# Patient Record
Sex: FEMALE | Race: BLACK OR AFRICAN AMERICAN | NOT HISPANIC OR LATINO | Employment: PART TIME | URBAN - METROPOLITAN AREA
[De-identification: names, ages, dates, MRNs, and addresses within clinical notes are randomized per-mention and may not be internally consistent; named-entity substitution may affect disease eponyms.]

---

## 2017-01-10 ENCOUNTER — TELEPHONE (OUTPATIENT)
Dept: OBGYN | Facility: CLINIC | Age: 32
End: 2017-01-10

## 2017-01-10 NOTE — TELEPHONE ENCOUNTER
Pt called to establish prenatal Care. Patient LMP was 16 . Pt complains of nausea/morning sickness.Patient is not taking prenatal vitamins. .

## 2017-01-11 ENCOUNTER — TELEPHONE (OUTPATIENT)
Dept: OBGYN | Facility: CLINIC | Age: 32
End: 2017-01-11

## 2017-01-11 DIAGNOSIS — Z34.91 NORMAL PREGNANCY IN FIRST TRIMESTER: Primary | ICD-10-CM

## 2017-01-30 ENCOUNTER — OFFICE VISIT (OUTPATIENT)
Dept: OBGYN | Facility: CLINIC | Age: 32
End: 2017-01-30
Attending: OBSTETRICS & GYNECOLOGY
Payer: COMMERCIAL

## 2017-01-30 VITALS
SYSTOLIC BLOOD PRESSURE: 129 MMHG | HEIGHT: 63 IN | HEART RATE: 87 BPM | DIASTOLIC BLOOD PRESSURE: 78 MMHG | WEIGHT: 151.6 LBS | BODY MASS INDEX: 26.86 KG/M2

## 2017-01-30 DIAGNOSIS — Z36.89 ENCOUNTER FOR FETAL ANATOMIC SURVEY: ICD-10-CM

## 2017-01-30 DIAGNOSIS — O09.291 H/O FETAL ANOMALY IN PRIOR PREGNANCY, CURRENTLY PREGNANT, FIRST TRIMESTER: ICD-10-CM

## 2017-01-30 DIAGNOSIS — Z36.82 ENCOUNTER FOR NUCHAL TRANSLUCENCY TESTING: ICD-10-CM

## 2017-01-30 DIAGNOSIS — Z86.32 HISTORY OF GESTATIONAL DIABETES MELLITUS (GDM) IN PRIOR PREGNANCY, CURRENTLY PREGNANT: ICD-10-CM

## 2017-01-30 DIAGNOSIS — O09.299 HISTORY OF GESTATIONAL DIABETES MELLITUS (GDM) IN PRIOR PREGNANCY, CURRENTLY PREGNANT: ICD-10-CM

## 2017-01-30 DIAGNOSIS — Z34.81 SUPERVISION OF NORMAL INTRAUTERINE PREGNANCY IN MULTIGRAVIDA, FIRST TRIMESTER: Primary | ICD-10-CM

## 2017-01-30 DIAGNOSIS — Z86.32 HISTORY OF GESTATIONAL DIABETES MELLITUS (GDM): ICD-10-CM

## 2017-01-30 DIAGNOSIS — O21.9 NAUSEA AND VOMITING DURING PREGNANCY PRIOR TO 22 WEEKS GESTATION: ICD-10-CM

## 2017-01-30 DIAGNOSIS — O09.91 PREGNANCY, SUPERVISION OF, HIGH-RISK, FIRST TRIMESTER: Primary | ICD-10-CM

## 2017-01-30 PROBLEM — Z34.80 SUPERVISION OF NORMAL INTRAUTERINE PREGNANCY IN MULTIGRAVIDA: Status: ACTIVE | Noted: 2017-01-30

## 2017-01-30 LAB
ABO + RH BLD: NORMAL
ABO + RH BLD: NORMAL
BASOPHILS # BLD AUTO: 0 10E9/L (ref 0–0.2)
BASOPHILS NFR BLD AUTO: 0.2 %
BLD GP AB SCN SERPL QL: NORMAL
BLOOD BANK CMNT PATIENT-IMP: NORMAL
DIFFERENTIAL METHOD BLD: NORMAL
EOSINOPHIL # BLD AUTO: 0.1 10E9/L (ref 0–0.7)
EOSINOPHIL NFR BLD AUTO: 0.9 %
ERYTHROCYTE [DISTWIDTH] IN BLOOD BY AUTOMATED COUNT: 13 % (ref 10–15)
HBA1C MFR BLD: 5.4 % (ref 4.3–6)
HCT VFR BLD AUTO: 38.8 % (ref 35–47)
HGB BLD-MCNC: 13 G/DL (ref 11.7–15.7)
IMM GRANULOCYTES # BLD: 0 10E9/L (ref 0–0.4)
IMM GRANULOCYTES NFR BLD: 0.3 %
LYMPHOCYTES # BLD AUTO: 2.2 10E9/L (ref 0.8–5.3)
LYMPHOCYTES NFR BLD AUTO: 33 %
MCH RBC QN AUTO: 28.9 PG (ref 26.5–33)
MCHC RBC AUTO-ENTMCNC: 33.5 G/DL (ref 31.5–36.5)
MCV RBC AUTO: 86 FL (ref 78–100)
MONOCYTES # BLD AUTO: 0.3 10E9/L (ref 0–1.3)
MONOCYTES NFR BLD AUTO: 3.8 %
NEUTROPHILS # BLD AUTO: 4.1 10E9/L (ref 1.6–8.3)
NEUTROPHILS NFR BLD AUTO: 61.8 %
NRBC # BLD AUTO: 0 10*3/UL
NRBC BLD AUTO-RTO: 0 /100
PLATELET # BLD AUTO: 211 10E9/L (ref 150–450)
RBC # BLD AUTO: 4.5 10E12/L (ref 3.8–5.2)
SPECIMEN EXP DATE BLD: NORMAL
WBC # BLD AUTO: 6.6 10E9/L (ref 4–11)

## 2017-01-30 PROCEDURE — 86850 RBC ANTIBODY SCREEN: CPT | Performed by: ADVANCED PRACTICE MIDWIFE

## 2017-01-30 PROCEDURE — 86780 TREPONEMA PALLIDUM: CPT | Performed by: ADVANCED PRACTICE MIDWIFE

## 2017-01-30 PROCEDURE — 86762 RUBELLA ANTIBODY: CPT | Performed by: ADVANCED PRACTICE MIDWIFE

## 2017-01-30 PROCEDURE — 85025 COMPLETE CBC W/AUTO DIFF WBC: CPT | Performed by: ADVANCED PRACTICE MIDWIFE

## 2017-01-30 PROCEDURE — 99212 OFFICE O/P EST SF 10 MIN: CPT | Mod: ZF

## 2017-01-30 PROCEDURE — 76801 OB US < 14 WKS SINGLE FETUS: CPT | Mod: ZF

## 2017-01-30 PROCEDURE — 86900 BLOOD TYPING SEROLOGIC ABO: CPT | Performed by: ADVANCED PRACTICE MIDWIFE

## 2017-01-30 PROCEDURE — 87340 HEPATITIS B SURFACE AG IA: CPT | Performed by: ADVANCED PRACTICE MIDWIFE

## 2017-01-30 PROCEDURE — 87389 HIV-1 AG W/HIV-1&-2 AB AG IA: CPT | Performed by: ADVANCED PRACTICE MIDWIFE

## 2017-01-30 PROCEDURE — 86901 BLOOD TYPING SEROLOGIC RH(D): CPT | Performed by: ADVANCED PRACTICE MIDWIFE

## 2017-01-30 PROCEDURE — 82306 VITAMIN D 25 HYDROXY: CPT | Performed by: ADVANCED PRACTICE MIDWIFE

## 2017-01-30 PROCEDURE — 87086 URINE CULTURE/COLONY COUNT: CPT | Performed by: ADVANCED PRACTICE MIDWIFE

## 2017-01-30 PROCEDURE — 83036 HEMOGLOBIN GLYCOSYLATED A1C: CPT | Performed by: ADVANCED PRACTICE MIDWIFE

## 2017-01-30 PROCEDURE — 36415 COLL VENOUS BLD VENIPUNCTURE: CPT | Performed by: ADVANCED PRACTICE MIDWIFE

## 2017-01-30 RX ORDER — PYRIDOXINE HCL (VITAMIN B6) 25 MG
25 TABLET ORAL 3 TIMES DAILY
Qty: 90 TABLET | Refills: 1 | Status: SHIPPED | OUTPATIENT
Start: 2017-01-30 | End: 2017-08-14

## 2017-01-30 RX ORDER — ONDANSETRON 4 MG/1
4 TABLET, FILM COATED ORAL EVERY 8 HOURS PRN
Qty: 30 TABLET | Refills: 1 | Status: SHIPPED | OUTPATIENT
Start: 2017-01-30 | End: 2017-08-14

## 2017-01-30 NOTE — PATIENT INSTRUCTIONS
"  Thank you for choosing [facility] for your obstetrical care.  We are an integrated health clinic with obstetricians, midwives, a psychologist, an acupuncturist, a nutritionist, a pharmacist, internal medicine and family practice all in one.  If you have questions about services offered please ask.      o Please keep all appointments with your provider.  You will help catch, prevent and treat problems early.  o Review your Expectant Family booklet and folder given to you at this intake visit.  It can answer many basic questions including:    Treatment for nausea and vomiting    Medications that are safe in pregnancy    Healthy diet and weight gain    Exercise and activity  o ASK questions!!  Please use \"Questions for my New OB visit\" form to write down any questions or concerns for your next visit.  o Eat a healthy diet.  Visit www.choosemyplate.gov and click on  Pregnancy and Breastfeeding  for information and tips  o Do not smoke.  Avoid other people's smoke, too.  We are happy to help with referrals to stop smoking programs.  o Do not drink alcohol.  o Try to avoid people who have colds or other infections.  Practice good hand washing.  o Consider registering for our Healthy Pregnancy Class here at [facility].  This class is offered every 3rd Wednesday from 2:30-4:30 p.m.  [place] at [phone] or online at [email or web address] or [email or web address]  o Consider registering for prenatal education classes through [place] at [facility].  You can view class schedules and register online at [email or web address] or call [phone] for questions     For urgent concerns, call [facility] at [phone] to speak with a triage nurse during regular clinic hours (8:00 am - 4:30 pm).  This line is answered by our service 24 hours a day, after hours a provider will return your call.  "

## 2017-01-30 NOTE — Clinical Note
2017       RE: Zaina Tan  [address]    [facility] [address]     Dear Colleague,    Thank you for referring your patient, Zaina Tan, to the [facility] at [facility]. Please see a copy of my visit note below.    31 year old female, , with LMP 2016, 8 3/7 weeks. Estimated Date of Delivery: 2017, presents for confirmation of dates and assessment of viability. This study was done transabdominally.    Measurements     CRL = 18.9 mm = 8 3/7 weeks  EGA.      Fetal anatomy appears normal for gestational age.     Fetal/Fetal Cardiac Activity: Present.  FHR = 182bpm.     Implantation: anterior.     Cervix = 37.1 cm      Maternal structures appear normal.    Impression: IUP w/ FAITH; 17    Recommend comprehensive scan at 18 to 20 weeks.    ROYCE Jolly MD, [facility] Specialists Staff  OB/GYN    2017  3:23 PM          Again, thank you for allowing me to participate in the care of your patient.      Sincerely,    [facility] Ultrasound

## 2017-01-30 NOTE — PROGRESS NOTES
31 year old female, , with LMP 2016, 8 3/7 weeks. Estimated Date of Delivery: 2017, presents for confirmation of dates and assessment of viability. This study was done transabdominally.    Measurements     CRL = 18.9 mm = 8 3/7 weeks  EGA.      Fetal anatomy appears normal for gestational age.     Fetal/Fetal Cardiac Activity: Present.  FHR = 182bpm.     Implantation: anterior.     Cervix = 37.1 cm      Maternal structures appear normal.    Impression: IUP w/ FAITH; 17    Recommend comprehensive scan at 18 to 20 weeks.    ROYCE Jolly MD, [facility] Specialists Staff  OB/GYN    2017  3:23 PM

## 2017-01-30 NOTE — MR AVS SNAPSHOT
"              After Visit Summary   1/30/2017    Zaina Tan    MRN: [id number]           Patient Information     Date Of Birth          1985        Visit Information        Provider Department      1/30/2017 3:00 PM Shahla West APRN CNM [facility]        Today's Diagnoses     Supervision of normal intrauterine pregnancy in multigravida, first trimester    -  1     History of gestational diabetes mellitus (GDM)         Nausea and vomiting during pregnancy prior to 22 weeks gestation         Encounter for nuchal translucency testing         Encounter for fetal anatomic survey         H/O fetal anomaly in prior pregnancy, currently pregnant, first trimester           Care Instructions      Thank you for choosing [facility] ([facility] for your obstetrical care.  We are an integrated health clinic with obstetricians, midwives, a psychologist, an acupuncturist, a nutritionist, a pharmacist, internal medicine and family practice all in one.  If you have questions about services offered please ask.      o Please keep all appointments with your provider.  You will help catch, prevent and treat problems early.  o Review your Expectant Family booklet and folder given to you at this intake visit.  It can answer many basic questions including:    Treatment for nausea and vomiting    Medications that are safe in pregnancy    Healthy diet and weight gain    Exercise and activity  o ASK questions!!  Please use \"Questions for my New OB visit\" form to write down any questions or concerns for your next visit.  o Eat a healthy diet.  Visit www.choosemyplate.gov and click on  Pregnancy and Breastfeeding  for information and tips  o Do not smoke.  Avoid other people's smoke, too.  We are happy to help with referrals to stop smoking programs.  o Do not drink alcohol.  o Try to avoid people who have colds or other infections.  Practice good hand washing.  o Consider registering for our " Healthy Pregnancy Class here at [facility].  This class is offered every 3rd Wednesday from 2:30-4:30 p.m.  [place] at [phone] or online at [email or web address] or [email or web address]  o Consider registering for prenatal education classes through [facility] at [facility].  You can view class schedules and register online at [email or web address] or call [phone] for questions     For urgent concerns, call [facility] at [phone] to speak with a triage nurse during regular clinic hours (8:00 am - 4:30 pm).  This line is answered by our service 24 hours a day, after hours a provider will return your call.        Follow-ups after your visit        Additional Services     MAT FETAL MED CTR REFERRAL-PREGNANCY       >> Patient may proceed with recommendations for further testing as directed by the Maternal Fetal Medicine Specialist >>    >> If requesting Fetal Echo: MFM will determine appropriate location for exam due to indication.    >> If requesting Lung Maturity Amnio:  If results indicate fetal lung maturity, induction or C/S is recommended within 36 hours.  Please schedule accordingly.     Dear Patient:   Please be aware that coverage of these services is subject to the terms and limitations of your health insurance plan.  Call member services at your health plan with any benefit or coverage questions.      Please bring the following to your appointment:    >>  Any x-rays, CTs or MRIs which have been performed.  Contact the facility where they were done to arrange for  prior to your scheduled appointment.  Any new CT, MRI or other procedures ordered by your specialist must be performed at a [facility] facility or coordinated by your clinic's referral office.  >>  List of current medications   >>  This referral request   >>  Any documents/labs given to you for this referral                  Follow-up notes from your care team     Discussed this visit Return in 3  "weeks (on 2/20/2017) for NOB.      Your next 10 appointments already scheduled     Feb 20, 2017  2:15 PM   NEW OB with RERE Dunn CNM   [facility] ([facility])    [facility] [address]  [address]  [facility] [id number] [phone]              Who to contact     Please call your clinic at [phone] to:    Ask questions about your health    Make or cancel appointments    Discuss your medicines    Learn about your test results    Speak to your doctor   If you have compliments or concerns about an experience at your clinic, or if you wish to file a complaint, please contact [facility] Patient Relations at [phone] or email us at [email or web address]         Additional Information About Your Visit        [employer] Information     Crowd[employer]t gives you secure access to your electronic health record. If you see a primary care provider, you can also send messages to your care team and make appointments. If you have questions, please call your primary care clinic.  If you do not have a primary care provider, please call [phone] and they will assist you.      Grow is an electronic gateway that provides easy, online access to your medical records. With Grow, you can request a clinic appointment, read your test results, renew a prescription or communicate with your care team.     To access your existing account, please contact your [facility] or call [phone] for assistance.        Care EveryWhere ID     This is your Care EveryWhere ID. This could be used by other organizations to access your [place] medical records  [id number]        Your Vitals Were     Pulse Height BMI (Body Mass Index)             87 1.6 m (5' 3\") 26.86 kg/m2          Blood Pressure from Last 3 Encounters:   01/30/17 129/78   01/25/16 128/68   01/11/16 129/77    Weight from Last 3 " Encounters:   01/30/17 68.765 kg (151 lb 9.6 oz)   01/25/16 65.318 kg (144 lb)   01/11/16 61.236 kg (135 lb)              We Performed the Following     25- OH-Vitamin D     ABO/Rh Type and Screen [BBH137]     Anti Treponema [THQ5837]     CBC with Platelets Differential [UMX041]     Hemoglobin A1c     Hepatitis B Surface Antigen [UKV234]     HIV Antigen Antibody Combo [ATJ8131]     MAT FETAL MED CTR REFERRAL-PREGNANCY     Rubella Antibody IgG Quantitative [BVS5293]     Urine Culture Aerobic Bacterial [POQ221]          Today's Medication Changes          These changes are accurate as of: 1/30/17  3:29 PM.  If you have any questions, ask your nurse or doctor.               Start taking these medicines.        Dose/Directions    ondansetron 4 MG tablet   Commonly known as:  ZOFRAN   Used for:  Nausea and vomiting during pregnancy prior to 22 weeks gestation   Started by:  Shahla West APRN CNM        Dose:  4 mg   Take 1 tablet (4 mg) by mouth every 8 hours as needed for nausea   Quantity:  30 tablet   Refills:  1       pyridOXINE 25 MG tablet   Commonly known as:  vitamin B-6   Used for:  Nausea and vomiting during pregnancy prior to 22 weeks gestation   Started by:  Shahla West APRN CNM        Dose:  25 mg   Take 1 tablet (25 mg) by mouth 3 times daily   Quantity:  90 tablet   Refills:  1         Stop taking these medicines if you haven't already. Please contact your care team if you have questions.     cholecalciferol 5000 UNITS Caps capsule   Commonly known as:  vitamin D3   Stopped by:  Shahla West APRN CNM           conjugated estrogens cream   Commonly known as:  PREMARIN   Stopped by:  Shahla West APRN CNM           docusate sodium 100 MG tablet   Commonly known as:  COLACE   Stopped by:  Shahla West APRN CNM           ferrous sulfate 325 (65 FE) MG tablet   Commonly known as:  IRON   Stopped by:  Shahla West APRN CNM                Where to get your  medicines      These medications were sent to [facility] - [place], [place] - [address] AT [facility] [address], [facility] [address]    Hours:  24-hours Phone:  [phone]    - ondansetron 4 MG tablet  - pyridOXINE 25 MG tablet             Primary Care Provider    Physician No Ref-Primary       No address on file        Thank you!     Thank you for choosing [facility]  for your care. Our goal is always to provide you with excellent care. Hearing back from our patients is one way we can continue to improve our services. Please take a few minutes to complete the written survey that you may receive in the mail after your visit with us. Thank you!             Your Updated Medication List - Protect others around you: Learn how to safely use, store and throw away your medicines at www.disposemymeds.org.          This list is accurate as of: 1/30/17  3:29 PM.  Always use your most recent med list.                   Brand Name Dispense Instructions for use    ondansetron 4 MG tablet    ZOFRAN    30 tablet    Take 1 tablet (4 mg) by mouth every 8 hours as needed for nausea       prenatal multivitamin  plus iron 27-0.8 MG Tabs per tablet      Take 1 tablet by mouth daily       pyridOXINE 25 MG tablet    vitamin B-6    90 tablet    Take 1 tablet (25 mg) by mouth 3 times daily

## 2017-01-30 NOTE — PROGRESS NOTES
Subjective   31 year old female who presents to clinic for initiation of OB care with her toddler daughter and her young niece.   at 8w3d with estimated date of delivery of Sep 8, 2017 based on US confirms.  Pregnancy is planned.  Symptoms since LMP include nausea and vomiting.  Patient has tried these relief measures: diet modification and small frequent meals.  Reviewed dating ultrasound.         PERSONAL/SOCIAL HISTORY  Pt is , Lives lives with their family.  Employment: Full time.  Her job involves moderate activity as nursing assistant.   Additional items: Denies past or present domestic violence, sexual and psychological abuse.    Objective  -VS: reviewed and within normal limits   -General appearance: no acute distress, patient is comfortable   NEUROLOGICAL/PSYCHIATRIC   - Orientated x3,   -Mood and affect: : normal   Genetic/Infection questionnaire completed, risks include personal history of GDM in second pregnancy.  History of pregnancy termination for hypoplastic left heart.     Assessment/Plan   at 8w3d  By US No LMP recorded. Patient is pregnant.   Encounter Diagnoses   Name Primary?     Supervision of normal intrauterine pregnancy in multigravida, first trimester Yes     History of gestational diabetes mellitus (GDM)      Nausea and vomiting during pregnancy prior to 22 weeks gestation      Encounter for nuchal translucency testing      Encounter for fetal anatomic survey      H/O fetal anomaly in prior pregnancy, currently pregnant, first trimester      History of gestational diabetes mellitus (GDM) in prior pregnancy, currently pregnant      Orders Placed This Encounter   Procedures     25- OH-Vitamin D     CBC with Platelets Differential [HMK087]     Anti Treponema [QSH0160]     Rubella Antibody IgG Quantitative [XWE4446]     Hepatitis B Surface Antigen [JNU790]     HIV Antigen Antibody Combo [ZEP7895]     Hemoglobin A1c     MAT FETAL MED CTR REFERRAL-PREGNANCY     ABO/Rh Type  and Screen [DCE032]     Orders Placed This Encounter   Medications     ondansetron (ZOFRAN) 4 MG tablet     Sig: Take 1 tablet (4 mg) by mouth every 8 hours as needed for nausea     Dispense:  30 tablet     Refill:  1     pyridOXINE (VITAMIN B-6) 25 MG tablet     Sig: Take 1 tablet (25 mg) by mouth 3 times daily     Dispense:  90 tablet     Refill:  1     In AM, afternoon, and at night.     - Reassured pt that team is aware of her past issues in pregnancy and prenatal plan will include early screening for diabetes in pregnancy and a fetal echocardiogram.    - Oriented to Practice, types of care, and how to reach a provider.   - Patient received 1st trimester new OB education packet complete with aide of The Expectant Family booklet including information on genetic screening test options.  - Patient desires 1st trimester screening which was ordered.  - Patient was encouraged to start prenatal vitamins as tolerated.    - Patient was sent to lab for routine OB labs including HgA1c.    - Patient instructed to schedule new OB exam with provider at 11 weeks.  - Pregnancy concerns to be addressed by provider at new OB exam include: personal history of GDM - will need early 1 hour, personal history of pregnancy termination for hypoplastic left heart - will need fetal echo.    Pt to RTO for NOB visit in 2 weeks and prn if questions or concerns    RERE Rios CNM

## 2017-01-30 NOTE — Clinical Note
Date:February 6, 2017      Patient was self referred, no letter generated. Do not send.        [facility] Health Information

## 2017-01-30 NOTE — Clinical Note
2017       RE: Zaina Tan  [address]    [facility] [address]     Dear Colleague,    Thank you for referring your patient, Zaina Tan, to the [facility] at [facility]. Please see a copy of my visit note below.      Subjective   31 year old female who presents to clinic for initiation of OB care with her toddler daughter and her young niece.   at 8w3d with estimated date of delivery of Sep 8, 2017 based on US confirms.  Pregnancy is planned.  Symptoms since LMP include nausea and vomiting.  Patient has tried these relief measures: diet modification and small frequent meals.  Reviewed dating ultrasound.         PERSONAL/SOCIAL HISTORY  Pt is , Lives lives with their family.  Employment: Full time.  Her job involves moderate activity as nursing assistant.   Additional items: Denies past or present domestic violence, sexual and psychological abuse.    Objective  -VS: reviewed and within normal limits   -General appearance: no acute distress, patient is comfortable   NEUROLOGICAL/PSYCHIATRIC   - Orientated x3,   -Mood and affect: : normal   Genetic/Infection questionnaire completed, risks include personal history of GDM in second pregnancy.  History of pregnancy termination for hypoplastic left heart.     Assessment/Plan   at 8w3d  By US No LMP recorded. Patient is pregnant.   Encounter Diagnoses   Name Primary?     Supervision of normal intrauterine pregnancy in multigravida, first trimester Yes     History of gestational diabetes mellitus (GDM)      Nausea and vomiting during pregnancy prior to 22 weeks gestation      Encounter for nuchal translucency testing      Encounter for fetal anatomic survey      H/O fetal anomaly in prior pregnancy, currently pregnant, first trimester      History of gestational diabetes mellitus (GDM) in prior pregnancy, currently pregnant      Orders Placed This Encounter   Procedures      25- OH-Vitamin D     CBC with Platelets Differential [QHP345]     Anti Treponema [OVO4129]     Rubella Antibody IgG Quantitative [FEU9717]     Hepatitis B Surface Antigen [PZT838]     HIV Antigen Antibody Combo [WAE3678]     Hemoglobin A1c     MAT FETAL MED CTR REFERRAL-PREGNANCY     ABO/Rh Type and Screen [BPV902]     Orders Placed This Encounter   Medications     ondansetron (ZOFRAN) 4 MG tablet     Sig: Take 1 tablet (4 mg) by mouth every 8 hours as needed for nausea     Dispense:  30 tablet     Refill:  1     pyridOXINE (VITAMIN B-6) 25 MG tablet     Sig: Take 1 tablet (25 mg) by mouth 3 times daily     Dispense:  90 tablet     Refill:  1     In AM, afternoon, and at night.     - Reassured pt that team is aware of her past issues in pregnancy and prenatal plan will include early screening for diabetes in pregnancy and a fetal echocardiogram.    - Oriented to Practice, types of care, and how to reach a provider.   - Patient received 1st trimester new OB education packet complete with aide of The Expectant Family booklet including information on genetic screening test options.  - Patient desires 1st trimester screening which was ordered.  - Patient was encouraged to start prenatal vitamins as tolerated.    - Patient was sent to lab for routine OB labs including HgA1c.    - Patient instructed to schedule new OB exam with provider at 11 weeks.  - Pregnancy concerns to be addressed by provider at new OB exam include: personal history of GDM - will need early 1 hour, personal history of pregnancy termination for hypoplastic left heart - will need fetal echo.    Pt to RTO for NOB visit in 2 weeks and prn if questions or concerns    RERE Rios CNM                      Again, thank you for allowing me to participate in the care of your patient.      Sincerely,    RERE Rios CNM

## 2017-01-30 NOTE — Clinical Note
Date:February 1, 2017      Patient was self referred, no letter generated. Do not send.        [facility] Health Information

## 2017-01-31 DIAGNOSIS — O26.90 PREGNANCY RELATED CONDITION, UNSPECIFIED TRIMESTER: Primary | ICD-10-CM

## 2017-01-31 PROBLEM — E55.9 VITAMIN D DEFICIENCY: Status: ACTIVE | Noted: 2017-01-31

## 2017-01-31 LAB
BACTERIA SPEC CULT: NO GROWTH
DEPRECATED CALCIDIOL+CALCIFEROL SERPL-MC: 25 UG/L (ref 20–75)
HBV SURFACE AG SERPL QL IA: NONREACTIVE
HIV 1+2 AB+HIV1 P24 AG SERPL QL IA: NORMAL
Lab: NORMAL
MICRO REPORT STATUS: NORMAL
RUBV IGG SERPL IA-ACNC: 110 IU/ML
SPECIMEN SOURCE: NORMAL
T PALLIDUM IGG+IGM SER QL: NEGATIVE

## 2017-02-20 ENCOUNTER — OFFICE VISIT (OUTPATIENT)
Dept: OBGYN | Facility: CLINIC | Age: 32
End: 2017-02-20
Attending: ADVANCED PRACTICE MIDWIFE
Payer: COMMERCIAL

## 2017-02-20 VITALS
DIASTOLIC BLOOD PRESSURE: 75 MMHG | HEART RATE: 69 BPM | HEIGHT: 62 IN | SYSTOLIC BLOOD PRESSURE: 117 MMHG | BODY MASS INDEX: 26.13 KG/M2 | WEIGHT: 142 LBS

## 2017-02-20 DIAGNOSIS — O09.299 HISTORY OF GESTATIONAL DIABETES MELLITUS (GDM) IN PRIOR PREGNANCY, CURRENTLY PREGNANT: ICD-10-CM

## 2017-02-20 DIAGNOSIS — R68.89 COLD FEELING: ICD-10-CM

## 2017-02-20 DIAGNOSIS — Z86.32 HISTORY OF GESTATIONAL DIABETES MELLITUS (GDM) IN PRIOR PREGNANCY, CURRENTLY PREGNANT: ICD-10-CM

## 2017-02-20 DIAGNOSIS — O09.91 SUPERVISION OF HIGH RISK PREGNANCY, ANTEPARTUM, FIRST TRIMESTER: Primary | ICD-10-CM

## 2017-02-20 DIAGNOSIS — E55.9 VITAMIN D DEFICIENCY: ICD-10-CM

## 2017-02-20 PROCEDURE — 87591 N.GONORRHOEAE DNA AMP PROB: CPT | Performed by: ADVANCED PRACTICE MIDWIFE

## 2017-02-20 PROCEDURE — 82950 GLUCOSE TEST: CPT | Performed by: ADVANCED PRACTICE MIDWIFE

## 2017-02-20 PROCEDURE — 99212 OFFICE O/P EST SF 10 MIN: CPT | Mod: ZF

## 2017-02-20 PROCEDURE — 87491 CHLMYD TRACH DNA AMP PROBE: CPT | Performed by: ADVANCED PRACTICE MIDWIFE

## 2017-02-20 ASSESSMENT — PAIN SCALES - GENERAL: PAINLEVEL: NO PAIN (0)

## 2017-02-20 NOTE — LETTER
2017       RE: Zaina Tan  [address]    [facility] [address]     Dear Colleague,    Thank you for referring your patient, Zaina Tan, to the [facility] at [facility]. Please see a copy of my visit note below.      SUBJECTIVE:    31 year old, female, , 11w3d,  who presents to the clinic today for a new ob visit.    Feels well. Has started PNV.  Estimated Date of Delivery: Sep 8, 2017   Sep 8, 2017 is calculated from No LMP recorded. Patient is pregnant..      She has not had bleeding since her LMP.   She has had mild nausea. Weight loss has not occurred.   This was a planned pregnancy.   FOB is involved,  Yaw   single  OTHER CONCERNS: Can't do early 1 hour today, will come back to have done next week.   Feeling cold at night, no fever or flu like symptoms.     ===========================================  ROS  PSYCHIATRIC:  Denies mood changes, difficulty concentrating, depression, anxiety, panic attacks or post partum depression  PHQ9: Last PHQ-9 score on record= 2  Social History   Substance Use Topics     Smoking status: Never Smoker     Smokeless tobacco: Never Used     Alcohol use No     History   Drug Use No     History   Smoking Status     Never Smoker   Smokeless Tobacco     Never Used       Alcohol use No     Family History   Problem Relation Age of Onset     Hypertension Mother      had before pregnancy     HEART DISEASE Other      hypoplastic left heart. TAB 23 wks      DIABETES No family hx of      ============================================  MEDICAL HISTORY   No Known Allergies        Current Outpatient Prescriptions:      cholecalciferol (VITAMIN D3) 5000 UNITS CAPS capsule, Take 1 capsule (5,000 Units) by mouth daily Take one capsule daily., Disp: 90 capsule, Rfl: 3     Prenatal Vit-Fe Fumarate-FA (PRENATAL MULTIVITAMIN  PLUS IRON) 27-0.8 MG TABS, Take 1 tablet by mouth daily, Disp: , Rfl:      ondansetron  "(ZOFRAN) 4 MG tablet, Take 1 tablet (4 mg) by mouth every 8 hours as needed for nausea (Patient not taking: Reported on 2017), Disp: 30 tablet, Rfl: 1     pyridOXINE (VITAMIN B-6) 25 MG tablet, Take 1 tablet (25 mg) by mouth 3 times daily (Patient not taking: Reported on 2017), Disp: 90 tablet, Rfl: 1  No current facility-administered medications for this visit.     Facility-Administered Medications Ordered in Other Visits:      lidocaine-EPINEPHrine 1.5 %-1:029578 injection, , EPIDURAL, Shanice MEJIA Cole Kiely, MD, 2 mL at 11/30/15 1202     bupivacaine (MARCAINE) 0.125 % injection (diluted from stock concentration by MD or CRNA), , EPIDURAL, Shanice MEJIA Cole Kiely, MD, 8 mL at 11/30/15 1202     fentaNYL (SUBLIMAZE) injection, , EPIDURAL, Shanice MEJIA Cole Kiely, MD, 16 mcg at 11/30/15 1202    Past Medical History   Diagnosis Date     NO ACTIVE PROBLEMS        Past Surgical History   Procedure Laterality Date     D & c       fetal hypoplastic leftheart syndrome            Obstetric History       T1      TAB0   SAB0   E0   M0   L1       # Outcome Date GA Lbr Alireza/2nd Weight Sex Delivery Anes PTL Lv   3 Current            2 Term 12/01/15 39w5d 11:55 / 01:02 3.43 kg (7 lb 9 oz) F Vag-Spont Local,EPI  Y      Apgar1:  4                Apgar5: 8   1 AB 14 23w0d                 GYN History- No Abnormal Pap Smears                        Cervical procedures: none                        History of STI: none    I personally reviewed the past social/family/medical and surgical history on the date of service.   I reviewed lab work done at Intake visit with patient.    OBJECTIVE:   PHYSICAL EXAM:  /75 (BP Location: Left arm, Patient Position: Chair, Cuff Size: Adult Regular)  Pulse 69  Ht 1.575 m (5' 2\")  Wt 64.4 kg (142 lb)  BMI 25.97 kg/m2  BMI- Body mass index is 25.97 kg/(m^2)., GENERAL:  Pleasant pregnant female, alert, cooperative and well groomed.  SKIN:  Warm and dry, without lesions " or rashes  HEAD: Symmetrical features.  MOUTH:  Buccal mucosa pink, moist without lesions.  Teeth in good repair.    NECK:  Thyroid without enlargement and nodules.  Lymph nodes not palpable.   LUNGS:  Clear to auscultation.  BREAST:    No dominant, fixed or suspicious masses are noted.  No skin or nipple changes or axillary nodes.   Nipples everted.      HEART:  RRR without murmur.  ABDOMEN: Soft without masses , tenderness or organomegaly.  No CVA tenderness.  Uterus not palpable at size equal to dates.  No scars noted.. Fetal heart tones present.  MUSCULOSKELETAL:  Full range of motion  EXTREMITIES:  No edema. No significant varicosities.   PELVIC EXAM:  GENITALIA: EGBUS  External genitalia, Bartholin's glands, urethra & [place]'s glands:normal. Vulva reveals no erythema or lesions.         VAGINA:  pink, normal rugae and discharge, no lesions, good tone.   CERVIX:  smooth, without discharge or CMT.                UTERUS: Anteverted and Midposition,  nontender 11 week size.   ADNEXA:  Without masses or tenderness.   RECTAL:  Normal appearance.  Digital exam deferred.  WET PREP:Not done  GC/CHLAMYDIA CULTURE OBTAINED:YES    ASSESSMENT:  Intrauterine pregnancy 11w3d size is consistent with dates  Genetic Screening: First Trimester Screen  Encounter Diagnoses   Name Primary?     Supervision of high risk pregnancy, antepartum, first trimester Yes     History of gestational diabetes mellitus (GDM) in prior pregnancy, currently pregnant      Cold feeling      Vitamin D deficiency         PLAN:  Orders Placed This Encounter   Procedures     TSH with free T4 reflex     Glucose 1 Hour     Orders Placed This Encounter   Medications     cholecalciferol (VITAMIN D3) 5000 UNITS CAPS capsule     Sig: Take 1 capsule (5,000 Units) by mouth daily Take one capsule daily.     Dispense:  90 capsule     Refill:  3     - Reviewed importance of early 1 hour glucose, will have done before 14 weeks.  - Reviewed use of triage nurse line and  contacting the on-call provider after hours for an urgent need such as fever, vagina bleeding, bladder or vaginal infection, rupture of membranes,  or term labor.    - Reviewed best evidence for: weight gain for her weight and height for pregnancy:  RECOMMENDED WEIGHT GAIN: 15-25 lbs.   healthy diet and foods to avoid; exercise and activity during pregnancy;avoiding exposure to toxoplasmosis; and maintenance of a generally healthy lifestyle.   - Discussed the harms, benefits, side effects and alternative therapies for current prescribed and OTC medications.    - All pt's questions discussed and answered.  Pt verbalized understanding of and agreement to plan of care.     - Continue scheduled prenatal care and prn if questions or concerns    RERE Rios CNM                 Again, thank you for allowing me to participate in the care of your patient.      Sincerely,    RERE Rios CNM

## 2017-02-20 NOTE — LETTER
Date:February 27, 2017      Patient was self referred, no letter generated. Do not send.        [facility] Health Information

## 2017-02-20 NOTE — MR AVS SNAPSHOT
"              After Visit Summary   2/20/2017    Zaina Tan    MRN: [id number]           Patient Information     Date Of Birth          1985        Visit Information        Provider Department      2/20/2017 2:15 PM Shahla West APRN CNM [facility]        Today's Diagnoses     Supervision of high risk pregnancy, antepartum, first trimester    -  1    History of gestational diabetes mellitus (GDM) in prior pregnancy, currently pregnant        Cold feeling        Vitamin D deficiency          Care Instructions      Thank you for choosing [facility] for your [facility]etrical care.  We are an integrated health clinic with obstetricians, midwives, a psychologist, an acupuncturist, a nutritionist, a pharmacist, internal medicine and family practice all in one.  If you have questions about services offered please ask.      o Please keep all appointments with your provider.  You will help catch, prevent and treat problems early.  o Review your Expectant Family booklet and folder given to you at this intake visit.  It can answer many basic questions including:    Treatment for nausea and vomiting    Medications that are safe in pregnancy    Healthy diet and weight gain    Exercise and activity  o ASK questions!!  Please use \"Questions for my New OB visit\" form to write down any questions or concerns for your next visit.  o Eat a healthy diet.  Visit www.choosemyplate.gov and click on  Pregnancy and Breastfeeding  for information and tips  o Do not smoke.  Avoid other people's smoke, too.  We are happy to help with referrals to stop smoking programs.  o Do not drink alcohol.  o Try to avoid people who have colds or other infections.  Practice good hand washing.  o Consider registering for our Healthy Pregnancy Class here at [facility].  This class is offered every 3rd Wednesday from 2:30-4:30 p.m.  [place] at [phone] or online at [email or web address] or " [email or web address]  o Consider registering for prenatal education classes through [employer] at [facility].  You can view class schedules and register online at [email or web address] or call [phone] for questions     For urgent concerns, call [facility] at [phone] to speak with a triage nurse during regular clinic hours (8:00 am - 4:30 pm).  This line is answered by our service 24 hours a day, after hours a provider will return your call.        Follow-ups after your visit        Follow-up notes from your care team     Will only call if abnormal Return in 4 weeks (on 3/20/2017) for ZEKE.      Your next 10 appointments already scheduled     Feb 27, 2017 12:45 PM CST   Genetic Counseling with REBEKAH GEN COUNSELOR 1   [facility] - [place] ([facility])    [address]  [facility] [address]   [phone]            Feb 27, 2017  1:30 PM CST   LEONEL NUCHAL TRANS W US SINGLE with [email or web address]   [facility] Ultrasound - [place] ([facility])    [address]  [facility] [id number] [phone]            Feb 27, 2017  2:00 PM CST   Radiology MD with REBEKAH CASTANEDA MD   [facility] [place] ([facility])    [address]  [facility] [address]   [phone]           Please arrive at the time given for your first appointment.  This visit is used internally to schedule the physician's time during your ultrasound.            Mar 24, 2017  2:15 PM CDT   RETURN OB with Dee Troncoso CNM   [facility]    [facility] [address]  [address]  [facility] [id number] [phone]              Who to contact     Please call your clinic at [phone] to:    Ask questions about your health    Make or cancel  "appointments    Discuss your medicines    Learn about your test results    Speak to your doctor   If you have compliments or concerns about an experience at your clinic, or if you wish to file a complaint, please contact [facility] Patient Relations at [phone] or email us at [email or web address]         Additional Information About Your Visit        [employer] Information     [employer] gives you secure access to your electronic health record. If you see a primary care provider, you can also send messages to your care team and make appointments. If you have questions, please call your primary care clinic.  If you do not have a primary care provider, please call [phone] and they will assist you.      [employer] is an electronic gateway that provides easy, online access to your medical records. With [employer], you can request a clinic appointment, read your test results, renew a prescription or communicate with your care team.     To access your existing account, please contact your [facility] Clinic or call [phone] for assistance.        Care EveryWhere ID     This is your Care EveryWhere ID. This could be used by other organizations to access your [place] medical records  [id number]        Your Vitals Were     Pulse Height BMI (Body Mass Index)             69 1.575 m (5' 2\") 25.97 kg/m2          Blood Pressure from Last 3 Encounters:   02/20/17 117/75   01/30/17 129/78   01/25/16 128/68    Weight from Last 3 Encounters:   02/20/17 64.4 kg (142 lb)   01/30/17 68.8 kg (151 lb 9.6 oz)   01/25/16 65.3 kg (144 lb)              We Performed the Following     Chlamydia Trachomatis PCR [[id number]]     Gonorrhea PCR [[id number]]          Today's Medication Changes          These changes are accurate as of: 2/20/17 11:59 PM.  If you have any questions, ask your nurse or doctor.               Start taking these medicines.        Dose/Directions    cholecalciferol " 5000 UNITS Caps capsule   Commonly known as:  vitamin D3   Used for:  Vitamin D deficiency   Started by:  Shahla West APRN CNM        Dose:  5000 Units   Take 1 capsule (5,000 Units) by mouth daily Take one capsule daily.   Quantity:  90 capsule   Refills:  3            Where to get your medicines      These medications were sent to [id number] - [place], [place] - [address] AT [facility] [address], [facility] [address]    Hours:  24-hours Phone:  [phone]     cholecalciferol 5000 UNITS Caps capsule                Primary Care Provider    Physician No Ref-Primary       No address on file        Thank you!     Thank you for choosing [facility]  for your care. Our goal is always to provide you with excellent care. Hearing back from our patients is one way we can continue to improve our services. Please take a few minutes to complete the written survey that you may receive in the mail after your visit with us. Thank you!             Your Updated Medication List - Protect others around you: Learn how to safely use, store and throw away your medicines at www.disposemymeds.org.          This list is accurate as of: 2/20/17 11:59 PM.  Always use your most recent med list.                   Brand Name Dispense Instructions for use    cholecalciferol 5000 UNITS Caps capsule    vitamin D3    90 capsule    Take 1 capsule (5,000 Units) by mouth daily Take one capsule daily.       ondansetron 4 MG tablet    ZOFRAN    30 tablet    Take 1 tablet (4 mg) by mouth every 8 hours as needed for nausea       prenatal multivitamin  plus iron 27-0.8 MG Tabs per tablet      Take 1 tablet by mouth daily       pyridOXINE 25 MG tablet    vitamin B-6    90 tablet    Take 1 tablet (25 mg) by mouth 3 times daily

## 2017-02-20 NOTE — PROGRESS NOTES
SUBJECTIVE:    31 year old, female, , 11w3d,  who presents to the clinic today for a new ob visit.    Feels well. Has started PNV.  Estimated Date of Delivery: Sep 8, 2017   Sep 8, 2017 is calculated from No LMP recorded. Patient is pregnant..      She has not had bleeding since her LMP.   She has had mild nausea. Weight loss has not occurred.   This was a planned pregnancy.   FOB is involved,  Yaw   single  OTHER CONCERNS: Can't do early 1 hour today, will come back to have done next week.   Feeling cold at night, no fever or flu like symptoms.     ===========================================  ROS  PSYCHIATRIC:  Denies mood changes, difficulty concentrating, depression, anxiety, panic attacks or post partum depression  PHQ9: Last PHQ-9 score on record= 2  Social History   Substance Use Topics     Smoking status: Never Smoker     Smokeless tobacco: Never Used     Alcohol use No     History   Drug Use No     History   Smoking Status     Never Smoker   Smokeless Tobacco     Never Used       Alcohol use No     Family History   Problem Relation Age of Onset     Hypertension Mother      had before pregnancy     HEART DISEASE Other      hypoplastic left heart. TAB 23 wks      DIABETES No family hx of      ============================================  MEDICAL HISTORY   No Known Allergies        Current Outpatient Prescriptions:      cholecalciferol (VITAMIN D3) 5000 UNITS CAPS capsule, Take 1 capsule (5,000 Units) by mouth daily Take one capsule daily., Disp: 90 capsule, Rfl: 3     Prenatal Vit-Fe Fumarate-FA (PRENATAL MULTIVITAMIN  PLUS IRON) 27-0.8 MG TABS, Take 1 tablet by mouth daily, Disp: , Rfl:      ondansetron (ZOFRAN) 4 MG tablet, Take 1 tablet (4 mg) by mouth every 8 hours as needed for nausea (Patient not taking: Reported on 2017), Disp: 30 tablet, Rfl: 1     pyridOXINE (VITAMIN B-6) 25 MG tablet, Take 1 tablet (25 mg) by mouth 3 times daily (Patient not taking: Reported on 2017), Disp: 90  "tablet, Rfl: 1  No current facility-administered medications for this visit.     Facility-Administered Medications Ordered in Other Visits:      lidocaine-EPINEPHrine 1.5 %-1:635990 injection, , EPIDURAL, Shanice MEJIA Cole Kiely, MD, 2 mL at 11/30/15 1202     bupivacaine (MARCAINE) 0.125 % injection (diluted from stock concentration by MD or CRNA), , EPIDURALJACKIE Deutz, Cole Kiely, MD, 8 mL at 11/30/15 1202     fentaNYL (SUBLIMAZE) injection, , EPIDURAL, Shanice MEJIA Cole Kiely, MD, 16 mcg at 11/30/15 1202    Past Medical History   Diagnosis Date     NO ACTIVE PROBLEMS        Past Surgical History   Procedure Laterality Date     D & c       fetal hypoplastic leftheart syndrome            Obstetric History       T1      TAB0   SAB0   E0   M0   L1       # Outcome Date GA Lbr Laireza/2nd Weight Sex Delivery Anes PTL Lv   3 Current            2 Term 12/01/15 39w5d 11:55 / 01:02 3.43 kg (7 lb 9 oz) F Vag-Spont Local,EPI  Y      Apgar1:  4                Apgar5: 8   1 AB 14 23w0d                 GYN History- No Abnormal Pap Smears                        Cervical procedures: none                        History of STI: none    I personally reviewed the past social/family/medical and surgical history on the date of service.   I reviewed lab work done at Intake visit with patient.    OBJECTIVE:   PHYSICAL EXAM:  /75 (BP Location: Left arm, Patient Position: Chair, Cuff Size: Adult Regular)  Pulse 69  Ht 1.575 m (5' 2\")  Wt 64.4 kg (142 lb)  BMI 25.97 kg/m2  BMI- Body mass index is 25.97 kg/(m^2)., GENERAL:  Pleasant pregnant female, alert, cooperative and well groomed.  SKIN:  Warm and dry, without lesions or rashes  HEAD: Symmetrical features.  MOUTH:  Buccal mucosa pink, moist without lesions.  Teeth in good repair.    NECK:  Thyroid without enlargement and nodules.  Lymph nodes not palpable.   LUNGS:  Clear to auscultation.  BREAST:    No dominant, fixed or suspicious masses are noted.  No skin " or nipple changes or axillary nodes.   Nipples everted.      HEART:  RRR without murmur.  ABDOMEN: Soft without masses , tenderness or organomegaly.  No CVA tenderness.  Uterus not palpable at size equal to dates.  No scars noted.. Fetal heart tones present.  MUSCULOSKELETAL:  Full range of motion  EXTREMITIES:  No edema. No significant varicosities.   PELVIC EXAM:  GENITALIA: EGBUS  External genitalia, Bartholin's glands, urethra & [place]'s glands:normal. Vulva reveals no erythema or lesions.         VAGINA:  pink, normal rugae and discharge, no lesions, good tone.   CERVIX:  smooth, without discharge or CMT.                UTERUS: Anteverted and Midposition,  nontender 11 week size.   ADNEXA:  Without masses or tenderness.   RECTAL:  Normal appearance.  Digital exam deferred.  WET PREP:Not done  GC/CHLAMYDIA CULTURE OBTAINED:YES    ASSESSMENT:  Intrauterine pregnancy 11w3d size is consistent with dates  Genetic Screening: First Trimester Screen  Encounter Diagnoses   Name Primary?     Supervision of high risk pregnancy, antepartum, first trimester Yes     History of gestational diabetes mellitus (GDM) in prior pregnancy, currently pregnant      Cold feeling      Vitamin D deficiency         PLAN:  Orders Placed This Encounter   Procedures     TSH with free T4 reflex     Glucose 1 Hour     Orders Placed This Encounter   Medications     cholecalciferol (VITAMIN D3) 5000 UNITS CAPS capsule     Sig: Take 1 capsule (5,000 Units) by mouth daily Take one capsule daily.     Dispense:  90 capsule     Refill:  3     - Reviewed importance of early 1 hour glucose, will have done before 14 weeks.  - Reviewed use of triage nurse line and contacting the on-call provider after hours for an urgent need such as fever, vagina bleeding, bladder or vaginal infection, rupture of membranes,  or term labor.    - Reviewed best evidence for: weight gain for her weight and height for pregnancy:  RECOMMENDED WEIGHT GAIN: 15-25 lbs.    healthy diet and foods to avoid; exercise and activity during pregnancy;avoiding exposure to toxoplasmosis; and maintenance of a generally healthy lifestyle.   - Discussed the harms, benefits, side effects and alternative therapies for current prescribed and OTC medications.    - All pt's questions discussed and answered.  Pt verbalized understanding of and agreement to plan of care.     - Continue scheduled prenatal care and prn if questions or concerns    RERE Rios CNM

## 2017-02-20 NOTE — PATIENT INSTRUCTIONS
"  Thank you for choosing [facility] for your [facility] care.  We are an integrated health clinic with obstetricians, midwives, a psychologist, an acupuncturist, a nutritionist, a pharmacist, internal medicine and family practice all in one.  If you have questions about services offered please ask.      o Please keep all appointments with your provider.  You will help catch, prevent and treat problems early.  o Review your Expectant Family booklet and folder given to you at this intake visit.  It can answer many basic questions including:    Treatment for nausea and vomiting    Medications that are safe in pregnancy    Healthy diet and weight gain    Exercise and activity  o ASK questions!!  Please use \"Questions for my New OB visit\" form to write down any questions or concerns for your next visit.  o Eat a healthy diet.  Visit www.choosemyplate.gov and click on  Pregnancy and Breastfeeding  for information and tips  o Do not smoke.  Avoid other people's smoke, too.  We are happy to help with referrals to stop smoking programs.  o Do not drink alcohol.  o Try to avoid people who have colds or other infections.  Practice good hand washing.  o Consider registering for our Healthy Pregnancy Class here at [facility].  This class is offered every 3rd Wednesday from 2:30-4:30 p.m.  [place] at [phone] or online at [email or web address] or [email or web address]  o Consider registering for prenatal education classes through [place] at [facility].  You can view class schedules and register online at [email or web address] or call [phone] for questions     For urgent concerns, call [facility] at [phone] to speak with a triage nurse during regular clinic hours (8:00 am - 4:30 pm).  This line is answered by our service 24 hours a day, after hours a provider will return your call.  "

## 2017-02-21 LAB
C TRACH DNA SPEC QL NAA+PROBE: NORMAL
N GONORRHOEA DNA SPEC QL NAA+PROBE: NORMAL
SPECIMEN SOURCE: NORMAL
SPECIMEN SOURCE: NORMAL

## 2017-02-22 ENCOUNTER — PRE VISIT (OUTPATIENT)
Dept: MATERNAL FETAL MEDICINE | Facility: CLINIC | Age: 32
End: 2017-02-22

## 2017-02-27 ENCOUNTER — OFFICE VISIT (OUTPATIENT)
Dept: MATERNAL FETAL MEDICINE | Facility: CLINIC | Age: 32
End: 2017-02-27
Attending: ADVANCED PRACTICE MIDWIFE
Payer: COMMERCIAL

## 2017-02-27 ENCOUNTER — HOSPITAL ENCOUNTER (OUTPATIENT)
Dept: ULTRASOUND IMAGING | Facility: CLINIC | Age: 32
Discharge: HOME | End: 2017-02-27
Attending: ADVANCED PRACTICE MIDWIFE | Admitting: OBSTETRICS & GYNECOLOGY
Payer: COMMERCIAL

## 2017-02-27 DIAGNOSIS — Z82.79 FAMILY HISTORY OF CONGENITAL HEART DEFECT: ICD-10-CM

## 2017-02-27 DIAGNOSIS — R68.89 COLD FEELING: ICD-10-CM

## 2017-02-27 DIAGNOSIS — O09.299 PREVIOUS PREGNANCY WITH CONGENITAL HEART DEFECT, CURRENTLY PREGNANT, UNSPECIFIED TRIMESTER: ICD-10-CM

## 2017-02-27 DIAGNOSIS — Z86.32 HISTORY OF GESTATIONAL DIABETES MELLITUS (GDM) IN PRIOR PREGNANCY, CURRENTLY PREGNANT: ICD-10-CM

## 2017-02-27 DIAGNOSIS — Z36.9 FIRST TRIMESTER SCREENING: Primary | ICD-10-CM

## 2017-02-27 DIAGNOSIS — O26.90 PREGNANCY RELATED CONDITION, UNSPECIFIED TRIMESTER: ICD-10-CM

## 2017-02-27 DIAGNOSIS — O09.299 HISTORY OF GESTATIONAL DIABETES MELLITUS (GDM) IN PRIOR PREGNANCY, CURRENTLY PREGNANT: ICD-10-CM

## 2017-02-27 DIAGNOSIS — O09.91 SUPERVISION OF HIGH RISK PREGNANCY, ANTEPARTUM, FIRST TRIMESTER: ICD-10-CM

## 2017-02-27 LAB
GLUCOSE 1H P 50 G GLC PO SERPL-MCNC: 87 MG/DL (ref 60–129)
TSH SERPL DL<=0.005 MIU/L-ACNC: 0.94 MU/L (ref 0.4–4)

## 2017-02-27 PROCEDURE — 76813 OB US NUCHAL MEAS 1 GEST: CPT

## 2017-02-27 PROCEDURE — 84704 HCG FREE BETACHAIN TEST: CPT | Performed by: OBSTETRICS & GYNECOLOGY

## 2017-02-27 PROCEDURE — 36415 COLL VENOUS BLD VENIPUNCTURE: CPT | Performed by: ADVANCED PRACTICE MIDWIFE

## 2017-02-27 PROCEDURE — 36415 COLL VENOUS BLD VENIPUNCTURE: CPT | Performed by: OBSTETRICS & GYNECOLOGY

## 2017-02-27 PROCEDURE — 84163 PAPPA SERUM: CPT | Performed by: OBSTETRICS & GYNECOLOGY

## 2017-02-27 PROCEDURE — 84443 ASSAY THYROID STIM HORMONE: CPT | Performed by: ADVANCED PRACTICE MIDWIFE

## 2017-02-27 PROCEDURE — 82950 GLUCOSE TEST: CPT | Performed by: ADVANCED PRACTICE MIDWIFE

## 2017-02-27 PROCEDURE — 96040 ZZH GENETIC COUNSELING, EACH 30 MINUTES: CPT | Mod: ZF | Performed by: GENETIC COUNSELOR, MS

## 2017-02-27 NOTE — MR AVS SNAPSHOT
After Visit Summary   2/27/2017    Zaina Tan    MRN: [id number]           Patient Information     Date Of Birth          1985        Visit Information        Provider Department      2/27/2017 12:45 PM Yvette Maharaj GC [facility] Maternal Fetal Medicine - [place]        Today's Diagnoses     First trimester screening    -  1    Pregnancy related condition, unspecified trimester        Family history of congenital heart defect           Follow-ups after your visit        Your next 10 appointments already scheduled     Mar 24, 2017  2:15 PM CDT   RETURN OB with Dee Troncoso CNM   [facility] ([facility])    [facility] [address]  [address]  [address]  [facility] [address] [phone]            Apr 17, 2017  1:30 PM CDT   LEONEL US COMP with URMFMUSR1   [facility] Maternal Fetal Medicine Ultrasound - [facility])    [address]  [facility] [id number] [phone]           Wear comfortable clothes and leave your valuables at home.            Apr 17, 2017  2:00 PM CDT   Radiology MD with UR LEONEL C[place]AND   [facility] Maternal Fetal Medicine - [place] ([facility])    [address]  [facility] [address] [phone]           Please arrive at the time given for your first appointment.  This visit is used internally to schedule the physician's time during your ultrasound.            May 05, 2017  2:00 PM CDT   Ech Fetal Complete* with URFETR1   [facility] Echo/EKG ([facility])    [address] [place] [address]  [facility] [address]                 Future tests that were ordered for you today     Open Future Orders        Priority Expected Expires Ordered    Echo Fetal Complete-Peds Cardiology Routine 5/8/2017 2/27/2018 2/27/2017            Who to contact     If you have questions or need follow up information about  today's clinic visit or your schedule please contact [facility] MATERNAL FETAL MEDICINE [facility] directly at [phone].  Normal or non-critical lab and imaging results will be communicated to you by [employer], letter or phone within 4 business days after the clinic has received the results. If you do not hear from us within 7 days, please contact the clinic through [employer] or phone. If you have a critical or abnormal lab result, we will notify you by phone as soon as possible.  Submit refill requests through [employer] or call your pharmacy and they will forward the refill request to us. Please allow 3 business days for your refill to be completed.          Additional Information About Your Visit        [employer] Information     [employer] gives you secure access to your electronic health record. If you see a primary care provider, you can also send messages to your care team and make appointments. If you have questions, please call your primary care clinic.  If you do not have a primary care provider, please call [phone] and they will assist you.        Care EveryWhere ID     This is your Care EveryWhere ID. This could be used by other organizations to access your [place] medical records  [id number]         Blood Pressure from Last 3 Encounters:   02/20/17 117/75   01/30/17 129/78   01/25/16 128/68    Weight from Last 3 Encounters:   02/20/17 64.4 kg (142 lb)   01/30/17 68.8 kg (151 lb 9.6 oz)   01/25/16 65.3 kg (144 lb)              We Performed the Following     [facility] Genetic Counseling        Primary Care Provider    Physician No Ref-Primary       No address on file        Thank you!     Thank you for choosing [facility]  for your care. Our goal is always to provide you with excellent care. Hearing back from our patients is one way we can continue to improve our services. Please take a few minutes to complete the written survey that you may receive in the mail after your visit  with us. Thank you!             Your Updated Medication List - Protect others around you: Learn how to safely use, store and throw away your medicines at www.disposemymeds.org.          This list is accurate as of: 2/27/17  3:11 PM.  Always use your most recent med list.                   Brand Name Dispense Instructions for use    cholecalciferol 5000 UNITS Caps capsule    vitamin D3    90 capsule    Take 1 capsule (5,000 Units) by mouth daily Take one capsule daily.       ondansetron 4 MG tablet    ZOFRAN    30 tablet    Take 1 tablet (4 mg) by mouth every 8 hours as needed for nausea       prenatal multivitamin  plus iron 27-0.8 MG Tabs per tablet      Take 1 tablet by mouth daily       pyridOXINE 25 MG tablet    vitamin B-6    90 tablet    Take 1 tablet (25 mg) by mouth 3 times daily

## 2017-02-27 NOTE — PROGRESS NOTES
"[facility] Maternal Fetal Medicine [place]  Genetic Counseling Consult    Patient: Zaina Tan YOB: 1985     Date of Service: 17     Referring Provider: Shahla West CNM        Zaina Tan was seen at [facility]etal [facility] Center for genetic consultation to discuss the options for routine screening for fetal chromosome abnormalities.  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      Impression/Plan:   1.  Zaina had an ultrasound and blood draw for first trimester screening.  Results are expected within one week, and will be available in EPIC.  We will contact her to discuss the results, and a copy will be forwarded to the office of the referring OB provider.    2.  Maternal serum AFP (single marker screen) is recommended after 15 weeks to screen for open neural tube defects.  A quad screen should not be performed.    3.  Due to Zaina's history of a baby with HLH, level II ultrasound has been scheduled for 17, and fetal echocardiogram has been scheduled for 17.    Pregnancy History:   /Parity:    Age at Delivery: 31 year old  FAITH: 2017, by Ultrasound  Gestational Age: 12w3d    No significant complications or exposures were reported in the current pregnancy.      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  Amniocentesis was completed, and chromosome results were normal (normal karyotype and normal microarray).  Zaina and her partner opted to terminate the pregnancy at 23 weeks via a D&E procedure (in [place]).  Zaina and her partner had a subsequent pregnancy that was uncomplicated and resulted in a full term delivery of a healthy daughter who is now 14 months of age.  Zaina reports that her daughter had a small \"hole in the heart\" that has spontaneously closed.  She has been followed by Dr. Marte (cardiology) due to the history of HLH in her " sibling.    Medical History:   Zaina s reported medical history is not expected to impact pregnancy management or risks to fetal development.  Zaina is 31 years of age and she reports good health.  Her partner and father of the baby, Bravo, is 34 years of age and he is healthy.       Family History:   A three-generation pedigree was obtained during Zaina's prior pregnancy.  The family history was updated today and this has been scanned into EPIC.  Zaina's partner has a brother with learning disabilities.  Otherwise, the reported family history is negative for multiple miscarriages, stillbirths, birth defects, mental retardation, known genetic conditions, and consanguinity.       Carrier Screening:   The patient reports that she and the father of the pregnancy have  ancestry:       The hemoglobinopathies are a group of genetic blood diseases that occur with increased frequency in individuals of  ancestry, and carrier screening for these conditions is available.  Carrier screening for the hemoglobinopathies includes a CBC with red blood cell indices, a ferritin level, and a quantitative hemoglobin electrophoresis or HPLC.  In addition,  screening in the [facility] includes many of the hemoglobinopathies.  Zaina has a normal MCV value, making it less likely for her to be a carrier of thalassemia.  She was informed about the option of hemoglobin electrophoresis testing to evaluate for sickle cell trait, and she did not express interest as of today.      Expanded carrier screening is available for a large panel of autosomal recessive conditions including cystic fibrosis, spinal muscular atrophy, and others.  Expanded carrier screening was not specifically discussed today, but remains an option for Zaina if she is interested.       Risk Assessment:   Zaina has had genetic counseling with Sindy Zambrano (in prior pregnancies) regarding the multifactorial inheritance associated with most cases  of hypoplastic left heart, and that the risk of recurrence in another pregnancy is in the range of 3-4%.  Some cases of HLH are due to an underlying chromosome abnormality or other genetic syndrome, though the normal amniocentesis results from the affected pregnancy suggest a relatively low likelihood that the HLH was syndromic.  In some families, left sided heart defects are associated with a relatively strong genetic susceptibility, and the risk of recurrence can be fairly significant in some families.  Given the history of HLH in Zaina's first pregnancy, as well as the history of a small hole in the heart for Zaina's daughter, the risk to the current pregnancy for any type of heart defect may be slightly higher than 3-4%.  Level II ultrasound and fetal echocardiogram are recommended and have been scheduled.    We reviewed the association between increasing maternal age and an increased risk for fetal chromosome abnormalities. We discussed specific features of common chromosome abnormalities including Down syndrome, trisomy 18, and trisomy 13.      At age 31 at midtrimester, the risk to have a baby with Down syndrome is about 1 in 508.     At age 31 at midtrimester, the risk to have a baby with any chromosome abnormality is about 1 in 254.       Testing Options:   We discussed the following options:     First trimester screening    First trimester ultrasound with nuchal translucency and nasal bone assessments, maternal plasma hCG, IVETTE-A, and AFP measurement    Screens for fetal trisomy 21, trisomy 13, and trisomy 18    Cannot screen for open neural tube defects; maternal serum AFP after 15 weeks is recommended     Non-invasive Prenatal Testing (NIPT)    Maternal plasma cell-free DNA testing; first trimester ultrasound with nuchal translucency and nasal bone assessment is recommended, when appropriate    Screens for fetal trisomy 21, trisomy 13, trisomy 18, and sex chromosome aneuploidy    Cannot screen for open  neural tube defects; maternal serum AFP after 15 weeks is recommended     Genetic Amniocentesis    Invasive procedure typically performed in the second trimester by which amniotic fluid is obtained for the purpose of chromosome analysis and/or other prenatal genetic analysis    Diagnostic results; >99% sensitivity for fetal chromosome abnormalities    AFAFP measurement tests for open neural tube defects     Comprehensive (Level II) ultrasound:     Detailed ultrasound performed between 18-22 weeks gestation to screen for major birth defects and markers for aneuploidy.      We reviewed the benefits and limitations of this testing.  Screening tests provide a risk assessment specific to the pregnancy for certain fetal chromosome abnormalities, but cannot definitively diagnose or exclude a fetal chromosome abnormality.  Follow-up genetic counseling and consideration of diagnostic testing is recommended with any abnormal screening result.      It was a [facility] be [facility].  Face-to-face time of the meeting was 35 minutes.      Yvette Maharaj, [facility]  Certified Genetic Counselor  Pager: [phone]

## 2017-02-27 NOTE — PROGRESS NOTES
"Please see \"Imaging\" tab under \"Chart Review\" for details of today's US.    Kalie Mendoza, DO    "

## 2017-03-02 ENCOUNTER — TELEPHONE (OUTPATIENT)
Dept: MATERNAL FETAL MEDICINE | Facility: CLINIC | Age: 32
End: 2017-03-02

## 2017-03-02 NOTE — TELEPHONE ENCOUNTER
I left Zaina a detailed message regarding her reassuring first trimester screen results.  Specifically, the screen risk of Down syndrome is less than 1 in 10,000 (vs. 1 in 458 age risk), and the screen risk of trisomy 18/13 is less than 1 in 10,000.  I explained that these results are very reassuring, though not definitive, and further aneuploidy screening/testing is not indicated at this time.  I encouraged Zaina to call me back if she has questions, or if she wants to review this result in more detail.    Serum AFP screening between 15-22 weeks is recommended to screen for open neural tube defects.      Yvette Maharaj MS, [facility]  Certified Genetic Counselor  March 2, 2017  11:53 AM

## 2017-03-06 LAB — LAB SCANNED RESULT: NORMAL

## 2017-03-24 ENCOUNTER — OFFICE VISIT (OUTPATIENT)
Dept: OBGYN | Facility: CLINIC | Age: 32
End: 2017-03-24
Attending: ADVANCED PRACTICE MIDWIFE
Payer: COMMERCIAL

## 2017-03-24 VITALS
HEIGHT: 62 IN | DIASTOLIC BLOOD PRESSURE: 66 MMHG | HEART RATE: 82 BPM | BODY MASS INDEX: 26.94 KG/M2 | SYSTOLIC BLOOD PRESSURE: 104 MMHG | WEIGHT: 146.4 LBS

## 2017-03-24 DIAGNOSIS — Z34.82 SUPERVISION OF NORMAL INTRAUTERINE PREGNANCY IN MULTIGRAVIDA, SECOND TRIMESTER: Primary | ICD-10-CM

## 2017-03-24 DIAGNOSIS — O09.291 H/O FETAL ANOMALY IN PRIOR PREGNANCY, CURRENTLY PREGNANT, FIRST TRIMESTER: ICD-10-CM

## 2017-03-24 ASSESSMENT — PAIN SCALES - GENERAL: PAINLEVEL: NO PAIN (0)

## 2017-03-24 NOTE — LETTER
"3/24/2017       RE: Zaina Tan  [address]    [facility] [address]     Dear Colleague,    Thank you for referring your patient, Zaina Tan, to the [facility] at [facility]. Please see a copy of my visit note below.    Subjective:      31 year old  at 16w4d presents for a routine prenatal appointment.        Denies cramping/contractions,  vaginal bleeding or leakage of fluid.   Not feeling fetal movement yet.   No HA, visual changes, RUQ or epigastric pain.     Pt concerns: Feeling well overall. Nausea is improving, has lost 3lbs total but has gained weight since last visit.   Level II US  Scheduled.  Fetal echo scheduled.  Offered AFP, pt declined.     Objective:  Vitals:    17 1421   BP: 104/66   BP Location: Left arm   Patient Position: Chair   Cuff Size: Adult Regular   Pulse: 82   Weight: 66.4 kg (146 lb 6.4 oz)   Height: 1.575 m (5' 2.01\")      See OB flowsheet    Assessment/Plan     Encounter Diagnoses   Name Primary?     Supervision of normal intrauterine pregnancy in multigravida, second trimester Yes     H/O fetal anomaly in prior pregnancy, currently pregnant, first trimester      Patient education/orders or handouts today:  PTL signs/symptoms, Fetal movement and Level 2 u/s scheduled  - Reviewed total weight gain, encouraged continued healthy diet and exercise.      - Reviewed why/how to contact provider.      Reviewed OBI/NOB labs.   Continue vitamin D supplementation.  Declined AFP.  Level 2 u/s scheduled for .  Fetal echo scheduled for .  Continue scheduled prenatal care, RTC in approx 4 weeks and prn if questions or concerns.     RERE Martinez CNM                  Again, thank you for allowing me to participate in the care of your patient.      Sincerely,    Dee Troncoso CNM      "

## 2017-03-24 NOTE — MR AVS SNAPSHOT
After Visit Summary   3/24/2017    Zaina Tan    MRN: [id number]           Patient Information     Date Of Birth          1985        Visit Information        Provider Department      3/24/2017 2:15 PM Dee Troncoso CNM [facility]        Today's Diagnoses     Supervision of normal intrauterine pregnancy in multigravida, second trimester    -  1    H/O fetal anomaly in prior pregnancy, currently pregnant, first trimester           Follow-ups after your visit        Follow-up notes from your care team     Return in about 4 weeks (around 4/21/2017) for AMANDA MOJICA.      Your next 10 appointments already scheduled     Apr 17, 2017  1:30 PM CDT   MFM US COMP with URMFMUSR1   [facility]etal [facility] Ultrasound - [facility])    [address]  [facility] [address] [phone]           Wear comfortable clothes and leave your valuables at home.            Apr 17, 2017  2:00 PM CDT   Radiology MD with UR LEONEL C[place]AND   [facility] Maternal [facility]etal Medicine - [facility])    [address]  [facility] [address] [phone]           Please arrive at the time given for your first appointment.  This visit is used internally to schedule the physician's time during your ultrasound.            Apr 25, 2017  2:15 PM CDT   RETURN OB with Dee Troncoso CNM   [facility] ([facility])    [facility] [address]  [address]  [address]  [facility] [address] [phone]            May 05, 2017  2:00 PM CDT   Ech Fetal Complete* with URFETR1   [facility] Echo/EKG ([facility])    [address]  [facility] [address]                 Who to contact     Please call your clinic at [phone] to:    Ask questions about your health    Make or cancel  "appointments    Discuss your medicines    Learn about your test results    Speak to your doctor   If you have compliments or concerns about an experience at your clinic, or if you wish to file a complaint, please contact [facility] Patient Relations at [phone] or email us at [email or web address]         Additional Information About Your Visit        MyChart Information     [employer] gives you secure access to your electronic health record. If you see a primary care provider, you can also send messages to your care team and make appointments. If you have questions, please call your primary care clinic.  If you do not have a primary care provider, please call [phone] and they will assist you.      [employer] is an electronic gateway that provides easy, online access to your medical records. With [employer], you can request a clinic appointment, read your test results, renew a prescription or communicate with your care team.     To access your existing account, please contact your [facility] or call [phone] for assistance.        Care EveryWhere ID     This is your Care EveryWhere ID. This could be used by other organizations to access your [place] medical records  [id number]        Your Vitals Were     Pulse Height BMI (Body Mass Index)             82 1.575 m (5' 2.01\") 26.77 kg/m2          Blood Pressure from Last 3 Encounters:   03/24/17 104/66   02/20/17 117/75   01/30/17 129/78    Weight from Last 3 Encounters:   03/24/17 66.4 kg (146 lb 6.4 oz)   02/20/17 64.4 kg (142 lb)   01/30/17 68.8 kg (151 lb 9.6 oz)              Today, you had the following     No orders found for display       Primary Care Provider    Physician No Ref-Primary       No address on file        Thank you!     Thank you for choosing [facility]  for your care. Our goal is always to provide you with excellent care. Hearing back from our patients " is one way we can continue to improve our services. Please take a few minutes to complete the written survey that you may receive in the mail after your visit with us. Thank you!             Your Updated Medication List - Protect others around you: Learn how to safely use, store and throw away your medicines at www.disposemymeds.org.          This list is accurate as of: 3/24/17 11:59 PM.  Always use your most recent med list.                   Brand Name Dispense Instructions for use    cholecalciferol 5000 UNITS Caps capsule    vitamin D3    90 capsule    Take 1 capsule (5,000 Units) by mouth daily Take one capsule daily.       ondansetron 4 MG tablet    ZOFRAN    30 tablet    Take 1 tablet (4 mg) by mouth every 8 hours as needed for nausea       prenatal multivitamin  plus iron 27-0.8 MG Tabs per tablet      Take 1 tablet by mouth daily       pyridOXINE 25 MG tablet    vitamin B-6    90 tablet    Take 1 tablet (25 mg) by mouth 3 times daily

## 2017-03-28 NOTE — PROGRESS NOTES
"Subjective:      31 year old  at 16w4d presents for a routine prenatal appointment.        Denies cramping/contractions,  vaginal bleeding or leakage of fluid.   Not feeling fetal movement yet.   No HA, visual changes, RUQ or epigastric pain.     Pt concerns: Feeling well overall. Nausea is improving, has lost 3lbs total but has gained weight since last visit.   Level II US  Scheduled.  Fetal echo scheduled.  Offered AFP, pt declined.     Objective:  Vitals:    17 1421   BP: 104/66   BP Location: Left arm   Patient Position: Chair   Cuff Size: Adult Regular   Pulse: 82   Weight: 66.4 kg (146 lb 6.4 oz)   Height: 1.575 m (5' 2.01\")      See OB flowsheet    Assessment/Plan     Encounter Diagnoses   Name Primary?     Supervision of normal intrauterine pregnancy in multigravida, second trimester Yes     H/O fetal anomaly in prior pregnancy, currently pregnant, first trimester      Patient education/orders or handouts today:  PTL signs/symptoms, Fetal movement and Level 2 u/s scheduled  - Reviewed total weight gain, encouraged continued healthy diet and exercise.      - Reviewed why/how to contact provider.      Reviewed OBI/NOB labs.   Continue vitamin D supplementation.  Declined AFP.  Level 2 u/s scheduled for .  Fetal echo scheduled for .  Continue scheduled prenatal care, RTC in approx 4 weeks and prn if questions or concerns.     Dee Troncoso, APRN, CNM                "

## 2017-04-17 ENCOUNTER — HOSPITAL ENCOUNTER (OUTPATIENT)
Dept: ULTRASOUND IMAGING | Facility: CLINIC | Age: 32
Discharge: HOME | End: 2017-04-17
Attending: ADVANCED PRACTICE MIDWIFE | Admitting: OBSTETRICS & GYNECOLOGY
Payer: COMMERCIAL

## 2017-04-17 ENCOUNTER — OFFICE VISIT (OUTPATIENT)
Dept: MATERNAL FETAL MEDICINE | Facility: CLINIC | Age: 32
End: 2017-04-17
Attending: ADVANCED PRACTICE MIDWIFE
Payer: COMMERCIAL

## 2017-04-17 DIAGNOSIS — O26.90 PREGNANCY RELATED CONDITION, UNSPECIFIED TRIMESTER: ICD-10-CM

## 2017-04-17 DIAGNOSIS — O09.299 PREVIOUS PREGNANCY WITH CONGENITAL HEART DEFECT, CURRENTLY PREGNANT, UNSPECIFIED TRIMESTER: Primary | ICD-10-CM

## 2017-04-17 PROCEDURE — 76811 OB US DETAILED SNGL FETUS: CPT

## 2017-04-17 NOTE — PROGRESS NOTES
"Please see \"Imaging\" tab under \"Chart Review\" for details of today's US at the [facility].    Santos Rodriguez MD  Maternal-Fetal Medicine      "

## 2017-04-17 NOTE — MR AVS SNAPSHOT
After Visit Summary   4/17/2017    Zaina Tan    MRN: [id number]           Patient Information     Date Of Birth          1985        Visit Information        Provider Department      4/17/2017 2:00 PM Santos Rodriguez MD [facility] Maternal Fetal Medicine [facility]        Today's Diagnoses     Previous pregnancy with congenital heart defect, currently pregnant, unspecified trimester    -  1       Follow-ups after your visit        Your next 10 appointments already scheduled     Apr 25, 2017  2:15 PM CDT   RETURN OB with Dee Troncoso CNM   [facility] ([facility])    [facility] [address]  [address]  [address]  [facility] [address] [phone]            May 05, 2017  2:00 PM CDT   Ech Fetal Complete* with URFETR1   [facility] Echo/EKG ([facility])    [address]  [facility] [address]                 Who to contact     If you have questions or need follow up information about today's clinic visit or your schedule please contact [facility] MATERNAL FETAL MEDICINE [facility] directly at [phone].  Normal or non-critical lab and imaging results will be communicated to you by [email or web address], letter or phone within 4 business days after the clinic has received the results. If you do not hear from us within 7 days, please contact the clinic through [email or web address] or phone. If you have a critical or abnormal lab result, we will notify you by phone as soon as possible.  Submit refill requests through [employer] or call your pharmacy and they will forward the refill request to us. Please allow 3 business days for your refill to be completed.          Additional Information About Your Visit        MyChart Information     [employer] gives you secure access to your electronic health record. If you see a primary care provider, you can also send messages to your care team and make appointments. If you have  questions, please call your primary care clinic.  If you do not have a primary care provider, please call [phone] and they will assist you.        Care EveryWhere ID     This is your Care EveryWhere ID. This could be used by other organizations to access your [place] medical records  [id number]         Blood Pressure from Last 3 Encounters:   03/24/17 104/66   02/20/17 117/75   01/30/17 129/78    Weight from Last 3 Encounters:   03/24/17 66.4 kg (146 lb 6.4 oz)   02/20/17 64.4 kg (142 lb)   01/30/17 68.8 kg (151 lb 9.6 oz)              Today, you had the following     No orders found for display       Primary Care Provider    Physician No Ref-Primary       No address on file        Thank you!     Thank you for choosing [facility]  for your care. Our goal is always to provide you with excellent care. Hearing back from our patients is one way we can continue to improve our services. Please take a few minutes to complete the written survey that you may receive in the mail after your visit with us. Thank you!             Your Updated Medication List - Protect others around you: Learn how to safely use, store and throw away your medicines at www.disposemymeds.org.          This list is accurate as of: 4/17/17  2:03 PM.  Always use your most recent med list.                   Brand Name Dispense Instructions for use    cholecalciferol 5000 UNITS Caps capsule    vitamin D3    90 capsule    Take 1 capsule (5,000 Units) by mouth daily Take one capsule daily.       ondansetron 4 MG tablet    ZOFRAN    30 tablet    Take 1 tablet (4 mg) by mouth every 8 hours as needed for nausea       prenatal multivitamin  plus iron 27-0.8 MG Tabs per tablet      Take 1 tablet by mouth daily       pyridOXINE 25 MG tablet    vitamin B-6    90 tablet    Take 1 tablet (25 mg) by mouth 3 times daily

## 2017-05-01 ENCOUNTER — OFFICE VISIT (OUTPATIENT)
Dept: OBGYN | Facility: CLINIC | Age: 32
End: 2017-05-01
Attending: ADVANCED PRACTICE MIDWIFE
Payer: COMMERCIAL

## 2017-05-01 VITALS
HEART RATE: 73 BPM | HEIGHT: 63 IN | DIASTOLIC BLOOD PRESSURE: 65 MMHG | BODY MASS INDEX: 27.11 KG/M2 | WEIGHT: 153 LBS | SYSTOLIC BLOOD PRESSURE: 107 MMHG

## 2017-05-01 DIAGNOSIS — Z86.32 HISTORY OF GESTATIONAL DIABETES MELLITUS (GDM) IN PRIOR PREGNANCY, CURRENTLY PREGNANT: ICD-10-CM

## 2017-05-01 DIAGNOSIS — D25.9 UTERINE LEIOMYOMA, UNSPECIFIED LOCATION: ICD-10-CM

## 2017-05-01 DIAGNOSIS — O09.291 H/O FETAL ANOMALY IN PRIOR PREGNANCY, CURRENTLY PREGNANT, FIRST TRIMESTER: ICD-10-CM

## 2017-05-01 DIAGNOSIS — O99.891 BACK PAIN AFFECTING PREGNANCY IN SECOND TRIMESTER: ICD-10-CM

## 2017-05-01 DIAGNOSIS — O09.299 HISTORY OF GESTATIONAL DIABETES MELLITUS (GDM) IN PRIOR PREGNANCY, CURRENTLY PREGNANT: ICD-10-CM

## 2017-05-01 DIAGNOSIS — M54.9 BACK PAIN AFFECTING PREGNANCY IN SECOND TRIMESTER: ICD-10-CM

## 2017-05-01 DIAGNOSIS — Z34.82 SUPERVISION OF NORMAL INTRAUTERINE PREGNANCY IN MULTIGRAVIDA, SECOND TRIMESTER: Primary | ICD-10-CM

## 2017-05-01 PROCEDURE — 99212 OFFICE O/P EST SF 10 MIN: CPT | Mod: ZF

## 2017-05-01 PROCEDURE — 99213 OFFICE O/P EST LOW 20 MIN: CPT

## 2017-05-01 ASSESSMENT — PAIN SCALES - GENERAL: PAINLEVEL: SEVERE PAIN (6)

## 2017-05-01 NOTE — LETTER
"2017       RE: Zaina Tan  [address]    [facility] [address]     Dear Colleague,    Thank you for referring your patient, Zaina Tan, to the [facility] at [facility]. Please see a copy of my visit note below.    Subjective:      31 year old  at 21w3d presents for a routine prenatal appointment.     no vaginal bleeding or leakage of fluid.  no contractions or cramping.  + fetal movement - a little every day.       No HA, visual changes, RUQ or epigastric pain.     The patient presents with the following concerns: Low back pain, worse on L side.  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  Her work schedule has several days blocked together.  Hasn't started wearing her maternity belt yet but will.  Took tylenol last 2 days ago - 1 extra strength and repeated 4 hours later.  Hasn't tried stretches or heat yet.   No s/s of UTI, no CVA tenderness.  Would like work modification letter.      Level II US  Results reviewed normal scan.   Has Fetal Echo scheduled 17.        Objective:  Vitals:    17 1351   BP: 107/65   Pulse: 73   Weight: 69.4 kg (153 lb)   Height: 1.6 m (5' 3\")     See OB flowsheet    Assessment/Plan     Encounter Diagnoses   Name Primary?     Supervision of normal intrauterine pregnancy in multigravida, second trimester Yes     Back pain affecting pregnancy in second trimester      Uterine leiomyoma, unspecified location      H/O fetal anomaly in prior pregnancy, currently pregnant, first trimester      History of gestational diabetes mellitus (GDM) in prior pregnancy, currently pregnant      - Reviewed total weight gain, encouraged continued healthy diet and exercise.    Reinforced heat to back, tylenol prn, stretches, maternity belt, limiting twisting/lifting.   PT encouraged, declined.  Work modification letter given.   - Reviewed why/how to contact provider.    Patient " education/orders or handouts today:PTL signs/symptoms, Fetal movement, PTL handout and Plan for EOB visit w labs   Return to clinic in 6 weeks and prn if questions or concerns.   RERE Rios CNM                  Again, thank you for allowing me to participate in the care of your patient.      Sincerely,    RERE Rios CNM

## 2017-05-01 NOTE — MR AVS SNAPSHOT
After Visit Summary   5/1/2017    Zaina Tan    MRN: [id number]           Patient Information     Date Of Birth          1985        Visit Information        Provider Department      5/1/2017 1:45 PM Shahla West APRN CNM [facility]        Today's Diagnoses     Supervision of normal intrauterine pregnancy in multigravida, second trimester    -  1       Follow-ups after your visit        Follow-up notes from your care team     Discussed this visit Return in about 6 weeks (around 6/12/2017) for EOB.      Your next 10 appointments already scheduled     May 16, 2017  1:00 PM CDT   Ech Fetal Complete* with URFETR1   [facility] Echo[facility]EKG ([facility])    [address]  [facility] [address]               Jun 12, 2017  1:30 PM CDT   RETURN OB with RERE Dunn CNM   [facility] ([facility])    [facility] [address]  [address]  [address]  [facility] [address] [phone]              Who to contact     Please call your clinic at [phone] to:    Ask questions about your health    Make or cancel appointments    Discuss your medicines    Learn about your test results    Speak to your doctor   If you have compliments or concerns about an experience at your clinic, or if you wish to file a complaint, please contact [facility] Patient Relations at [phone] or email us at [email or web address]         Additional Information About Your Visit        MyChart Information     [employer] gives you secure access to your electronic health record. If you see a primary care provider, you can also send messages to your care team and make appointments. If you have questions, please call your primary care clinic.  If you do not have a primary care provider, please call [phone] and they will assist you.      [employer] is an electronic gateway that  "provides easy, online access to your medical records. With [employer], you can request a clinic appointment, read your test results, renew a prescription or communicate with your care team.     To access your existing account, please contact your [facility] or call [phone] for assistance.        Care EveryWhere ID     This is your Care EveryWhere ID. This could be used by other organizations to access your [place] medical records  [id number]        Your Vitals Were     Pulse Height BMI (Body Mass Index)             73 1.6 m (5' 3\") 27.1 kg/m2          Blood Pressure from Last 3 Encounters:   05/01/17 107/65   03/24/17 104/66   02/20/17 117/75    Weight from Last 3 Encounters:   05/01/17 69.4 kg (153 lb)   03/24/17 66.4 kg (146 lb 6.4 oz)   02/20/17 64.4 kg (142 lb)              Today, you had the following     No orders found for display       Primary Care Provider    Physician No Ref-Primary       No address on file        Thank you!     Thank you for choosing [facility]  for your care. Our goal is always to provide you with excellent care. Hearing back from our patients is one way we can continue to improve our services. Please take a few minutes to complete the written survey that you may receive in the mail after your visit with us. Thank you!             Your Updated Medication List - Protect others around you: Learn how to safely use, store and throw away your medicines at www.disposemymeds.org.          This list is accurate as of: 5/1/17  2:18 PM.  Always use your most recent med list.                   Brand Name Dispense Instructions for use    cholecalciferol 5000 UNITS Caps capsule    vitamin D3    90 capsule    Take 1 capsule (5,000 Units) by mouth daily Take one capsule daily.       ondansetron 4 MG tablet    ZOFRAN    30 tablet    Take 1 tablet (4 mg) by mouth every 8 hours as needed for nausea       prenatal multivitamin  plus iron 27-0.8 MG " Tabs per tablet      Take 1 tablet by mouth daily       pyridOXINE 25 MG tablet    vitamin B-6    90 tablet    Take 1 tablet (25 mg) by mouth 3 times daily

## 2017-05-01 NOTE — LETTER
Date:May 3, 2017      Patient was self referred, no letter generated. Do not send.        [facility] Health Information

## 2017-05-01 NOTE — LETTER
May 1, 2017        Zaina Tan  [address]  [facility] [address]    To Whom it May Concern:    I am one of the medical provider treating Zaina Tan for prenatal care.  She seen in our office on 5/1/2017 and was given the following instructions. Ms. Tan has a health condition that requires accomodation.  Specifically can not stand for an hour without fifteen minute break to sit, may not lift 50lbs more than three times a shift and must be assisted with all lifting over 25lbs.  Additionally she may not be put at risk of being kicked in the stomach.    Ms. Tan is able to continue working with reasonable accomodation.  We recommend the following accomodation - change to scheduling to have no more than two shifts in a row, prefer one shift followed by break day if possible.      This limitation began on 5/1/2017. At this time, I anticipate that Ms. Tan will need an accomodation for her limitation until her  Estimated Date of Delivery: Sep 8, 2017.    Please feel free to contact us should you require additional information or clarification.         Sincerely,        RERE Alfaro CNM

## 2017-05-01 NOTE — PROGRESS NOTES
"Subjective:      31 year old  at 21w3d presents for a routine prenatal appointment.     no vaginal bleeding or leakage of fluid.  no contractions or cramping.  + fetal movement - a little every day.       No HA, visual changes, RUQ or epigastric pain.     The patient presents with the following concerns: Low back pain, worse on L side.  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  Her work schedule has several days blocked together.  Hasn't started wearing her maternity belt yet but will.  Took tylenol last 2 days ago - 1 extra strength and repeated 4 hours later.  Hasn't tried stretches or heat yet.   No s/s of UTI, no CVA tenderness.  Would like work modification letter.      Level II US  Results reviewed normal scan.   Has Fetal Echo scheduled 17.        Objective:  Vitals:    17 1351   BP: 107/65   Pulse: 73   Weight: 69.4 kg (153 lb)   Height: 1.6 m (5' 3\")     See OB flowsheet    Assessment/Plan     Encounter Diagnoses   Name Primary?     Supervision of normal intrauterine pregnancy in multigravida, second trimester Yes     Back pain affecting pregnancy in second trimester      Uterine leiomyoma, unspecified location      H/O fetal anomaly in prior pregnancy, currently pregnant, first trimester      History of gestational diabetes mellitus (GDM) in prior pregnancy, currently pregnant      - Reviewed total weight gain, encouraged continued healthy diet and exercise.    Reinforced heat to back, tylenol prn, stretches, maternity belt, limiting twisting/lifting.   PT encouraged, declined.  Work modification letter given.   - Reviewed why/how to contact provider.    Patient education/orders or handouts today:PTL signs/symptoms, Fetal movement, PTL handout and Plan for EOB visit w labs   Return to clinic in 6 weeks and prn if questions or concerns.   Shahla West, RERE MOJICA                "

## 2017-05-16 ENCOUNTER — HOSPITAL ENCOUNTER (OUTPATIENT)
Dept: CARDIOLOGY | Facility: CLINIC | Age: 32
Discharge: HOME | End: 2017-05-16
Attending: OBSTETRICS & GYNECOLOGY | Admitting: OBSTETRICS & GYNECOLOGY
Payer: COMMERCIAL

## 2017-05-16 DIAGNOSIS — O09.299 PREVIOUS PREGNANCY WITH CONGENITAL HEART DEFECT, CURRENTLY PREGNANT, UNSPECIFIED TRIMESTER: ICD-10-CM

## 2017-05-16 PROCEDURE — 76825 ECHO EXAM OF FETAL HEART: CPT

## 2017-06-12 ENCOUNTER — OFFICE VISIT (OUTPATIENT)
Dept: OBGYN | Facility: CLINIC | Age: 32
End: 2017-06-12
Attending: ADVANCED PRACTICE MIDWIFE
Payer: COMMERCIAL

## 2017-06-12 DIAGNOSIS — O09.299 HISTORY OF GESTATIONAL DIABETES MELLITUS (GDM) IN PRIOR PREGNANCY, CURRENTLY PREGNANT: ICD-10-CM

## 2017-06-12 DIAGNOSIS — Z23 NEED FOR DIPHTHERIA-TETANUS-PERTUSSIS (TDAP) VACCINE: ICD-10-CM

## 2017-06-12 DIAGNOSIS — Z86.32 HISTORY OF GESTATIONAL DIABETES MELLITUS (GDM) IN PRIOR PREGNANCY, CURRENTLY PREGNANT: ICD-10-CM

## 2017-06-12 DIAGNOSIS — Z34.82 SUPERVISION OF NORMAL INTRAUTERINE PREGNANCY IN MULTIGRAVIDA, SECOND TRIMESTER: Primary | ICD-10-CM

## 2017-06-12 DIAGNOSIS — Z23 NEED FOR TDAP VACCINATION: ICD-10-CM

## 2017-06-12 LAB
GLUCOSE 1H P 50 G GLC PO SERPL-MCNC: 98 MG/DL (ref 60–129)
HGB BLD-MCNC: 11 G/DL (ref 11.7–15.7)

## 2017-06-12 PROCEDURE — 90715 TDAP VACCINE 7 YRS/> IM: CPT | Mod: ZF

## 2017-06-12 PROCEDURE — 99212 OFFICE O/P EST SF 10 MIN: CPT | Mod: 25

## 2017-06-12 PROCEDURE — 86780 TREPONEMA PALLIDUM: CPT | Performed by: ADVANCED PRACTICE MIDWIFE

## 2017-06-12 PROCEDURE — 25000125 ZZHC RX 250: Mod: ZF

## 2017-06-12 PROCEDURE — 90471 IMMUNIZATION ADMIN: CPT

## 2017-06-12 PROCEDURE — 82306 VITAMIN D 25 HYDROXY: CPT | Performed by: ADVANCED PRACTICE MIDWIFE

## 2017-06-12 PROCEDURE — 85027 COMPLETE CBC AUTOMATED: CPT | Performed by: ADVANCED PRACTICE MIDWIFE

## 2017-06-12 PROCEDURE — 82950 GLUCOSE TEST: CPT | Performed by: ADVANCED PRACTICE MIDWIFE

## 2017-06-12 PROCEDURE — 85018 HEMOGLOBIN: CPT | Performed by: ADVANCED PRACTICE MIDWIFE

## 2017-06-12 NOTE — MR AVS SNAPSHOT
After Visit Summary   6/12/2017    Zaina Tan    MRN: [id number]           Patient Information     Date Of Birth          1985        Visit Information        Provider Department      6/12/2017 1:30 PM Shahla West APRN CNM [facility]        Today's Diagnoses     Supervision of normal intrauterine pregnancy in multigravida, second trimester    -  1    Need for diphtheria-tetanus-pertussis (Tdap) vaccine        Need for Tdap vaccination        History of gestational diabetes mellitus (GDM) in prior pregnancy, currently pregnant           Follow-ups after your visit        Your next 10 appointments already scheduled     Jul 10, 2017  2:00 PM CDT   RETURN OB with RERE Dunn CNM   [facility] ([facility])    [facility] [address]  [address]  [address]  [facility] [address] [phone]              Who to contact     Please call your clinic at [phone] to:    Ask questions about your health    Make or cancel appointments    Discuss your medicines    Learn about your test results    Speak to your doctor   If you have compliments or concerns about an experience at your clinic, or if you wish to file a complaint, please contact [facility] Patient Relations at [phone] or email us at [email or web address]         Additional Information About Your Visit        MyChart Information     [employer] gives you secure access to your electronic health record. If you see a primary care provider, you can also send messages to your care team and make appointments. If you have questions, please call your primary care clinic.  If you do not have a primary care provider, please call [phone] and they will assist you.      [employer] is an electronic gateway that provides easy, online access to your medical records. With [employer], you can request a clinic appointment, read  your test results, renew a prescription or communicate with your care team.     To access your existing account, please contact your [facility] or call [phone] for assistance.        Care EveryWhere ID     This is your Care EveryWhere ID. This could be used by other organizations to access your [place] medical records  [id number]        Your Vitals Were     BMI (Body Mass Index)                   27.97 kg/m2            Blood Pressure from Last 3 Encounters:   06/13/17 118/62   05/01/17 107/65   03/24/17 104/66    Weight from Last 3 Encounters:   06/13/17 71.6 kg (157 lb 14.4 oz)   05/01/17 69.4 kg (153 lb)   03/24/17 66.4 kg (146 lb 6.4 oz)              We Performed the Following     25 Hydroxyvitamin D2 and D3     Anti Treponema     CBC with Platelets     Glucose tolerance gest screen 1 hour     Hemoglobin     TDAP VACCINE (BOOSTRIX)        Primary Care Provider    Physician No Ref-Primary       No address on file        Thank you!     Thank you for choosing [facility]  for your care. Our goal is always to provide you with excellent care. Hearing back from our patients is one way we can continue to improve our services. Please take a few minutes to complete the written survey that you may receive in the mail after your visit with us. Thank you!             Your Updated Medication List - Protect others around you: Learn how to safely use, store and throw away your medicines at www.disposemymeds.org.          This list is accurate as of: 6/12/17 11:59 PM.  Always use your most recent med list.                   Brand Name Dispense Instructions for use    cholecalciferol 5000 UNITS Caps capsule    vitamin D3    90 capsule    Take 1 capsule (5,000 Units) by mouth daily Take one capsule daily.       ondansetron 4 MG tablet    ZOFRAN    30 tablet    Take 1 tablet (4 mg) by mouth every 8 hours as needed for nausea       prenatal multivitamin  plus iron 27-0.8 MG  Tabs per tablet      Take 1 tablet by mouth daily       pyridOXINE 25 MG tablet    vitamin B-6    90 tablet    Take 1 tablet (25 mg) by mouth 3 times daily

## 2017-06-12 NOTE — LETTER
Date:June 14, 2017      Patient was self referred, no letter generated. Do not send.        [facility] Health Information

## 2017-06-12 NOTE — LETTER
2017       RE: Zaina Tan  [address]    [facility] [address]     Dear Colleague,    Thank you for referring your patient, Zaina Tan, to the [facility]. Please see a copy of my visit note below.       31 year old, , 27w3d,   The patient presents with the following concerns: Feeling well overall.  Has been able to have shifts split up so she has more recovery time after work.   PHQ-9 SCORE 2015   Total Score 16 - 2   Total Score - 16 -     Education completed today includes breast feeding, [facility] hand out , contraception, counting movements, signs of pre-term labor, when to present to birthplace, post partum depression, GBS, getting enough iron and labor induction.  Birth preferences reviewed: Medicated  Labor support:      Feeding plans :    Contraception planned:  Unsure. Plans a few years for child spacing.  Will review options and follow up.   The following labs were ordered today:       GCT, CBC w platelets, Vitamin D and Anti-treponema  Water birth consent form was not given.  Blood type:   ABO   Date Value Ref Range Status   2017 B  Final     RH(D)   Date Value Ref Range Status   2017  Pos  Final     Antibody Screen   Date Value Ref Range Status   2017 Neg  Final   Rhogam  was not given.  TDAP  Was given.  A/P:  Encounter Diagnoses   Name Primary?     Supervision of normal intrauterine pregnancy in multigravida, second trimester Yes     Need for diphtheria-tetanus-pertussis (Tdap) vaccine      Need for Tdap vaccination      History of gestational diabetes mellitus (GDM) in prior pregnancy, currently pregnant      Orders Placed This Encounter   Procedures     TDAP VACCINE (BOOSTRIX)     Glucose tolerance gest screen 1 hour     Hemoglobin     Anti Treponema     25 Hydroxyvitamin D2 and D3     CBC with Platelets     - Confirmed pt would like S  [facility] care in labor.     Continue scheduled prenatal care  RERE Rios CNM                  Again, thank you for allowing me to participate in the care of your patient.      Sincerely,    RERE Rios CNM

## 2017-06-13 VITALS — SYSTOLIC BLOOD PRESSURE: 118 MMHG | DIASTOLIC BLOOD PRESSURE: 62 MMHG | BODY MASS INDEX: 27.97 KG/M2 | WEIGHT: 157.9 LBS

## 2017-06-13 LAB
DEPRECATED CALCIDIOL+CALCIFEROL SERPL-MC: NORMAL UG/L (ref 20–75)
ERYTHROCYTE [DISTWIDTH] IN BLOOD BY AUTOMATED COUNT: 13.5 % (ref 10–15)
HCT VFR BLD AUTO: 34.1 % (ref 35–47)
HGB BLD-MCNC: 11 G/DL (ref 11.7–15.7)
MCH RBC QN AUTO: 29.6 PG (ref 26.5–33)
MCHC RBC AUTO-ENTMCNC: 32.3 G/DL (ref 31.5–36.5)
MCV RBC AUTO: 92 FL (ref 78–100)
PLATELET # BLD AUTO: 205 10E9/L (ref 150–450)
RBC # BLD AUTO: 3.71 10E12/L (ref 3.8–5.2)
T PALLIDUM IGG+IGM SER QL: NEGATIVE
VITAMIN D2 SERPL-MCNC: <5 UG/L
VITAMIN D3 SERPL-MCNC: 48 UG/L
WBC # BLD AUTO: 8.3 10E9/L (ref 4–11)

## 2017-06-13 NOTE — PROGRESS NOTES
31 year old, , 27w3d,   The patient presents with the following concerns: Feeling well overall.  Has been able to have shifts split up so she has more recovery time after work.   PHQ-9 SCORE 2015   Total Score 16 - 2   Total Score - 16 -     Education completed today includes breast feeding, [facility] hand out , contraception, counting movements, signs of pre-term labor, when to present to birthplace, post partum depression, GBS, getting enough iron and labor induction.  Birth preferences reviewed: Medicated  Labor support:      Feeding plans :    Contraception planned:  Unsure. Plans a few years for child spacing.  Will review options and follow up.   The following labs were ordered today:       GCT, CBC w platelets, Vitamin D and Anti-treponema  Water birth consent form was not given.  Blood type:   ABO   Date Value Ref Range Status   2017 B  Final     RH(D)   Date Value Ref Range Status   2017  Pos  Final     Antibody Screen   Date Value Ref Range Status   2017 Neg  Final   Rhogam  was not given.  TDAP  Was given.  A/P:  Encounter Diagnoses   Name Primary?     Supervision of normal intrauterine pregnancy in multigravida, second trimester Yes     Need for diphtheria-tetanus-pertussis (Tdap) vaccine      Need for Tdap vaccination      History of gestational diabetes mellitus (GDM) in prior pregnancy, currently pregnant      Orders Placed This Encounter   Procedures     TDAP VACCINE (BOOSTRIX)     Glucose tolerance gest screen 1 hour     Hemoglobin     Anti Treponema     25 Hydroxyvitamin D2 and D3     CBC with Platelets     - Confirmed pt would like [facility] care in labor.     Continue scheduled prenatal care  RERE Rios [facility]

## 2017-07-10 ENCOUNTER — OFFICE VISIT (OUTPATIENT)
Dept: OBGYN | Facility: CLINIC | Age: 32
End: 2017-07-10
Attending: ADVANCED PRACTICE MIDWIFE
Payer: COMMERCIAL

## 2017-07-10 VITALS
DIASTOLIC BLOOD PRESSURE: 63 MMHG | WEIGHT: 161.4 LBS | BODY MASS INDEX: 28.59 KG/M2 | HEART RATE: 94 BPM | SYSTOLIC BLOOD PRESSURE: 116 MMHG

## 2017-07-10 DIAGNOSIS — Z34.83 SUPERVISION OF NORMAL INTRAUTERINE PREGNANCY IN MULTIGRAVIDA, THIRD TRIMESTER: Primary | ICD-10-CM

## 2017-07-10 DIAGNOSIS — Z86.32 HISTORY OF GESTATIONAL DIABETES MELLITUS (GDM) IN PRIOR PREGNANCY, CURRENTLY PREGNANT: ICD-10-CM

## 2017-07-10 DIAGNOSIS — D25.9 UTERINE LEIOMYOMA, UNSPECIFIED LOCATION: ICD-10-CM

## 2017-07-10 DIAGNOSIS — O09.299 HISTORY OF GESTATIONAL DIABETES MELLITUS (GDM) IN PRIOR PREGNANCY, CURRENTLY PREGNANT: ICD-10-CM

## 2017-07-10 DIAGNOSIS — E55.9 VITAMIN D DEFICIENCY: ICD-10-CM

## 2017-07-10 DIAGNOSIS — O09.293 H/O FETAL ANOMALY IN PRIOR PREGNANCY, CURRENTLY PREGNANT, THIRD TRIMESTER: ICD-10-CM

## 2017-07-10 PROCEDURE — 99212 OFFICE O/P EST SF 10 MIN: CPT | Mod: ZF

## 2017-07-10 NOTE — PROGRESS NOTES
Subjective:      31 year old  at 31w3d presentst for a routine prenatal appointment with young daughter and her nephew.    No vaginal bleeding or leakage of fluid.  no contractions. + fetal movement.       No HA, visual changes, RUQ or epigastric pain.   Patient concerns:   Feeling well overall.  Reviewed EOB labs with patient - pt happy she's not GDM this pregnancy.  Reviewed TDAP Previously given     - Having some R sided sciatica and pubic symphysis discomfort after walking/standing.  Resting helps.  Has maternity belt from previous pregnancy but hasn't started wearing. Not interested in PT at this time.   - Has [facility] paperwork to be completed.     Objective:  Vitals:    07/10/17 1407   BP: 116/63   BP Location: Left arm   Patient Position: Chair   Cuff Size: Adult Regular   Pulse: 94   Weight: 73.2 kg (161 lb 6.4 oz)   See ob flowsheet    Assessment/Plan     Encounter Diagnoses   Name Primary?     Supervision of normal intrauterine pregnancy in multigravida, third trimester Yes     H/O fetal anomaly in prior pregnancy, currently pregnant, third trimester      Uterine leiomyoma, unspecified location      History of gestational diabetes mellitus (GDM) in prior pregnancy, currently pregnant      Vitamin D deficiency        ABO   Date Value Ref Range Status   2017 B  Final     RH(D)   Date Value Ref Range Status   2017  Pos  Final     Antibody Screen   Date Value Ref Range Status   2017 Neg  Final   , Rhogam  was notgiven.    - Reviewed total weight gain, encouraged continued healthy diet and exercise.  .  Reviewed importance of daily fetal kick count and why/how to contact provider.  - Encouraged maternity belt while working/walking and then additional pillow support while resting.  Offered PT, pt delcined.   - Reviewed why/how to contact provider if headache/visual changes/RUQ or epigastric pain, decreased fetal movement, vaginal bleeding, leakage of fluid or more than 4 contractions in an  hour.     Patient education/orders or handouts today:PTL signs/symptoms and Hospital tour  Reviewed GBS screening at 35-36 wks.    Return to clinic in 3 weeks and prn if questions or concerns.     Shahla West, APRN ADWOAM

## 2017-07-10 NOTE — LETTER
7/10/2017       RE: Zaina Tan  [address]    [facility] [address]     Dear Colleague,    Thank you for referring your patient, Zaina Tan, to the [facility] at [facility]. Please see a copy of my visit note below.    Subjective:      31 year old  at 31w3d presentst for a routine prenatal appointment with young daughter and her nephew.    No vaginal bleeding or leakage of fluid.  no contractions. + fetal movement.       No HA, visual changes, RUQ or epigastric pain.   Patient concerns:   Feeling well overall.  Reviewed EOB labs with patient - pt happy she's not GDM this pregnancy.  Reviewed TDAP Previously given     - Having some R sided sciatica and pubic symphysis discomfort after walking/standing.  Resting helps.  Has maternity belt from previous pregnancy but hasn't started wearing. Not interested in PT at this time.   - Has FMLA paperwork to be completed.     Objective:  Vitals:    07/10/17 1407   BP: 116/63   BP Location: Left arm   Patient Position: Chair   Cuff Size: Adult Regular   Pulse: 94   Weight: 73.2 kg (161 lb 6.4 oz)   See ob flowsheet  Assessment/Plan     Encounter Diagnoses   Name Primary?     Supervision of normal intrauterine pregnancy in multigravida, third trimester Yes     H/O fetal anomaly in prior pregnancy, currently pregnant, third trimester      Uterine leiomyoma, unspecified location      History of gestational diabetes mellitus (GDM) in prior pregnancy, currently pregnant      Vitamin D deficiency        ABO   Date Value Ref Range Status   2017 B  Final     RH(D)   Date Value Ref Range Status   2017  Pos  Final     Antibody Screen   Date Value Ref Range Status   2017 Neg  Final   , Rhogam  was notgiven.    - Reviewed total weight gain, encouraged continued healthy diet and exercise.  .  Reviewed importance of daily fetal kick count and why/how to contact provider.  - Encouraged  maternity belt while working/walking and then additional pillow support while resting.  Offered PT, pt delcined.   - Reviewed why/how to contact provider if headache/visual changes/RUQ or epigastric pain, decreased fetal movement, vaginal bleeding, leakage of fluid or more than 4 contractions in an hour.     Patient education/orders or handouts today:PTL signs/symptoms and Hospital tour  Reviewed GBS screening at 35-36 wks.    Return to clinic in 3 weeks and prn if questions or concerns.     RERE RiosM

## 2017-07-10 NOTE — NURSING NOTE
Chief Complaint   Patient presents with     Prenatal Care     31 weeks and 3 days       Erin Alvarez, [facility] 7/10/2017

## 2017-07-10 NOTE — MR AVS SNAPSHOT
After Visit Summary   7/10/2017    Zaina Tan    MRN: [id number]           Patient Information     Date Of Birth          1985        Visit Information        Provider Department      7/10/2017 2:00 PM Shahla West APRN CNM [facility]        Today's Diagnoses     Supervision of normal intrauterine pregnancy in multigravida, third trimester    -  1    H/O fetal anomaly in prior pregnancy, currently pregnant, third trimester        Uterine leiomyoma, unspecified location        History of gestational diabetes mellitus (GDM) in prior pregnancy, currently pregnant        Vitamin D deficiency        Urinary frequency           Follow-ups after your visit        Follow-up notes from your care team     Discussed this visit Return in about 3 weeks (around 7/31/2017) for ZEKE.      Your next 10 appointments already scheduled     Jul 31, 2017  2:15 PM CDT   RETURN OB with RERE Sweet CNM   [facility] ([facility])    [facility] [address]  [address]  [facility] [address] [phone]              Who to contact     Please call your clinic at [phone] to:    Ask questions about your health    Make or cancel appointments    Discuss your medicines    Learn about your test results    Speak to your doctor   If you have compliments or concerns about an experience at your clinic, or if you wish to file a complaint, please contact [facility] Patient Relations at [phone] or email us at [email or web address]         Additional Information About Your Visit        MyChart Information     [employer] gives you secure access to your electronic health record. If you see a primary care provider, you can also send messages to your care team and make appointments. If you have questions, please call your primary care clinic.  If you do not have a primary care provider,  please call [phone] and they will assist you.      [employer] is an electronic gateway that provides easy, online access to your medical records. With [employer], you can request a clinic appointment, read your test results, renew a prescription or communicate with your care team.     To access your existing account, please contact your [facility] or call [phone] for assistance.        Care EveryWhere ID     This is your Care EveryWhere ID. This could be used by other organizations to access your [place] medical records  [id number]        Your Vitals Were     Pulse BMI (Body Mass Index)                94 28.59 kg/m2           Blood Pressure from Last 3 Encounters:   07/10/17 116/63   06/13/17 118/62   05/01/17 107/65    Weight from Last 3 Encounters:   07/10/17 73.2 kg (161 lb 6.4 oz)   06/13/17 71.6 kg (157 lb 14.4 oz)   05/01/17 69.4 kg (153 lb)              Today, you had the following     No orders found for display       Primary Care Provider    Physician No Ref-Primary       No address on file        Equal Access to Services     : Hadii kavita Clayton, wagiancarloda joseph, qaybcharo hookalesteban bolton, carla rawls . So [facility] [phone].    ATENCIÓN: Si habla español, tiene a lee disposición servicios gratuitos de asistencia lingüística. Llame al [phone].    We comply with applicable federal civil rights laws and [place] laws. We do not discriminate on the basis of race, color, national origin, age, disability sex, sexual orientation or gender identity.            Thank you!     Thank you for choosing [facility]  for your care. Our goal is always to provide you with excellent care. Hearing back from our patients is one way we can continue to improve our services. Please take a few minutes to complete the written survey that you may receive in the mail after your visit with us. Thank you!              Your Updated Medication List - Protect others around you: Learn how to safely use, store and throw away your medicines at www.disposemymeds.org.          This list is accurate as of: 7/10/17  2:25 PM.  Always use your most recent med list.                   Brand Name Dispense Instructions for use Diagnosis    cholecalciferol 5000 UNITS Caps capsule    vitamin D3    90 capsule    Take 1 capsule (5,000 Units) by mouth daily Take one capsule daily.    Vitamin D deficiency       ondansetron 4 MG tablet    ZOFRAN    30 tablet    Take 1 tablet (4 mg) by mouth every 8 hours as needed for nausea    Nausea and vomiting during pregnancy prior to 22 weeks gestation       prenatal multivitamin  plus iron 27-0.8 MG Tabs per tablet      Take 1 tablet by mouth daily        pyridOXINE 25 MG tablet    vitamin B-6    90 tablet    Take 1 tablet (25 mg) by mouth 3 times daily    Nausea and vomiting during pregnancy prior to 22 weeks gestation

## 2017-07-31 ENCOUNTER — HOSPITAL ENCOUNTER (OUTPATIENT)
Facility: CLINIC | Age: 32
Discharge: HOME | End: 2017-07-31
Attending: ADVANCED PRACTICE MIDWIFE | Admitting: ADVANCED PRACTICE MIDWIFE
Payer: COMMERCIAL

## 2017-07-31 ENCOUNTER — OFFICE VISIT (OUTPATIENT)
Dept: OBGYN | Facility: CLINIC | Age: 32
End: 2017-07-31
Attending: ADVANCED PRACTICE MIDWIFE
Payer: COMMERCIAL

## 2017-07-31 VITALS
WEIGHT: 163.6 LBS | HEIGHT: 63 IN | DIASTOLIC BLOOD PRESSURE: 74 MMHG | BODY MASS INDEX: 28.99 KG/M2 | HEART RATE: 81 BPM | SYSTOLIC BLOOD PRESSURE: 116 MMHG

## 2017-07-31 VITALS — DIASTOLIC BLOOD PRESSURE: 68 MMHG | TEMPERATURE: 98.1 F | SYSTOLIC BLOOD PRESSURE: 136 MMHG

## 2017-07-31 DIAGNOSIS — Z34.83 SUPERVISION OF NORMAL INTRAUTERINE PREGNANCY IN MULTIGRAVIDA, THIRD TRIMESTER: Primary | ICD-10-CM

## 2017-07-31 LAB — A1 MICROGLOB PLACENTAL VAG QL: NEGATIVE

## 2017-07-31 PROCEDURE — 99212 OFFICE O/P EST SF 10 MIN: CPT | Mod: ZF

## 2017-07-31 ASSESSMENT — PAIN SCALES - GENERAL: PAINLEVEL: NO PAIN (0)

## 2017-07-31 NOTE — LETTER
"2017       RE: Zaina Tan  [address]    [facility] [address]     Dear Colleague,    Thank you for referring your patient, Zaina Tan, to the [facility] at [facility]. Please see a copy of my visit note below.    Subjective:      31 year old  at 34w3d presentst for a routine prenatal appointment.      No vaginal bleeding.  Frequent BH contractions. Normal daily fetal movement. Reports leaking of fluid x 3 episodes, first was 1 week ago and last was 2 days ago. Each time it leaked through her pants and onto her sheets. She has not had intercourse since the leaking began. Each time she felt the fluid and it felt more watery than mucous.     No HA, visual changes, RUQ or epigastric pain.     Patient concerns: Left sided pain with palpation. Hurts all the time primarily with palpation or if she lays on that side. Has not tried heat, ice or tylenol. Normal BMs, passing gas, no other associated symptoms.     Reviewed TDAP Previously given     Objective:  Vitals:    17 1425   BP: 116/74   Pulse: 81   Weight: 74.2 kg (163 lb 9.6 oz)   Height: 1.6 m (5' 3\")   See ob flowsheet    Assessment/Plan     Encounter Diagnosis   Name Primary?     Supervision of normal intrauterine pregnancy in multigravida, third trimester Yes     ABO   Date Value Ref Range Status   2017 B  Final     RH(D)   Date Value Ref Range Status   2017  Pos  Final     Antibody Screen   Date Value Ref Range Status   2017 Neg  Final   Rhogam was not given.    - Reviewed importance of daily fetal kick count and why/how to contact provider.    - Reviewed why/how to contact provider if headache/visual changes/RUQ or epigastric pain, decreased fetal movement, vaginal bleeding, leakage of fluid or more than 4 contractions in an hour.     -Reviewed GBS screening at 35-36 wks.    -Pt sent directly to LD for r/o SROM.     Return to clinic in 2 weeks and prn " if questions or concerns.     Yuli Russell, RERE CNM

## 2017-07-31 NOTE — PLAN OF CARE
Data: [facility] at 1506.   Reason for maternal/fetal assessment per patient is Rule out rupture of membranes  . Patient is a . Prenatal record reviewed.      Obstetric History       T1      L1     SAB0   TAB0   Ectopic0   Multiple0   Live Births1       # Outcome Date GA Lbr Alireza/2nd Weight Sex Delivery Anes PTL Lv   3 Current            2 Term 12/01/15 39w5d 11:55 / 01:02 3.43 kg (7 lb 9 oz) F Vag-Spont Local,EPI  PREETI      Apgar1:  4                Apgar5: 8   1 AB 14 23w0d                Medical History:   Past Medical History:   Diagnosis Date     NO ACTIVE PROBLEMS    . Gestational Age 34w3d. VSS. Cervix: not examined.  Fetal movement present. Patient denies cramping, backache, pelvic pressure, UTI symptoms, GI problems, bloody show, vaginal bleeding, edema, headache, visual disturbances, epigastric or URQ pain, abdominal pain, rupture of membranes. Support persons not present.  Action: Verbal consent for EFM. Triage assessment completed. EFM applied for fetal well being. Uterine assessment shows occasional contraction. Fetal assessment: Presumed adequate fetal oxygenation documented (see flow record). Patient education pamphlets given on discharge instructions. Patient instructed to report change in fetal movement, vaginal leaking of fluid or bleeding, abdominal pain, or any concerns related to the pregnancy to her nurse/physician.   Response: Homer Reagan CNM informed of patient arrival and assessment. Plan per provider is to discharge patient to home. Patient verbalized understanding of education and verbalized agreement with plan. Discharged ambulatory at 1650.

## 2017-07-31 NOTE — IP AVS SNAPSHOT
MRN:[id number]                      After Visit Summary   7/31/2017    Zaina Tan    MRN: [id number]           Thank you!     Thank you for choosing [place] for your care. Our goal is always to provide you with excellent care. Hearing back from our patients is one way we can continue to improve our services. Please take a few minutes to complete the written survey that you may receive in the mail after you visit with us. Thank you!        Patient Information     Date Of Birth          1985        Designated Caregiver       Most Recent Value    Caregiver    Will someone help with your care after discharge? no      About your hospital stay     You were admitted on:  July 31, 2017 You last received care in the:  [facility]    You were discharged on:  July 31, 2017       Who to Call     For medical emergencies, please call 911.  For non-urgent questions about your medical care, please call your primary care provider or clinic, None          Attending Provider     Provider Specialty    Sydney Reagan APRN CNM MIDWIFE       Primary Care Provider    Physician No Ref-Primary      Your next 10 appointments already scheduled     Aug 14, 2017  2:00 PM CDT   RETURN OB with Dee Troncoso CNM   [facility] ([facility])    [place] [facility] [address]  [address]  [address]  [facility] [address] [phone]              Further instructions from your care team       Discharge Instruction for Undelivered Patients      You were seen for: Membrane Assessment  We Consulted: Homer Reagan CNM  You had (Test or Medicine):Amnisure/NST     Diet:   Drink 8 to 12 glasses of liquids (milk, juice, water) every day.  You may eat meals and snacks.     Activity:  Call your doctor or nurse midwife if your baby is moving less than usual.     Call your provider if you notice:  Swelling in your face or increased swelling in your hands or  legs.  Headaches that are not relieved by Tylenol (acetaminophen).  Changes in your vision (blurring: seeing spots or stars.)  Nausea (sick to your stomach) and vomiting (throwing up).   Weight gain of 5 pounds or more per week.  Heartburn that doesn't go away.  Signs of bladder infection: pain when you urinate (use the toilet), need to go more often and more urgently.  The bag of bingham (rupture of membranes) breaks, or you notice leaking in your underwear.  Bright red blood in your underwear.  Abdominal (lower belly) or stomach pain.  For first baby: Contractions (tightening) less than 5 minutes apart for one hour or more.  Second (plus) baby: Contractions (tightening) less than 10 minutes apart and getting stronger.  *If less than 34 weeks: Contractions (tightenings) more than 6 times in one hour.  Increase or change in vaginal discharge (note the color and amount)  Other:     Follow-up:  As scheduled in the clinic          Pending Results     No orders found from 7/29/2017 to 8/1/2017.            Admission Information     Date & Time Provider Department Dept. Phone    7/31/2017 Sydney Reaagn, RERE CNM [facility] [phone]      Your Vitals Were     Blood Pressure Temperature                136/68 98.1  F (36.7  C) (Oral)          MyChart Information     [employer] gives you secure access to your electronic health record. If you see a primary care provider, you can also send messages to your care team and make appointments. If you have questions, please call your primary care clinic.  If you do not have a primary care provider, please call [phone] and they will assist you.        Care EveryWhere ID     This is your Care EveryWhere ID. This could be used by other organizations to access your [place] medical records  [id number]        Equal Access to Services     LINDA CALLOWAY AH: Guillermo Clayton, wilberto ruiz, carla majano. So  [facility] [phone].    ATENCIÓN: Si bobla rao, tiene a lee disposición servicios gratuitos de asistencia lingüística. Torri parra [phone].    We comply with applicable federal civil rights laws and [place] laws. We do not discriminate on the basis of race, color, national origin, age, disability sex, sexual orientation or gender identity.               Review of your medicines      UNREVIEWED medicines. Ask your doctor about these medicines        Dose / Directions    cholecalciferol 5000 UNITS Caps capsule   Commonly known as:  vitamin D3   Used for:  Vitamin D deficiency        Dose:  5000 Units   Take 1 capsule (5,000 Units) by mouth daily Take one capsule daily.   Quantity:  90 capsule   Refills:  3       ondansetron 4 MG tablet   Commonly known as:  ZOFRAN   Used for:  Nausea and vomiting during pregnancy prior to 22 weeks gestation        Dose:  4 mg   Take 1 tablet (4 mg) by mouth every 8 hours as needed for nausea   Quantity:  30 tablet   Refills:  1       prenatal multivitamin  plus iron 27-0.8 MG Tabs per tablet        Dose:  1 tablet   Take 1 tablet by mouth daily   Refills:  0       pyridOXINE 25 MG tablet   Commonly known as:  vitamin B-6   Used for:  Nausea and vomiting during pregnancy prior to 22 weeks gestation        Dose:  25 mg   Take 1 tablet (25 mg) by mouth 3 times daily   Quantity:  90 tablet   Refills:  1                Protect others around you: Learn how to safely use, store and throw away your medicines at www.disposemymeds.org.             Medication List: This is a list of all your medications and when to take them. Check marks below indicate your daily home schedule. Keep this list as a reference.      Medications           Morning Afternoon Evening Bedtime As Needed    cholecalciferol 5000 UNITS Caps capsule   Commonly known as:  vitamin D3   Take 1 capsule (5,000 Units) by mouth daily Take one capsule daily.                                ondansetron 4 MG tablet   Commonly known  as:  ZOFRAN   Take 1 tablet (4 mg) by mouth every 8 hours as needed for nausea                                prenatal multivitamin  plus iron 27-0.8 MG Tabs per tablet   Take 1 tablet by mouth daily                                pyridOXINE 25 MG tablet   Commonly known as:  vitamin B-6   Take 1 tablet (25 mg) by mouth 3 times daily

## 2017-07-31 NOTE — IP AVS SNAPSHOT
[facility]    [address]    [place] [address]    Phone:  [phone]                                       After Visit Summary   7/31/2017    Zaina Tan    MRN: [id number]           After Visit Summary Signature Page     I have received my discharge instructions, and my questions have been answered. I have discussed any challenges I see with this plan with the nurse or doctor.    ..........................................................................................................................................  Patient/Patient Representative Signature      ..........................................................................................................................................  Patient Representative Print Name and Relationship to Patient    ..................................................               ................................................  Date                                            Time    ..........................................................................................................................................  Reviewed by Signature/Title    ...................................................              ..............................................  Date                                                            Time

## 2017-07-31 NOTE — MR AVS SNAPSHOT
After Visit Summary   7/31/2017    Zaina Tan    MRN: [id number]           Patient Information     Date Of Birth          1985        Visit Information        Provider Department      7/31/2017 2:15 PM Yuli Russell APRN CNM [facility]        Today's Diagnoses     Supervision of normal intrauterine pregnancy in multigravida, third trimester    -  1       Follow-ups after your visit        Follow-up notes from your care team     Return for Return OB.      Who to contact     Please call your clinic at [phone] to:    Ask questions about your health    Make or cancel appointments    Discuss your medicines    Learn about your test results    Speak to your doctor   If you have compliments or concerns about an experience at your clinic, or if you wish to file a complaint, please contact [facility] Patient Relations at [phone] or email us at [email or web address]         Additional Information About Your Visit        MyChart Information     [employer] gives you secure access to your electronic health record. If you see a primary care provider, you can also send messages to your care team and make appointments. If you have questions, please call your primary care clinic.  If you do not have a primary care provider, please call [phone] and they will assist you.      [employer] is an electronic gateway that provides easy, online access to your medical records. With [employer], you can request a clinic appointment, read your test results, renew a prescription or communicate with your care team.     To access your existing account, please contact your [facility] or call [phone] for assistance.        Care EveryWhere ID     This is your Care EveryWhere ID. This could be used by other organizations to access your [place] medical records  [id number]        Your Vitals Were     Pulse Height BMI (Body  "Mass Index)             81 1.6 m (5' 3\") 28.98 kg/m2          Blood Pressure from Last 3 Encounters:   07/31/17 116/74   07/10/17 116/63   06/13/17 118/62    Weight from Last 3 Encounters:   07/31/17 74.2 kg (163 lb 9.6 oz)   07/10/17 73.2 kg (161 lb 6.4 oz)   06/13/17 71.6 kg (157 lb 14.4 oz)              Today, you had the following     No orders found for display       Primary Care Provider    Physician No Ref-Primary       No address on file        Equal Access to Services     [facility]: Hadii kavita Clayton, wilberto ruiz, tish bolton, carla rawls . So [facility] [phone].    ATENCIÓN: Si habla español, tiene a lee disposición servicios gratuitos de asistencia lingüística. Llame al [phone].    We comply with applicable federal civil rights laws and [place] laws. We do not discriminate on the basis of race, color, national origin, age, disability sex, sexual orientation or gender identity.            Thank you!     Thank you for choosing [facility]  for your care. Our goal is always to provide you with excellent care. Hearing back from our patients is one way we can continue to improve our services. Please take a few minutes to complete the written survey that you may receive in the mail after your visit with us. Thank you!             Your Updated Medication List - Protect others around you: Learn how to safely use, store and throw away your medicines at www.disposemymeds.org.          This list is accurate as of: 7/31/17  2:47 PM.  Always use your most recent med list.                   Brand Name Dispense Instructions for use Diagnosis    cholecalciferol 5000 UNITS Caps capsule    vitamin D3    90 capsule    Take 1 capsule (5,000 Units) by mouth daily Take one capsule daily.    Vitamin D deficiency       ondansetron 4 MG tablet    ZOFRAN    30 tablet    Take 1 tablet (4 mg) by mouth every 8 hours as needed for nausea    Nausea " and vomiting during pregnancy prior to 22 weeks gestation       prenatal multivitamin  plus iron 27-0.8 MG Tabs per tablet      Take 1 tablet by mouth daily        pyridOXINE 25 MG tablet    vitamin B-6    90 tablet    Take 1 tablet (25 mg) by mouth 3 times daily    Nausea and vomiting during pregnancy prior to 22 weeks gestation

## 2017-07-31 NOTE — PROGRESS NOTES
"Subjective:      31 year old  at 34w3d presentst for a routine prenatal appointment.      No vaginal bleeding.  Frequent BH contractions. Normal daily fetal movement. Reports leaking of fluid x 3 episodes, first was 1 week ago and last was 2 days ago. Each time it leaked through her pants and onto her sheets. She has not had intercourse since the leaking began. Each time she felt the fluid and it felt more watery than mucous.     No HA, visual changes, RUQ or epigastric pain.     Patient concerns: Left sided pain with palpation. Hurts all the time primarily with palpation or if she lays on that side. Has not tried heat, ice or tylenol. Normal BMs, passing gas, no other associated symptoms.     Reviewed TDAP Previously given     Objective:  Vitals:    17 1425   BP: 116/74   Pulse: 81   Weight: 74.2 kg (163 lb 9.6 oz)   Height: 1.6 m (5' 3\")   See ob flowsheet    Assessment/Plan     Encounter Diagnosis   Name Primary?     Supervision of normal intrauterine pregnancy in multigravida, third trimester Yes     ABO   Date Value Ref Range Status   2017 B  Final     RH(D)   Date Value Ref Range Status   2017  Pos  Final     Antibody Screen   Date Value Ref Range Status   2017 Neg  Final   Rhogam was not given.    - Reviewed importance of daily fetal kick count and why/how to contact provider.    - Reviewed why/how to contact provider if headache/visual changes/RUQ or epigastric pain, decreased fetal movement, vaginal bleeding, leakage of fluid or more than 4 contractions in an hour.     -Reviewed GBS screening at 35-36 wks.    -Pt sent directly to LD for r/o SROM.     Return to clinic in 2 weeks and prn if questions or concerns.     RERE Keene CNM      "

## 2017-07-31 NOTE — H&P
HOSPITAL TRIAGE NOTE  ===================    CHIEF COMPLAINT  ========================  Zaina Tan is a 31 year old patient presenting today at 34w3d for evaluation of leaking vaginal fluid.    No LMP recorded. Patient is pregnant.  Estimated Date of Delivery: Sep 8, 2017     HPI  ==================   Pt sent from clinic for amnisure evaluation. Felt leaking of clear mucousy DC last week and again two days ago. No leaking today. No recent IC, no hx of cervical or vaginal infection. Has had UTI in past, no dysuria, urgency or CVAT. No abd pain or fevers  Prenatal record and labs reviewed from [facility], through [employer] EMR.    CONTRACTIONS: irreg, mild and not felt by patient  ABDOMINAL PAIN: none  FETAL MOVEMENT: active    VAGINAL BLEEDING: none  RUPTURE OF MEMBRANES: uncertain- pending amnisure  PELVIC PAIN: none    PREGNANCY COMPLICATIONS: none  OTHER:     REVIEW OF SYSTEMS  =====================  C: NEGATIVE for fever, chills  I: NEGATIVE for worrisome rashes, moles or lesions  E: NEGATIVE for vision changes or irritation  R: NEGATIVE for significant cough or SOB  CV: NEGATIVE for chest pain, palpitations or varicosities  GI: NEGATIVE for nausea, abdominal pain, heartburn, or change in bowel habits  : NEGATIVE for frequency, dysuria, or hematuria  M: NEGATIVE for significant arthralgias or myalgia  N: NEGATIVE for headache, weakness, dizziness or paresthesias  P: NEGATIVE for changes in mood or affect    PROBLEM LIST  ===============  Patient Active Problem List    Diagnosis Date Noted     H/O fetal anomaly in prior pregnancy, currently pregnant, third trimester 2017     Priority: High     2017 - 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    17- Fetal echo wnl       Supervision of normal intrauterine pregnancy in multigravida - [facility] 2017     Priority: High     2017 - Normal early 1 hour gluosce.  TSH normal.   Normal NT. 3/24/17: does not  desire AFP, has level 2 scheduled for 4/17 4/17/17- [facility] US- wnl  5/16/17.  Fetal ECHO wnl  6/12/2017 - Tdap done.  Plans CNM care, epidural, breastfeeding.  Unsure about birth control.  Passed 1 hour glucose       Vitamin D deficiency 01/31/2017     Priority: Medium     1/30/17: Vitamin D= 25, [employer] message sent - supplementation 2932-8028 IU/day  6/12/17- Vit D 48       History of gestational diabetes mellitus (GDM) in prior pregnancy, currently pregnant 09/03/2015     Priority: Medium     2/4/2017 - HgA1C WNL.  Early  1 hour at NOB -  Normal early 1 hour gluosce.  TSH normal.     6/12/17- GCT 98       Uterine leiomyoma 04/21/2015     Priority: Medium     Overview:   1st trimester US noted several fibroids at uterine fundus, largest 2.1 cm. Monitor.        Maternal care for other (suspected) fetal abnormality and damage, not applicable or unspecified 04/01/2015     Priority: Medium     Overview:   -Per pt chromosomal analysis was normal. S/p 2nd trimester D&E. Will send to [facility] following dating US for evaluation and genetic counseling.          HISTORIES  ==============  ALLERGIES:    No Known Allergies  PAST MEDICAL HISTORY  Past Medical History:   Diagnosis Date     NO ACTIVE PROBLEMS      SOCIAL HISTORY  Social History     Social History     Marital status: Single     Spouse name: N/A     Number of children: N/A     Years of education: N/A     Occupational History     [employer]      Social History Main Topics     Smoking status: Never Smoker     Smokeless tobacco: Never Used     Alcohol use No     Drug use: No     Sexual activity: Yes     Partners: Male     Other Topics Concern     Not on file     Social History Narrative    How much exercise per week? Daily walking    How much calcium per day? In vits and foods       How much caffeine per day? 0    How much vitamin D per day? In vits and foods    Do you/your family wear seatbelts?  Yes    Do you/your family use safety helmets? Yes    Do you/your family use  sunscreen? No    Do you/your family keep firearms in the home? no    Do you/your family have a smoke detector(s)?     Do you feel safe in your home? Yes    Has anyone ever touched you in an unwanted manner?no feels safe in home.           2015 Peyton Bernardo LPN    Reviewed Esperanza Owen CNM     PARTNER: not involved  EMPLOYMENT:   FAMILY HISTORY  Family History   Problem Relation Age of Onset     Hypertension Mother      had before pregnancy     HEART DISEASE Other      hypoplastic left heart. TAB 23 wks      DIABETES No family hx of      OB HISTORY  Obstetric History       T1      L1     SAB0   TAB0   Ectopic0   Multiple0   Live Births1       # Outcome Date GA Lbr Alireza/2nd Weight Sex Delivery Anes PTL Lv   3 Current            2 Term 12/01/15 39w5d 11:55 / 01:02 3.43 kg (7 lb 9 oz) F Vag-Spont Local,EPI  PREETI      Apgar1:  4                Apgar5: 8   1 AB 14 23w0d               Prenatal Labs:   Lab Results   Component Value Date    ABO B 2017    RH  Pos 2017    AS Neg 2017    HEPBANG Nonreactive 2017    TREPAB Negative 2017    RUQIGG 110 2017    HGB 11.0 (L) 2017     Rubella- immune    ULTRASOUND(s) reviewed: no    EXAM  ============  /68  Temp 98.1  F (36.7  C) (Oral)  GENERAL APPEARANCE: healthy, alert and no distress  Has not eaten since this AM, baby moving per pt  RESP: lungs clear to auscultation - no rales, rhonchi or wheezes  BREAST: normal without masses, tenderness or nipple discharge and no palpable axillary masses or adenopathy  CV: regular rates and rhythm, normal S1 S2, no S3 or S4 and no murmur,and no varicosities  ABDOMEN:  soft, nontender, no epigastric pain  SKIN: no suspicious lesions or rashes  NEURO: Denies headache, blurred vision, other vision changes  PSYCH: mentation appears normal. and affect normal/bright  MS/ LEGS: No edema    CONTRACTIONS: irreg, mild and felt as tightenings   FETAL HEART TONES: continuous EFM-  baseline 130 with moderate variability and one accelerations. No decelerations.  NST: NON-REACTIVE  EFW:na    PELVIC EXAM: deferred  BLOOD: no  DISCHARGE: none seen    ROM: no  AMNISURE: negative    LABS: none  Lab results reviewed- na  DIAGNOSIS  ============  34w3d seen on the [facility]place Triage for vaginal discharge- no SROM  NST: NON-REACTIVE  Fetal Heart rate tracing:category one    PLAN  ============  Will have pt eat and stay until reactive NST  Discharge to home with PTL instructions per discharge instruction form  Call or return to the [facility]place with contractions, cramping, abdominal or pelvic pain, vaginal bleeding, leaking fluid or decreased fetal movement.  Follow up- in clinic at next visit.  RERE Mcmanus CNM

## 2017-07-31 NOTE — DISCHARGE INSTRUCTIONS
Discharge Instruction for Undelivered Patients      You were seen for: Membrane Assessment  We Consulted: Homer Reagan CNM  You had (Test or Medicine):Amnisure/NST     Diet:   Drink 8 to 12 glasses of liquids (milk, juice, water) every day.  You may eat meals and snacks.     Activity:  Call your doctor or nurse midwife if your baby is moving less than usual.     Call your provider if you notice:  Swelling in your face or increased swelling in your hands or legs.  Headaches that are not relieved by Tylenol (acetaminophen).  Changes in your vision (blurring: seeing spots or stars.)  Nausea (sick to your stomach) and vomiting (throwing up).   Weight gain of 5 pounds or more per week.  Heartburn that doesn't go away.  Signs of bladder infection: pain when you urinate (use the toilet), need to go more often and more urgently.  The bag of bnigham (rupture of membranes) breaks, or you notice leaking in your underwear.  Bright red blood in your underwear.  Abdominal (lower belly) or stomach pain.  For first baby: Contractions (tightening) less than 5 minutes apart for one hour or more.  Second (plus) baby: Contractions (tightening) less than 10 minutes apart and getting stronger.  *If less than 34 weeks: Contractions (tightenings) more than 6 times in one hour.  Increase or change in vaginal discharge (note the color and amount)  Other:     Follow-up:  As scheduled in the clinic

## 2017-08-14 ENCOUNTER — OFFICE VISIT (OUTPATIENT)
Dept: OBGYN | Facility: CLINIC | Age: 32
End: 2017-08-14
Attending: OBSTETRICS & GYNECOLOGY
Payer: COMMERCIAL

## 2017-08-14 VITALS
SYSTOLIC BLOOD PRESSURE: 111 MMHG | HEART RATE: 79 BPM | HEIGHT: 63 IN | DIASTOLIC BLOOD PRESSURE: 68 MMHG | BODY MASS INDEX: 28.97 KG/M2 | WEIGHT: 163.5 LBS

## 2017-08-14 DIAGNOSIS — Z34.83 SUPERVISION OF NORMAL INTRAUTERINE PREGNANCY IN MULTIGRAVIDA, THIRD TRIMESTER: ICD-10-CM

## 2017-08-14 PROCEDURE — 87653 STREP B DNA AMP PROBE: CPT | Performed by: ADVANCED PRACTICE MIDWIFE

## 2017-08-14 ASSESSMENT — ANXIETY QUESTIONNAIRES
1. FEELING NERVOUS, ANXIOUS, OR ON EDGE: SEVERAL DAYS
6. BECOMING EASILY ANNOYED OR IRRITABLE: MORE THAN HALF THE DAYS
7. FEELING AFRAID AS IF SOMETHING AWFUL MIGHT HAPPEN: SEVERAL DAYS
5. BEING SO RESTLESS THAT IT IS HARD TO SIT STILL: NOT AT ALL
3. WORRYING TOO MUCH ABOUT DIFFERENT THINGS: NOT AT ALL
GAD7 TOTAL SCORE: 5
2. NOT BEING ABLE TO STOP OR CONTROL WORRYING: NOT AT ALL

## 2017-08-14 ASSESSMENT — PATIENT HEALTH QUESTIONNAIRE - PHQ9
SUM OF ALL RESPONSES TO PHQ QUESTIONS 1-9: 0
5. POOR APPETITE OR OVEREATING: SEVERAL DAYS

## 2017-08-14 ASSESSMENT — PAIN SCALES - GENERAL: PAINLEVEL: NO PAIN (0)

## 2017-08-14 NOTE — LETTER
"2017       RE: Zaina Tan  [address]    [place] [address]     Dear Colleague,    Thank you for referring your patient, Zania Tan, to the [facility] at [facility]. Please see a copy of my visit note below.    Subjective:      31 year old  at 36w3d presents for a routine prenatal appointment.         No vaginal bleeding,  leakage of fluid, or change in vaginal discharge.  No contractions.  Normal daily fetal movement.  No HA, visual changes, RUQ or epigastric pain.   Patient concerns: Feeling well overall. Feeling more heaviness in abdomen. Having fears about vaginal lacerations as she had a difficult healing process after her first baby.     Objective:  Vitals:    17 1415   BP: 111/68   Pulse: 79   Weight: 74.2 kg (163 lb 8 oz)   Height: 1.6 m (5' 3\")    See OB flowsheet    Assessment/Plan     Encounter Diagnosis   Name Primary?     Supervision of normal intrauterine pregnancy in multigravida, third trimester        PHQ-9 SCORE 2015   Total Score 16 - 2   Total Score - 16 -     GBS screening: Obtained.  Labor signs discussed. Reinforced daily fetal movement counts.  Reviewed methods for reducing perineal lacerations.   Reviewed why/how to contact provider if headache/visual changes/RUQ or epigastric pain, decreased fetal movement, vaginal bleeding, leakage of fluid.   Return to clinic in 1 week and prn if questions or concerns.     RERE Keene CNM    Again, thank you for allowing me to participate in the care of your patient.      Sincerely,    RERE Keene CNM      "

## 2017-08-14 NOTE — LETTER
Date:August 15, 2017      Patient was self referred, no letter generated. Do not send.        [facility] Health Information

## 2017-08-14 NOTE — MR AVS SNAPSHOT
After Visit Summary   8/14/2017    Zaina Tan    MRN: [id number]           Patient Information     Date Of Birth          1985        Visit Information        Provider Department      8/14/2017 2:00 PM Yuli Russell APRN CNM [facility]        Today's Diagnoses     Supervision of normal intrauterine pregnancy in multigravida, third trimester           Follow-ups after your visit        Follow-up notes from your care team     Return in about 2 weeks (around 8/28/2017) for Return OB.      Who to contact     Please call your clinic at [phone] to:    Ask questions about your health    Make or cancel appointments    Discuss your medicines    Learn about your test results    Speak to your doctor   If you have compliments or concerns about an experience at your clinic, or if you wish to file a complaint, please contact [facility] Patient Relations at [phone] or email us at [email or web address]         Additional Information About Your Visit        MyChart Information     [employer] gives you secure access to your electronic health record. If you see a primary care provider, you can also send messages to your care team and make appointments. If you have questions, please call your primary care clinic.  If you do not have a primary care provider, please call [phone] and they will assist you.      [employer] is an electronic gateway that provides easy, online access to your medical records. With [employer] you can request a clinic appointment, read your test results, renew a prescription or communicate with your care team.     To access your existing account, please contact your [facility] or call [phone] for assistance.        Care EveryWhere ID     This is your Care EveryWhere ID. This could be used by other organizations to access your [place] medical records  [id number]        Your Vitals  "Were     Pulse Height Breastfeeding? BMI (Body Mass Index)          79 1.6 m (5' 3\") No 28.96 kg/m2         Blood Pressure from Last 3 Encounters:   08/14/17 111/68   07/31/17 136/68   07/31/17 116/74    Weight from Last 3 Encounters:   08/14/17 74.2 kg (163 lb 8 oz)   07/31/17 74.2 kg (163 lb 9.6 oz)   07/10/17 73.2 kg (161 lb 6.4 oz)              We Performed the Following     Group B strep PCR          Today's Medication Changes          These changes are accurate as of: 8/14/17  2:40 PM.  If you have any questions, ask your nurse or doctor.               Stop taking these medicines if you haven't already. Please contact your care team if you have questions.     cholecalciferol 5000 UNITS Caps capsule   Commonly known as:  vitamin D3   Stopped by:  Yuli Russell APRN CNM           ondansetron 4 MG tablet   Commonly known as:  ZOFRAN   Stopped by:  Yuli Russell APRN CNM           pyridOXINE 25 MG tablet   Commonly known as:  vitamin B-6   Stopped by:  Yuli Russell APRN CNM                    Primary Care Provider    Physician No Ref-Primary       No address on file        Equal Access to Services     [facility] : Guillermo ballesteros Sostacey, wagiancarloda luamberadaha, qaybta kaalmada adevelasquezyada, carla wadsworth. So [facility] [phone].    ATENCIÓN: Si habla español, tiene a lee disposición servicios gratuitos de asistencia lingüística. Llame al [phone].    We comply with applicable federal civil rights laws and [place] laws. We do not discriminate on the basis of race, color, national origin, age, disability sex, sexual orientation or gender identity.            Thank you!     Thank you for choosing [facility]  for your care. Our goal is always to provide you with excellent care. Hearing back from our patients is one way we can continue to improve our services. Please take a few minutes to complete the written survey that you may receive in the " mail after your visit with us. Thank you!             Your Updated Medication List - Protect others around you: Learn how to safely use, store and throw away your medicines at www.disposemymeds.org.          This list is accurate as of: 8/14/17  2:40 PM.  Always use your most recent med list.                   Brand Name Dispense Instructions for use Diagnosis    prenatal multivitamin plus iron 27-0.8 MG Tabs per tablet      Take 1 tablet by mouth daily

## 2017-08-14 NOTE — PROGRESS NOTES
"Subjective:      31 year old  at 36w3d presents for a routine prenatal appointment.         No vaginal bleeding,  leakage of fluid, or change in vaginal discharge.  No contractions.  Normal daily fetal movement.  No HA, visual changes, RUQ or epigastric pain.   Patient concerns: Feeling well overall. Feeling more heaviness in abdomen. Having fears about vaginal lacerations as she had a difficult healing process after her first baby.     Objective:  Vitals:    17 1415   BP: 111/68   Pulse: 79   Weight: 74.2 kg (163 lb 8 oz)   Height: 1.6 m (5' 3\")    See OB flowsheet    Assessment/Plan     Encounter Diagnosis   Name Primary?     Supervision of normal intrauterine pregnancy in multigravida, third trimester        PHQ-9 SCORE 2015   Total Score 16 - 2   Total Score - 16 -     GBS screening: Obtained.  Labor signs discussed. Reinforced daily fetal movement counts.  Reviewed methods for reducing perineal lacerations.   Reviewed why/how to contact provider if headache/visual changes/RUQ or epigastric pain, decreased fetal movement, vaginal bleeding, leakage of fluid.   Return to clinic in 1 week and prn if questions or concerns.     RERE Keene CNM  "

## 2017-08-15 LAB
GP B STREP DNA SPEC QL NAA+PROBE: NEGATIVE
SPECIMEN SOURCE: NORMAL

## 2017-08-15 ASSESSMENT — ANXIETY QUESTIONNAIRES: GAD7 TOTAL SCORE: 5

## 2017-08-28 ENCOUNTER — OFFICE VISIT (OUTPATIENT)
Dept: OBGYN | Facility: CLINIC | Age: 32
End: 2017-08-28
Attending: ADVANCED PRACTICE MIDWIFE
Payer: COMMERCIAL

## 2017-08-28 VITALS
BODY MASS INDEX: 29.23 KG/M2 | DIASTOLIC BLOOD PRESSURE: 65 MMHG | HEIGHT: 63 IN | SYSTOLIC BLOOD PRESSURE: 101 MMHG | WEIGHT: 165 LBS | HEART RATE: 90 BPM

## 2017-08-28 DIAGNOSIS — Z34.83 SUPERVISION OF NORMAL INTRAUTERINE PREGNANCY IN MULTIGRAVIDA, THIRD TRIMESTER: Primary | ICD-10-CM

## 2017-08-28 PROCEDURE — 99212 OFFICE O/P EST SF 10 MIN: CPT | Mod: ZF

## 2017-08-28 ASSESSMENT — PAIN SCALES - GENERAL: PAINLEVEL: NO PAIN (0)

## 2017-08-28 NOTE — NURSING NOTE
Chief Complaint   Patient presents with     Prenatal Care     ZEKE 38 weeks and 3 days   Ania Justin LPN

## 2017-08-28 NOTE — PROGRESS NOTES
"Subjective:     31 year old  at 38w3d presents for routine prenatal visit. No vaginal bleeding or leakage of fluid.  Occasional BH contractions.  Normal daily fetal movement. No HA, visual changes, RUQ or epigastric pain. Patient concerns: Feeling well overall. Experiencing hemorrhoids with constipation. Notices this especially after eating a lot of bread. Has not tried any interventions. Requests VE today.    Objective:  Vitals:    17 1428   BP: 101/65   Pulse: 90   Weight: 74.8 kg (165 lb)   Height: 1.6 m (5' 2.99\")    See OB flowsheet    VE: 1 cm internal os/long/-3/posterior/medium      Assessment/Plan     Encounter Diagnosis   Name Primary?     Supervision of normal intrauterine pregnancy in multigravida, third trimester Yes       -Reviewed GBS Negative  -Advised increased fiber intake and hydration for hemorrhoids. Will consider stool softener if constipation persists.   - Reviewed why/how to contact provider if headache/visual changes/RUQ or epigastric pain, decreased fetal movement, vaginal bleeding, leakage of fluid or strong/regular contractions.  -Return to clinic in 1 week and prn if questions or concerns.     Yuli Russell, APRMARI CNM      "

## 2017-08-28 NOTE — LETTER
"2017       RE: Zaina Tan  [address]    [facility] [address]     Dear Colleague,    Thank you for referring your patient, Zaina Tan, to the [facility] at [facility]. Please see a copy of my visit note below.    Subjective:     31 year old  at 38w3d presents for routine prenatal visit. No vaginal bleeding or leakage of fluid.  Occasional BH contractions.  Normal daily fetal movement. No HA, visual changes, RUQ or epigastric pain. Patient concerns: Feeling well overall. Experiencing hemorrhoids with constipation. Notices this especially after eating a lot of bread. Has not tried any interventions. Requests VE today.    Objective:  Vitals:    17 1428   BP: 101/65   Pulse: 90   Weight: 74.8 kg (165 lb)   Height: 1.6 m (5' 2.99\")    See OB flowsheet    VE: 1 cm internal os/long/-3/posterior/medium      Assessment/Plan     Encounter Diagnosis   Name Primary?     Supervision of normal intrauterine pregnancy in multigravida, third trimester Yes       -Reviewed GBS Negative  -Advised increased fiber intake and hydration for hemorrhoids. Will consider stool softener if constipation persists.   - Reviewed why/how to contact provider if headache/visual changes/RUQ or epigastric pain, decreased fetal movement, vaginal bleeding, leakage of fluid or strong/regular contractions.  -Return to clinic in 1 week and prn if questions or concerns.     RREE Keene CNM      "

## 2017-08-28 NOTE — MR AVS SNAPSHOT
After Visit Summary   8/28/2017    Zaina Tan    MRN: [id number]           Patient Information     Date Of Birth          1985        Visit Information        Provider Department      8/28/2017 2:15 PM Yuli Russell APRN CN [facility]        Today's Diagnoses     Supervision of normal intrauterine pregnancy in multigravida, third trimester    -  1       Follow-ups after your visit        Follow-up notes from your care team     Return in about 1 week (around 9/4/2017) for Return OB.      Your next 10 appointments already scheduled     Sep 07, 2017  1:15 PM CDT   RETURN OB with Dee Troncoso CNM   [facility] ([facility])    [facility]  [address]  [address]  [facility] [address] [phone]              Who to contact     Please call your clinic at [phone] to:    Ask questions about your health    Make or cancel appointments    Discuss your medicines    Learn about your test results    Speak to your doctor   If you have compliments or concerns about an experience at your clinic, or if you wish to file a complaint, please contact [facility] Patient Relations at [phone] or email us at [email or web address]         Additional Information About Your Visit        MyChart Information     [employer] gives you secure access to your electronic health record. If you see a primary care provider, you can also send messages to your care team and make appointments. If you have questions, please call your primary care clinic.  If you do not have a primary care provider, please call [phone] and they will assist you.      lettrs is an electronic gateway that provides easy, online access to your medical records. With lettrs, you can request a clinic appointment, read your test results, renew a prescription or communicate with your care team.    "  To access your existing account, please contact your [facility] or call [phone] for assistance.        Care EveryWhere ID     This is your Care EveryWhere ID. This could be used by other organizations to access your [place] medical records  [id number]        Your Vitals Were     Pulse Height Breastfeeding? BMI (Body Mass Index)          90 1.6 m (5' 2.99\") No 29.24 kg/m2         Blood Pressure from Last 3 Encounters:   08/28/17 101/65   08/14/17 111/68   07/31/17 136/68    Weight from Last 3 Encounters:   08/28/17 74.8 kg (165 lb)   08/14/17 74.2 kg (163 lb 8 oz)   07/31/17 74.2 kg (163 lb 9.6 oz)              Today, you had the following     No orders found for display       Primary Care Provider    Physician No Ref-Primary       No address on file        Equal Access to Services     [facility]: Hadii kavita Clayton, waaxda joseph, qaybta kaalmada hoang, carla rawls . So [facility] [phone].    ATENCIÓN: Si habla español, tiene a lee disposición servicios gratuitos de asistencia lingüística. Llame al [phone].    We comply with applicable federal civil rights laws and [place] laws. We do not discriminate on the basis of race, color, national origin, age, disability sex, sexual orientation or gender identity.            Thank you!     Thank you for choosing [facility]  for your care. Our goal is always to provide you with excellent care. Hearing back from our patients is one way we can continue to improve our services. Please take a few minutes to complete the written survey that you may receive in the mail after your visit with us. Thank you!             Your Updated Medication List - Protect others around you: Learn how to safely use, store and throw away your medicines at www.disposemymeds.org.          This list is accurate as of: 8/28/17  3:18 PM.  Always use your most recent med list.                "    Brand Name Dispense Instructions for use Diagnosis    prenatal multivitamin plus iron 27-0.8 MG Tabs per tablet      Take 1 tablet by mouth daily

## 2017-09-04 ENCOUNTER — ANESTHESIA EVENT (OUTPATIENT)
Dept: OBGYN | Facility: CLINIC | Age: 32
End: 2017-09-04
Payer: COMMERCIAL

## 2017-09-04 ENCOUNTER — TELEPHONE (OUTPATIENT)
Dept: OBGYN | Facility: CLINIC | Age: 32
End: 2017-09-04

## 2017-09-04 ENCOUNTER — HOSPITAL ENCOUNTER (INPATIENT)
Facility: CLINIC | Age: 32
LOS: 3 days | Discharge: HOME | End: 2017-09-07
Attending: ADVANCED PRACTICE MIDWIFE | Admitting: ADVANCED PRACTICE MIDWIFE
Payer: COMMERCIAL

## 2017-09-04 ENCOUNTER — SURGERY (OUTPATIENT)
Age: 32
End: 2017-09-04

## 2017-09-04 ENCOUNTER — ANESTHESIA (OUTPATIENT)
Dept: OBGYN | Facility: CLINIC | Age: 32
End: 2017-09-04
Payer: COMMERCIAL

## 2017-09-04 LAB
ABO + RH BLD: NORMAL
ABO + RH BLD: NORMAL
ALBUMIN SERPL-MCNC: 2.9 G/DL (ref 3.4–5)
ALP SERPL-CCNC: 244 U/L (ref 40–150)
ALT SERPL W P-5'-P-CCNC: 19 U/L (ref 0–50)
ANION GAP SERPL CALCULATED.3IONS-SCNC: 10 MMOL/L (ref 3–14)
AST SERPL W P-5'-P-CCNC: 19 U/L (ref 0–45)
BASOPHILS # BLD AUTO: 0 10E9/L (ref 0–0.2)
BASOPHILS NFR BLD AUTO: 0.2 %
BILIRUB SERPL-MCNC: 0.5 MG/DL (ref 0.2–1.3)
BLD GP AB SCN SERPL QL: NORMAL
BLOOD BANK CMNT PATIENT-IMP: NORMAL
BUN SERPL-MCNC: 5 MG/DL (ref 7–30)
CALCIUM SERPL-MCNC: 9 MG/DL (ref 8.5–10.1)
CHLORIDE SERPL-SCNC: 107 MMOL/L (ref 94–109)
CO2 SERPL-SCNC: 23 MMOL/L (ref 20–32)
CREAT SERPL-MCNC: 0.68 MG/DL (ref 0.52–1.04)
CREAT UR-MCNC: 79 MG/DL
DIFFERENTIAL METHOD BLD: NORMAL
EOSINOPHIL # BLD AUTO: 0 10E9/L (ref 0–0.7)
EOSINOPHIL NFR BLD AUTO: 0.1 %
ERYTHROCYTE [DISTWIDTH] IN BLOOD BY AUTOMATED COUNT: 13.5 % (ref 10–15)
GFR SERPL CREATININE-BSD FRML MDRD: >90 ML/MIN/1.7M2
GLUCOSE SERPL-MCNC: 97 MG/DL (ref 70–99)
HCT VFR BLD AUTO: 38.4 % (ref 35–47)
HGB BLD-MCNC: 12.7 G/DL (ref 11.7–15.7)
HGB BLD-MCNC: 12.8 G/DL (ref 11.7–15.7)
IMM GRANULOCYTES # BLD: 0 10E9/L (ref 0–0.4)
IMM GRANULOCYTES NFR BLD: 0.4 %
LYMPHOCYTES # BLD AUTO: 1.2 10E9/L (ref 0.8–5.3)
LYMPHOCYTES NFR BLD AUTO: 11.8 %
MCH RBC QN AUTO: 28.4 PG (ref 26.5–33)
MCHC RBC AUTO-ENTMCNC: 33.3 G/DL (ref 31.5–36.5)
MCV RBC AUTO: 85 FL (ref 78–100)
MONOCYTES # BLD AUTO: 0.6 10E9/L (ref 0–1.3)
MONOCYTES NFR BLD AUTO: 5.5 %
NEUTROPHILS # BLD AUTO: 8.3 10E9/L (ref 1.6–8.3)
NEUTROPHILS NFR BLD AUTO: 82 %
NRBC # BLD AUTO: 0 10*3/UL
NRBC BLD AUTO-RTO: 0 /100
PLATELET # BLD AUTO: 178 10E9/L (ref 150–450)
POTASSIUM SERPL-SCNC: 3.9 MMOL/L (ref 3.4–5.3)
PROT SERPL-MCNC: 7.9 G/DL (ref 6.8–8.8)
PROT UR-MCNC: 0.34 G/L
PROT/CREAT 24H UR: 0.43 G/G CR (ref 0–0.2)
RBC # BLD AUTO: 4.51 10E12/L (ref 3.8–5.2)
SODIUM SERPL-SCNC: 140 MMOL/L (ref 133–144)
SPECIMEN EXP DATE BLD: NORMAL
URATE SERPL-MCNC: 6.4 MG/DL (ref 2.6–6)
WBC # BLD AUTO: 10.1 10E9/L (ref 4–11)

## 2017-09-04 PROCEDURE — 25000128 H RX IP 250 OP 636: Performed by: ADVANCED PRACTICE MIDWIFE

## 2017-09-04 PROCEDURE — C9290 INJ, BUPIVACAINE LIPOSOME: HCPCS | Performed by: STUDENT IN AN ORGANIZED HEALTH CARE EDUCATION/TRAINING PROGRAM

## 2017-09-04 PROCEDURE — 27110028 ZZH OR GENERAL SUPPLY NON-STERILE: Performed by: OBSTETRICS & GYNECOLOGY

## 2017-09-04 PROCEDURE — 40000170 ZZH STATISTIC PRE-PROCEDURE ASSESSMENT II: Performed by: OBSTETRICS & GYNECOLOGY

## 2017-09-04 PROCEDURE — 86900 BLOOD TYPING SEROLOGIC ABO: CPT | Performed by: ADVANCED PRACTICE MIDWIFE

## 2017-09-04 PROCEDURE — 12000032 ZZH R&B OB CRITICAL UMMC

## 2017-09-04 PROCEDURE — 25000128 H RX IP 250 OP 636: Performed by: NURSE ANESTHETIST, CERTIFIED REGISTERED

## 2017-09-04 PROCEDURE — 40000977 ZZH STATISTIC ATTENDANCE AT DELIVERY

## 2017-09-04 PROCEDURE — 25000128 H RX IP 250 OP 636: Performed by: ANESTHESIOLOGY

## 2017-09-04 PROCEDURE — 86901 BLOOD TYPING SEROLOGIC RH(D): CPT | Performed by: ADVANCED PRACTICE MIDWIFE

## 2017-09-04 PROCEDURE — 84156 ASSAY OF PROTEIN URINE: CPT | Performed by: ADVANCED PRACTICE MIDWIFE

## 2017-09-04 PROCEDURE — 71000015 ZZH RECOVERY PHASE 1 LEVEL 2 EA ADDTL HR: Performed by: OBSTETRICS & GYNECOLOGY

## 2017-09-04 PROCEDURE — 36000059 ZZH SURGERY LEVEL 3 EA 15 ADDTL MIN UMMC: Performed by: OBSTETRICS & GYNECOLOGY

## 2017-09-04 PROCEDURE — 25000132 ZZH RX MED GY IP 250 OP 250 PS 637

## 2017-09-04 PROCEDURE — 27210794 ZZH OR GENERAL SUPPLY STERILE: Performed by: OBSTETRICS & GYNECOLOGY

## 2017-09-04 PROCEDURE — 25000128 H RX IP 250 OP 636: Performed by: STUDENT IN AN ORGANIZED HEALTH CARE EDUCATION/TRAINING PROGRAM

## 2017-09-04 PROCEDURE — 36415 COLL VENOUS BLD VENIPUNCTURE: CPT | Performed by: ADVANCED PRACTICE MIDWIFE

## 2017-09-04 PROCEDURE — 86780 TREPONEMA PALLIDUM: CPT | Performed by: ADVANCED PRACTICE MIDWIFE

## 2017-09-04 PROCEDURE — 84550 ASSAY OF BLOOD/URIC ACID: CPT | Performed by: ADVANCED PRACTICE MIDWIFE

## 2017-09-04 PROCEDURE — 25000128 H RX IP 250 OP 636: Performed by: OBSTETRICS & GYNECOLOGY

## 2017-09-04 PROCEDURE — 86850 RBC ANTIBODY SCREEN: CPT | Performed by: ADVANCED PRACTICE MIDWIFE

## 2017-09-04 PROCEDURE — 25000125 ZZHC RX 250: Performed by: NURSE ANESTHETIST, CERTIFIED REGISTERED

## 2017-09-04 PROCEDURE — 37000009 ZZH ANESTHESIA TECHNICAL FEE, EACH ADDTL 15 MIN: Performed by: OBSTETRICS & GYNECOLOGY

## 2017-09-04 PROCEDURE — 99215 OFFICE O/P EST HI 40 MIN: CPT

## 2017-09-04 PROCEDURE — 80053 COMPREHEN METABOLIC PANEL: CPT | Performed by: ADVANCED PRACTICE MIDWIFE

## 2017-09-04 PROCEDURE — 37000008 ZZH ANESTHESIA TECHNICAL FEE, 1ST 30 MIN: Performed by: OBSTETRICS & GYNECOLOGY

## 2017-09-04 PROCEDURE — 85025 COMPLETE CBC W/AUTO DIFF WBC: CPT | Performed by: ADVANCED PRACTICE MIDWIFE

## 2017-09-04 PROCEDURE — 25000125 ZZHC RX 250: Performed by: STUDENT IN AN ORGANIZED HEALTH CARE EDUCATION/TRAINING PROGRAM

## 2017-09-04 PROCEDURE — 85018 HEMOGLOBIN: CPT | Performed by: ADVANCED PRACTICE MIDWIFE

## 2017-09-04 PROCEDURE — 71000014 ZZH RECOVERY PHASE 1 LEVEL 2 FIRST HR: Performed by: OBSTETRICS & GYNECOLOGY

## 2017-09-04 PROCEDURE — 88307 TISSUE EXAM BY PATHOLOGIST: CPT | Performed by: STUDENT IN AN ORGANIZED HEALTH CARE EDUCATION/TRAINING PROGRAM

## 2017-09-04 PROCEDURE — 25000125 ZZHC RX 250: Performed by: ANESTHESIOLOGY

## 2017-09-04 PROCEDURE — 88307 TISSUE EXAM BY PATHOLOGIST: CPT | Mod: 26 | Performed by: STUDENT IN AN ORGANIZED HEALTH CARE EDUCATION/TRAINING PROGRAM

## 2017-09-04 PROCEDURE — 25000125 ZZHC RX 250: Performed by: ADVANCED PRACTICE MIDWIFE

## 2017-09-04 PROCEDURE — 36000057 ZZH SURGERY LEVEL 3 1ST 30 MIN - UMMC: Performed by: OBSTETRICS & GYNECOLOGY

## 2017-09-04 RX ORDER — MISOPROSTOL 200 UG/1
TABLET ORAL
Status: DISCONTINUED
Start: 2017-09-04 | End: 2017-09-04 | Stop reason: HOSPADM

## 2017-09-04 RX ORDER — TERBUTALINE SULFATE 1 MG/ML
0.25 INJECTION, SOLUTION SUBCUTANEOUS
Status: DISCONTINUED | OUTPATIENT
Start: 2017-09-04 | End: 2017-09-05

## 2017-09-04 RX ORDER — LIDOCAINE HYDROCHLORIDE 20 MG/ML
INJECTION, SOLUTION INFILTRATION; PERINEURAL PRN
Status: DISCONTINUED | OUTPATIENT
Start: 2017-09-04 | End: 2017-09-04

## 2017-09-04 RX ORDER — CEFAZOLIN SODIUM 1 G/3ML
1 INJECTION, POWDER, FOR SOLUTION INTRAMUSCULAR; INTRAVENOUS SEE ADMIN INSTRUCTIONS
Status: DISCONTINUED | OUTPATIENT
Start: 2017-09-04 | End: 2017-09-04 | Stop reason: HOSPADM

## 2017-09-04 RX ORDER — OXYTOCIN/0.9 % SODIUM CHLORIDE 30/500 ML
PLASTIC BAG, INJECTION (ML) INTRAVENOUS CONTINUOUS PRN
Status: DISCONTINUED | OUTPATIENT
Start: 2017-09-04 | End: 2017-09-04

## 2017-09-04 RX ORDER — KETOROLAC TROMETHAMINE 30 MG/ML
INJECTION, SOLUTION INTRAMUSCULAR; INTRAVENOUS PRN
Status: DISCONTINUED | OUTPATIENT
Start: 2017-09-04 | End: 2017-09-04

## 2017-09-04 RX ORDER — OXYTOCIN/0.9 % SODIUM CHLORIDE 30/500 ML
PLASTIC BAG, INJECTION (ML) INTRAVENOUS
Status: DISCONTINUED
Start: 2017-09-04 | End: 2017-09-04 | Stop reason: HOSPADM

## 2017-09-04 RX ORDER — OXYTOCIN 10 [USP'U]/ML
10 INJECTION, SOLUTION INTRAMUSCULAR; INTRAVENOUS
Status: DISCONTINUED | OUTPATIENT
Start: 2017-09-04 | End: 2017-09-05

## 2017-09-04 RX ORDER — ONDANSETRON 2 MG/ML
4 INJECTION INTRAMUSCULAR; INTRAVENOUS EVERY 30 MIN PRN
Status: CANCELLED | OUTPATIENT
Start: 2017-09-04

## 2017-09-04 RX ORDER — OXYTOCIN/0.9 % SODIUM CHLORIDE 30/500 ML
100-340 PLASTIC BAG, INJECTION (ML) INTRAVENOUS CONTINUOUS PRN
Status: COMPLETED | OUTPATIENT
Start: 2017-09-04 | End: 2017-09-04

## 2017-09-04 RX ORDER — METHYLERGONOVINE MALEATE 0.2 MG/ML
200 INJECTION INTRAVENOUS
Status: DISCONTINUED | OUTPATIENT
Start: 2017-09-04 | End: 2017-09-05

## 2017-09-04 RX ORDER — FENTANYL CITRATE 50 UG/ML
25-50 INJECTION, SOLUTION INTRAMUSCULAR; INTRAVENOUS
Status: CANCELLED | OUTPATIENT
Start: 2017-09-04

## 2017-09-04 RX ORDER — FENTANYL CITRATE 50 UG/ML
50-100 INJECTION, SOLUTION INTRAMUSCULAR; INTRAVENOUS
Status: DISCONTINUED | OUTPATIENT
Start: 2017-09-04 | End: 2017-09-05

## 2017-09-04 RX ORDER — CITRIC ACID/SODIUM CITRATE 334-500MG
30 SOLUTION, ORAL ORAL
Status: COMPLETED | OUTPATIENT
Start: 2017-09-04 | End: 2017-09-04

## 2017-09-04 RX ORDER — NALOXONE HYDROCHLORIDE 0.4 MG/ML
.1-.4 INJECTION, SOLUTION INTRAMUSCULAR; INTRAVENOUS; SUBCUTANEOUS
Status: DISCONTINUED | OUTPATIENT
Start: 2017-09-04 | End: 2017-09-04

## 2017-09-04 RX ORDER — SODIUM CHLORIDE, SODIUM LACTATE, POTASSIUM CHLORIDE, CALCIUM CHLORIDE 600; 310; 30; 20 MG/100ML; MG/100ML; MG/100ML; MG/100ML
INJECTION, SOLUTION INTRAVENOUS CONTINUOUS
Status: DISCONTINUED | OUTPATIENT
Start: 2017-09-04 | End: 2017-09-04 | Stop reason: HOSPADM

## 2017-09-04 RX ORDER — NALBUPHINE HYDROCHLORIDE 10 MG/ML
2.5-5 INJECTION, SOLUTION INTRAMUSCULAR; INTRAVENOUS; SUBCUTANEOUS EVERY 6 HOURS PRN
Status: DISCONTINUED | OUTPATIENT
Start: 2017-09-04 | End: 2017-09-05

## 2017-09-04 RX ORDER — LIDOCAINE 40 MG/G
CREAM TOPICAL
Status: DISCONTINUED | OUTPATIENT
Start: 2017-09-04 | End: 2017-09-05

## 2017-09-04 RX ORDER — CEFAZOLIN SODIUM 2 G/100ML
2 INJECTION, SOLUTION INTRAVENOUS
Status: COMPLETED | OUTPATIENT
Start: 2017-09-04 | End: 2017-09-04

## 2017-09-04 RX ORDER — LIDOCAINE HYDROCHLORIDE 10 MG/ML
INJECTION, SOLUTION EPIDURAL; INFILTRATION; INTRACAUDAL; PERINEURAL
Status: DISCONTINUED
Start: 2017-09-04 | End: 2017-09-04 | Stop reason: HOSPADM

## 2017-09-04 RX ORDER — LIDOCAINE HYDROCHLORIDE AND EPINEPHRINE 15; 5 MG/ML; UG/ML
INJECTION, SOLUTION EPIDURAL PRN
Status: DISCONTINUED | OUTPATIENT
Start: 2017-09-04 | End: 2017-09-04

## 2017-09-04 RX ORDER — LABETALOL HYDROCHLORIDE 5 MG/ML
10 INJECTION, SOLUTION INTRAVENOUS
Status: CANCELLED | OUTPATIENT
Start: 2017-09-04

## 2017-09-04 RX ORDER — ACETAMINOPHEN 325 MG/1
650 TABLET ORAL EVERY 4 HOURS PRN
Status: DISCONTINUED | OUTPATIENT
Start: 2017-09-04 | End: 2017-09-05

## 2017-09-04 RX ORDER — ONDANSETRON 4 MG/1
4 TABLET, ORALLY DISINTEGRATING ORAL EVERY 30 MIN PRN
Status: CANCELLED | OUTPATIENT
Start: 2017-09-04

## 2017-09-04 RX ORDER — NALOXONE HYDROCHLORIDE 0.4 MG/ML
.1-.4 INJECTION, SOLUTION INTRAMUSCULAR; INTRAVENOUS; SUBCUTANEOUS
Status: CANCELLED | OUTPATIENT
Start: 2017-09-04

## 2017-09-04 RX ORDER — OXYTOCIN/0.9 % SODIUM CHLORIDE 30/500 ML
1-24 PLASTIC BAG, INJECTION (ML) INTRAVENOUS CONTINUOUS
Status: DISCONTINUED | OUTPATIENT
Start: 2017-09-04 | End: 2017-09-05

## 2017-09-04 RX ORDER — SODIUM CHLORIDE, SODIUM LACTATE, POTASSIUM CHLORIDE, CALCIUM CHLORIDE 600; 310; 30; 20 MG/100ML; MG/100ML; MG/100ML; MG/100ML
INJECTION, SOLUTION INTRAVENOUS CONTINUOUS
Status: DISCONTINUED | OUTPATIENT
Start: 2017-09-04 | End: 2017-09-05

## 2017-09-04 RX ORDER — FLUMAZENIL 0.1 MG/ML
0.2 INJECTION, SOLUTION INTRAVENOUS
Status: CANCELLED | OUTPATIENT
Start: 2017-09-04

## 2017-09-04 RX ORDER — IBUPROFEN 800 MG/1
800 TABLET, FILM COATED ORAL
Status: DISCONTINUED | OUTPATIENT
Start: 2017-09-04 | End: 2017-09-05

## 2017-09-04 RX ORDER — SODIUM CHLORIDE, SODIUM LACTATE, POTASSIUM CHLORIDE, CALCIUM CHLORIDE 600; 310; 30; 20 MG/100ML; MG/100ML; MG/100ML; MG/100ML
INJECTION, SOLUTION INTRAVENOUS CONTINUOUS
Status: CANCELLED | OUTPATIENT
Start: 2017-09-04

## 2017-09-04 RX ORDER — CITRIC ACID/SODIUM CITRATE 334-500MG
SOLUTION, ORAL ORAL
Status: COMPLETED
Start: 2017-09-04 | End: 2017-09-04

## 2017-09-04 RX ORDER — OXYTOCIN 10 [USP'U]/ML
INJECTION, SOLUTION INTRAMUSCULAR; INTRAVENOUS
Status: DISCONTINUED
Start: 2017-09-04 | End: 2017-09-04 | Stop reason: HOSPADM

## 2017-09-04 RX ORDER — ONDANSETRON 2 MG/ML
4 INJECTION INTRAMUSCULAR; INTRAVENOUS EVERY 6 HOURS PRN
Status: DISCONTINUED | OUTPATIENT
Start: 2017-09-04 | End: 2017-09-05

## 2017-09-04 RX ORDER — BUPIVACAINE HYDROCHLORIDE AND EPINEPHRINE 2.5; 5 MG/ML; UG/ML
INJECTION, SOLUTION INFILTRATION; PERINEURAL PRN
Status: DISCONTINUED | OUTPATIENT
Start: 2017-09-04 | End: 2017-09-04

## 2017-09-04 RX ORDER — OXYCODONE AND ACETAMINOPHEN 5; 325 MG/1; MG/1
1 TABLET ORAL
Status: DISCONTINUED | OUTPATIENT
Start: 2017-09-04 | End: 2017-09-05

## 2017-09-04 RX ORDER — NALOXONE HYDROCHLORIDE 0.4 MG/ML
.1-.4 INJECTION, SOLUTION INTRAMUSCULAR; INTRAVENOUS; SUBCUTANEOUS
Status: DISCONTINUED | OUTPATIENT
Start: 2017-09-04 | End: 2017-09-05

## 2017-09-04 RX ORDER — EPHEDRINE SULFATE 50 MG/ML
INJECTION, SOLUTION INTRAMUSCULAR; INTRAVENOUS; SUBCUTANEOUS
Status: DISCONTINUED
Start: 2017-09-04 | End: 2017-09-04 | Stop reason: WASHOUT

## 2017-09-04 RX ORDER — CARBOPROST TROMETHAMINE 250 UG/ML
250 INJECTION, SOLUTION INTRAMUSCULAR
Status: DISCONTINUED | OUTPATIENT
Start: 2017-09-04 | End: 2017-09-05

## 2017-09-04 RX ORDER — EPHEDRINE SULFATE 50 MG/ML
5 INJECTION, SOLUTION INTRAMUSCULAR; INTRAVENOUS; SUBCUTANEOUS
Status: DISCONTINUED | OUTPATIENT
Start: 2017-09-04 | End: 2017-09-05

## 2017-09-04 RX ORDER — FENTANYL CITRATE 50 UG/ML
25 INJECTION, SOLUTION INTRAMUSCULAR; INTRAVENOUS ONCE
Status: COMPLETED | OUTPATIENT
Start: 2017-09-04 | End: 2017-09-04

## 2017-09-04 RX ADMIN — CEFAZOLIN SODIUM 2 G: 2 INJECTION, SOLUTION INTRAVENOUS at 19:31

## 2017-09-04 RX ADMIN — PHENYLEPHRINE HYDROCHLORIDE 200 MCG: 10 INJECTION, SOLUTION INTRAMUSCULAR; INTRAVENOUS; SUBCUTANEOUS at 20:24

## 2017-09-04 RX ADMIN — KETOROLAC TROMETHAMINE 30 MG: 30 INJECTION, SOLUTION INTRAMUSCULAR at 20:31

## 2017-09-04 RX ADMIN — SODIUM CHLORIDE, POTASSIUM CHLORIDE, SODIUM LACTATE AND CALCIUM CHLORIDE: 600; 310; 30; 20 INJECTION, SOLUTION INTRAVENOUS at 22:21

## 2017-09-04 RX ADMIN — NALBUPHINE HYDROCHLORIDE 5 MG: 10 INJECTION, SOLUTION INTRAMUSCULAR; INTRAVENOUS; SUBCUTANEOUS at 11:13

## 2017-09-04 RX ADMIN — Medication 30 ML: at 19:14

## 2017-09-04 RX ADMIN — PHENYLEPHRINE HYDROCHLORIDE 50 MCG: 10 INJECTION, SOLUTION INTRAMUSCULAR; INTRAVENOUS; SUBCUTANEOUS at 20:04

## 2017-09-04 RX ADMIN — LIDOCAINE HYDROCHLORIDE,EPINEPHRINE BITARTRATE 3 ML: 15; .005 INJECTION, SOLUTION EPIDURAL; INFILTRATION; INTRACAUDAL; PERINEURAL at 11:07

## 2017-09-04 RX ADMIN — BUPIVACAINE 20 ML: 13.3 INJECTION, SUSPENSION, LIPOSOMAL INFILTRATION at 21:10

## 2017-09-04 RX ADMIN — OXYTOCIN-SODIUM CHLORIDE 0.9% IV SOLN 30 UNIT/500ML 100 ML/HR: 30-0.9/5 SOLUTION at 23:48

## 2017-09-04 RX ADMIN — Medication 12 ML/HR: at 11:09

## 2017-09-04 RX ADMIN — BUPIVACAINE HYDROCHLORIDE AND EPINEPHRINE BITARTRATE 20 ML: 2.5; .005 INJECTION, SOLUTION INFILTRATION; PERINEURAL at 21:10

## 2017-09-04 RX ADMIN — PHENYLEPHRINE HYDROCHLORIDE 200 MCG: 10 INJECTION, SOLUTION INTRAMUSCULAR; INTRAVENOUS; SUBCUTANEOUS at 20:22

## 2017-09-04 RX ADMIN — AZITHROMYCIN MONOHYDRATE 500 MG: 500 INJECTION, POWDER, LYOPHILIZED, FOR SOLUTION INTRAVENOUS at 19:45

## 2017-09-04 RX ADMIN — SODIUM CHLORIDE, POTASSIUM CHLORIDE, SODIUM LACTATE AND CALCIUM CHLORIDE 1000 ML: 600; 310; 30; 20 INJECTION, SOLUTION INTRAVENOUS at 10:21

## 2017-09-04 RX ADMIN — SODIUM CITRATE AND CITRIC ACID MONOHYDRATE 30 ML: 500; 334 SOLUTION ORAL at 19:14

## 2017-09-04 RX ADMIN — PHENYLEPHRINE HYDROCHLORIDE 100 MCG: 10 INJECTION, SOLUTION INTRAMUSCULAR; INTRAVENOUS; SUBCUTANEOUS at 19:38

## 2017-09-04 RX ADMIN — PHENYLEPHRINE HYDROCHLORIDE 100 MCG: 10 INJECTION, SOLUTION INTRAMUSCULAR; INTRAVENOUS; SUBCUTANEOUS at 19:55

## 2017-09-04 RX ADMIN — PHENYLEPHRINE HYDROCHLORIDE 100 MCG: 10 INJECTION, SOLUTION INTRAMUSCULAR; INTRAVENOUS; SUBCUTANEOUS at 19:44

## 2017-09-04 RX ADMIN — PHENYLEPHRINE HYDROCHLORIDE 100 MCG: 10 INJECTION, SOLUTION INTRAMUSCULAR; INTRAVENOUS; SUBCUTANEOUS at 19:29

## 2017-09-04 RX ADMIN — FENTANYL CITRATE 25 MCG: 50 INJECTION, SOLUTION INTRAMUSCULAR; INTRAVENOUS at 11:06

## 2017-09-04 RX ADMIN — PHENYLEPHRINE HYDROCHLORIDE 100 MCG: 10 INJECTION, SOLUTION INTRAMUSCULAR; INTRAVENOUS; SUBCUTANEOUS at 19:50

## 2017-09-04 RX ADMIN — SODIUM CHLORIDE, POTASSIUM CHLORIDE, SODIUM LACTATE AND CALCIUM CHLORIDE: 600; 310; 30; 20 INJECTION, SOLUTION INTRAVENOUS at 11:30

## 2017-09-04 RX ADMIN — Medication 13 ML: at 19:19

## 2017-09-04 RX ADMIN — OXYTOCIN-SODIUM CHLORIDE 0.9% IV SOLN 30 UNIT/500ML 300 ML/HR: 30-0.9/5 SOLUTION at 19:58

## 2017-09-04 RX ADMIN — SODIUM CHLORIDE, POTASSIUM CHLORIDE, SODIUM LACTATE AND CALCIUM CHLORIDE: 600; 310; 30; 20 INJECTION, SOLUTION INTRAVENOUS at 17:03

## 2017-09-04 RX ADMIN — ONDANSETRON 4 MG: 2 INJECTION INTRAMUSCULAR; INTRAVENOUS at 19:55

## 2017-09-04 RX ADMIN — SODIUM CHLORIDE, POTASSIUM CHLORIDE, SODIUM LACTATE AND CALCIUM CHLORIDE: 600; 310; 30; 20 INJECTION, SOLUTION INTRAVENOUS at 19:20

## 2017-09-04 NOTE — PLAN OF CARE
Problem: Labor (Cervical Ripen, Induct, Augment) (Adult,Obstetrics,Pediatric)  Goal: Signs and Symptoms of Listed Potential Problems Will be Absent or Manageable (Labor)  Signs and symptoms of listed potential problems will be absent or manageable by discharge/transition of care (reference Labor (Cervical Ripen, Induct, Augment) (Adult,Obstetrics,Pediatric) CPG).   Outcome: Improving  [facility]: [facility]   Reason for maternal/fetal assessment per patient is Rule Out Labor  .  Patient is a . Prenatal record reviewed.      Obstetric History       T1      L1     SAB0   TAB0   Ectopic0   Multiple0   Live Births1        # Outcome Date GA Lbr Alireza/2nd Weight Sex Delivery Anes PTL Lv   3 Current                     2 Term 12/01/15 39w5d 11:55 / 01:02 3.43 kg (7 lb 9 oz) F Vag-Spont Local,EPI   PREETI      Apgar1:  4                Apgar5: 8   1 AB 14 23w0d                     . Medical history:   Past Medical History:   Diagnosis Date     NO ACTIVE PROBLEMS     . Gestational Age 39w3d. VSS. Fetal movement present. Patient denies pelvic pressure, UTI symptoms, GI problems, bloody show, vaginal bleeding, edema, headache, visual disturbances, epigastric or URQ pain, abdominal pain. Complains of back and abdominal pain with contractions. States water broke at 0915 while on way to hospital. Fluid assessed, meconium present.  Support persons, friend [place], and patient's young daughter present.  Action: Verbal consent for EFM. Triage assessment completed in room 442. EFM applied. Uterine assessment: Moderate contractions per palpation. Patient states frequency of every 5 minutes.Fetal assessment: Presumed adequate fetal oxygenation documented (see flow record).   Response: Yuli Russell CNM informed of patient arrival and desire for epidural as soon as possible. Cervix checked by provider: /-3. Plan per provider is admission and prepare for epidural. Patient verbalized  agreement with plan. Patient oriented to room and call light.

## 2017-09-04 NOTE — PLAN OF CARE
Problem: Labor (Cervical Ripen, Induct, Augment) (Adult,Obstetrics,Pediatric)  Goal: Signs and Symptoms of Listed Potential Problems Will be Absent or Manageable (Labor)  Signs and symptoms of listed potential problems will be absent or manageable by discharge/transition of care (reference Labor (Cervical Ripen, Induct, Augment) (Adult,Obstetrics,Pediatric) CPG).   Outcome: Improving  , with decels (LD, ED and VD). O2 started, CNM aware.  Ctxns q 2-4. Used rebozo technique between 3 contractions, then attempted to reposition patient to hands and knees using cubb device and assistance x3. Along w/CNM Drew, positioned patient successfully on hands and knees with pillows below her abdomen; patient then reported she did not feel well in that position and threw up. Cubb removed and patient repositioned to right side. Plan for monitoring per protocol.

## 2017-09-04 NOTE — IP AVS SNAPSHOT
[facility]    [address] [facility] [address]    Phone:  [phone]                                       After Visit Summary   9/4/2017    Zaina Tan    MRN: [id number]           After Visit Summary Signature Page     I have received my discharge instructions, and my questions have been answered. I have discussed any challenges I see with this plan with the nurse or doctor.    ..........................................................................................................................................  Patient/Patient Representative Signature      ..........................................................................................................................................  Patient Representative Print Name and Relationship to Patient    ..................................................               ................................................  Date                                            Time    ..........................................................................................................................................  Reviewed by Signature/Title    ...................................................              ..............................................  Date                                                            Time

## 2017-09-04 NOTE — PROGRESS NOTES
Labor Progress Note    S: Zaina Tan is a 31 year old  at 39w3d with no LMP recorded in active labor with SROM and meconium stained fluid. Pain is well controlled at this time after epidural. She reports moderate itchiness with improvement after Nubain. No changes in vision, RUQ pain, lower extremity swelling or headache.  Overall doing well.     O:  Blood pressure (!) 133/91, pulse 82, temperature 98.1  F (36.7  C), temperature source Oral, SpO2 100 %, not currently breastfeeding.  General appearance: comfortable.  Neuro: DTRs difficult to elicit. No clonus   Extremities: No edema  Contractions: Every 2-5 minutes. seconds duration.  Palpate: moderate.  Soft resting tone.   FHT: Baseline 140 bpm with moderate variability. Accelerations not present. Late decelerations resolved with position changes and interventions. Now a Category 1 tracing.   ROM: meconium fluid . Membranes have been ruptured for <12 hours.  Pelvic exam: 6.5/ 80%/ Anterior/ soft/ -2    Pitocin- none,  Antibiotics- none    Labs   Liver Function Studies -   Recent Labs   Lab Test  17   1307   PROTTOTAL  7.9   ALBUMIN  2.9*   BILITOTAL  0.5   ALKPHOS  244*   AST  19   ALT  19     Lab Results   Component Value Date    WBC 10.1 2017     Lab Results   Component Value Date    RBC 4.51 2017     Lab Results   Component Value Date    HGB 12.8 2017     Lab Results   Component Value Date    HCT 38.4 2017     No components found for: MCT  Lab Results   Component Value Date    MCV 85 2017     Lab Results   Component Value Date    MCH 28.4 2017     Lab Results   Component Value Date    MCHC 33.3 2017     Lab Results   Component Value Date    RDW 13.5 2017     Lab Results   Component Value Date     2017     Tp/cr to be collected by nursing     A:  31 year old  with IUP @ 39w3d active labor   Comment: normal cervical change since last exam   Fetal Heart rate tracing Category II  with resolution to Category I with interventions  GBS- negative  Non-sustained, mildly elevated BP with normal HELLP labs   Patient Active Problem List   Diagnosis     History of gestational diabetes mellitus (GDM) in prior pregnancy, currently pregnant     H/O fetal anomaly in prior pregnancy, currently pregnant, third trimester     Supervision of normal intrauterine pregnancy in multigravida - [facility] CNM     Vitamin D deficiency     Uterine leiomyoma     Maternal care for other (suspected) fetal abnormality and damage, not applicable or unspecified     Labor and delivery indication for care or intervention         P:  -Encourage movement and position changes  -Encourage PO fluid as tolerated  -Continuous EFM.  -Continue to monitor closely for maternal and fetal wellbeing.  -Reassess patient in 1-2 hours, sooner prn.  -Anticipate        Scribe Disclosure:   I, Dorothy Feliz, am serving as a scribe; to document services personally performed by Yuli Russell- -based on data collection and the provider's statements to me.     Provider Disclosure:  I agree with above History, Review of Systems, Physical exam and Plan.  I have reviewed the content of the documentation and have edited it as needed. I have personally performed the services documented here and the documentation accurately represents those services and the decisions I have made.      Electronically signed by:  RERE Keene CNM, MS3  Pager: [phone]

## 2017-09-04 NOTE — PROGRESS NOTES
Labor Progress Note    S:  Zaina is a 31 year old  at 38w3d in active labor with SROM and meconium stained fluid. Pt reports pain well controlled with epidural. Upon attempting to move Zaina to her hands and knees using the CUB, she had an episode of nausea and a small amount of vomiting. Upon returning to the right lateral decubitus position, her symptoms resolved and she is resting comfortable and doing well without visitors.     O:  Blood pressure 124/80, pulse 82, temperature 98.1  F (36.7  C), temperature source Axillary, resp. rate 16, SpO2 96 %, not currently breastfeeding.  General appearance: comfortable until after manipulation onto hand and knees.  Contractions: Every 2-4 minutes.  seconds duration.  Palpate: moderate.  Soft resting tone.   FHT: Baseline 140 with moderate variability. Accelerations not present. 1 prolonged deceleration present upon moving her to the assisted hands and knees position.   ROM: light meconium fluid. Membranes have been ruptured for <12 hours hours.  Pelvic exam: 7/ 80%/ Anterior/ soft/ -2. Cervix remains edematous.     Pitocin- none,  Antibiotics- none    A:  31 year old  with IUP @ 39w3d active labor without change in cervical dilation from previous exam. Minimal cervical change since 1300.   -Fetal Heart rate tracing Category II in association with assistance into hands and knees position. 1 prolonged deceleration resolved upon returning Zaina to the right lateral decubitus position. Resolution to Category II with intrauterine resuscitation efforts.   -gHTN v Pre-Eclampsia; Pt meets criteria for Pre-E with elevated Tp:Cr ratio (0.43) may be due to blood in the urinary catheter.   -GBS- negative    Patient Active Problem List   Diagnosis     History of gestational diabetes mellitus (GDM) in prior pregnancy, currently pregnant     H/O fetal anomaly in prior pregnancy, currently pregnant, third trimester     Supervision of normal intrauterine pregnancy  in multigravida - [facility] YUNIOR     Vitamin D deficiency     Uterine leiomyoma     Maternal care for other (suspected) fetal abnormality and damage, not applicable or unspecified     Labor and delivery indication for care or intervention       P:  -Repeat total protein:Cr ratio if uncontaminated sample can be obtained  -Encourage movement and frequent position changes.  -Encourage PO fluid as tolerated.   -Continuous EFM.  -Continue to monitor closely for maternal and fetal wellbeing.  -Reassess patient in 30 minutes, sooner prn.  -Initiate Pitocin augmentation if FHR remains category I for 30 minutes.   -MD consultant Nae in house and available as needed.       Scribe Disclosure:   I, Dorothy Feliz, am serving as a scribe; to document services personally performed by Yuli Russell- -based on data collection and the provider's statements to me.      Provider Disclosure:  I agree with above History, Review of Systems, Physical exam and Plan.  I have reviewed the content of the documentation and have edited it as needed. I have personally performed the services documented here and the documentation accurately represents those services and the decisions I have made.       Electronically signed by: RERE Keene CNM, MS3  Pager: [phone]    ADDENDUM 1836  I was called to the patient room at 1800 for a deceleration following straight catheterization. Cervical exam remained unchanged and intrauterine resuscitation efforts were undertaken until the fetus recovered to baseline of 140 after 4 minutes. At that time, I discussed with the patient that I was concerned about the wellbeing of her fetus given the slow recovery to baseline and would like to have our OB/GYN on call come to the bedside for consultation for delivery by . Zaina agreed to this. She called her partner Bravo who is en route to the hospital.     Handoff given to Dr. Soni at bedside and [facility] midwife Tez.      Yuli Russell, RERE ORONAM

## 2017-09-04 NOTE — PROVIDER NOTIFICATION
Cervix 6.5/80/-2, Ctxns q 2-4, FHR 130s w/LD's, denies pain, patient repositioned to right side with peanut ball and FHR return to baseline. Preeclampsia labs sent. Pt educated regarding decels, plan for fluid bolus and O2 if needed. Continue to monitor per protocol.

## 2017-09-04 NOTE — IP AVS SNAPSHOT
MRN:[id number]                      After Visit Summary   9/4/2017    Zaina Tan    MRN: [id number]           Thank you!     Thank you for choosing [facility] for your care. Our goal is always to provide you with excellent care. Hearing back from our patients is one way we can continue to improve our services. Please take a few minutes to complete the written survey that you may receive in the mail after you visit with us. Thank you!        Patient Information     Date Of Birth          1985        Designated Caregiver       Most Recent Value    Caregiver    Will someone help with your care after discharge? no      About your hospital stay     You were admitted on:  September 4, 2017 You last received care in the:  [facility]    You were discharged on:  September 7, 2017       Who to Call     For medical emergencies, please call 911.  For non-urgent questions about your medical care, please call your primary care provider or clinic, None  For questions related to your surgery, please call your surgery clinic        Attending Provider     Provider Yuli Quinn APRN [facility]       Primary Care Provider    Physician No Ref-Primary      After Care Instructions     Activity       Review discharge instructions            Diet       Resume previous diet            Discharge Instructions       Call the [facility] at [phone] or return to the ED if you have any of the following:    - Temperature greater than 100.4F  - Pain not controlled by pain medications  - any symptoms or preeclampsia, including: Severe headache, visual changes, chest pain, shortness of breath, pain in the upper right abdomen, or sudden increase in swelling  - Uncontrolled nausea/vomiting  - Foul-smelling vaginal discharge  - Vaginal bleeding soaking 1 pad per hour for 2 hours in a row            Discharge Instructions - Gestational diabetic patients        Gestational diabetic patients to follow up for fasting blood sugar and 2 hour 75gm glucose load at 6 weeks postpartum.            Discharge Instructions - Postpartum visit       Schedule postpartum visit with your provider and return to clinic in 6 weeks and in 1 week for a blood pressure check.                  Your next 10 appointments already scheduled     Sep 07, 2017  1:15 PM CDT   RETURN OB with Dee Troncoso CNM   [facility] ([facility])    [facility]  [address]  [address]  [facility] [id number] [phone]              Further instructions from your care team       Postop  Birth Instructions    Activity       Do not lift more than 10 pounds for 6 weeks after surgery.  Ask family and friends for help when you need it.    No driving until you have stopped taking your pain medications (usually two weeks after surgery).    No heavy exercise or activity for 6 weeks.  Don't do anything that will put a strain on your surgery site.    Don't strain when using the toilet.  Your care team may prescribe a stool softener if you have problems with your bowel movements.     To care for your incision:       Keep the incision clean and dry.    Do not soak your incision in water. No swimming or hot tubs until it has fully healed. You may soak in the bathtub if the water level is below your incision.    Do not use peroxide, gel, cream, lotion, or ointment on your incision.    Adjust your clothes to avoid pressure on your surgery site (check the elastic in your underwear for example).     You may see a small amount of clear or pink drainage and this is normal.  Check with your health care provider:       If the drainage increases or has an odor.    If the incision reddens, you have swelling, or develop a rash.    If you have increased pain and the medicine we prescribed doesn't help.    If you have a fever above 100.4 F (38 C)  with or without chills when placing thermometer under your tongue.   The area around your incision (surgery wound), will feel numb.  This is normal. The numbness should go away in less than a year.     Keep your hands clean:  Always wash your hands before touching your incision (surgery wound). This helps reduce your risk of infection. If your hands aren't dirty, you may use an alcohol hand-rub to clean your hands. Keep your nails clean and short.    Call your healthcare provider if you have any of these symptoms:       You soak a sanitary pad with blood within 1 hour, or you see blood clots larger than a golf ball.    Bleeding that lasts more than 6 weeks.    Vaginal discharge that smells bad.    Severe pain, cramping or tenderness in your lower belly area.    A need to urinate more frequently (use the toilet more often), more urgently (use the toilet very quickly), or it burns when you urinate.    Nausea and vomiting.    Redness, swelling or pain around a vein in your leg.    Problems breastfeeding or a red or painful area on your breast.    Chest pain and cough or are gasping for air.    Problems with coping with sadness, anxiety or depression. If you have concerns about hurting yourself or the baby, call your provider immediately.      You have questions or concerns after you return home.                  Pending Results     No orders found from 9/2/2017 to 9/5/2017.            Statement of Approval     Ordered          09/07/17 0941  I have reviewed and agree with all the recommendations and orders detailed in this document.  EFFECTIVE NOW     Approved and electronically signed by:  Sivan Doshi MD             Admission Information     Date & Time Provider Department Dept. Phone    9/4/2017 Shellie Soni MD [facility] [phone]      Your Vitals Were     Blood Pressure Pulse Temperature Respirations Pulse Oximetry       116/70 70 98.2  F (36.8  C) (Oral) 16 100%       MyChart Information      [employer] gives you secure access to your electronic health record. If you see a primary care provider, you can also send messages to your care team and make appointments. If you have questions, please call your primary care clinic.  If you do not have a primary care provider, please call [phone] and they will assist you.        Care EveryWhere ID     This is your Care EveryWhere ID. This could be used by other organizations to access your [place] medical records  [id number]        Equal Access to Services     LINDA CALLOWAY : Hadii kavita ku hadasho Soomaali, waaxda luqadaha, qaybta kaalmada adeegyada, waxay jorge rawls . So [facility] [phone].    ATENCIÓN: Si habla español, tiene a lee disposición servicios gratuitos de asistencia lingüística. [facility] [phone].    We comply with applicable federal civil rights laws and [place] laws. We do not discriminate on the basis of race, color, national origin, age, disability sex, sexual orientation or gender identity.               Review of your medicines      START taking        Dose / Directions    ibuprofen 600 MG tablet   Commonly known as:  ADVIL/MOTRIN        Dose:  600 mg   Take 1 tablet (600 mg) by mouth every 6 hours as needed for moderate pain   Quantity:  30 tablet   Refills:  1       oxyCODONE-acetaminophen 5-325 MG per tablet   Commonly known as:  PERCOCET        Dose:  1-2 tablet   Take 1-2 tablets by mouth every 4 hours as needed for pain   Quantity:  25 tablet   Refills:  0       senna-docusate 8.6-50 MG per tablet   Commonly known as:  SENOKOT-S;PERICOLACE        Dose:  2 tablet   Take 2 tablets by mouth 2 times daily   Quantity:  120 tablet   Refills:  1         CONTINUE these medicines which have NOT CHANGED        Dose / Directions    prenatal multivitamin plus iron 27-0.8 MG Tabs per tablet        Dose:  1 tablet   Take 1 tablet by mouth daily   Refills:  0            Where to get your medicines      These medications were  sent to [facility] [place], [place] - [address]  [address], [facility] [address]     Phone:  [phone]     ibuprofen 600 MG tablet    senna-docusate 8.6-50 MG per tablet         Some of these will need a paper prescription and others can be bought over the counter. Ask your nurse if you have questions.     Bring a paper prescription for each of these medications     oxyCODONE-acetaminophen 5-325 MG per tablet                Protect others around you: Learn how to safely use, store and throw away your medicines at www.disposemymeds.org.             Medication List: This is a list of all your medications and when to take them. Check marks below indicate your daily home schedule. Keep this list as a reference.      Medications           Morning Afternoon Evening Bedtime As Needed    ibuprofen 600 MG tablet   Commonly known as:  ADVIL/MOTRIN   Take 1 tablet (600 mg) by mouth every 6 hours as needed for moderate pain   Last time this was given:  800 mg on 9/7/2017  6:06 AM                                oxyCODONE-acetaminophen 5-325 MG per tablet   Commonly known as:  PERCOCET   Take 1-2 tablets by mouth every 4 hours as needed for pain                                prenatal multivitamin plus iron 27-0.8 MG Tabs per tablet   Take 1 tablet by mouth daily                                senna-docusate 8.6-50 MG per tablet   Commonly known as:  SENOKOT-S;PERICOLACE   Take 2 tablets by mouth 2 times daily   Last time this was given:  2 tablets on 9/6/2017  9:01 PM

## 2017-09-04 NOTE — PROGRESS NOTES
Labor Progress Note    S:   Zaina is a 31 year old  at 38w3d in active labor with SROM and meconium stained fluid. Pt reports pain well controlled with epidural. Pt is in good spirits. Partner left hospital to take pt's daughter home. He will not be able to be present for birth. Pt doing well laboring without family support. Overall doing well.      O:  Blood pressure 143/82, pulse 82, temperature 98.1  F (36.7  C), temperature source Axillary, resp. rate 16, SpO2 100 %, not currently breastfeeding.  General appearance: comfortable.  Contractions: Every 2-4 minutes.  seconds duration.  Palpate: moderate.  Soft resting tone.   FHT: Baseline 140 with moderate variability. Accelerations not present. 1 late and 1 prolonged deceleration present. Decelerations resolved with position changes. Now a category I tracing.   ROM: light meconium fluid. Membranes have been ruptured for <12 hours.  Pelvic exam: 6.5/80%/ Anterior/ soft/ -2. Cervix is edematous.    Pitocin- none,  Antibiotics- none    Labs  Creatinine   Date Value Ref Range Status   2017 0.68 0.52 - 1.04 mg/dL Final     Lab Results   Component Value Date    PROTTOTAL 7.9 2017     0.43 Total Protein:Creatinine ratio. Blood with clots present in the urine sample     A:  31 year old  with IUP @ 39w3d active labor   No cervical change from previous exam at 1300  Fetal Heart rate tracing Category II with resolution to Category I with interventions  GBS- negative  gHTN v Pre-Eclampsia; Pt meets criteria for Pre-E with elevated Tp:Cr ratio (0.43) may be due to blood in the urinary catheter.     Patient Active Problem List   Diagnosis     History of gestational diabetes mellitus (GDM) in prior pregnancy, currently pregnant     H/O fetal anomaly in prior pregnancy, currently pregnant, third trimester     Supervision of normal intrauterine pregnancy in multigravida - S CNM     Vitamin D deficiency     Uterine leiomyoma     Maternal care for  other (suspected) fetal abnormality and damage, not applicable or unspecified     Labor and delivery indication for care or intervention       P:  -Elevated BPs: Will continue to monitor closely however we would not  at this point if we consider her to have preeclampsia   -Repeat total protein:Cr ratio labs if sample not contaminated with blood can be obtained  -Encourage movement and position changes.  -Encourage PO fluid as tolerated.   -Continuous EFM.  -Continue to monitor closely for maternal and fetal wellbeing.  -MD Soni made aware that pt meets criteria for pre-eclampsia  -Reassess patient in 1 hour, sooner prn.  -Consider pit if no change in cervix at reassessment     Scribe Disclosure:   I, Dorothy Feliz, am serving as a scribe; to document services personally performed by RERE Escalera CNM- -based on data collection and the provider's statements to me.     Provider Disclosure:  I agree with above History, Review of Systems, Physical exam and Plan.  I have reviewed the content of the documentation and have edited it as needed. I have personally performed the services documented here and the documentation accurately represents those services and the decisions I have made.      Electronically signed by:  RERE Keene CNM, MS3  Pager: [phone]      ADDENDUM [id number]    CNM at bedside as pt noted to be having recurrent late decelerations.   SVE: 7/80%/-2, small caput, cervix continues to feel edematous. Probable OP positioning.     Plan:   -Discussed increased risk for  with pt given slow cervical change and frequent episodes of late decelerations. Pt verbalizes understanding.  -Continue intrauterine resuscitation efforts to achieve Cat I tracing.   -Rebozo and repositioning to support optimal fetal positioning and descent.   -Dr. Soni updated on pt's Cat II tracing and minimal cervical change. Dr. Soni in house and available.   -Pitocin  augmentation when tracing becomes Category I.     Yuli Russell, APRN CNM

## 2017-09-04 NOTE — H&P
"ADMIT NOTE  =================  Zaina is a 31 year old  at 39w3d with a Last Menstrual Period of No LMP recorded. Patient is pregnant. and Estimated Date of Delivery of Estimated Date of Delivery: Sep 8, 2017 based on first trimester ultrasound. She is [facility] on 2017 at 9:53 AM  in in early labor and spontaneous rupture of membranes for light meconium stained fluid.     Fetal movement- active  ROM- yes, large, light meconium   GBS- negative    HPI  ================  Zaina reports spontaneous contractions that began this morning around 0800. Contractions increased in intensity close to 0900 at which time she decided to come to the hospital. At 0900 her membranes ruptured in the car while she was en route to the hospital. She requested an epidural upon her arrival to the [facility].       FOB- is not involved, Pt's Partner is [place] (goes by Neil), he has been present and supportive since the beginning of pregnancy. Plans to be present for birth.   Other labor support- Daughter    PRENATAL COURSE  =================  Prenatal course was essentially uncomplicated Regular care received at [facility] clinic.  Started care at 8 weeks gestation, 9 total visits.  TWG  165 - 150 =  15 #. Starting BMI 27, Height 63\"  Denies smoking, drug, etoh use during pregnancy.    HISTORIES  ============  No Known Allergies  Past Medical History:   Diagnosis Date     NO ACTIVE PROBLEMS      Past Surgical History:   Procedure Laterality Date     D & C      fetal hypoplastic leftheart syndrome   .  Family History   Problem Relation Age of Onset     Hypertension Mother      had before pregnancy     HEART DISEASE Other      hypoplastic left heart. TAB 23 wks      DIABETES No family hx of      Social History   Substance Use Topics     Smoking status: Never Smoker     Smokeless tobacco: Never Used     Alcohol use No     Obstetric History       T1      L1     SAB0   TAB0   Ectopic0   Multiple0   Live " Births1       # Outcome Date GA Lbr Alireza/2nd Weight Sex Delivery Anes PTL Lv   3 Current            2 Term 12/01/15 39w5d 11:55 / 01:02 3.43 kg (7 lb 9 oz) F Vag-Spont Local,EPI  PREETI      Apgar1:  4                Apgar5: 8   1 AB 06/07/14 23w0d                  LABS:   ===========  Rhogam not indicated  Lab Results   Component Value Date    ABO B 01/30/2017       Lab Results   Component Value Date    RH  Pos 01/30/2017     No results found for: RUBELLAABIGG   No results found for: RPR  No results found for: HIV  Lab Results   Component Value Date    HGB 11.0 06/12/2017      Lab Results   Component Value Date    HEPBANG Nonreactive 01/30/2017     Lab Results   Component Value Date    GBS Negative 08/14/2017     GCT - 98      ULTRA SOUND:  ==============  First trimester US- confirmed LMP dating and Second trimester screening US- WNL. Normal Fetal Echo (completed d/t hx termination for hypoplastic left heart). Anterior placenta.     ROS  =========  Pt denies significant respiratory, cardiovacular, GI, or muscular/skeletalcomplaints.    See RN data base ROS.     PHYSICAL EXAM:  ===============  Blood pressure 144/69, temperature 97.8  F (36.6  C), temperature source Oral, not currently breastfeeding.  General appearance: uncomfortable with contractions  Heart: RRR without murmur  Lungs: clear to auscultation   Neuro: denies headache and visual disturbances  Psych: Mentation normal and bright   Legs: 2+/2+, no clonus, no edema     Abdomen: gravid, single vertex fetus, non-tender between contractions. Vertex position confirmed with BSUS.  EFW- 7#2 lbs.   CONTACTIONS: Contractions every 2.5-6 minutes.  Palpate: moderate  FETAL HEART TONES: baseline  with moderate FHR variability and pos accelerations. No decelerations present.      PELVIC EXAM: 4/90%/ Mid/ soft/ -3   BLOODY SHOW: no   ROM: Yes  FLUID: clear  AMNISURE: not done; gross rupture for large amount of clear fluid.    ASSESSMENT:  ==============  IUP @ 39w3d in  in early labor   Fetal Heart rate tracing Category category one  GBS- negative  Desires epidural     PLAN:  ===========  Admit - see IP orders  Expectant management of labor  Epidural per pt request  Fetal Monitoring: Continuous EFM for epidural  MD consultant Soni on call/ available prn   Reevaluate in 2-3 hours, sooner prn.     Yuli Russell, CNM, APRN

## 2017-09-04 NOTE — ANESTHESIA PROCEDURE NOTES
Combined Spinal/Epidural procedure note    Staff  Anesthesiologist:  DANIEL LEE  Location:  OB  Procedure start time:  9/4/2017 10:56 AM  Procedure end time:  9/4/2017 11:09 AM    Timeout  patient identified, IV checked, site marked, risks and benefits discussed, informed consent, monitors and equipment checked, pre-op evaluation, at physician/surgeon's request and post-op pain management    Correct Patient: Yes    Correct Position: Yes    Correct Site: Yes    Correct Procedure: Yes    Correct Laterality:  N/A  Site Marked:  N/A    Procedure details  Procedure:  Combined Spinal/Epidural (CSE)  Position:  Sitting  ASA:  2  Sterile Prep: chloraprep, patient draped, mask and sterile gloves    Insertion site:  L4-5  Local skin infiltration:  2% lidocaine  amount (mL):  2  Approach:  Midline  Epidural Needle Type:  Mai  Needle gauge (G):  17  Injection Technique:  LORT saline  LINO at (cm):  4.5  Attempts:  1  Redirects:  0  Spinal Needle (G):  25  Spinal Needle Type:  Iman  Catheter gauge (G):  19  Catheter threaded easily: Yes    Threaded to skin (cm) :  9  Threaded in epidural space (cm):  4.5  Paresthesias:  No  CSF with Epidural needle: No    CSF with Spinal needle: Yes    Aspiration negative for Heme or CSF: Yes    Test dose (mL):  3  Local anesthetic for test dose:  Lidocaine 1.5% w/ 1:200,000 epinephrine  Test dose negative for signs of intravascular, subdural or intrathecal injection: Yes

## 2017-09-04 NOTE — SIGNIFICANT EVENT
[facility]  DYSTOCIA ARREST HUDDLE       Choose one diagnosis below  Diagnosis of Dystocia/ Arrest:  Cervix is >=6cm  Membranes are ruptured (if possible), then No cervical change after at least 4 hours of adequate uterine activity (strong by palpation or MVUs greater than 200), OR at least 6 hours of oxytocin administration with inadequate uterine activity.  Patient with limited cervical change over last 6 hours with swelling of cervix on exam.  Category II tracing with periods of minimal variability and recurrent late decelerations.  Discussed with patient concern for protracted labor and inability to augment given category II tracing.  Recommend proceeding with  section.  Discussed risks of bleeding, infection and damage to surrounding structures of the uterus.  Patient understands, agreeable to blood transfusion if needed.  Surgical consent signed.  Plan to move forward with  section for above reasons.  Shellie Soni MD

## 2017-09-04 NOTE — TELEPHONE ENCOUNTER
Zaina paged to report low back cramping about every 5 minutes that started this morning 30 minutes ago at 0800. These cramps are reminiscent of early labor cramps with her first baby. Denies leaking of fluid and vaginal bleeding. Has had some mucous discharge today but no bloody show. She is uncertain if baby is active this morning - will do movement count now and page back if <10 in 2 hours. Denies dysuria and frequency.     Plan for pt to shower, eat, hydrate, fetal movement count and rest as needed.  Will page back if membranes rupture, if cramps are feeling stronger or more frequent. Encouraged to come to labor floor for evaluation at any point if she desires. Pt agrees with this plan.     RERE Keene CNM

## 2017-09-04 NOTE — PROVIDER NOTIFICATION
Prolonged decel following patient repositioning to left side. Patient turned to right side, O2 and fluid bolus started. FHR return to baseline. CNM Kleven at bedside, cervix checked, unchanged.. Straight cath'd to empty bladder. Monitoring per protocol.

## 2017-09-04 NOTE — ANESTHESIA PREPROCEDURE EVALUATION
Anesthesia Evaluation       history and physical reviewed .      No history of anesthetic complications          ROS/MED HX    ENT/Pulmonary:  - neg pulmonary ROS     Neurologic:  - neg neurologic ROS     Cardiovascular:  - neg cardiovascular ROS       METS/Exercise Tolerance:     Hematologic:         Musculoskeletal:         GI/Hepatic:  - neg GI/hepatic ROS       Renal/Genitourinary:         Endo:         Psychiatric:         Infectious Disease:         Malignancy:         Other:                     Physical Exam  Normal systems: cardiovascular, pulmonary and dental    Airway   Mallampati: II  TM distance: > 3 FB  Neck ROM: full  Mouth opening: > 3 cm    Dental     Cardiovascular       Pulmonary           neg OB ROS  (+) gestational diabetes               Anesthesia Plan      History & Physical Review  History and physical reviewed and following examination; no interval change.    ASA Status:  2 .  OB Epidural Asa: 2   NPO Status:  > 8 hours    Plan for Epidural   PONV prophylaxis:  Ondansetron (or other 5HT-3) and Dexamethasone or Solumedrol  Combined CSE      Postoperative Care  Postoperative pain management:  IV analgesics, Oral pain medications and Peripheral nerve block (Single Shot).      Consents  Anesthetic plan, risks, benefits and alternatives discussed with:  Patient and Patient..

## 2017-09-05 LAB — HGB BLD-MCNC: 10.6 G/DL (ref 11.7–15.7)

## 2017-09-05 PROCEDURE — 36415 COLL VENOUS BLD VENIPUNCTURE: CPT | Performed by: OBSTETRICS & GYNECOLOGY

## 2017-09-05 PROCEDURE — 25000132 ZZH RX MED GY IP 250 OP 250 PS 637: Performed by: STUDENT IN AN ORGANIZED HEALTH CARE EDUCATION/TRAINING PROGRAM

## 2017-09-05 PROCEDURE — 12000030 ZZH R&B OB INTERMEDIATE UMMC

## 2017-09-05 PROCEDURE — 25000128 H RX IP 250 OP 636: Performed by: STUDENT IN AN ORGANIZED HEALTH CARE EDUCATION/TRAINING PROGRAM

## 2017-09-05 PROCEDURE — 85018 HEMOGLOBIN: CPT | Performed by: OBSTETRICS & GYNECOLOGY

## 2017-09-05 RX ORDER — LIDOCAINE 40 MG/G
CREAM TOPICAL
Status: DISCONTINUED | OUTPATIENT
Start: 2017-09-05 | End: 2017-09-07 | Stop reason: HOSPADM

## 2017-09-05 RX ORDER — PRENATAL VIT/IRON FUM/FOLIC AC 27MG-0.8MG
1 TABLET ORAL DAILY
Status: DISCONTINUED | OUTPATIENT
Start: 2017-09-05 | End: 2017-09-07 | Stop reason: HOSPADM

## 2017-09-05 RX ORDER — OXYTOCIN/0.9 % SODIUM CHLORIDE 30/500 ML
340 PLASTIC BAG, INJECTION (ML) INTRAVENOUS CONTINUOUS PRN
Status: DISCONTINUED | OUTPATIENT
Start: 2017-09-05 | End: 2017-09-07 | Stop reason: HOSPADM

## 2017-09-05 RX ORDER — SIMETHICONE 80 MG
80 TABLET,CHEWABLE ORAL 4 TIMES DAILY PRN
Status: DISCONTINUED | OUTPATIENT
Start: 2017-09-05 | End: 2017-09-07 | Stop reason: HOSPADM

## 2017-09-05 RX ORDER — ACETAMINOPHEN 325 MG/1
650 TABLET ORAL EVERY 4 HOURS PRN
Status: DISCONTINUED | OUTPATIENT
Start: 2017-09-07 | End: 2017-09-07 | Stop reason: HOSPADM

## 2017-09-05 RX ORDER — OXYTOCIN 10 [USP'U]/ML
10 INJECTION, SOLUTION INTRAMUSCULAR; INTRAVENOUS
Status: DISCONTINUED | OUTPATIENT
Start: 2017-09-05 | End: 2017-09-07 | Stop reason: HOSPADM

## 2017-09-05 RX ORDER — NALOXONE HYDROCHLORIDE 0.4 MG/ML
.1-.4 INJECTION, SOLUTION INTRAMUSCULAR; INTRAVENOUS; SUBCUTANEOUS
Status: DISCONTINUED | OUTPATIENT
Start: 2017-09-05 | End: 2017-09-07 | Stop reason: HOSPADM

## 2017-09-05 RX ORDER — BISACODYL 10 MG
10 SUPPOSITORY, RECTAL RECTAL DAILY PRN
Status: DISCONTINUED | OUTPATIENT
Start: 2017-09-06 | End: 2017-09-07 | Stop reason: HOSPADM

## 2017-09-05 RX ORDER — LANOLIN 100 %
OINTMENT (GRAM) TOPICAL
Status: DISCONTINUED | OUTPATIENT
Start: 2017-09-05 | End: 2017-09-07 | Stop reason: HOSPADM

## 2017-09-05 RX ORDER — ONDANSETRON 2 MG/ML
4 INJECTION INTRAMUSCULAR; INTRAVENOUS EVERY 6 HOURS PRN
Status: DISCONTINUED | OUTPATIENT
Start: 2017-09-05 | End: 2017-09-07 | Stop reason: HOSPADM

## 2017-09-05 RX ORDER — AMOXICILLIN 250 MG
1-2 CAPSULE ORAL 2 TIMES DAILY
Status: DISCONTINUED | OUTPATIENT
Start: 2017-09-05 | End: 2017-09-07 | Stop reason: HOSPADM

## 2017-09-05 RX ORDER — HYDROCORTISONE 2.5 %
CREAM (GRAM) TOPICAL 3 TIMES DAILY PRN
Status: DISCONTINUED | OUTPATIENT
Start: 2017-09-05 | End: 2017-09-07 | Stop reason: HOSPADM

## 2017-09-05 RX ORDER — MISOPROSTOL 200 UG/1
400 TABLET ORAL
Status: DISCONTINUED | OUTPATIENT
Start: 2017-09-05 | End: 2017-09-07 | Stop reason: HOSPADM

## 2017-09-05 RX ORDER — ACETAMINOPHEN 325 MG/1
975 TABLET ORAL EVERY 8 HOURS
Status: DISCONTINUED | OUTPATIENT
Start: 2017-09-05 | End: 2017-09-07 | Stop reason: HOSPADM

## 2017-09-05 RX ORDER — OXYTOCIN/0.9 % SODIUM CHLORIDE 30/500 ML
100 PLASTIC BAG, INJECTION (ML) INTRAVENOUS CONTINUOUS
Status: DISCONTINUED | OUTPATIENT
Start: 2017-09-05 | End: 2017-09-07 | Stop reason: HOSPADM

## 2017-09-05 RX ORDER — IBUPROFEN 400 MG/1
400-800 TABLET, FILM COATED ORAL EVERY 6 HOURS PRN
Status: DISCONTINUED | OUTPATIENT
Start: 2017-09-05 | End: 2017-09-07 | Stop reason: HOSPADM

## 2017-09-05 RX ORDER — DEXTROSE, SODIUM CHLORIDE, SODIUM LACTATE, POTASSIUM CHLORIDE, AND CALCIUM CHLORIDE 5; .6; .31; .03; .02 G/100ML; G/100ML; G/100ML; G/100ML; G/100ML
INJECTION, SOLUTION INTRAVENOUS CONTINUOUS
Status: DISCONTINUED | OUTPATIENT
Start: 2017-09-05 | End: 2017-09-07 | Stop reason: HOSPADM

## 2017-09-05 RX ORDER — OXYCODONE HYDROCHLORIDE 5 MG/1
5-10 TABLET ORAL
Status: DISCONTINUED | OUTPATIENT
Start: 2017-09-05 | End: 2017-09-07 | Stop reason: HOSPADM

## 2017-09-05 RX ORDER — DIPHENHYDRAMINE HCL 25 MG
25 CAPSULE ORAL EVERY 6 HOURS PRN
Status: DISCONTINUED | OUTPATIENT
Start: 2017-09-05 | End: 2017-09-07 | Stop reason: HOSPADM

## 2017-09-05 RX ORDER — DIPHENHYDRAMINE HYDROCHLORIDE 50 MG/ML
25 INJECTION INTRAMUSCULAR; INTRAVENOUS EVERY 6 HOURS PRN
Status: DISCONTINUED | OUTPATIENT
Start: 2017-09-05 | End: 2017-09-07 | Stop reason: HOSPADM

## 2017-09-05 RX ORDER — KETOROLAC TROMETHAMINE 30 MG/ML
30 INJECTION, SOLUTION INTRAMUSCULAR; INTRAVENOUS EVERY 6 HOURS
Status: DISPENSED | OUTPATIENT
Start: 2017-09-05 | End: 2017-09-06

## 2017-09-05 RX ADMIN — OXYCODONE HYDROCHLORIDE 10 MG: 5 TABLET ORAL at 01:14

## 2017-09-05 RX ADMIN — SENNOSIDES AND DOCUSATE SODIUM 2 TABLET: 8.6; 5 TABLET ORAL at 21:52

## 2017-09-05 RX ADMIN — ACETAMINOPHEN 975 MG: 325 TABLET, FILM COATED ORAL at 01:14

## 2017-09-05 RX ADMIN — OXYCODONE HYDROCHLORIDE 5 MG: 5 TABLET ORAL at 22:02

## 2017-09-05 RX ADMIN — OXYCODONE HYDROCHLORIDE 5 MG: 5 TABLET ORAL at 15:25

## 2017-09-05 RX ADMIN — OXYCODONE HYDROCHLORIDE 10 MG: 5 TABLET ORAL at 07:10

## 2017-09-05 RX ADMIN — OXYCODONE HYDROCHLORIDE 5 MG: 5 TABLET ORAL at 19:08

## 2017-09-05 RX ADMIN — ACETAMINOPHEN 975 MG: 325 TABLET, FILM COATED ORAL at 17:31

## 2017-09-05 RX ADMIN — SODIUM CHLORIDE, SODIUM LACTATE, POTASSIUM CHLORIDE, CALCIUM CHLORIDE AND DEXTROSE MONOHYDRATE: 5; 600; 310; 30; 20 INJECTION, SOLUTION INTRAVENOUS at 05:01

## 2017-09-05 RX ADMIN — SENNOSIDES AND DOCUSATE SODIUM 1 TABLET: 8.6; 5 TABLET ORAL at 08:36

## 2017-09-05 RX ADMIN — OXYCODONE HYDROCHLORIDE 10 MG: 5 TABLET ORAL at 04:05

## 2017-09-05 RX ADMIN — IBUPROFEN 800 MG: 400 TABLET ORAL at 21:52

## 2017-09-05 RX ADMIN — OXYCODONE HYDROCHLORIDE 5 MG: 5 TABLET ORAL at 10:33

## 2017-09-05 RX ADMIN — KETOROLAC TROMETHAMINE 30 MG: 30 INJECTION, SOLUTION INTRAMUSCULAR at 02:27

## 2017-09-05 RX ADMIN — ACETAMINOPHEN 975 MG: 325 TABLET, FILM COATED ORAL at 09:10

## 2017-09-05 RX ADMIN — KETOROLAC TROMETHAMINE 30 MG: 30 INJECTION, SOLUTION INTRAMUSCULAR at 15:25

## 2017-09-05 RX ADMIN — KETOROLAC TROMETHAMINE 30 MG: 30 INJECTION, SOLUTION INTRAMUSCULAR at 08:35

## 2017-09-05 NOTE — PLAN OF CARE
Problem: Goal Outcome Summary  Goal: Goal Outcome Summary  Outcome: Improving  Vitals are stable, breastfeeding baby on demand, voiding without difficulty, taking Ibuprofen, Tylenol, and Oxycodone for pain. Continue with plan of care.

## 2017-09-05 NOTE — PROGRESS NOTES
D) Pt to PACU via cart.  Vss, temp slightly elevated (99.9),  30 units of Pitocin infusing by gravity to PIV without complications, sena to gravity with clear, yellow with a pink-tinged colored urine. Denies nausea. Pt began to report some pain at her incision site as of  but denied needing any interventions.   I) IV to pump, sequential compression devices re-started. TAP block administered by anesthesia team.  A) Stable. FF @ U/U, midline. Perineum intact. Small amount of rubra lochia.  incision covered, dressing C/D/I.  P) Pt to inform RN if she experiences pain or nausea.  Post op cares.   Anticipate transfer to 7 floor post partum at ~ 2 hours post op.

## 2017-09-05 NOTE — PLAN OF CARE
Problem: Goal Outcome Summary  Goal: Goal Outcome Summary  Outcome: No Change  Data: Vital signs within normal limits. Postpartum checks within normal limits - see flow record. Patient eating and drinking normally. Patient's sena catheter draining adequate amounts of urine and pt is up ambulating. No apparent signs of infection. Incision dressing clean, dry and intact. Patient performing self cares and is able to care for infant. Breastfeeding on que with minimal ot no assistance.  Action: Patient medicated during the shift for pain. See MAR. Patient reassessed within 1 hour after each medication and pain was improved - patient stated she was comfortable. Patient education done about unit and room orientation, plan of care, breastfeeding, etc. See flow record.  Response: Positive attachment behaviors observed with infant. Support person present.   Plan: Anticipate discharge on POD #3-4.

## 2017-09-05 NOTE — PROGRESS NOTES
Data: Zaina Tan transferred to 7121 via cart at 2320. Baby transferred via parent's arms.  Action: Receiving unit notified of transfer: Yes. Patient and family notified of room change. Report given to Sivan PARSONS RN at 2300. Belongings sent to receiving unit. Accompanied by Registered Nurse. Oriented patient to surroundings. Call light within reach. ID bands double-checked with receiving RN.  Response: Patient tolerated transfer and is stable.

## 2017-09-05 NOTE — PLAN OF CARE
Problem: Labor (Cervical Ripen, Induct, Augment) (Adult,Obstetrics,Pediatric)  Goal: Signs and Symptoms of Listed Potential Problems Will be Absent or Manageable (Labor)  Signs and symptoms of listed potential problems will be absent or manageable by discharge/transition of care (reference Labor (Cervical Ripen, Induct, Augment) (Adult,Obstetrics,Pediatric) CPG).   Outcome: Declining  Contractions q 2-5, FHR 140s w/VD, PD and LD.Cervix unchanged per YUNIOR Russell. Position changes, O2 and fluid boluses given, with FHR return to baseline, but decels continued with contractions. Bimal Zaman and Nae notified, and patient agreed to . Pre-op prep completed, consults completed and consents signed. Patient tearful, anxious for boyfriend to arrive. OR team briefed about desire to keep gender of baby a surprise until birth as her boyfriend believes the child to be a girl, but it is a boy, per the patient, that she plans to name after her boyfriend. Report given to Dinora DAMON RN, and care transferred prior to  patient transfer to OR2.

## 2017-09-05 NOTE — ANESTHESIA PROCEDURE NOTES
Peripheral Nerve Block Procedure Note    Staff:     Anesthesiologist:  DANIEL LEE    Referred By:  KATE GONZÁLES  Location: PACU  Procedure Start/Stop TImes:      9/4/2017 9:04 PM     9/4/2017 9:11 PM    patient identified, IV checked, site marked, risks and benefits discussed, informed consent, monitors and equipment checked, pre-op evaluation, at physician/surgeon's request and post-op pain management      Correct Patient: Yes      Correct Position: Yes      Correct Site: Yes      Correct Procedure: Yes      Correct Laterality:  N/A    Site Marked:  Yes  Procedure details:     Procedure:  TAP    ASA:  2 and Emergent    Diagnosis:  Post op pain    Laterality:  Bilateral    Position:  Supine    Sterile Prep: chloraprep, mask and sterile gloves      Local skin infiltration:  None    Needle:  Insulated and short bevel    Needle gauge:  21    Needle length (mm):  100    Ultrasound: Yes      Ultrasound used to identify targeted nerve, plexus, or vascular structure and placed a needle adjacent to it      Permanent Image entered into patiient's record      Abnormal pain on injection: No      Blood Aspirated: No      Paresthesias:  No    Bleeding at site: No      Bolus via:  Needle    Infusion Method:  Single Shot    Blood aspirated via catheter: No      Complications:  None  Assessment/Narrative:     Injection made incrementally with aspirations every (mL):  5

## 2017-09-05 NOTE — OP NOTE
[facility]  Operative Note    Zaina Tan    1985   MRN [id number]  Date of Service: 2017   Surgeon: Shellie Soni MD  Assistants: Emily Rubin MD PGY-2     Pre-operative diagnosis:   IUP at 39w3d   Arrest of dilation  Preeclampsia w/o severe features    Post-operative diagnosis:   Postpartum s/p primary  with two-layer uterine closure     Procedures: Primary low transverse  section with double layer uterine closure via Pfannenstiel incision     Anesthesia: epidural  IVF: 300cc  UOP: 100cc  EBL: 800cc  Intraoperative medications: 2g ancef, 500mg azitromycin, IV pitocin    Indications: Zaina Tan is an 31 year old  at 39w3d who presented in labor.  Her labor course was protracted with minimal change of cervix from 6 to 7 cm over 6 hours.  There was also concern for fetal status with Category II tracing with minimal to moderate variability and recurrent late decelerationsShe was also diagnosed with preeclampsia without severe features. The risks, benefits and alternatives were reviewed and the patient signed informed consent for the procedure. All questions were answered.     Findings: A single liveborn male weighing 4030g with apgars of 1 and 7.  Face presentation was initially noted and the fetus became breech in utero and was delivered atraumatically using typical breech maneuvers. Cord gases: arterial pH 6.99, venous pH 7.14. Four 1-2cm subserosal/pedunculated fibroids on left aspect of fundus. Normal appearing fallopian tubes, ovaries.  Meconium-stained amniotic fluid, terminal meconium.  Anterior placenta, intact. 3V cord. No fascial adhesions.  No intraabdominal adhesions.    Specimen: cord blood, cord gases, placenta    Complications: None apparent    Procedure Details: The patient was taken back to the operating room where epidural anesthesia was noted to be adequate. SCDs and sena catheter placed.  Hematuria was noted. She was  prepped and draped in the usual sterile fashion in the dorsal supine position with a leftward tilt. A safety time out was performed. 2 g Ancef and 500mg azithromycin was administered. After testing to ensure adequate anesthesia, a Pfannenstiel skin incision was made with the scalpel and carried through the underlying layers to the fascia. The fascia was incised in the midline and extended laterally with Ballard scissors. The superior aspect of the fascial incision was grasped with kocher clamps, elevated and the underlying rectus muscles dissected off with a combination of blunt and sharp dissection. Attention was then turned to the inferior aspect of the incision, which in a similar fashion was grasped, tented up and the rectus muscle dissected off the fascia. The rectus muscles were  in the midline. The peritoneum was identified and entered bluntly.  The bladder blade was inserted.  The lower uterine segment was incised in a transverse fashion with the scalpel. The incision was extended with digital pressure in cranial and caudal directions. The bladder blade was removed.  Face presentation was initially noted but as delivery of the fetal head was attempted the fetus became breech in utero and was then delivered atraumatically using typical breech maneuvers. There was initial poor tone after delivery and as such the cord was immediately clamped and cut and the infant was handed off to the waiting NICU staff.  A segment of cord was taken for cord blood and gases. The placenta was removed with gentle traction on the cord and fundal massage. The uterus was exteriorized and cleared of all clots and debris.  Oxytocin was given through running IV.  Four 1-2cm subserosal/pedunculated fibroids on left aspect of fundus. The uterine incision was repaired with 0- Vicryl in a running locked fashion. A second layer of 0-Vicryl was used to horizontally imbricate the incision.  The uterus was noted to be firm. The  posterior cul-de-sac was suctioned. The uterus was then returned to the abdomen and noted to be hemostatic. The pericolic gutters were suctioned and cleared of clots.  A kocher clamp was used to elevate the fascia superiorly and inferiorly and areas of oozing were controlled with cautery until good hemostasis was noted. The fascia was closed with 0-vicryl in a running fashion. The subcutaneous tissue was irrigated and areas of bleeding were controlled with cautery. The subcutaneous tissue was not closed as it was approximately 1.5cm deep The skin was closed with 4-0 Monocryl. Steristrips, ABD and sterile bandage placed.The patient tolerated the procedure well and was taken to the recovery room in stable condition. All lap, instrument, and sharps counts were correct times two. Dr. Soni was scrubbed and present for the procedure.     Emily Rubin MD   PGY-2 OB/GYN  9/4/2017 9:39 PM    Staff MD Note  I was present and scrubbed for the entire procedure noted above.  I agree with the description above and any necessary changes have been made by me.  Shellie Soni MD

## 2017-09-05 NOTE — PROGRESS NOTES
[facility] Obstetrics Postpartum Progress Note    S: Patient states she is doing well.  Small amount of PO without n/v.  Has not ambulated much.   Denies HA, vision changes, CP, SOB, n/v, abdominal pain. She is not yet passing flatus. Mood good.  Pain well controlled with current pain meds. Lochia similar to menses. breastfeeding. Baby doing well.   O:  Vitals:    17 2250 17 2300 17 2335 17 0008   BP: 122/72 135/86 139/82 128/81   Pulse:       Resp:     Temp:   98.6  F (37  C)    TempSrc:   Oral    SpO2:  100% 96% 99%     Gen:  NAD,  CV: regular rate and rhythm  Resp: CTAB, good air movement  Abd: soft, nondistended, minimally and appropriately tender, fundus firm at  umbilicus  Incision: dry and intact with light serosanguinous staining on Steristrips in place  : Infante in place  Ext: non-tender, trace edema, no erythema    Hemoglobin   Date Value Ref Range Status   2017 12.8 11.7 - 15.7 g/dL Final   2017 12.7 11.7 - 15.7 g/dL Final     A/P: 31 year old  POD#1 s/p primary LTCS for arrest of dilation.  Doing well at this early juncture in her postop/postpartum course.    1. Heme: POD1 Hgb pending, pt had a starting Hgb of 12.8, .  Pt asymptomatic, no tachycardia on exam.  Continue to monitor VS.  2. Pain control: s/p TAP block.  pain is well-controlled with Tylenol, Toradol and oxycodone, continue Tylenol and oxycodone, will transition to IBU after 24h of Toradol.  3. Preeclampsia w/o severe features: pt continues to be asymptomatic.  Normotensive since delivery, cont to monitor.  4. Hematuria: present on straight cath prior to surgery per OR RN, present upon Infante placement in OR prior to surgery.  5. Rh positive  6. Routine postpartum:    encourage breastfeeding    Encourage ambulation    Cont bowel regimen    Contraception: will discuss tomorrow  7. Dispo: likely d/c home on POD2-3 pending continued recovery.    Emily Rubin MD  PGY-2  OB/GYN    OB/GYN Staff -- Pt seen and examined by me. Agree with note as above.  Sammi

## 2017-09-05 NOTE — DISCHARGE SUMMARY
[facility] Discharge Summary    Zaina Tan MRN# [id number]   Age: 31 year old YOB: 1985     Date of Admission:  2017  Date of Discharge::  17  Admitting Physician:  R[facility]  Discharge Physician:  Dr. Doshi             Admission Diagnoses:   Intrauterine pregnancy at 39w3d  Labor w/ spontaneous rupture of membranes  Meconium stained fluid         Discharge Diagnosis:   Postpartum s/p CS  Arrest of Dilation  Preeclampsia without severe features          Procedures:   Procedure(s): Primary low transverse  section with two layer closure via Pfannenstiel skin incision                Medications Prior to Admission:     Prescriptions Prior to Admission   Medication Sig Dispense Refill Last Dose     Prenatal Vit-Fe Fumarate-FA (PRENATAL MULTIVITAMIN  PLUS IRON) 27-0.8 MG TABS Take 1 tablet by mouth daily   Past Week at Unknown time             Discharge Medications:        Review of your medicines      START taking       Dose / Directions    ibuprofen 600 MG tablet   Commonly known as:  ADVIL/MOTRIN        Dose:  600 mg   Take 1 tablet (600 mg) by mouth every 6 hours as needed for moderate pain   Quantity:  30 tablet   Refills:  1       oxyCODONE-acetaminophen 5-325 MG per tablet   Commonly known as:  PERCOCET        Dose:  1-2 tablet   Take 1-2 tablets by mouth every 4 hours as needed for pain   Quantity:  25 tablet   Refills:  0       senna-docusate 8.6-50 MG per tablet   Commonly known as:  SENOKOT-S;PERICOLACE        Dose:  2 tablet   Take 2 tablets by mouth 2 times daily   Quantity:  120 tablet   Refills:  1         CONTINUE these medicines which have NOT CHANGED       Dose / Directions    prenatal multivitamin plus iron 27-0.8 MG Tabs per tablet        Dose:  1 tablet   Take 1 tablet by mouth daily   Refills:  0            Where to get your medicines      These medications were sent to [facility] [place], [place] - [address]  [address] [place] [address], [facility] [address]     Phone:  [phone]      ibuprofen 600 MG tablet     senna-docusate 8.6-50 MG per tablet         Some of these will need a paper prescription and others can be bought over the counter. Ask your nurse if you have questions.     Bring a paper prescription for each of these medications      oxyCODONE-acetaminophen 5-325 MG per tablet                 Consultations:   none          Brief Admission History:   Zaina Tan is an 31 year old  at 39w3d who presented in labor.  Her labor course was protracted with minimal change of cervix from 6 to 7 cm over 6 hours. There was also concern for fetal status with Category II tracing with minimal to moderate variability and recurrent late decelerations. She was also diagnosed with preeclampsia without severe features with mild range blood pressures and a UPC 0.43. The decision was made to proceed with a PLTCS for arrest of dilation and category II FHR. The risks, benefits and alternatives were reviewed and the patient signed informed consent for the procedure. All questions were answered.         Intrapartum/Intraoperative course   The procedure was uncomplicated.   mL.      Operative Findings: A single liveborn male weighing 4030g with apgars of 1 and 7. Face presentation was initially noted and the fetus became breech in utero and was delivered atraumatically using typical breech maneuvers. Cord gases: arterial pH 6.99, venous pH 7.14. Four 1-2 cm subserosal/pedunculated fibroids on left aspect of fundus. Normal appearing fallopian tubes, ovaries. Meconium stained amniotic fluid, terminal meconium. Anterior placenta, intact.  3V cord. No fascial adhesions. No intraabdominal adhesions.    Please see operative report for full details.       Postpartum Course   The patient's hospital course was unremarkable.  She recovered as anticipated and experienced no post-operative complications. On discharge, her pain was well  controlled. Vaginal bleeding was appropriate for postpartum period.  Voiding without difficulty.  Ambulating well and tolerating a normal diet without nausea or emesis.  No fever or significant wound drainage.  Breastfeeding well.  Infant is stable.  Passing flatus and had a bowel movement. She did not exhibit symptoms of preeclampsia with severe features.      She was discharged on post-partum day #3.    She planned to use a LARC for contraception.    B positive blood type. Rhogam not indicated.     Post-partum hemoglobin: 10.6          Discharge Instructions and Follow-Up:   Discharge diet: Regular   Discharge activity: No lifting greater than 20 lbs, pushing, pulling, or other strenuous activity for 6 weeks. Pelvic rest for 6 weeks including no sexual intercourse, tampons, or douching. No driving until you can slam on the brakes without pain or while on narcotic pain medications.    Discharge follow-up: Follow up with primary OB for routine postpartum visit in 6 weeks   Wound care: Keep incision clean and dry           Discharge Disposition:   Discharged to home      Emily Rubin MD  PGY2 OBGYN     Appreciate note by Dr. Rubin. Patient has been seen and examined by me separate from the resident, agree with above note.     Sivan Doshi MD  10:53 PM

## 2017-09-05 NOTE — ANESTHESIA CARE TRANSFER NOTE
Patient: Zaina Tan    Procedure(s):   - Wound Class: II-Clean Contaminated    Diagnosis: pregnant  Diagnosis Additional Information: No value filed.    Anesthesia Type:   No value filed.     Note:  Airway :Nasal Cannula  Patient transferred to:PACU  Comments: Airway :Nasal Cannula  Patient transferred to:PACU  Comments: Patient was following commands, warm and demonstrated adequate strength.  Transported to PACU on 3L O2 via nasal cannula.   VSS upon arrival to PACU.  Patient denies nausea or pain at this time.   Care transfer plan communicated and patient care transferred to PACU RN     Jatinder Serrano Jr., MD  Anesthesia Resident - CA2  Pager: [phone]  9/4/2017  9:15 PM        Vitals: (Last set prior to Anesthesia Care Transfer)    CRNA VITALS  9/4/2017 2015 - 9/4/2017 2115      9/4/2017             NIBP: (!)  143/134    NIBP Mean: 140                Electronically Signed By: Jatinder Serrano MD  September 4, 2017  9:15 PM

## 2017-09-05 NOTE — BRIEF OP NOTE
[facility] OB/GYN Brief Operative Note    Pre-operative diagnosis: IUP at 39w3d   Arrest of dilation  Preeclampsia w/o severe features   Post-operative diagnosis: Same s/p below stated procedure     Procedure: primary  with two-layer uterine closure  TAP block     Surgeon: Dr. Shellie Soni   Assistant(s): Emily Rubin MD PGY2 OB/GYN      Anesthesia: Epidural anesthesia     Estimated blood loss: 800mL   Total IV fluids: 300 mL, crystalloid       Total urine output: 150mL   Drains: Infante draining clear yellow urine   Specimens: cord blood, cord gases, placenta   Implants: none   Complications: None apparent    Condition: Patient in stable condition at the end of surgery, patient stayed in OR to have TAP block done.     Findings: A single vigorous, liveborn male weighing 4030g with apgars of 1 and 7.  Face presentation was initially noted and the fetus became breech in utero and was delivered atraumatically using typical breech maneuvers. Cord gases: pending. Four 1-2cm subserosal/pedunculated fibrooids on left aspect of fundus. Normal appearing fallopian tubes, ovaries.  Meconium-stained amniotic fluid, terminal meconium.  Anterior placenta, intact. 3V cord. No fascial adhesions.  No intraabdominal adhesions.     Emily Rubin MD  PGY2 OB/GYN  2017 7:12 PM     Staff MD Note  I was present and scrubbed for the entire procedure noted above.  I agree with the description above and any necessary changes have been made by me.  Shellie Soni MD

## 2017-09-05 NOTE — PROGRESS NOTES
Zaina Tan      MRN#: [id number]  Age: 31 year old      YOB: 1985      P0D #1 S/P  Section - CNM social rounds    - Pt noted up ambulating in room - coping well overall with pain using abdominal binder as well as NSAIDs and prn narcotics.  Encouraged around the clock NSAIDS with prn narcotics, taking stool softener.   Toddler daughter being cared for her by her partner.  Allowed pt space to update CNM on birth story, pt feels that everything possible was done to avoid C/Section and she feels like she had wonderful care in labor from both CNM and MD teams.  All pt's questions about C/Section recovery recommendations discussed and answered.  Reviewed with pt social support from CNM while inpatient with MD care and follow up with CNM team in office for wound check/BP check and routine postpartum exam.  No unmet needs identified by pt.  Pt verbalized understanding of and agreement to social involvement of CNM.        Shahla JERNIGAN, ADWOAM

## 2017-09-06 LAB
COPATH REPORT: NORMAL
T PALLIDUM IGG+IGM SER QL: NEGATIVE

## 2017-09-06 PROCEDURE — 25000132 ZZH RX MED GY IP 250 OP 250 PS 637: Performed by: STUDENT IN AN ORGANIZED HEALTH CARE EDUCATION/TRAINING PROGRAM

## 2017-09-06 PROCEDURE — 12000028 ZZH R&B OB UMMC

## 2017-09-06 RX ORDER — OXYCODONE AND ACETAMINOPHEN 5; 325 MG/1; MG/1
1-2 TABLET ORAL EVERY 4 HOURS PRN
Qty: 25 TABLET | Refills: 0 | Status: SHIPPED | OUTPATIENT
Start: 2017-09-06 | End: 2017-10-18

## 2017-09-06 RX ORDER — AMOXICILLIN 250 MG
2 CAPSULE ORAL 2 TIMES DAILY
Qty: 120 TABLET | Refills: 1 | Status: SHIPPED | OUTPATIENT
Start: 2017-09-06 | End: 2018-02-28

## 2017-09-06 RX ORDER — IBUPROFEN 600 MG/1
600 TABLET, FILM COATED ORAL EVERY 6 HOURS PRN
Qty: 30 TABLET | Refills: 1 | Status: SHIPPED | OUTPATIENT
Start: 2017-09-06 | End: 2017-10-18

## 2017-09-06 RX ADMIN — OXYCODONE HYDROCHLORIDE 10 MG: 5 TABLET ORAL at 08:56

## 2017-09-06 RX ADMIN — ACETAMINOPHEN 975 MG: 325 TABLET, FILM COATED ORAL at 08:56

## 2017-09-06 RX ADMIN — IBUPROFEN 800 MG: 400 TABLET ORAL at 23:33

## 2017-09-06 RX ADMIN — IBUPROFEN 800 MG: 400 TABLET ORAL at 16:35

## 2017-09-06 RX ADMIN — ACETAMINOPHEN 975 MG: 325 TABLET, FILM COATED ORAL at 18:05

## 2017-09-06 RX ADMIN — OXYCODONE HYDROCHLORIDE 5 MG: 5 TABLET ORAL at 05:56

## 2017-09-06 RX ADMIN — IBUPROFEN 800 MG: 400 TABLET ORAL at 10:29

## 2017-09-06 RX ADMIN — OXYCODONE HYDROCHLORIDE 5 MG: 5 TABLET ORAL at 18:05

## 2017-09-06 RX ADMIN — OXYCODONE HYDROCHLORIDE 5 MG: 5 TABLET ORAL at 01:32

## 2017-09-06 RX ADMIN — OXYCODONE HYDROCHLORIDE 5 MG: 5 TABLET ORAL at 21:55

## 2017-09-06 RX ADMIN — SENNOSIDES AND DOCUSATE SODIUM 1 TABLET: 8.6; 5 TABLET ORAL at 08:56

## 2017-09-06 RX ADMIN — SENNOSIDES AND DOCUSATE SODIUM 2 TABLET: 8.6; 5 TABLET ORAL at 21:01

## 2017-09-06 RX ADMIN — IBUPROFEN 800 MG: 400 TABLET ORAL at 04:37

## 2017-09-06 RX ADMIN — ACETAMINOPHEN 975 MG: 325 TABLET, FILM COATED ORAL at 01:29

## 2017-09-06 NOTE — PLAN OF CARE
Problem: Goal Outcome Summary  Goal: Goal Outcome Summary  Outcome: Improving  Vitals are stable, breastfeeding baby on demand, voiding without difficulty. Taking Ibuprofen, Tylenol, and Oxycodone for pain. Continue with plan of care.

## 2017-09-06 NOTE — PLAN OF CARE
Problem: Goal Outcome Summary  Goal: Goal Outcome Summary  Outcome: Improving  Data: Vital signs within normal limits. Postpartum checks within normal limits - see flow record. Patient eating and drinking normally. Patient able to empty bladder independently and is up ambulating. Dressing still on, encouraged pt to shower tomorrow and remove. Patient performing self cares and is able to care for infant.  Action: Patient medicated during the shift for incisional soreness. Patient education done about the importance of hand massage/expression to support healthy milk supply.  Response: Positive attachment behaviors observed with infant.   Will continue to monitor and provide support.

## 2017-09-06 NOTE — PROGRESS NOTES
[facility] Obstetrics Postpartum Progress Note    S: Patient states she is doing well.  Pain well controlled with current pain meds.   Good PO intake w/o nausea/vomiting.  She is ambulating.  No voiding issues.  Denies HA, vision changes, CP, SOB, n/v, abdominal pain. She is passing flatus, small BM o/n. Mood good.  Lochia similar to menses. Breastfeeding, denies breast issues. Baby doing well.   O:  Vitals:    17 0836 17 1530 17 2215 17 0711   BP: 124/68 124/72 106/61 106/57   Pulse: 73 100 92 78   Resp: 18 18 18 16   Temp: 98.5  F (36.9  C) 97.6  F (36.4  C) 98  F (36.7  C) 98.5  F (36.9  C)   TempSrc: Oral Oral Oral Oral   SpO2: 99% 100%       Gen:  NAD,  CV: regular rate and rhythm  Resp: CTAB, good air movement  Abd: soft, nondistended, minimally tender, fundus firm at  umbilicus  Incision: dry and intact with light serosanguinous staining on Steristrips in place  : Infante in place  Ext: non-tender, trace edema, no erythema    Hemoglobin   Date Value Ref Range Status   2017 10.6 (L) 11.7 - 15.7 g/dL Final   2017 12.8 11.7 - 15.7 g/dL Final     A/P: 31 year old  POD#2 s/p primary LTCS for arrest of dilation.  Doing well in her postop/postpartum course.    1. Heme: POD1 Hgb 10.6 is appropriate as pt had a starting Hgb of 12.8 and .  Pt asymptomatic, no tachycardia on exam.  Continue to monitor VS.  2. Pain control: s/p TAP block.  pain is well-controlled with Tylenol, IBU, and oxycodone, continue.  3. Preeclampsia w/o severe features: pt continues to be asymptomatic.  Normotensive since delivery, cont to monitor.  4. Rh positive  5. Routine postpartum:    encourage breastfeeding    Encourage ambulation    Cont bowel regimen    Cont regular diet    Contraception: considering LARC  6. Dispo: likely d/c home on POD 3 pending continued recovery.    Emily Rubin MD  PGY-2 OB/GYN     [facility] staff:  Appreciate note by Dr. Rubin.  I have seen  and examined the patient without the resident. I have reviewed, edited, and agree with the note.        Philly Mills MD, FACOG  9/6/2017  9:35 AM

## 2017-09-07 VITALS
TEMPERATURE: 98.2 F | HEART RATE: 70 BPM | DIASTOLIC BLOOD PRESSURE: 70 MMHG | SYSTOLIC BLOOD PRESSURE: 116 MMHG | OXYGEN SATURATION: 100 % | RESPIRATION RATE: 16 BRPM

## 2017-09-07 PROCEDURE — 25000132 ZZH RX MED GY IP 250 OP 250 PS 637: Performed by: STUDENT IN AN ORGANIZED HEALTH CARE EDUCATION/TRAINING PROGRAM

## 2017-09-07 RX ADMIN — ACETAMINOPHEN 975 MG: 325 TABLET, FILM COATED ORAL at 10:14

## 2017-09-07 RX ADMIN — ACETAMINOPHEN 975 MG: 325 TABLET, FILM COATED ORAL at 02:29

## 2017-09-07 RX ADMIN — OXYCODONE HYDROCHLORIDE 5 MG: 5 TABLET ORAL at 02:29

## 2017-09-07 RX ADMIN — IBUPROFEN 800 MG: 400 TABLET ORAL at 06:06

## 2017-09-07 RX ADMIN — OXYCODONE HYDROCHLORIDE 5 MG: 5 TABLET ORAL at 10:14

## 2017-09-07 RX ADMIN — OXYCODONE HYDROCHLORIDE 5 MG: 5 TABLET ORAL at 06:06

## 2017-09-07 NOTE — PLAN OF CARE
Problem: Goal Outcome Summary  Goal: Goal Outcome Summary  Outcome: Adequate for Discharge Date Met:  09/07/17  Vitals are stable, breastfeeding baby on demand, voiding without difficulty. Going home today.

## 2017-09-07 NOTE — DISCHARGE SUMMARY
Patient stable independent with infant and self care. Discharged instructions reviewed. Pt verbalized understanding of discharge instructions. Take home meds given with instructions. D/C home with infant. Checked and matched bands with infant. Instructed follow up in 6 weeks in clinic.

## 2017-09-07 NOTE — PLAN OF CARE
Problem: Goal Outcome Summary  Goal: Goal Outcome Summary  Outcome: Improving  Pt stable this shift with postpartum checks. Pt is breastfeeding well with good latch verified. Pt is having good pain relief and taking ibuprofen,tylenol and oxycodone. Incision shows no sign of infection.  Will continue to monitor for changes and assist as needed.

## 2017-09-07 NOTE — PROGRESS NOTES
[facility]stetric[facility] Postpartum Progress Note    S: Patient states she is doing well, very tired as baby is cluster feeding.  Pain well controlled with current pain meds.   Passing flatus, yesterday.  Denies HA, vision changes, CP, SOB, n/v, abdominal pain.  Lochia light.   O:  Vitals:    17 0711 17 1527 17 2000 17 0000   BP: 106/57 120/76 108/71 128/79   BP Location:  Left arm     Pulse: 78 77 72 79   Resp:  18    Temp: 98.5  F (36.9  C) 98.1  F (36.7  C) 98  F (36.7  C) 98.2  F (36.8  C)   TempSrc: Oral Oral Oral Oral   SpO2:         Gen:  NAD,  CV: regular rate and rhythm  Resp: CTAB, good air movement  Abd: soft, nondistended, minimally tender, fundus firm at 1cm below umbilicus  Incision: dry and intact with light serosanguinous staining on Steristrips in place  : Infante in place  Ext: non-tender, trace edema, no erythema    Hemoglobin   Date Value Ref Range Status   2017 10.6 (L) 11.7 - 15.7 g/dL Final   2017 12.8 11.7 - 15.7 g/dL Final     A/P: 31 year old  POD#3 s/p primary LTCS for arrest of dilation.  Doing well in her postop/postpartum course.    1. Heme: POD1 Hgb 10.6 is appropriate as pt had a starting Hgb of 12.8 and .  Pt asymptomatic, no tachycardia on exam.  Continue to monitor VS.  2. Pain control: s/p TAP block.  pain is well-controlled with Tylenol, IBU, and oxycodone, continue.  3. Preeclampsia w/o severe features: pt continues to be asymptomatic.  Normotensive since delivery, cont to monitor.  4. Rh positive  5. Routine postpartum:    encourage breastfeeding    Encourage ambulation    Cont bowel regimen    Cont regular diet    Contraception: considering LARC  6. Dispo: likely d/c home today pending attending physician evaluation    Emily Rubin MD  PGY-2 OB/GYN     Appreciate note by Dr. Rubin. Patient has been seen and examined by me separate from the resident, agree with above note.     Sivan Doshi MD  10:52  PM

## 2017-09-07 NOTE — PLAN OF CARE
Problem: Goal Outcome Summary  Goal: Goal Outcome Summary  Outcome: Improving  Pt stable this shift with postpartum checks. Pt is breastfeeding well with good latch verified. Baby cluster fed and mother was very tired.  Pt is having good pain relief and taking ibuprofen,tylenol and oxycodone. Incision shows no sign of infection.   Pt ambulated in room with ease. Pt tolerating fluids and food. Plan is for discharge today. Will continue to monitor for changes and assist as needed.

## 2017-10-04 ENCOUNTER — TELEPHONE (OUTPATIENT)
Dept: OBGYN | Facility: CLINIC | Age: 32
End: 2017-10-04

## 2017-10-04 NOTE — TELEPHONE ENCOUNTER
----- Message from Vidhya Waite sent at 10/4/2017  9:21 AM CDT -----  Regarding: FMLA paperwork  Contact: [phone]  Patient says she handed FMLA paperwork to Shahla West to fill out & have it faxed to her employer. She states her work contacted her & they have not received anything. Could you please check & see if there is FMLA paperwork there in her chart & call patient back.     Thank you!  Carmella    Call Center       Please DO NOT send this message and/or reply back to sender. Call Center Representatives DO NOT respond to messages.

## 2017-10-04 NOTE — TELEPHONE ENCOUNTER
Pt is resender her [facility] paperwork to be filled out and sent to employer.

## 2017-10-18 ENCOUNTER — OFFICE VISIT (OUTPATIENT)
Dept: OBGYN | Facility: CLINIC | Age: 32
End: 2017-10-18
Attending: ADVANCED PRACTICE MIDWIFE
Payer: COMMERCIAL

## 2017-10-18 DIAGNOSIS — R29.898 RIGHT ARM WEAKNESS: ICD-10-CM

## 2017-10-18 DIAGNOSIS — Z91.89 AT RISK FOR INEFFECTIVE BREASTFEEDING: Primary | ICD-10-CM

## 2017-10-18 PROCEDURE — 99212 OFFICE O/P EST SF 10 MIN: CPT | Mod: ZF

## 2017-10-18 RX ORDER — BREAST PUMP
1 EACH MISCELLANEOUS PRN
Qty: 1 EACH | Refills: 0 | Status: SHIPPED | OUTPATIENT
Start: 2017-10-18 | End: 2017-11-13

## 2017-10-18 NOTE — MR AVS SNAPSHOT
After Visit Summary   10/18/2017    Zaina Tan    MRN: [id number]           Patient Information     Date Of Birth          1985        Visit Information        Provider Department      10/18/2017 11:30 AM Shahla West APRN CNM [facility]        Today's Diagnoses     At risk for ineffective breastfeeding    -  1    Routine postpartum follow-up        Postpartum care and examination of lactating mother        Right arm weakness           Follow-ups after your visit        Additional Services     [facility], HAND, AND CHIROPRACTIC REFERRAL       **This order will print in the [facility] Scheduling Office**    Physical Therapy, Hand Therapy and Chiropractic Care are available through:    *[facility]  *[facility]  *[facility]    Call one number to schedule at any of the above locations: [phone].    Your provider has referred you to: Physical Therapy at [facility] or [facility]    Indication/Reason for Referral: Women's Health (Please Complete Special Programs SmartList)  Onset of Illness: 2 weeks agp  Therapy Orders: Evaluate and Treat  Special Programs: Women's Health: OB/GYN Musculoskeletal Dysfunction: R arm and R leg discomfort s/p Labor and  Section   Special Request: None    Nicholas Peterson      Additional Comments for the Therapist or Chiropractor: None    Please be aware that coverage of these services is subject to the terms and limitations of your health insurance plan.  Call member services at your health plan with any benefit or coverage questions.      Please bring the following to your appointment:    *Your personal calendar for scheduling future appointments  *Comfortable clothing                  Follow-up notes from your care team     Discussed this visit Return in about 1 year (around 10/18/2018), or When Desirse IUD, for Annual Exam .      Who to contact     Please call your clinic at [phone]  to:    Ask questions about your health    Make or cancel appointments    Discuss your medicines    Learn about your test results    Speak to your doctor   If you have compliments or concerns about an experience at your clinic, or if you wish to file a complaint, please contact [facility] Patient Relations at [phone] or email us at [email or web address]         Additional Information About Your Visit        [employer] Information     [employer] gives you secure access to your electronic health record. If you see a primary care provider, you can also send messages to your care team and make appointments. If you have questions, please call your primary care clinic.  If you do not have a primary care provider, please call [phone] and they will assist you.      [employer] is an electronic gateway that provides easy, online access to your medical records. With [employer], you can request a clinic appointment, read your test results, renew a prescription or communicate with your care team.     To access your existing account, please contact your [facility] or call [phone] for assistance.        Care EveryWhere ID     This is your Care EveryWhere ID. This could be used by other organizations to access your [place] medical records  [id number]         Blood Pressure from Last 3 Encounters:   09/07/17 116/70   08/28/17 101/65   08/14/17 111/68    Weight from Last 3 Encounters:   08/28/17 74.8 kg (165 lb)   08/14/17 74.2 kg (163 lb 8 oz)   07/31/17 74.2 kg (163 lb 9.6 oz)              We Performed the Following     [facility], HAND, AND CHIROPRACTIC REFERRAL          Today's Medication Changes          These changes are accurate as of: 10/18/17 11:59 PM.  If you have any questions, ask your nurse or doctor.               Start taking these medicines.        Dose/Directions    breast pump Misc   Used for:  At risk for ineffective breastfeeding   Started by:   Shahla West, RERE MOJICA        Dose:  1 each   1 each as needed   Quantity:  1 each   Refills:  0            Where to get your medicines      Some of these will need a paper prescription and others can be bought over the counter.  Ask your nurse if you have questions.     Bring a paper prescription for each of these medications     breast pump Misc                Primary Care Provider    Physician No Ref-Primary       NO REF-PRIMARY PHYSICIAN        Equal Access to Services     LINDA [facility] SystemDANIEL : Hadii aad ku hadasho Soomaali, waaxda luqadaha, qaybta kaalmada adeegyada, waxay sonaliin haycarlosn cristian alejandredonitakieran rawls . So [facility] [phone].    ATENCIÓN: Si habla español, tiene a lee disposición servicios gratuitos de asistencia lingüística. [facility] [phone].    We comply with applicable federal civil rights laws and [place] laws. We do not discriminate on the basis of race, color, national origin, age, disability, sex, sexual orientation, or gender identity.            Thank you!     Thank you for choosing [facility]  for your care. Our goal is always to provide you with excellent care. Hearing back from our patients is one way we can continue to improve our services. Please take a few minutes to complete the written survey that you may receive in the mail after your visit with us. Thank you!             Your Updated Medication List - Protect others around you: Learn how to safely use, store and throw away your medicines at www.disposemymeds.org.          This list is accurate as of: 10/18/17 11:59 PM.  Always use your most recent med list.                   Brand Name Dispense Instructions for use Diagnosis    breast pump Misc     1 each    1 each as needed    At risk for ineffective breastfeeding       prenatal multivitamin plus iron 27-0.8 MG Tabs per tablet      Take 1 tablet by mouth daily        senna-docusate 8.6-50 MG per tablet    SENOKOT-S;PERICOLACE    120 tablet    Take 2 tablets  by mouth 2 times daily    Delivered by  section

## 2017-10-18 NOTE — LETTER
"10/18/2017       RE: Zaina Tan  [address]   [place] [address]     Dear Colleague,    Thank you for referring your patient, Zaina Tan, to the [facility] at [facility]. Please see a copy of my visit note below.      Nursing Notes:   Khoa Sarmiento CMA  10/18/2017 11:48 AM  Signed  SUBJECTIVE:   Zaina Tan is here for her 6-week postpartum checkup.     PHQ-9 score: 0  Hx of Abuse:  No    Delivery Date: 2017.    Delivering provider:  Shellie Soni MD.    Type of delivery:  .     Delivery complications: Arrest of dilation, pre eclampsia  Infant gender:  boy, weight 8 pounds 14.2 oz.  Feeding Method:  Bottlefeed with formula.  Complications reported with feeding:  inadequate milk supply. Feels bad because she really wants to breastfeed.  Bleeding:  None.  Duration:  1 month.  Menses resumed:  No  Bowel/Urinary problems:  No, using stool softener PRN    Contraception Planned:  Will discuss.  She  has not had intercourse since delivery..           ================================================================  ROS: 10 point ROS neg other than the symptoms noted above in the HPI.   - R arm and R leg - numbness/tingling and then perceived muscle \"slowness\" over the past 2 weeks.   - Incisional discomfort more when moving, not taking any pain medication. Feels like \"stretching\"  - Breastfeeding issues. Infant has self weaned, only taking bottle.  She stopped pumping because she wasn't getting a large volume.  Would like to pump to give in bottle if he won't go to breast.     EXAM:  Vitals reviewed, WNL    General: healthy, alert, no distress, cooperative and smiling  Psych: negative for anxiety, nervous breakdown, depression, thoughts of self-harm, thoughts of hurting someone else, agitation, hallucinations, sexual difficulties, marital problems, abusive relationship, excessive alcohol consumption and illegal drug usage  Last " PHQ-9 score on record= 2  Breasts:  Nipples intact with no lesions, Non-tender and No S/S of yeast or mastitis  Abdomen: Benign, Soft, flat, non-tender, No masses, organomegaly and Diastasis less than 1-2 FB  Incision:  well healed   Vulva:  Normal genitalia and Bartholin's, Urethra, [place]'s normal  Vagina:  normal with good muscle tone  Cervix:   closed, without lesion or CMT.    Uterus:  fully involuted and non-tender    Adnexa:  Within normal limits and No masses, nodularity, tenderness  Recto-vaginal:   anus normal        ASSESSMENT:   Encounter Diagnoses   Name Primary?     Routine postpartum follow-up      Postpartum care and examination of lactating mother      At risk for ineffective breastfeeding Yes     Right arm weakness       Normal postpartum exam after c/s for abnormal FHT, Pre Eclampsia,   Pregnancy was complicated by:  Failure to Progress, Fetal distress and PIH.      PLAN:   (Z91.89) At risk for ineffective breastfeeding  (primary encounter diagnosis)  Plan: Misc. Devices (BREAST PUMP) MISC    (R29.898) Right arm weakness  Plan: [facility] PT, HAND, AND CHIROPRACTIC REFERRAL    Last pap 1/2016 NIL, HPV negative. No noted h/o abnormal.  Repeat co testing 2021 per [facility].   Risks and benefits of prescribed medications discussed.  Medication instructions reviewed.  Contraception methods discussed at length - Nexplanon, Paragard, Mirena, Kyleena.  Pt undecided - will follow up prn if desires.    Signs and symptoms of postpartum depression/anxiety discussed and resources offered  Discussed calcium intake, vitamins and supplements including Vitamin D  - Reviewed how to establish/maintain milk supply, nipple care, pumping for storage, fore/hind milk, wet/dirty diapers to expect each day, resources prn if questions or concerns.  Encouraged restarting pumping q2-3 hours if desires to restart milk supply. Follow up with lactation prn.   - Reviewed PT for muscle discomfort/weakness. Consider sports medicine prn.    Exercise encouraged  Flu shot recommended  High fiber, low fat diet encouraged  Kegel exercises recommended  Routine breast self-exam encouraged  Seat belt use encouraged  STI/STD prevention discussed    Follow up in 1 year and prn     All pt's questions discussed and answered. Pt verbalized understanding of and agreement to plan of care.     Shahla JERNIGAN, YUNIOR          Again, thank you for allowing me to participate in the care of your patient.      Sincerely,    RERE Rios CNM

## 2017-10-18 NOTE — NURSING NOTE
SUBJECTIVE:   Zaina Tan is here for her 6-week postpartum checkup.     PHQ-9 score: 0  Hx of Abuse:  No    Delivery Date: 2017.    Delivering provider:  Shellie Soni MD.    Type of delivery:  .     Delivery complications: Arrest of dilation, pre eclampsia  Infant gender:  boy, weight 8 pounds 14.2 oz.  Feeding Method:  Bottlefeed with formula.  Complications reported with feeding:  inadequate milk supply. Feels bad because she really wants to breastfeed.  Bleeding:  None.  Duration:  1 month.  Menses resumed:  No  Bowel/Urinary problems:  No, using stool softener PRN    Contraception Planned:  Will discuss.  She  has not had intercourse since delivery..

## 2017-10-18 NOTE — PROGRESS NOTES
"  Nursing Notes:   Khoa Sarmiento, [facility]  10/18/2017 11:48 AM  Signed  SUBJECTIVE:   Zaina Tan is here for her 6-week postpartum checkup.     PHQ-9 score: 0  Hx of Abuse:  No    Delivery Date: 2017.    Delivering provider:  Shellie Soni MD.    Type of delivery:  .     Delivery complications: Arrest of dilation, pre eclampsia  Infant gender:  boy, weight 8 pounds 14.2 oz.  Feeding Method:  Bottlefeed with formula.  Complications reported with feeding:  inadequate milk supply. Feels bad because she really wants to breastfeed.  Bleeding:  None.  Duration:  1 month.  Menses resumed:  No  Bowel/Urinary problems:  No, using stool softener PRN    Contraception Planned:  Will discuss.  She  has not had intercourse since delivery..           ================================================================  ROS: 10 point ROS neg other than the symptoms noted above in the HPI.   - R arm and R leg - numbness/tingling and then perceived muscle \"slowness\" over the past 2 weeks.   - Incisional discomfort more when moving, not taking any pain medication. Feels like \"stretching\"  - Breastfeeding issues. Infant has self weaned, only taking bottle.  She stopped pumping because she wasn't getting a large volume.  Would like to pump to give in bottle if he won't go to breast.     EXAM:  Vitals reviewed, WNL    General: healthy, alert, no distress, cooperative and smiling  Psych: negative for anxiety, nervous breakdown, depression, thoughts of self-harm, thoughts of hurting someone else, agitation, hallucinations, sexual difficulties, marital problems, abusive relationship, excessive alcohol consumption and illegal drug usage  Last PHQ-9 score on record= 2  Breasts:  Nipples intact with no lesions, Non-tender and No S/S of yeast or mastitis  Abdomen: Benign, Soft, flat, non-tender, No masses, organomegaly and Diastasis less than 1-2 FB  Incision:  well healed   Vulva:  Normal genitalia and Bartholin's, Urethra, [place]'s " normal  Vagina:  normal with good muscle tone  Cervix:   closed, without lesion or CMT.    Uterus:  fully involuted and non-tender    Adnexa:  Within normal limits and No masses, nodularity, tenderness  Recto-vaginal:   anus normal        ASSESSMENT:   Encounter Diagnoses   Name Primary?     Routine postpartum follow-up      Postpartum care and examination of lactating mother      At risk for ineffective breastfeeding Yes     Right arm weakness       Normal postpartum exam after c/s for abnormal FHT, Pre Eclampsia,   Pregnancy was complicated by:  Failure to Progress, Fetal distress and PIH.      PLAN:   (Z91.89) At risk for ineffective breastfeeding  (primary encounter diagnosis)  Plan: Misc. Devices (BREAST PUMP) MISC    (R29.898) Right arm weakness  Plan: [facility] PT, HAND, AND CHIROPRACTIC REFERRAL    Last pap 1/2016 NIL, HPV negative. No noted h/o abnormal.  Repeat co testing 2021 per [facility].   Risks and benefits of prescribed medications discussed.  Medication instructions reviewed.  Contraception methods discussed at length - Nexplanon, Paragard, Mirena, Kyleena.  Pt undecided - will follow up prn if desires.    Signs and symptoms of postpartum depression/anxiety discussed and resources offered  Discussed calcium intake, vitamins and supplements including Vitamin D  - Reviewed how to establish/maintain milk supply, nipple care, pumping for storage, fore/hind milk, wet/dirty diapers to expect each day, resources prn if questions or concerns.  Encouraged restarting pumping q2-3 hours if desires to restart milk supply. Follow up with lactation prn.   - Reviewed PT for muscle discomfort/weakness. Consider sports medicine prn.   Exercise encouraged  Flu shot recommended  High fiber, low fat diet encouraged  Kegel exercises recommended  Routine breast self-exam encouraged  Seat belt use encouraged  STI/STD prevention discussed    Follow up in 1 year and prn     All pt's questions discussed and answered. Pt verbalized  understanding of and agreement to plan of care.     Shahla JERNIGAN, CNM

## 2017-10-18 NOTE — LETTER
Date:October 23, 2017      Patient was self referred, no letter generated. Do not send.        [facility] Information

## 2017-10-31 ENCOUNTER — THERAPY VISIT (OUTPATIENT)
Dept: PHYSICAL THERAPY | Facility: CLINIC | Age: 32
End: 2017-10-31
Payer: COMMERCIAL

## 2017-10-31 DIAGNOSIS — G54.0 TOS (THORACIC OUTLET SYNDROME): Primary | ICD-10-CM

## 2017-10-31 PROCEDURE — 97161 PT EVAL LOW COMPLEX 20 MIN: CPT | Mod: GP | Performed by: PHYSICAL THERAPIST

## 2017-10-31 PROCEDURE — 97110 THERAPEUTIC EXERCISES: CPT | Mod: GP | Performed by: PHYSICAL THERAPIST

## 2017-10-31 NOTE — PROGRESS NOTES
"Subjective:    Patient is a 32 year old female presenting with rehab cervical spine hpi.   Zaina Tan is a 32 year old female with a cervical spine condition.  Condition occurred with:  Insidious onset.  Condition occurred: for unknown reasons.  This is a new condition  Pt presents with complaints of R UE \"heaviness\" for the past 6+ weeks; pt reported she is 8 weeks post-partum. Pt noted onset of sx's in R UE to include the upper/lower arm and hand. Sx's include: pain in the distal tips of the 2-4th fingers, numbness/pain of the UE/hand and \"heaviness\" to lifting of the R UE. In spite of UE \"weakness\" pt reported she is able to lift her children, daughter-almost 2 years and son-8 weeks.        Pain is described as aching and is intermittent Pain Scale: 0-4/10.  Associated symptoms:  Numbness, loss of strength and fatigue. Pain is worse during the day.  Symptoms are exacerbated by certain positions, lifting and carrying and relieved by nothing.  Since onset symptoms are unchanged.        General health as reported by patient is good.                                              Objective:    System              Cervical/Thoracic Evaluation  Cervical AROM: normal           Cervical Myotomes:  Cervical myotomes:  strength L-40 ft lbs; R-5 ft lbs.        C5 (Deltoid):  Right: 5-  C6 (Biceps):  Right: 5-  C7 (Triceps):  Right: 5-      DTR's:  normal          Cervical Dermatomes:              C6 left:  Hypo-light touch       C7 left:  Hypo-light touch       C8 left:  Hypo-light touch                        Shoulder Evaluation:  ROM:  AROM:  normal                                  Strength:        Abduction:  Right: 5/5     Pain:    Internal Rotation:  Right: 5/5     Pain:  External Rotation:   Right:5/5     Pain:        Elbow Flexion:  Right:5/5     Pain:  Elbow Extension:  Right:5-/5     Pain:                                           General     ROS    Assessment/Plan:      Patient is a 32 year old female with " cervical complaints.    Patient has the following significant findings with corresponding treatment plan.                Diagnosis 1:  R UE sx's; possibly related to TOS  Pain -  manual therapy, self management, education and home program  Decreased strength - therapeutic exercise and therapeutic activities  Impaired muscle performance - neuro re-education  Decreased function - therapeutic activities    Therapy Evaluation Codes:   1) History comprised of:   Personal factors that impact the plan of care:      None.    Comorbidity factors that impact the plan of care are:      None.     Medications impacting care: None.  2) Examination of Body Systems comprised of:   Body structures and functions that impact the plan of care:      Cervical spine.   Activity limitations that impact the plan of care are:      Lifting.  3) Clinical presentation characteristics are:   Stable/Uncomplicated.  4) Decision-Making    Low complexity using standardized patient assessment instrument and/or measureable assessment of functional outcome.  Cumulative Therapy Evaluation is: Low complexity.    Previous and current functional limitations:  (See Goal Flow Sheet for this information)    Short term and Long term goals: (See Goal Flow Sheet for this information)     Communication ability:  Patient appears to be able to clearly communicate and understand verbal and written communication and follow directions correctly.  Treatment Explanation - The following has been discussed with the patient:   RX ordered/plan of care  Anticipated outcomes  Possible risks and side effects  This patient would benefit from PT intervention to resume normal activities.   Rehab potential is good.    Frequency:  1 X week, once daily  Duration:  for 6 weeks  Discharge Plan:  Achieve all LTG.  Independent in home treatment program.  Reach maximal therapeutic benefit.    Please refer to the daily flowsheet for treatment today, total treatment time and time spent  performing 1:1 timed codes.

## 2017-10-31 NOTE — MR AVS SNAPSHOT
After Visit Summary   10/31/2017    Zaina Tan    MRN: [id number]           Patient Information     Date Of Birth          1985        Visit Information        Provider Department      10/31/2017 3:20 PM Shahla Fried PT [facility] Physical Therapy        Today's Diagnoses     TOS (thoracic outlet syndrome)    -  1       Follow-ups after your visit        Your next 10 appointments already scheduled     Nov 07, 2017  3:20 PM CST   STEPHEN Extremity with Shahla Fried PT   [facility] Physical Therapy ([place]  )    [address] [id number] [phone]              Who to contact     If you have questions or need follow up information about today's clinic visit or your schedule please contact [facility] PHYSICAL THERAPY directly at [phone].  Normal or non-critical lab and imaging results will be communicated to you by MyChart, letter or phone within 4 business days after the clinic has received the results. If you do not hear from us within 7 days, please contact the clinic through Oxagenhart or phone. If you have a critical or abnormal lab result, we will notify you by phone as soon as possible.  Submit refill requests through SecondHome or call your pharmacy and they will forward the refill request to us. Please allow 3 business days for your refill to be completed.          Additional Information About Your Visit        MyChart Information     SecondHome gives you secure access to your electronic health record. If you see a primary care provider, you can also send messages to your care team and make appointments. If you have questions, please call your primary care clinic.  If you do not have a primary care provider, please call [phone] and they will assist you.        Care EveryWhere ID     This is your Care EveryWhere ID. This could be used by  other organizations to access your [place] medical records  [id number]         Blood Pressure from Last 3 Encounters:   09/07/17 116/70   08/28/17 101/65   08/14/17 111/68    Weight from Last 3 Encounters:   08/28/17 74.8 kg (165 lb)   08/14/17 74.2 kg (163 lb 8 oz)   07/31/17 74.2 kg (163 lb 9.6 oz)              We Performed the Following     STEPHEN Inital Eval Report     PT Eval, Low Complexity (11981)     Therapeutic Exercises        Primary Care Provider    Physician No Ref-Primary       NO REF-PRIMARY PHYSICIAN        Equal Access to Services     LINDA CALLOWAY : Hadii aad ku hadasho Soomaali, waaxda luqadaha, qaybta kaalmada hoang, carla rawls . So [facility] [phone].    ATENCIÓN: Si habla español, tiene a lee disposición servicios gratuitos de asistencia lingüística. Llame al [phone].    We comply with applicable federal civil rights laws and [place] laws. We do not discriminate on the basis of race, color, national origin, age, disability, sex, sexual orientation, or gender identity.            Thank you!     Thank you for choosing [facility] PHYSICAL THERAP[facility]  for your care. Our goal is always to provide you with excellent care. Hearing back from our patients is one way we can continue to improve our services. Please take a few minutes to complete the written survey that you may receive in the mail after your visit with us. Thank you!             Your Updated Medication List - Protect others around you: Learn how to safely use, store and throw away your medicines at www.disposemymeds.org.          This list is accurate as of: 10/31/17 11:59 PM.  Always use your most recent med list.                   Brand Name Dispense Instructions for use Diagnosis    breast pump Misc     1 each    1 each as needed    At risk for ineffective breastfeeding       prenatal multivitamin plus iron 27-0.8 MG Tabs per tablet      Take 1 tablet by mouth daily         senna-docusate 8.6-50 MG per tablet    SENOKOT-S;PERICOLACE    120 tablet    Take 2 tablets by mouth 2 times daily    Delivered by  section

## 2017-11-02 PROBLEM — G54.0 TOS (THORACIC OUTLET SYNDROME): Status: ACTIVE | Noted: 2017-11-02

## 2017-11-02 NOTE — PROGRESS NOTES
Subjective:    Patient is a 32 year old female presenting with rehab left ankle/foot hpi.                                      Pertinent medical history includes:  Other (Numbness/tingling at right hand).        Current occupation is CNA.    Primary job tasks include:  Lifting (carrying, pushing/pulling).                                Objective:    System    Physical Exam    General     ROS    Assessment/Plan:

## 2017-11-07 ENCOUNTER — THERAPY VISIT (OUTPATIENT)
Dept: PHYSICAL THERAPY | Facility: CLINIC | Age: 32
End: 2017-11-07
Payer: COMMERCIAL

## 2017-11-07 DIAGNOSIS — G54.0 TOS (THORACIC OUTLET SYNDROME): ICD-10-CM

## 2017-11-07 PROCEDURE — 97530 THERAPEUTIC ACTIVITIES: CPT | Mod: GP | Performed by: PHYSICAL THERAPIST

## 2017-11-07 NOTE — MR AVS SNAPSHOT
After Visit Summary   11/7/2017    Zaina Tan    MRN: [id number]           Patient Information     Date Of Birth          1985        Visit Information        Provider Department      11/7/2017 3:20 PM Shahla Fried PT [facility] Physical Therapy        Today's Diagnoses     TOS (thoracic outlet syndrome)           Follow-ups after your visit        Who to contact     If you have questions or need follow up information about today's clinic visit or your schedule please contact [facility] PHYSICAL THERAPY directly at [phone].  Normal or non-critical lab and imaging results will be communicated to you by Continuumhart, letter or phone within 4 business days after the clinic has received the results. If you do not hear from us within 7 days, please contact the clinic through Continuumhart or phone. If you have a critical or abnormal lab result, we will notify you by phone as soon as possible.  Submit refill requests through [employer] or call your pharmacy and they will forward the refill request to us. Please allow 3 business days for your refill to be completed.          Additional Information About Your Visit        MyChart Information     [employer] gives you secure access to your electronic health record. If you see a primary care provider, you can also send messages to your care team and make appointments. If you have questions, please call your primary care clinic.  If you do not have a primary care provider, please call [phone] and they will assist you.        Care EveryWhere ID     This is your Care EveryWhere ID. This could be used by other organizations to access your [place] medical records  [id number]         Blood Pressure from Last 3 Encounters:   09/07/17 116/70   08/28/17 101/65   08/14/17 111/68    Weight from Last 3 Encounters:   08/28/17 74.8 kg (165 lb)   08/14/17 74.2 kg (163 lb 8 oz)   07/31/17 74.2 kg (163  lb 9.6 oz)              We Performed the Following     Therapeutic Activities        Primary Care Provider    Physician No Ref-Primary       NO REF-PRIMARY PHYSICIAN        Equal Access to Services     LINDA CALLOWAY : Hadii aad ku hadchrissysolis Clayton, wagiancarloda marianoluisanaha, abbeta monsterbinda maxwellcollinsmarie, waxmelissa jorge hiteshmario arreolavelasquez hilldonitakieran wadsworth. So [facility] [phone].    ATENCIÓN: Si habla español, tiene a lee disposición servicios gratuitos de asistencia lingüística. Llame al [phone].    We comply with applicable federal civil rights laws and [place] laws. We do not discriminate on the basis of race, color, national origin, age, disability, sex, sexual orientation, or gender identity.            Thank you!     Thank you for choosing [facility] PHYSICAL THERAP[facility]  for your care. Our goal is always to provide you with excellent care. Hearing back from our patients is one way we can continue to improve our services. Please take a few minutes to complete the written survey that you may receive in the mail after your visit with us. Thank you!             Your Updated Medication List - Protect others around you: Learn how to safely use, store and throw away your medicines at www.disposemymeds.org.          This list is accurate as of: 17  5:09 PM.  Always use your most recent med list.                   Brand Name Dispense Instructions for use Diagnosis    breast pump Misc     1 each    1 each as needed    At risk for ineffective breastfeeding       prenatal multivitamin plus iron 27-0.8 MG Tabs per tablet      Take 1 tablet by mouth daily        senna-docusate 8.6-50 MG per tablet    SENOKOT-S;PERICOLACE    120 tablet    Take 2 tablets by mouth 2 times daily    Delivered by  section

## 2017-11-13 ENCOUNTER — OFFICE VISIT (OUTPATIENT)
Dept: OBGYN | Facility: CLINIC | Age: 32
End: 2017-11-13
Attending: ADVANCED PRACTICE MIDWIFE
Payer: COMMERCIAL

## 2017-11-13 VITALS
SYSTOLIC BLOOD PRESSURE: 100 MMHG | BODY MASS INDEX: 27 KG/M2 | HEART RATE: 63 BPM | HEIGHT: 63 IN | DIASTOLIC BLOOD PRESSURE: 57 MMHG | WEIGHT: 152.4 LBS

## 2017-11-13 DIAGNOSIS — R52 POSTPARTUM PAIN: ICD-10-CM

## 2017-11-13 DIAGNOSIS — M79.641 PAIN OF RIGHT HAND: Primary | ICD-10-CM

## 2017-11-13 DIAGNOSIS — M79.604 PAIN OF RIGHT LOWER EXTREMITY: ICD-10-CM

## 2017-11-13 PROCEDURE — 99212 OFFICE O/P EST SF 10 MIN: CPT | Mod: ZF

## 2017-11-13 NOTE — MR AVS SNAPSHOT
After Visit Summary   11/13/2017    Zaina Tan    MRN: [id number]           Patient Information     Date Of Birth          1985        Visit Information        Provider Department      11/13/2017 10:45 AM Shahla West APRN CNM [facility]        Today's Diagnoses     Pain of right hand    -  1    Pain of right lower extremity        Postpartum pain           Follow-ups after your visit        Additional Services     NEUROLOGY ADULT REFERRAL       Your provider has referred you for the following:   Consult at [place]: [facility] of Neurology [facility] [phone]   [email or web address]    Please be aware that coverage of these services is subject to the terms and limitations of your health insurance plan.  Call member services at your health plan with any benefit or coverage questions.      Please bring the following with you to your appointment:    (1) Any X-Rays, CTs or MRIs which have been performed.  Contact the facility where they were done to arrange for  prior to your scheduled appointment.    (2) List of current medications  (3) This referral request   (4) Any documents/labs given to you for this referral                  Follow-up notes from your care team     Discussed this visit Return if symptoms worsen or fail to improve.      Who to contact     Please call your clinic at [phone] to:    Ask questions about your health    Make or cancel appointments    Discuss your medicines    Learn about your test results    Speak to your doctor   If you have compliments or concerns about an experience at your clinic, or if you wish to file a complaint, please contact [facility] Physicians Patient Relations at [phone] or email us at [email or web address]         Additional Information About Your Visit        MyChart Information     SuiteLinqt gives you secure  "access to your electronic health record. If you see a primary care provider, you can also send messages to your care team and make appointments. If you have questions, please call your primary care clinic.  If you do not have a primary care provider, please call [phone] and they will assist you.      [employer] is an electronic gateway that provides easy, online access to your medical records. With [employer], you can request a clinic appointment, read your test results, renew a prescription or communicate with your care team.     To access your existing account, please contact your [facility] Clinic or call [phone] for assistance.        Care EveryWhere ID     This is your Care EveryWhere ID. This could be used by other organizations to access your [place] medical records  [id number]        Your Vitals Were     Pulse Height BMI (Body Mass Index)             63 1.6 m (5' 2.99\") 27.01 kg/m2          Blood Pressure from Last 3 Encounters:   11/13/17 100/57   09/07/17 116/70   08/28/17 101/65    Weight from Last 3 Encounters:   11/13/17 69.1 kg (152 lb 6.4 oz)   08/28/17 74.8 kg (165 lb)   08/14/17 74.2 kg (163 lb 8 oz)              We Performed the Following     NEUROLOGY ADULT REFERRAL     WRIST SPLINT          Today's Medication Changes          These changes are accurate as of: 11/13/17  4:27 PM.  If you have any questions, ask your nurse or doctor.               Stop taking these medicines if you haven't already. Please contact your care team if you have questions.     breast pump Misc   Stopped by:  Shahla West APRN CNM                    Primary Care Provider    Physician No Ref-Primary       NO REF-PRIMARY PHYSICIAN        Equal Access to Services     [facility] AH: Hadii kavita [place], waaxda joseph, qaybta carla simon. So [facility] [phone].    ATENCIÓN: Si habla español, tiene a lee disposición servicios " [place] de asistencia lingüística. Torri parra [phone].    We comply with applicable federal civil rights laws and [place] laws. We do not discriminate on the basis of race, color, national origin, age, disability, sex, sexual orientation, or gender identity.            Thank you!     Thank you for choosing [facility]  for your care. Our goal is always to provide you with excellent care. Hearing back from our patients is one way we can continue to improve our services. Please take a few minutes to complete the written survey that you may receive in the mail after your visit with us. Thank you!             Your Updated Medication List - Protect others around you: Learn how to safely use, store and throw away your medicines at www.disposemymeds.org.          This list is accurate as of: 17  4:27 PM.  Always use your most recent med list.                   Brand Name Dispense Instructions for use Diagnosis    prenatal multivitamin plus iron 27-0.8 MG Tabs per tablet      Take 1 tablet by mouth daily        senna-docusate 8.6-50 MG per tablet    SENOKOT-S;PERICOLACE    120 tablet    Take 2 tablets by mouth 2 times daily    Delivered by  section

## 2017-11-13 NOTE — PROGRESS NOTES
"S -  Pt is a 32 year old  who is now 10 weeks postpartum following a C/Section.   She is healing well from an OB perspective but continues to have persistent R sided pain despite having started physical therapy recommended.  R wrist discomfort/hand numbness worse in the AM.  Hasn't started wearing a wrist splint.  Now having some continued discomfort that also happens in her right leg.  Reports having some difference in sensation between left and right leg.  Physical therapy recommended follow up with OB as her primary care to get referral to neurology.  Pt is scheduled to return to work at end of November.       O - /57  Pulse 63  Ht 1.6 m (5' 2.99\")  Wt 69.1 kg (152 lb 6.4 oz)  BMI 27.01 kg/m2     Additional physical exam deferred.  Pt able to hold pen/purse, no  limit to ADL during visit.        A - 32 year old  10 weeks s/p C/Section who continued R sided weakness/pain      P - (M79.641) Pain of right hand  (primary encounter diagnosis)  Plan: NEUROLOGY ADULT REFERRAL, WRIST SPLINT,         (M79.604) Pain of right lower extremity  Plan: NEUROLOGY ADULT REFERRAL            (O90.89,  R52) Postpartum pain  Plan: NEUROLOGY ADULT REFERRAL          - Reviewed with pt potential influence of labor and then C/section on nerves and allowing more time as symptoms are improving overall per pt.  Continue PT and follow up with neurology.  Pt aware any work modification letters should come from Neurology or PT as pt is cleared Obstetrically to return to work.   Encouraged wrist splint while sleeping and throughout the day.  Encouraged pt to use Lingotekt to communicate questions/plan of care with OB providers - pt updated password in visit to reestablish access.    100% of this 20 minute visit was spent in face to face counseling and coordination of care as above.   All pt's questions discussed and answered. Pt verbalized understanding of and agreement to bowman of care.      Shahla JERNIGAN, YUNIOR          "

## 2017-11-13 NOTE — LETTER
"2017       RE: Zaina Tan  [address]   [place] [address]     Dear Colleague,    Thank you for referring your patient, Zaina Tan, to the [facility] at [facility]. Please see a copy of my visit note below.    S -  Pt is a 32 year old  who is now 10 weeks postpartum following a C/Section.   She is healing well from an OB perspective but continues to have persistent R sided pain despite having started physical therapy recommended.  R wrist discomfort/hand numbness worse in the AM.  Hasn't started wearing a wrist splint.  Now having some continued discomfort that also happens in her right leg.  Reports having some difference in sensation between left and right leg.  Physical therapy recommended follow up with OB as her primary care to get referral to neurology.  Pt is scheduled to return to work at end of November.       O - /57  Pulse 63  Ht 1.6 m (5' 2.99\")  Wt 69.1 kg (152 lb 6.4 oz)  BMI 27.01 kg/m2     Additional physical exam deferred.  Pt able to hold pen/purse, no  limit to ADL during visit.        A - 32 year old  10 weeks s/p C/Section who continued R sided weakness/pain      P - (M79.641) Pain of right hand  (primary encounter diagnosis)  Plan: NEUROLOGY ADULT REFERRAL, WRIST SPLINT,         (M79.604) Pain of right lower extremity  Plan: NEUROLOGY ADULT REFERRAL            (O90.89,  R52) Postpartum pain  Plan: NEUROLOGY ADULT REFERRAL          - Reviewed with pt potential influence of labor and then C/section on nerves and allowing more time as symptoms are improving overall per pt.  Continue PT and follow up with neurology.  Pt aware any work modification letters should come from Neurology or PT as pt is cleared Obstetrically to return to work.   Encouraged wrist splint while sleeping and throughout the day.  Encouraged pt to use [employer] to communicate questions/plan of care with OB providers - " pt updated password in visit to reestablish access.    100% of this 20 minute visit was spent in face to face counseling and coordination of care as above.   All pt's questions discussed and answered. Pt verbalized understanding of and agreement to bowman of care.      Shahla JERNIGAN CNM            Again, thank you for allowing me to participate in the care of your patient.      Sincerely,    RERE Rois CNM

## 2018-02-26 ENCOUNTER — TELEPHONE (OUTPATIENT)
Dept: OBGYN | Facility: CLINIC | Age: 33
End: 2018-02-26

## 2018-02-26 NOTE — TELEPHONE ENCOUNTER
Patient called to seek appointment for concern she is having with her vision. Pt states for last month has begun to see black spots in her field of vision and having difficulty recognizing people until they approach her within close distance. Pt is 5 months post partum. Denies symptoms dizziness/weakness. Vision has been getting worse gradually over last month.    Advised pt be seen by primary care. Facilitated appointment with Dr. Singh this week.

## 2018-02-28 ENCOUNTER — OFFICE VISIT (OUTPATIENT)
Dept: OPHTHALMOLOGY | Facility: CLINIC | Age: 33
End: 2018-02-28
Attending: OPHTHALMOLOGY
Payer: COMMERCIAL

## 2018-02-28 ENCOUNTER — OFFICE VISIT (OUTPATIENT)
Dept: INTERNAL MEDICINE | Facility: CLINIC | Age: 33
End: 2018-02-28
Attending: INTERNAL MEDICINE
Payer: COMMERCIAL

## 2018-02-28 ENCOUNTER — TELEPHONE (OUTPATIENT)
Dept: OBGYN | Facility: CLINIC | Age: 33
End: 2018-02-28

## 2018-02-28 VITALS
DIASTOLIC BLOOD PRESSURE: 68 MMHG | SYSTOLIC BLOOD PRESSURE: 104 MMHG | HEIGHT: 64 IN | BODY MASS INDEX: 27.98 KG/M2 | WEIGHT: 163.9 LBS | HEART RATE: 66 BPM

## 2018-02-28 DIAGNOSIS — H53.9 VISION CHANGES: Primary | ICD-10-CM

## 2018-02-28 DIAGNOSIS — H53.10 SUBJECTIVE VISUAL DISTURBANCE OF BOTH EYES: Primary | ICD-10-CM

## 2018-02-28 DIAGNOSIS — H43.22 ASTEROID HYALOSIS OF LEFT EYE: ICD-10-CM

## 2018-02-28 DIAGNOSIS — H52.13 MYOPIA OF BOTH EYES: ICD-10-CM

## 2018-02-28 DIAGNOSIS — G56.03 BILATERAL CARPAL TUNNEL SYNDROME: ICD-10-CM

## 2018-02-28 DIAGNOSIS — H43.393 VITREOUS SYNERESIS OF BOTH EYES: ICD-10-CM

## 2018-02-28 LAB
ALBUMIN SERPL-MCNC: 3.6 G/DL (ref 3.4–5)
ALP SERPL-CCNC: 98 U/L (ref 40–150)
ALT SERPL W P-5'-P-CCNC: 20 U/L (ref 0–50)
ANION GAP SERPL CALCULATED.3IONS-SCNC: 7 MMOL/L (ref 3–14)
AST SERPL W P-5'-P-CCNC: 14 U/L (ref 0–45)
BILIRUB SERPL-MCNC: 0.2 MG/DL (ref 0.2–1.3)
BUN SERPL-MCNC: 9 MG/DL (ref 7–30)
CALCIUM SERPL-MCNC: 8.4 MG/DL (ref 8.5–10.1)
CHLORIDE SERPL-SCNC: 108 MMOL/L (ref 94–109)
CO2 SERPL-SCNC: 25 MMOL/L (ref 20–32)
CREAT SERPL-MCNC: 0.59 MG/DL (ref 0.52–1.04)
ERYTHROCYTE [DISTWIDTH] IN BLOOD BY AUTOMATED COUNT: 12.6 % (ref 10–15)
GFR SERPL CREATININE-BSD FRML MDRD: >90 ML/MIN/1.7M2
GLUCOSE SERPL-MCNC: 96 MG/DL (ref 70–99)
HCT VFR BLD AUTO: 36.4 % (ref 35–47)
HGB BLD-MCNC: 12 G/DL (ref 11.7–15.7)
MCH RBC QN AUTO: 28.4 PG (ref 26.5–33)
MCHC RBC AUTO-ENTMCNC: 33 G/DL (ref 31.5–36.5)
MCV RBC AUTO: 86 FL (ref 78–100)
PLATELET # BLD AUTO: 184 10E9/L (ref 150–450)
POTASSIUM SERPL-SCNC: 4 MMOL/L (ref 3.4–5.3)
PROT SERPL-MCNC: 7.7 G/DL (ref 6.8–8.8)
RBC # BLD AUTO: 4.22 10E12/L (ref 3.8–5.2)
SODIUM SERPL-SCNC: 140 MMOL/L (ref 133–144)
TSH SERPL DL<=0.005 MIU/L-ACNC: 1.34 MU/L (ref 0.4–4)
VIT B12 SERPL-MCNC: 961 PG/ML (ref 193–986)
WBC # BLD AUTO: 4.2 10E9/L (ref 4–11)

## 2018-02-28 PROCEDURE — 92015 DETERMINE REFRACTIVE STATE: CPT | Mod: GY,ZF

## 2018-02-28 PROCEDURE — 80053 COMPREHEN METABOLIC PANEL: CPT | Performed by: INTERNAL MEDICINE

## 2018-02-28 PROCEDURE — 85027 COMPLETE CBC AUTOMATED: CPT | Performed by: INTERNAL MEDICINE

## 2018-02-28 PROCEDURE — 84443 ASSAY THYROID STIM HORMONE: CPT | Performed by: INTERNAL MEDICINE

## 2018-02-28 PROCEDURE — G0463 HOSPITAL OUTPT CLINIC VISIT: HCPCS | Mod: ZF

## 2018-02-28 PROCEDURE — 36415 COLL VENOUS BLD VENIPUNCTURE: CPT | Performed by: INTERNAL MEDICINE

## 2018-02-28 PROCEDURE — 92134 CPTRZ OPH DX IMG PST SGM RTA: CPT | Mod: ZF | Performed by: OPHTHALMOLOGY

## 2018-02-28 PROCEDURE — 82607 VITAMIN B-12: CPT | Performed by: INTERNAL MEDICINE

## 2018-02-28 ASSESSMENT — REFRACTION_MANIFEST
OD_CYLINDER: SPHERE
OD_SPHERE: -0.75
OS_CYLINDER: SPHERE
OS_SPHERE: -0.50

## 2018-02-28 ASSESSMENT — VISUAL ACUITY
OD_SC+: +1
OS_PH_SC: 20/25
METHOD: SNELLEN - LINEAR
OS_SC: 20/50
OD_SC: 20/25

## 2018-02-28 ASSESSMENT — CONF VISUAL FIELD
OS_NORMAL: 1
OD_NORMAL: 1
METHOD: COUNTING FINGERS

## 2018-02-28 ASSESSMENT — ENCOUNTER SYMPTOMS
DIARRHEA: 0
CHILLS: 0
EYE PAIN: 0
POLYPHAGIA: 0
POLYDIPSIA: 0
TINGLING: 1
BRUISES/BLEEDS EASILY: 0
ARTHRALGIAS: 0
NERVOUS/ANXIOUS: 0
FATIGUE: 0
INSOMNIA: 0
HEADACHES: 0
EYE WATERING: 0
ALTERED TEMPERATURE REGULATION: 0
SWOLLEN GLANDS: 0
EYE IRRITATION: 0
CONSTIPATION: 0
DYSPNEA ON EXERTION: 0
FEVER: 0
DEPRESSION: 0
DIZZINESS: 0
NUMBNESS: 1
SINUS CONGESTION: 0
COUGH: 0
BACK PAIN: 0
DECREASED CONCENTRATION: 0
PANIC: 0
ABDOMINAL PAIN: 0

## 2018-02-28 ASSESSMENT — CUP TO DISC RATIO
OS_RATIO: 0.4
OD_RATIO: 0.4

## 2018-02-28 ASSESSMENT — TONOMETRY
OS_IOP_MMHG: 19
OD_IOP_MMHG: 20
IOP_METHOD: TONOPEN

## 2018-02-28 ASSESSMENT — SLIT LAMP EXAM - LIDS
COMMENTS: NORMAL
COMMENTS: NORMAL

## 2018-02-28 NOTE — PROGRESS NOTES
CC: floaters both eyes x several months  HPI:  Zaina Tan is a 32 year old female with no previous ocular history presents with three days increased floaters in both eyes. She has had one floater in her right eye for many years. 3 days ago she noticed an increased number, as many as nine total in both eyes. She denies flashes, pain. Only notices when looking at light plain surface. These are always present. These spots follow when she moves her eyes. She also states that she has blurry vision at a distance.           POHx: None, denies trauma  PMHx: Denies  Current Medications:   No current outpatient prescriptions on file prior to visit.  No current facility-administered medications on file prior to visit.   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    Discussed with MD Yannick Rodriguez MD  PGY3, Dept of Ophthalmology  Pager [phone]      Teaching statement:  Complete documentation of historical and exam elements from today's encounter can be found in the full encounter summary report (not reduplicated in this progress note). I personally obtained the chief complaint(s) and history of present illness.  I confirmed and edited as necessary the review of systems, past medical/surgical history, family history, social history, and examination findings as documented by others; and I examined the patient myself. I personally reviewed the relevant tests,  images, and reports as documented above.     I formulated and edited as necessary the assessment and plan and discussed the findings and management plan with the patient and family.    Lorenza Rome MD  Comprehensive Ophthalmology & Ocular Pathology  Department of Ophthalmology and Visual Neurosciences  [email or web address]  Pager [phone]

## 2018-02-28 NOTE — NURSING NOTE
Chief Complaints and History of Present Illnesses   Patient presents with     Consult For     black spots     HPI    Symptoms:        Frequency:  Constant       Do you have eye pain now?:  No      Comments:  Seeing black spots in BE that increased about 3 days ago  No flashes of lights  Decreased details dist va starting about 1 month ago  Cheryl Dozier COT 2:00 PM February 28, 2018

## 2018-02-28 NOTE — PROGRESS NOTES
HPI  Patient is here for evaluation of vision changes as well as hand problem..   Patient states that she had a black spot in her vision field. She states that last week she has noticed several black spots. She called the clinic and was advised to see a doctor. Patient also has noticed that her visual acuity has decreased. She reports that she is seeing more spots in the left eye. She denies pain in both eyes.   Patient also reports that after delivering her son she has numbness and tingling in her hands. Most of the symptoms are in her right hand. She was advised to try a splint. However, her symptoms have not improved. She also describes some weakness in her right hand. She denies issues with working on a keyboard, however, she does not type a lot.     Review of Systems     Constitutional:  Negative for fever, chills and fatigue.   HENT:  Negative for ear pain and sinus congestion.    Eyes:  Positive for spots and floaters. Negative for pain, eye pain, eye watering, eye dryness, flashing lights, tunnel vision and eye irritation.   Respiratory:   Negative for cough and dyspnea on exertion.    Cardiovascular:  Negative for chest pain, dyspnea on exertion and edema.   Gastrointestinal:  Negative for abdominal pain, diarrhea and constipation.   Musculoskeletal:  Negative for back pain, arthralgias and bone pain.   Skin:  Negative for itching and rash.   Neurological:  Positive for tingling and numbness. Negative for dizziness and headaches.   Endo/Heme:  Negative for anemia, swollen glands and bruises/bleeds easily.   Psychiatric/Behavioral:  Negative for depression, decreased concentration, mood swings and panic attacks.    Endocrine:  Negative for altered temperature regulation, polyphagia, polydipsia, unwanted hair growth and change in facial hair.    Current Outpatient Prescriptions   Medication     senna-docusate (SENOKOT-S;PERICOLACE) 8.6-50 MG per tablet     Prenatal Vit-Fe Fumarate-FA (PRENATAL MULTIVITAMIN   "PLUS IRON) 27-0.8 MG TABS     No current facility-administered medications for this visit.      Past Medical History:   Diagnosis Date     NO ACTIVE PROBLEMS      Past Surgical History:   Procedure Laterality Date      SECTION N/A 2017    Procedure:  SECTION;;  Surgeon: Shellie Soni MD;  Location:  L+D     D & C      fetal hypoplastic leftheart syndrome     Family History   Problem Relation Age of Onset     Hypertension Mother      had before pregnancy     HEART DISEASE Other      hypoplastic left heart. TAB 23 wks      DIABETES No family hx of      Social History     Social History     Marital status: Single     Spouse name: N/A     Number of children: N/A     Years of education: N/A     Occupational History     [employer]      Social History Main Topics     Smoking status: Never Smoker     Smokeless tobacco: Never Used     Alcohol use No     Drug use: No     Sexual activity: Yes     Partners: Male     Other Topics Concern     Not on file     Social History Narrative    How much exercise per week? Daily walking    How much calcium per day? In vits and foods       How much caffeine per day? 0    How much vitamin D per day? In vits and foods    Do you/your family wear seatbelts?  Yes    Do you/your family use safety helmets? Yes    Do you/your family use sunscreen? No    Do you/your family keep firearms in the home? no    Do you/your family have a smoke detector(s)?     Do you feel safe in your home? Yes    Has anyone ever touched you in an unwanted manner?no feels safe in home.           2015 Peyton Bernardo LPN    Reviewed Esperanza Owen CNM       Vitals:    18 0826 18 0828 18 0829 18 0830   BP: 105/71 98/63 109/71 104/68   BP Location: Left arm Left arm Left arm Left arm   Patient Position: Chair Chair Chair Chair   Pulse:    66   Weight: 74.3 kg (163 lb 14.4 oz)      Height: 1.613 m (5' 3.5\")            Physical Exam   Constitutional: She is oriented to " person, place, and time and well-developed, well-nourished, and in no distress.   HENT:   Head: Normocephalic and atraumatic.   Mouth/Throat: Oropharynx is clear and moist. No oropharyngeal exudate.   Eyes: Conjunctivae and EOM are normal. Pupils are equal, round, and reactive to light. No scleral icterus.   Neck: Normal range of motion. Neck supple. No thyromegaly present.   Cardiovascular: Normal rate, regular rhythm and normal heart sounds.  Exam reveals no gallop and no friction rub.    No murmur heard.  Pulmonary/Chest: Effort normal and breath sounds normal. No respiratory distress. She has no wheezes. She has no rales. She exhibits no tenderness.   Abdominal: Soft. Bowel sounds are normal.   Musculoskeletal: She exhibits no edema.   Neurological: She is alert and oriented to person, place, and time. She has normal reflexes. She displays normal reflexes. She exhibits normal muscle tone.   Skin: Skin is warm and dry.   Psychiatric: Mood, affect and judgment normal.     Assessment and Plan:  Zaina was seen today for consult for.    Diagnoses and all orders for this visit:    Vision changes. Discussed the need for urgent Ophthalmology evaluation given change in vision as well as spots in visual filed. Patient is in agreement with the plan.   -     OPHTHALMOLOGY ADULT REFERRAL    Bilateral carpal tunnel syndrome. Advised on likely cause of symptoms. Recommend to continue with use of wrist brace at this time. Referral to Sports Medicine was also made for further evaluation and recommendations on management.   -     CBC with Platelets  -     Vitamin B12  -     TSH with free T4 reflex  -     Comprehensive metabolic panel  -     SPORTS MEDICINE REFERRAL        Total time spent 30  minutes.  More than 50% of the time spent with Ms. Tan on counseling / coordinating her care    Dilcia Singh MD

## 2018-02-28 NOTE — MR AVS SNAPSHOT
After Visit Summary   2/28/2018    Zaina Tan    MRN: [id number]           Patient Information     Date Of Birth          1985        Visit Information        Provider Department      2/28/2018 1:45 PM Yannick Munoz MD [facility]        Today's Diagnoses     Subjective visual disturbance of both eyes    -  1    Asteroid hyalosis of left eye        Myopia of both eyes        Vitreous syneresis of both eyes           Follow-ups after your visit        Follow-up notes from your care team     Return for dilated exam; f/u vit syneresis.      Your next 10 appointments already scheduled     Mar 01, 2018  8:30 AM CST   (Arrive by 8:15 AM)   New Patient Visit with Flako Dent MD   [facility] ([facility] [place])    [address]  [address] [facility] [address] [phone]            Mar 28, 2018  9:30 AM CDT   NEW GENERAL with Lorenza Rome MD   [facility] ([facility])    [address]  [address] [address] [phone]              Who to contact     Please call your clinic at [phone] to:    Ask questions about your health    Make or cancel appointments    Discuss your medicines    Learn about your test results    Speak to your doctor            Additional Information About Your Visit        [employer] Information     [employer] gives you secure access to your electronic health record. If you see a primary care provider, you can also send messages to your care team and make appointments. If you have questions, please call your primary care clinic.  If you do not have a primary care provider, please call [phone] and they will assist you.      [employer] is an electronic gateway that provides easy, online access to your medical records. With [employer], you can request a clinic appointment, read your test results, renew a prescription or communicate with your care team.      To access your existing account, please contact your [facility] or call [phone] for assistance.        Care EveryWhere ID     This is your Care EveryWhere ID. This could be used by other organizations to access your [place] medical records  [id number]        Your Vitals Were     Last Period                   02/15/2018            Blood Pressure from Last 3 Encounters:   02/28/18 104/68   11/13/17 100/57   09/07/17 116/70    Weight from Last 3 Encounters:   02/28/18 74.3 kg (163 lb 14.4 oz)   11/13/17 69.1 kg (152 lb 6.4 oz)   08/28/17 74.8 kg (165 lb)              We Performed the Following     OCT Retina Spectralis OU (both eyes)        Primary Care Provider Office Phone # Fax #    Dilcia Slade Singh [phone]       [facility] [address]  [facility] [address]        Equal Access to Services     LINDA CALLOWAY : Hadii aad ku hadasho Soomaali, waaxda luqadaha, qaybta kaalmada adeegyada, waxay sonaliin haycarlosn cristian rawls . So [facility] [phone].    ATENCIÓN: Si renny yeh, tiene a lee disposición servicios gratuitos de asistencia lingüística. Llame al [phone].    We comply with applicable federal civil rights laws and [place] laws. We do not discriminate on the basis of race, color, national origin, age, disability, sex, sexual orientation, or gender identity.            Thank you!     Thank you for choosing E[facility]  for your care. Our goal is always to provide you with excellent care. Hearing back from our patients is one way we can continue to improve our services. Please take a few minutes to complete the written survey that you may receive in the mail after your visit with us. Thank you!             Your Updated Medication List - Protect others around you: Learn how to safely use, store and throw away your medicines at www.disposemymeds.org.      Notice  As of 2/28/2018  3:14 PM    You have not been prescribed any  medications.

## 2018-02-28 NOTE — NURSING NOTE
Chief Complaint   Patient presents with     Consult For     Vision changes--see black spots all over, tingling in both hands, cold in fingertips.        See SHERYL Sarmiento 2/28/2018

## 2018-02-28 NOTE — LETTER
3/6/2018         Zaina Tan   [address]   [place] [address]        Dear Ms. Tan:    Your labs were reviewed by Dilcia Singh MD and are within acceptable ranges.  No changes are recommended.     Results for orders placed or performed in visit on 02/28/18   CBC with Platelets   Result Value Ref Range    WBC 4.2 4.0 - 11.0 10e9/L    RBC Count 4.22 3.8 - 5.2 10e12/L    Hemoglobin 12.0 11.7 - 15.7 g/dL    Hematocrit 36.4 35.0 - 47.0 %    MCV 86 78 - 100 fl    MCH 28.4 26.5 - 33.0 pg    MCHC 33.0 31.5 - 36.5 g/dL    RDW 12.6 10.0 - 15.0 %    Platelet Count 184 150 - 450 10e9/L   Vitamin B12   Result Value Ref Range    Vitamin B12 961 193 - 986 pg/mL   TSH with free T4 reflex   Result Value Ref Range    TSH 1.34 0.40 - 4.00 mU/L   Comprehensive metabolic panel   Result Value Ref Range    Sodium 140 133 - 144 mmol/L    Potassium 4.0 3.4 - 5.3 mmol/L    Chloride 108 94 - 109 mmol/L    Carbon Dioxide 25 20 - 32 mmol/L    Anion Gap 7 3 - 14 mmol/L    Glucose 96 70 - 99 mg/dL    Urea Nitrogen 9 7 - 30 mg/dL    Creatinine 0.59 0.52 - 1.04 mg/dL    GFR Estimate >90 >60 mL/min/1.7m2    GFR Estimate If Black >90 >60 mL/min/1.7m2    Calcium 8.4 (L) 8.5 - 10.1 mg/dL    Bilirubin Total 0.2 0.2 - 1.3 mg/dL    Albumin 3.6 3.4 - 5.0 g/dL    Protein Total 7.7 6.8 - 8.8 g/dL    Alkaline Phosphatase 98 40 - 150 U/L    ALT 20 0 - 50 U/L    AST 14 0 - 45 U/L         Please note that test explanations are brief and do not reflect all diagnostic uses.  If you have any questions or concerns, please call the clinic at [phone].      Sincerely,      Ania Justin sent on behalf of  Dilcia Singh MD

## 2018-02-28 NOTE — LETTER
2/28/2018       RE: Zaina Tan  [address]   [place] [address]     Dear Colleague,    Thank you for referring your patient, Zaina Tan, to the [facility]  at [facility]. Please see a copy of my visit note below.    HPI  Patient is here for evaluation of vision changes as well as hand problem..   Patient states that she had a black spot in her vision field. She states that last week she has noticed several black spots. She called the clinic and was advised to see a doctor. Patient also has noticed that her visual acuity has decreased. She reports that she is seeing more spots in the left eye. She denies pain in both eyes.   Patient also reports that after delivering her son she has numbness and tingling in her hands. Most of the symptoms are in her right hand. She was advised to try a splint. However, her symptoms have not improved. She also describes some weakness in her right hand. She denies issues with working on a keyboard, however, she does not type a lot.     Review of Systems     Constitutional:  Negative for fever, chills and fatigue.   HENT:  Negative for ear pain and sinus congestion.    Eyes:  Positive for spots and floaters. Negative for pain, eye pain, eye watering, eye dryness, flashing lights, tunnel vision and eye irritation.   Respiratory:   Negative for cough and dyspnea on exertion.    Cardiovascular:  Negative for chest pain, dyspnea on exertion and edema.   Gastrointestinal:  Negative for abdominal pain, diarrhea and constipation.   Musculoskeletal:  Negative for back pain, arthralgias and bone pain.   Skin:  Negative for itching and rash.   Neurological:  Positive for tingling and numbness. Negative for dizziness and headaches.   Endo/Heme:  Negative for anemia, swollen glands and bruises/bleeds easily.   Psychiatric/Behavioral:  Negative for depression, decreased concentration, mood swings and panic attacks.     Endocrine:  Negative for altered temperature regulation, polyphagia, polydipsia, unwanted hair growth and change in facial hair.    Current Outpatient Prescriptions   Medication     senna-docusate (SENOKOT-S;PERICOLACE) 8.6-50 MG per tablet     Prenatal Vit-Fe Fumarate-FA (PRENATAL MULTIVITAMIN  PLUS IRON) 27-0.8 MG TABS     No current facility-administered medications for this visit.      Past Medical History:   Diagnosis Date     NO ACTIVE PROBLEMS      Past Surgical History:   Procedure Laterality Date      SECTION N/A 2017    Procedure:  SECTION;;  Surgeon: Shellie Soni MD;  Location:  L+D     D & C      fetal hypoplastic leftheart syndrome     Family History   Problem Relation Age of Onset     Hypertension Mother      had before pregnancy     HEART DISEASE Other      hypoplastic left heart. TAB 23 wks      DIABETES No family hx of      Social History     Social History     Marital status: Single     Spouse name: N/A     Number of children: N/A     Years of education: N/A     Occupational History     [employer]      Social History Main Topics     Smoking status: Never Smoker     Smokeless tobacco: Never Used     Alcohol use No     Drug use: No     Sexual activity: Yes     Partners: Male     Other Topics Concern     Not on file     Social History Narrative    How much exercise per week? Daily walking    How much calcium per day? In vits and foods       How much caffeine per day? 0    How much vitamin D per day? In vits and foods    Do you/your family wear seatbelts?  Yes    Do you/your family use safety helmets? Yes    Do you/your family use sunscreen? No    Do you/your family keep firearms in the home? no    Do you/your family have a smoke detector(s)?     Do you feel safe in your home? Yes    Has anyone ever touched you in an unwanted manner?no feels safe in home.           2015 Peyton Bernardo LPN    Reviewed Esperanza Owen CNM       Vitals:    18 0826 18 0828  "02/28/18 0829 02/28/18 0830   BP: 105/71 98/63 109/71 104/68   BP Location: Left arm Left arm Left arm Left arm   Patient Position: Chair Chair Chair Chair   Pulse:    66   Weight: 74.3 kg (163 lb 14.4 oz)      Height: 1.613 m (5' 3.5\")            Physical Exam   Constitutional: She is oriented to person, place, and time and well-developed, well-nourished, and in no distress.   HENT:   Head: Normocephalic and atraumatic.   Mouth/Throat: Oropharynx is clear and moist. No oropharyngeal exudate.   Eyes: Conjunctivae and EOM are normal. Pupils are equal, round, and reactive to light. No scleral icterus.   Neck: Normal range of motion. Neck supple. No thyromegaly present.   Cardiovascular: Normal rate, regular rhythm and normal heart sounds.  Exam reveals no gallop and no friction rub.    No murmur heard.  Pulmonary/Chest: Effort normal and breath sounds normal. No respiratory distress. She has no wheezes. She has no rales. She exhibits no tenderness.   Abdominal: Soft. Bowel sounds are normal.   Musculoskeletal: She exhibits no edema.   Neurological: She is alert and oriented to person, place, and time. She has normal reflexes. She displays normal reflexes. She exhibits normal muscle tone.   Skin: Skin is warm and dry.   Psychiatric: Mood, affect and judgment normal.     Assessment and Plan:  Zaina was seen today for consult for.    Diagnoses and all orders for this visit:    Vision changes. Discussed the need for urgent Ophthalmology evaluation given change in vision as well as spots in visual filed. Patient is in agreement with the plan.   -     OPHTHALMOLOGY ADULT REFERRAL    Bilateral carpal tunnel syndrome. Advised on likely cause of symptoms. Recommend to continue with use of wrist brace at this time. Referral to Sports Medicine was also made for further evaluation and recommendations on management.   -     CBC with Platelets  -     Vitamin B12  -     TSH with free T4 reflex  -     Comprehensive metabolic " panel  -     SPORTS MEDICINE REFERRAL        Total time spent 30  minutes.  More than 50% of the time spent with Ms. Tan on counseling / coordinating her care    Dilcia Singh MD            Again, thank you for allowing me to participate in the care of your patient.      Sincerely,    Dilcia Singh MD

## 2018-02-28 NOTE — MR AVS SNAPSHOT
After Visit Summary   2/28/2018    Zaina Tan    MRN: [id number]           Patient Information     Date Of Birth          1985        Visit Information        Provider Department      2/28/2018 8:00 AM Dilcia Singh MD [facility]         Today's Diagnoses     Vision changes    -  1    Bilateral carpal tunnel syndrome           Follow-ups after your visit        Additional Services     OPHTHALMOLOGY ADULT REFERRAL       Your provider has referred you to: [facility] [phone]   [email or web address]    Please be aware that coverage of these services is subject to the terms and limitations of your health insurance plan.  Call member services at your health plan with any benefit or coverage questions.      Please bring the following with you to your appointment:    (1) Any X-Rays, CTs or MRIs which have been performed.  Contact the facility where they were done to arrange for  prior to your scheduled appointment.    (2) List of current medications  (3) This referral request   (4) Any documents/labs given to you for this referral            SPORTS MEDICINE REFERRAL       Your provider has referred you to:  [facility]: Sports Medicine [facility] [phone]   [email or web address]    Please be aware that coverage of these services is subject to the terms and limitations of your health insurance plan.  Call member services at your health plan with any benefit or coverage questions.      Please bring the following to your appointment:    >>   Any x-rays, CTs or MRIs which have been performed.  Contact the facility where they were done to arrange for  prior to your scheduled appointment.    >>   List of current medications   >>   This referral request   >>   Any documents/labs given to you for this referral                  Who to contact     Please call your clinic  "at [phone] to:    Ask questions about your health    Make or cancel appointments    Discuss your medicines    Learn about your test results    Speak to your doctor            Additional Information About Your Visit        [employer] Information     [employer] gives you secure access to your electronic health record. If you see a primary care provider, you can also send messages to your care team and make appointments. If you have questions, please call your primary care clinic.  If you do not have a primary care provider, please call [phone] and they will assist you.      [employer] is an electronic gateway that provides easy, online access to your medical records. With [employer], you can request a clinic appointment, read your test results, renew a prescription or communicate with your care team.     To access your existing account, please contact your [facility] or call [phone] for assistance.        Care EveryWhere ID     This is your Care EveryWhere ID. This could be used by other organizations to access your [place] medical records  [id number]        Your Vitals Were     Pulse Height Last Period Breastfeeding? BMI (Body Mass Index)       66 1.613 m (5' 3.5\") 02/15/2018 No 28.58 kg/m2        Blood Pressure from Last 3 Encounters:   02/28/18 104/68   11/13/17 100/57   09/07/17 116/70    Weight from Last 3 Encounters:   02/28/18 74.3 kg (163 lb 14.4 oz)   11/13/17 69.1 kg (152 lb 6.4 oz)   08/28/17 74.8 kg (165 lb)              We Performed the Following     CBC with Platelets     Comprehensive metabolic panel     OPHTHALMOLOGY ADULT REFERRAL     SPORTS MEDICINE REFERRAL     TSH with free T4 reflex     Vitamin B12        Primary Care Provider Fax #    Physician No Ref-Primary [phone]       No address on file        Equal Access to Services     LINDA CALLOWAY AH: Guillermo Clayton, wilberto ruiz, carla majano " ah. So [facility] [phone].    ATENCIÓN: Si habla rao, tiene a lee disposición servicios gratuitos de asistencia lingüística. Torri al [phone].    We comply with applicable federal civil rights laws and [place] laws. We do not discriminate on the basis of race, color, national origin, age, disability, sex, sexual orientation, or gender identity.            Thank you!     Thank you for choosing [facility]   for your care. Our goal is always to provide you with excellent care. Hearing back from our patients is one way we can continue to improve our services. Please take a few minutes to complete the written survey that you may receive in the mail after your visit with us. Thank you!             Your Updated Medication List - Protect others around you: Learn how to safely use, store and throw away your medicines at www.disposemymeds.org.          This list is accurate as of 18  8:46 AM.  Always use your most recent med list.                   Brand Name Dispense Instructions for use Diagnosis    prenatal multivitamin plus iron 27-0.8 MG Tabs per tablet      Take 1 tablet by mouth daily        senna-docusate 8.6-50 MG per tablet    SENOKOT-S;PERICOLACE    120 tablet    Take 2 tablets by mouth 2 times daily    Delivered by  section

## 2018-02-28 NOTE — TELEPHONE ENCOUNTER
Dr Singh requested we facilitate eye clinic appointment ASAP. Spoke with Jey at [facility] who confirms 1:45pm with Dr. Munoz today. Called patient to inform but reached voicemail. Left detailed message re: appointment time and location. Requested patient call to confirm she received message. Will attempt to call again     Zaina returned to clinic and nurse informed her of eye appointment. Pt expressed understanding and agrees with plan

## 2018-02-28 NOTE — LETTER
Date:March 1, 2018      Patient was self referred, no letter generated. Do not send.        [facility] Information

## 2018-02-28 NOTE — LETTER
3/6/2018         Zaina Tan   [address]   [facility] [address]        Dear Ms. Tan:    Your vitamin B12 level was normal.     Results for orders placed or performed in visit on 02/28/18   CBC with Platelets   Result Value Ref Range    WBC 4.2 4.0 - 11.0 10e9/L    RBC Count 4.22 3.8 - 5.2 10e12/L    Hemoglobin 12.0 11.7 - 15.7 g/dL    Hematocrit 36.4 35.0 - 47.0 %    MCV 86 78 - 100 fl    MCH 28.4 26.5 - 33.0 pg    MCHC 33.0 31.5 - 36.5 g/dL    RDW 12.6 10.0 - 15.0 %    Platelet Count 184 150 - 450 10e9/L   Vitamin B12   Result Value Ref Range    Vitamin B12 961 193 - 986 pg/mL   TSH with free T4 reflex   Result Value Ref Range    TSH 1.34 0.40 - 4.00 mU/L   Comprehensive metabolic panel   Result Value Ref Range    Sodium 140 133 - 144 mmol/L    Potassium 4.0 3.4 - 5.3 mmol/L    Chloride 108 94 - 109 mmol/L    Carbon Dioxide 25 20 - 32 mmol/L    Anion Gap 7 3 - 14 mmol/L    Glucose 96 70 - 99 mg/dL    Urea Nitrogen 9 7 - 30 mg/dL    Creatinine 0.59 0.52 - 1.04 mg/dL    GFR Estimate >90 >60 mL/min/1.7m2    GFR Estimate If Black >90 >60 mL/min/1.7m2    Calcium 8.4 (L) 8.5 - 10.1 mg/dL    Bilirubin Total 0.2 0.2 - 1.3 mg/dL    Albumin 3.6 3.4 - 5.0 g/dL    Protein Total 7.7 6.8 - 8.8 g/dL    Alkaline Phosphatase 98 40 - 150 U/L    ALT 20 0 - 50 U/L    AST 14 0 - 45 U/L         Please note that test explanations are brief and do not reflect all diagnostic uses.  If you have any questions or concerns, please call the clinic at [phone].      Sincerely,      Ania Justin sent on behalf of  Dilcia Singh MD

## 2018-03-01 ENCOUNTER — OFFICE VISIT (OUTPATIENT)
Dept: ORTHOPEDICS | Facility: CLINIC | Age: 33
End: 2018-03-01
Payer: COMMERCIAL

## 2018-03-01 VITALS
DIASTOLIC BLOOD PRESSURE: 68 MMHG | SYSTOLIC BLOOD PRESSURE: 104 MMHG | HEIGHT: 64 IN | WEIGHT: 163 LBS | BODY MASS INDEX: 27.83 KG/M2

## 2018-03-01 DIAGNOSIS — G56.03 BILATERAL CARPAL TUNNEL SYNDROME: Primary | ICD-10-CM

## 2018-03-01 NOTE — PROGRESS NOTES
"Sports Medicine Clinic Visit    PCP: Dilcia Singh    Zaina Tan is a 32 year old female who is seen  in consultation at the request of Dr. Singh presenting with bilateral wrist and hand numbness and tingling.     Injury: None    Location of Pain: bilateral hands, worse in right  Duration of Pain: 5 month(s)  Pain is better with: Splint   Pain is worse with: in early AM, at end of day  Additional Features: right hand dominate   Treatment so far consists of: Splint  Prior History of related problems:     /68  Ht 5' 3.5\" (1.613 m)  Wt 163 lb (73.9 kg)  LMP 02/15/2018  BMI 28.42 kg/m2         PMH:  Past Medical History:   Diagnosis Date     NO ACTIVE PROBLEMS        Active problem list:  Patient Active Problem List   Diagnosis     History of gestational diabetes mellitus (GDM) in prior pregnancy, currently pregnant     H/O fetal anomaly in prior pregnancy, currently pregnant, third trimester     Supervision of normal intrauterine pregnancy in multigravida - [facility] CNM     Vitamin D deficiency     Uterine leiomyoma     Maternal care for other (suspected) fetal abnormality and damage, not applicable or unspecified     Labor and delivery indication for care or intervention     Delivered by  section     TOS (thoracic outlet syndrome)       FH:  Family History   Problem Relation Age of Onset     Hypertension Mother      had before pregnancy     HEART DISEASE Other      hypoplastic left heart. TAB 23 wks      DIABETES No family hx of        SH:  Social History     Social History     Marital status: Single     Spouse name: N/A     Number of children: N/A     Years of education: N/A     Occupational History     [employer]      Social History Main Topics     Smoking status: Never Smoker     Smokeless tobacco: Never Used     Alcohol use No     Drug use: No     Sexual activity: Yes     Partners: Male     Other Topics Concern     Not on file     Social History Narrative    How much exercise per week? Daily " walking    How much calcium per day? In vits and foods       How much caffeine per day? 0    How much vitamin D per day? In vits and foods    Do you/your family wear seatbelts?  Yes    Do you/your family use safety helmets? Yes    Do you/your family use sunscreen? No    Do you/your family keep firearms in the home? no    Do you/your family have a smoke detector(s)?     Do you feel safe in your home? Yes    Has anyone ever touched you in an unwanted manner?no feels safe in home.           April 20, 2015 Peyton Bernardo LPN    Reviewed Esperanza Owen CNM       MEDS:  See EMR, reviewed  ALL:  See EMR, reviewed    REVIEW OF SYSTEMS:  CONSTITUTIONAL:NEGATIVE for fever, chills, change in weight  INTEGUMENTARY/SKIN: NEGATIVE for worrisome rashes, moles or lesions  EYES: NEGATIVE for vision changes or irritation  ENT/MOUTH: NEGATIVE for ear, mouth and throat problems  RESP:NEGATIVE for significant cough or SOB  BREAST: NEGATIVE for masses, tenderness or discharge  CV: NEGATIVE for chest pain, palpitations or peripheral edema  GI: NEGATIVE for nausea, abdominal pain, heartburn, or change in bowel habits  :NEGATIVE for frequency, dysuria, or hematuria  :NEGATIVE for frequency, dysuria, or hematuria  NEURO: NEGATIVE for weakness, dizziness or paresthesias  ENDOCRINE: NEGATIVE for temperature intolerance, skin/hair changes  HEME/ALLERGY/IMMUNE: NEGATIVE for bleeding problems  PSYCHIATRIC: NEGATIVE for changes in mood or affect      Subjective: This 32-year-old female is 6 months status post delivery of child.  She has been dealing with paresthesias in the bilateral hands, right greater than left.  In the early morning when she gets out of bed she has a tendency to notice numbness and tingling involving the fourth finger and sometimes the third second and first finger of the right hand and similar issues with the left hand.  The right will be more prominent.  Sometimes she can feel an aching sensation in the palm.  She is likely  "to feel it in the morning and then sometimes she will feel it at the end of the day.  If she feels at the end of the day she will \"grab my brace\" and wear it for a while which sometimes help with the pain.  She has not used the braces consistently at night.  TSH pending.    Objective:  Thumb strength to abduction to opponens to cocking of the thumb is normal bilaterally.  Grasp strength is normal.  There is no synovial thickening at the MCPs.  She can make a full fist.  No effusion at the wrist.  Nontender about the distal radius or distal ulna.  A Phalen's test reproduces tingling in fourth fingers bilaterally.  Tinel's sign is positive reproducing shooting discomfort into the thumb.  Overlying skin is intact.  Appropriate in conversation and affect.    Assessment: Carpal tunnel syndrome    Plan: She was given a set of nerve glide exercises that she will do 2-3 times daily.  She will wear the bilateral volar splints consistently at night for the next 4 weeks.  She knows of the problems not improving the next step would be an EMG to evaluate nerve function.  She will follow-up if not improved.                  "

## 2018-03-01 NOTE — LETTER
"  3/1/2018      RE: Zaina Tan  [address]   [facility] [address]       Sports Medicine Clinic Visit    PCP: Dilcia Singh    Zaina Tan is a 32 year old female who is seen  in consultation at the request of Dr. Singh presenting with bilateral wrist and hand numbness and tingling.     Injury: None    Location of Pain: bilateral hands, worse in right  Duration of Pain: 5 month(s)  Pain is better with: Splint   Pain is worse with: in early AM, at end of day  Additional Features: right hand dominate   Treatment so far consists of: Splint  Prior History of related problems:     /68  Ht 5' 3.5\" (1.613 m)  Wt 163 lb (73.9 kg)  LMP 02/15/2018  BMI 28.42 kg/m2         PMH:  Past Medical History:   Diagnosis Date     NO ACTIVE PROBLEMS        Active problem list:  Patient Active Problem List   Diagnosis     History of gestational diabetes mellitus (GDM) in prior pregnancy, currently pregnant     H/O fetal anomaly in prior pregnancy, currently pregnant, third trimester     Supervision of normal intrauterine pregnancy in multigravida - [facility] CNM     Vitamin D deficiency     Uterine leiomyoma     Maternal care for other (suspected) fetal abnormality and damage, not applicable or unspecified     Labor and delivery indication for care or intervention     Delivered by  section     TOS (thoracic outlet syndrome)       FH:  Family History   Problem Relation Age of Onset     Hypertension Mother      had before pregnancy     HEART DISEASE Other      hypoplastic left heart. TAB 23 wks      DIABETES No family hx of        SH:  Social History     Social History     Marital status: Single     Spouse name: N/A     Number of children: N/A     Years of education: N/A     Occupational History     [employer]      Social History Main Topics     Smoking status: Never Smoker     Smokeless tobacco: Never Used     Alcohol use No     Drug use: No     Sexual activity: Yes     Partners: Male     Other Topics " Concern     Not on file     Social History Narrative    How much exercise per week? Daily walking    How much calcium per day? In vits and foods       How much caffeine per day? 0    How much vitamin D per day? In vits and foods    Do you/your family wear seatbelts?  Yes    Do you/your family use safety helmets? Yes    Do you/your family use sunscreen? No    Do you/your family keep firearms in the home? no    Do you/your family have a smoke detector(s)?     Do you feel safe in your home? Yes    Has anyone ever touched you in an unwanted manner?no feels safe in home.           April 20, 2015 Peyton Bernardo LPN    Reviewed Esperanza Owen CNM       MEDS:  See EMR, reviewed  ALL:  See EMR, reviewed    REVIEW OF SYSTEMS:  CONSTITUTIONAL:NEGATIVE for fever, chills, change in weight  INTEGUMENTARY/SKIN: NEGATIVE for worrisome rashes, moles or lesions  EYES: NEGATIVE for vision changes or irritation  ENT/MOUTH: NEGATIVE for ear, mouth and throat problems  RESP:NEGATIVE for significant cough or SOB  BREAST: NEGATIVE for masses, tenderness or discharge  CV: NEGATIVE for chest pain, palpitations or peripheral edema  GI: NEGATIVE for nausea, abdominal pain, heartburn, or change in bowel habits  :NEGATIVE for frequency, dysuria, or hematuria  :NEGATIVE for frequency, dysuria, or hematuria  NEURO: NEGATIVE for weakness, dizziness or paresthesias  ENDOCRINE: NEGATIVE for temperature intolerance, skin/hair changes  HEME/ALLERGY/IMMUNE: NEGATIVE for bleeding problems  PSYCHIATRIC: NEGATIVE for changes in mood or affect      Subjective: This 32-year-old female is 6 months status post delivery of child.  She has been dealing with paresthesias in the bilateral hands, right greater than left.  In the early morning when she gets out of bed she has a tendency to notice numbness and tingling involving the fourth finger and sometimes the third second and first finger of the right hand and similar issues with the left hand.  The right will  "be more prominent.  Sometimes she can feel an aching sensation in the palm.  She is likely to feel it in the morning and then sometimes she will feel it at the end of the day.  If she feels at the end of the day she will \"grab my brace\" and wear it for a while which sometimes help with the pain.  She has not used the braces consistently at night.  TSH pending.    Objective:  Thumb strength to abduction to opponens to cocking of the thumb is normal bilaterally.  Grasp strength is normal.  There is no synovial thickening at the MCPs.  She can make a full fist.  No effusion at the wrist.  Nontender about the distal radius or distal ulna.  A Phalen's test reproduces tingling in fourth fingers bilaterally.  Tinel's sign is positive reproducing shooting discomfort into the thumb.  Overlying skin is intact.  Appropriate in conversation and affect.    Assessment: Carpal tunnel syndrome    Plan: She was given a set of nerve glide exercises that she will do 2-3 times daily.  She will wear the bilateral volar splints consistently at night for the next 4 weeks.  She knows of the problems not improving the next step would be an EMG to evaluate nerve function.  She will follow-up if not improved.      Flako Dent MD    "

## 2018-03-01 NOTE — Clinical Note
Thank you for allowing me to see your patient in Sports Medicine Clinic.  Please see the attached copy of our visit.  Sincerely,  Flako Dent MD

## 2018-03-01 NOTE — MR AVS SNAPSHOT
"              After Visit Summary   3/1/2018    Zaina Tan    MRN: [id number]           Patient Information     Date Of Birth          1985        Visit Information        Provider Department      3/1/2018 8:30 AM Flako Dent MD [facility] Sports Medicine        Today's Diagnoses     Bilateral carpal tunnel syndrome    -  1       Follow-ups after your visit        Your next 10 appointments already scheduled     Mar 28, 2018  9:30 AM CDT   [facility] with Lorenza Rome MD   Eye Clinic ([facility])    [address] [facility] [address] [place] [id number] [phone]              Who to contact     Please call your clinic at [phone] to:    Ask questions about your health    Make or cancel appointments    Discuss your medicines    Learn about your test results    Speak to your doctor            Additional Information About Your Visit        MyChart Information     [employer] gives you secure access to your electronic health record. If you see a primary care provider, you can also send messages to your care team and make appointments. If you have questions, please call your primary care clinic.  If you do not have a primary care provider, please call [phone] and they will assist you.      [employer] is an electronic gateway that provides easy, online access to your medical records. With [employer], you can request a clinic appointment, read your test results, renew a prescription or communicate with your care team.     To access your existing account, please contact your [facility] or call [phone] for assistance.        Care EveryWhere ID     This is your Care EveryWhere ID. This could be used by other organizations to access your [place] medical records  [id number]        Your Vitals Were     Height Last Period BMI (Body Mass Index)             5' 3.5\" (1.613 m) 02/15/2018 28.42 kg/m2          Blood Pressure from " Last 3 Encounters:   03/01/18 104/68   02/28/18 104/68   11/13/17 100/57    Weight from Last 3 Encounters:   03/01/18 163 lb (73.9 kg)   02/28/18 163 lb 14.4 oz (74.3 kg)   11/13/17 152 lb 6.4 oz (69.1 kg)              Today, you had the following     No orders found for display       Primary Care Provider Office Phone # Fax #    Dilcia Slade Singh [phone]       [facility] [address]  [facility] [address]        Equal Access to Services     [facility]: Hadii kavita Clayton, waandrea ruiz, tish kaalmamarie bolton, carla rawls . So [facility] [phone].    ATENCIÓN: Si habla español, tiene a lee disposición servicios gratuitos de asistencia lingüística. Llame al [phone].    We comply with applicable federal civil rights laws and [place] laws. We do not discriminate on the basis of race, color, national origin, age, disability, sex, sexual orientation, or gender identity.            Thank you!     Thank you for choosing [facility]  for your care. Our goal is always to provide you with excellent care. Hearing back from our patients is one way we can continue to improve our services. Please take a few minutes to complete the written survey that you may receive in the mail after your visit with us. Thank you!             Your Updated Medication List - Protect others around you: Learn how to safely use, store and throw away your medicines at www.disposemymeds.org.      Notice  As of 3/1/2018  8:54 AM    You have not been prescribed any medications.

## 2018-04-04 ENCOUNTER — OFFICE VISIT (OUTPATIENT)
Dept: OPHTHALMOLOGY | Facility: CLINIC | Age: 33
End: 2018-04-04
Attending: OPHTHALMOLOGY
Payer: COMMERCIAL

## 2018-04-04 DIAGNOSIS — H43.22 ASTEROID HYALOSIS OF LEFT EYE: ICD-10-CM

## 2018-04-04 DIAGNOSIS — H52.13 MYOPIA OF BOTH EYES: ICD-10-CM

## 2018-04-04 DIAGNOSIS — H53.10 SUBJECTIVE VISUAL DISTURBANCE OF BOTH EYES: Primary | ICD-10-CM

## 2018-04-04 DIAGNOSIS — H43.393 VITREOUS SYNERESIS OF BOTH EYES: ICD-10-CM

## 2018-04-04 PROCEDURE — G0463 HOSPITAL OUTPT CLINIC VISIT: HCPCS | Mod: ZF

## 2018-04-04 ASSESSMENT — REFRACTION_WEARINGRX
OD_SPHERE: -0.50
OS_CYLINDER: SPHERE
OS_SPHERE: -0.50
OD_CYLINDER: SPHERE

## 2018-04-04 ASSESSMENT — TONOMETRY
OS_IOP_MMHG: 18
OD_IOP_MMHG: 17
IOP_METHOD: TONOPEN

## 2018-04-04 ASSESSMENT — CUP TO DISC RATIO
OS_RATIO: 0.4
OD_RATIO: 0.4

## 2018-04-04 ASSESSMENT — VISUAL ACUITY
OD_CC: 20/20
OS_CC: 20/20
CORRECTION_TYPE: GLASSES
METHOD: SNELLEN - LINEAR

## 2018-04-04 ASSESSMENT — CONF VISUAL FIELD
OD_NORMAL: 1
METHOD: COUNTING FINGERS
OS_NORMAL: 1

## 2018-04-04 ASSESSMENT — SLIT LAMP EXAM - LIDS
COMMENTS: NORMAL
COMMENTS: NORMAL

## 2018-04-04 NOTE — NURSING NOTE
Chief Complaints and History of Present Illnesses   Patient presents with     Follow Up For     vit syneresis     HPI    Affected eye(s):  Both   Symptoms:        Frequency:  Constant       Do you have eye pain now?:  No      Comments:  Feels that the va is better with the new gls  No F&F  Cheryl Dozier COT 2:30 PM April 4, 2018

## 2018-04-04 NOTE — PROGRESS NOTES
CC: floaters both eyes x several months  Interval History: wearing glasses. Very happy with vision. Floaters improved, now only sees one floater. Denies flashes    HPI:  Zaina Tan is a 32 year old female with no previous ocular history presents with three days increased floaters in both eyes. She has had one floater in her right eye for many years. 3 days ago she noticed an increased number, as many as nine total in both eyes. She denies flashes, pain. Only notices when looking at light plain surface. These are always present. These spots follow when she moves her eyes. She also states that she has blurry vision at a distance.           POHx: None, denies trauma  PMHx: Denies  Current Medications:   No current outpatient prescriptions on file prior to visit.  No current facility-administered medications on file prior to visit.   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    Discussed with MD Yannick Rodriguez MD  PGY3, Dept of Ophthalmology  Pager [phone]    Teaching statement:  Complete documentation of historical and exam elements from today's encounter can be found in the full encounter summary report (not reduplicated in this progress note). I personally obtained the chief complaint(s) and history of present illness.  I confirmed and edited as necessary the review of systems, past medical/surgical history, family history, social  history, and examination findings as documented by others; and I examined the patient myself. I personally reviewed the relevant tests, images, and reports as documented above.     I formulated and edited as necessary the assessment and plan and discussed the findings and management plan with the patient and family.    Lorenza Rome MD  Comprehensive Ophthalmology & Ocular Pathology  Department of Ophthalmology and Visual Neurosciences  [email or web address]  Pager [phone]

## 2018-04-04 NOTE — MR AVS SNAPSHOT
After Visit Summary   4/4/2018    Zaina Tan    MRN: [id number]           Patient Information     Date Of Birth          1985        Visit Information        Provider Department      4/4/2018 2:00 PM Yannick Munoz MD [facility]        Today's Diagnoses     Subjective visual disturbance of both eyes    -  1    Asteroid hyalosis of left eye        Myopia of both eyes        Vitreous syneresis of both eyes           Follow-ups after your visit        Follow-up notes from your care team     Return in about 1 year (around 4/4/2019) for Annual Visit.      Who to contact     Please call your clinic at [phone] to:    Ask questions about your health    Make or cancel appointments    Discuss your medicines    Learn about your test results    Speak to your doctor            Additional Information About Your Visit        MyChart Information     [employer] gives you secure access to your electronic health record. If you see a primary care provider, you can also send messages to your care team and make appointments. If you have questions, please call your primary care clinic.  If you do not have a primary care provider, please call [phone] and they will assist you.      [employer] is an electronic gateway that provides easy, online access to your medical records. With [employer], you can request a clinic appointment, read your test results, renew a prescription or communicate with your care team.     To access your existing account, please contact your [facility] or call [phone] for assistance.        Care EveryWhere ID     This is your Care EveryWhere ID. This could be used by other organizations to access your [place] medical records  [id number]         Blood Pressure from Last 3 Encounters:   03/01/18 104/68   02/28/18 104/68   11/13/17 100/57    Weight from Last 3 Encounters:   03/01/18 73.9 kg (163 lb)   02/28/18 74.3 kg (163 lb 14.4 oz)   11/13/17  69.1 kg (152 lb 6.4 oz)              Today, you had the following     No orders found for display       Primary Care Provider Office Phone # Fax #    Dilcia Slade Singh [phone]       [facility] [address]  [facility] [address]        Equal Access to Services     [facility] ELLI : Hadii aad ku hadasho Soomaali, waaxda luqadaha, qaybta kaalmada adeegyada, waxmelissa lyonin haycalrosn cristian alejandredonitakieran wadsworth. So [facility] [phone].    ATENCIÓN: Si habla español, tiene a lee disposición servicios gratuitos de asistencia lingüística. Llame al [phone].    We comply with applicable federal civil rights laws and [place] laws. We do not discriminate on the basis of race, color, national origin, age, disability, sex, sexual orientation, or gender identity.            Thank you!     Thank you for choosing EYE CLINIC  for your care. Our goal is always to provide you with excellent care. Hearing back from our patients is one way we can continue to improve our services. Please take a few minutes to complete the written survey that you may receive in the mail after your visit with us. Thank you!             Your Updated Medication List - Protect others around you: Learn how to safely use, store and throw away your medicines at www.disposemymeds.org.      Notice  As of 4/4/2018  3:50 PM    You have not been prescribed any medications.

## 2018-09-27 PROBLEM — G54.0 TOS (THORACIC OUTLET SYNDROME): Status: RESOLVED | Noted: 2017-11-02 | Resolved: 2018-09-27

## 2018-09-27 NOTE — PROGRESS NOTES
Pt completed IE and one follow-up visit; reported no change in status from IE however with additional complaints of R LE s'xs. Assessment of low back unremarkable. No further visits scheduled. Pt will be discharged at this time.

## 2018-09-28 ENCOUNTER — TELEPHONE (OUTPATIENT)
Dept: OBGYN | Facility: CLINIC | Age: 33
End: 2018-09-28

## 2018-09-28 DIAGNOSIS — R30.9 PAIN WITH URINATION: Primary | ICD-10-CM

## 2018-09-28 RX ORDER — SULFAMETHOXAZOLE/TRIMETHOPRIM 800-160 MG
1 TABLET ORAL 2 TIMES DAILY
Qty: 6 TABLET | Refills: 0 | Status: SHIPPED | OUTPATIENT
Start: 2018-09-28 | End: 2019-02-21

## 2018-09-28 NOTE — TELEPHONE ENCOUNTER
Spoke to Zaina who has had pain/burning with urination the last two days. She just got her period,not pregnant. She denies fever chills or low back pain. Se hasn't had an UTI in the last year. Denies any blood in urine. NKDA .  Prescribed bactrim per protocol. Informed Zaina if still symptomatic or s/s worsen after 2 days, she needs to be seen in clinic. Pt indicated understanding and agreed with plan.

## 2019-01-19 NOTE — ANESTHESIA POSTPROCEDURE EVALUATION
Patient: Zaina Tan    Procedure(s):   - Wound Class: II-Clean Contaminated    Diagnosis:pregnant  Diagnosis Additional Information: No value filed.    Anesthesia Type:  No value filed.    Note:  Anesthesia Post Evaluation    Patient location during evaluation: PACU  Patient participation: Able to fully participate in evaluation  Level of consciousness: awake  Pain management: adequate  Airway patency: patent  Cardiovascular status: acceptable  Respiratory status: acceptable  Hydration status: balanced  PONV: none     Anesthetic complications: None          Last vitals:  Vitals:    09/04/17 2130 09/04/17 2145 09/04/17 2200   BP: 119/85 (!) 123/92 105/50   Pulse:      Resp: 21 16 16   Temp:      SpO2: 100% 100% 100%         Electronically Signed By: Mtich Arrington MD  September 4, 2017  10:28 PM   Statement Selected

## 2019-02-21 ENCOUNTER — OFFICE VISIT (OUTPATIENT)
Dept: OBGYN | Facility: CLINIC | Age: 34
End: 2019-02-21
Attending: NURSE PRACTITIONER
Payer: COMMERCIAL

## 2019-02-21 VITALS
HEART RATE: 58 BPM | WEIGHT: 166.3 LBS | DIASTOLIC BLOOD PRESSURE: 72 MMHG | HEIGHT: 64 IN | BODY MASS INDEX: 28.39 KG/M2 | SYSTOLIC BLOOD PRESSURE: 116 MMHG

## 2019-02-21 DIAGNOSIS — M54.50 BILATERAL LOW BACK PAIN WITHOUT SCIATICA, UNSPECIFIED CHRONICITY: Primary | ICD-10-CM

## 2019-02-21 DIAGNOSIS — R10.2 PELVIC PAIN IN FEMALE: ICD-10-CM

## 2019-02-21 LAB
ALBUMIN UR-MCNC: NEGATIVE MG/DL
APPEARANCE UR: CLEAR
BILIRUB UR QL STRIP: NEGATIVE
COLOR UR AUTO: YELLOW
GLUCOSE UR STRIP-MCNC: NEGATIVE MG/DL
HGB UR QL STRIP: ABNORMAL
KETONES UR STRIP-MCNC: NEGATIVE MG/DL
LEUKOCYTE ESTERASE UR QL STRIP: NEGATIVE
NITRATE UR QL: NEGATIVE
PH UR STRIP: 6 PH (ref 5–7)
SP GR UR STRIP: 1.02 (ref 1–1.03)
UROBILINOGEN UR STRIP-ACNC: 0.2 EU/DL (ref 0.2–1)

## 2019-02-21 PROCEDURE — 81003 URINALYSIS AUTO W/O SCOPE: CPT

## 2019-02-21 PROCEDURE — G0463 HOSPITAL OUTPT CLINIC VISIT: HCPCS | Mod: ZF

## 2019-02-21 PROCEDURE — 80048 BASIC METABOLIC PNL TOTAL CA: CPT | Performed by: NURSE PRACTITIONER

## 2019-02-21 ASSESSMENT — MIFFLIN-ST. JEOR: SCORE: 1431.39

## 2019-02-21 ASSESSMENT — PAIN SCALES - GENERAL: PAINLEVEL: NO PAIN (0)

## 2019-02-21 NOTE — PROGRESS NOTES
Progress Note    SUBJECTIVE:  Zaina Tan is an 33 year old, , who presents with lower back and pelvic pain.    Concerns today include:     1. Reports pelvic pain that started 2018. Pain is intermittent and occurs when pressure is applied to her abdomen. Denies pain with intercourse. Not currently using anything for contraception. Denies changes in her bowel or bladder. Reports having a bowel movement today. Was treated for a UTI in 2018 with complete resolution of symptoms. Had an uncomplicated C/S in 2017.    2. Reports intermittent bilateral back pain that started in 2019. Reports bending over for long periods of time when she is feeding her one year old. Reports a 25 lb weight gain in the past 1.5 years. Patient does not exercise and reports eating a diet high in carbohydrates.    Menstrual History:  Menstrual History 2015   LAST MENSTRUAL PERIOD 2015 2015 2/15/2018   Menarche Age - - -   Period Cycle (Days) - - -   Period Duration (Days) - - -   Period Pattern - - -   Menstrual Flow - - -   Dysmenorrhea - - -   Reviewed Today - - -       Last    Lab Results   Component Value Date    PAP NIL 2016         Last   Lab Results   Component Value Date    HPV16 Negative 2016     Last   Lab Results   Component Value Date    HPV18 Negative 2016     Last   Lab Results   Component Value Date    HRHPV Negative 2016       HISTORY:  No Known Allergies  Immunization History   Administered Date(s) Administered     Influenza Vaccine IM 3yrs+ 4 Valent IIV4 10/12/2015     TDAP Vaccine (Boostrix) 2015, 2017       Obstetric History       T2      L2     SAB0   TAB0   Ectopic0   Multiple0   Live Births2      Past Medical History:   Diagnosis Date     Vitreous syneresis      Past Surgical History:   Procedure Laterality Date      SECTION N/A 2017    Procedure:  SECTION;;  Surgeon:  Shellie Soni MD;  Location:  L[facility]D     D & C  6-2014    fetal hypoplastic leftheart syndrome     Family History   Problem Relation Age of Onset     Hypertension Mother         had before pregnancy     Heart Disease Other         hypoplastic left heart. TAB 23 wks      Diabetes No family hx of      Social History     Socioeconomic History     Marital status: Single     Spouse name: None     Number of children: None     Years of education: None     Highest education level: None   Occupational History     Occupation: [employer]   Social Needs     Financial resource strain: None     Food insecurity:     Worry: None     Inability: None     Transportation needs:     Medical: None     Non-medical: None   Tobacco Use     Smoking status: Never Smoker     Smokeless tobacco: Never Used   Substance and Sexual Activity     Alcohol use: No     Drug use: No     Sexual activity: Yes     Partners: Male   Lifestyle     Physical activity:     Days per week: None     Minutes per session: None     Stress: None   Relationships     Social connections:     Talks on phone: None     Gets together: None     Attends Latter day service: None     Active member of club or organization: None     Attends meetings of clubs or organizations: None     Relationship status: None     Intimate partner violence:     Fear of current or ex partner: None     Emotionally abused: None     Physically abused: None     Forced sexual activity: None   Other Topics Concern     None   Social History Narrative    How much exercise per week? Daily walking    How much calcium per day? In vits and foods       How much caffeine per day? 0    How much vitamin D per day? In vits and foods    Do you/your family wear seatbelts?  Yes    Do you/your family use safety helmets? Yes    Do you/your family use sunscreen? No    Do you/your family keep firearms in the home? no    Do you/your family have a smoke detector(s)?     Do you feel safe in your home? Yes    Has anyone ever  "touched you in an unwanted manner?no feels safe in home.           April 20, 2015 Peyton Bernardo LPN    Reviewed Esperanza Owen CNANGELO       ROS:  Negative except for HPI.    PHQ-9 SCORE 11/9/2015 1/11/2016 8/14/2017   PHQ-9 Total Score - 2 -   PHQ-9 Total Score 16 - 0     RODRIGO-7 SCORE 8/14/2017   Total Score 5       EXAM:  Blood pressure 116/72, pulse 58, height 1.613 m (5' 3.5\"), weight 75.4 kg (166 lb 4.8 oz), not currently breastfeeding. Body mass index is 29 kg/m .  General - pleasant female in no acute distress.  Skin - no suspicious lesions or rashes  Abdomen - soft, nontender, nondistended, no masses or organomegaly noted.  Musculoskeletal - no gross deformities.  Neurological - normal strength, sensation, and mental status.      ASSESSMENT:  Encounter Diagnoses   Name Primary?     Bilateral low back pain without sciatica, unspecified chronicity Yes     Pelvic pain in female         PLAN:   Orders Placed This Encounter   Procedures     US GYN Complete Transvaginal - 01451 (In Clinic)     Basic Metabolic Panel     CBC with Platelets     Clinitek Urine Macroscopic POCT     Outpatient lab today, results via MyChart  Schedule transvaginal ultrasound for pelvic pain evaluation  Follow the low back pain instructions for exercise and pain reduction - patient education given  Decreasing weight can also be helpful in reducing low back pain - patient education given      I, BEBETO Woods, RN, ROSIBEL, JOSE Student, completed the PFSH and ROS. I then acted as a scribe for Joselyn GARCIA for the remainder of the visit.  JOSE Student. BEBETO Woods, RN, ROSIBEL GARCIA Student.    \"I agree with the PFSH and ROS as completed by the JOSE Student, BEBETO Woods, RN except for changes made by me. The remainder of the encounter was performed by me and scribed by the Student. The scribed note accurately reflects my personal services and decisions made by me.    Joselyn GARCIA              "

## 2019-02-21 NOTE — NURSING NOTE
Chief Complaint   Patient presents with     Abdominal Pain     Pt c/o low back and abdominal pain.  Pt also c/o body aches and chills.

## 2019-02-21 NOTE — LETTER
RE: Zaina Tan  [address]  [facility] [address]     Dear Colleague,    Thank you for referring your patient, Zaina Tan, to the [facility] at [facility]. Please see a copy of my visit note below.  Progress Note    SUBJECTIVE:  Zaina Tan is an 33 year old, , who presents with lower back and pelvic pain.    Concerns today include:     1. Reports pelvic pain that started 2018. Pain is intermittent and occurs when pressure is applied to her abdomen. Denies pain with intercourse. Not currently using anything for contraception. Denies changes in her bowel or bladder. Reports having a bowel movement today. Was treated for a UTI in 2018 with complete resolution of symptoms. Had an uncomplicated C/S in 2017.    2. Reports intermittent bilateral back pain that started in 2019. Reports bending over for long periods of time when she is feeding her one year old. Reports a 25 lb weight gain in the past 1.5 years. Patient does not exercise and reports eating a diet high in carbohydrates.    Menstrual History:  Menstrual History 2015   LAST MENSTRUAL PERIOD 2015 2015 2/15/2018   Menarche Age - - -   Period Cycle (Days) - - -   Period Duration (Days) - - -   Period Pattern - - -   Menstrual Flow - - -   Dysmenorrhea - - -   Reviewed Today - - -       Last    Lab Results   Component Value Date    PAP NIL 2016         Last   Lab Results   Component Value Date    HPV16 Negative 2016     Last   Lab Results   Component Value Date    HPV18 Negative 2016     Last   Lab Results   Component Value Date    HRHPV Negative 2016       HISTORY:  No Known Allergies  Immunization History   Administered Date(s) Administered     Influenza Vaccine IM 3yrs+ 4 Valent IIV4 10/12/2015     TDAP Vaccine (Boostrix) 2015, 2017       Obstetric History        T2      L2     SAB0   TAB0   Ectopic0   Multiple0   Live Births2      Past Medical History:   Diagnosis Date     Vitreous syneresis      Past Surgical History:   Procedure Laterality Date      SECTION N/A 2017    Procedure:  SECTION;;  Surgeon: Shellie Soni MD;  Location: [facility]     D & C      fetal hypoplastic leftheart syndrome     Family History   Problem Relation Age of Onset     Hypertension Mother         had before pregnancy     Heart Disease Other         hypoplastic left heart. TAB 23 wks      Diabetes No family hx of      Social History     Socioeconomic History     Marital status: Single     Spouse name: None     Number of children: None     Years of education: None     Highest education level: None   Occupational History     Occupation: [employer]   Social Needs     Financial resource strain: None     Food insecurity:     Worry: None     Inability: None     Transportation needs:     Medical: None     Non-medical: None   Tobacco Use     Smoking status: Never Smoker     Smokeless tobacco: Never Used   Substance and Sexual Activity     Alcohol use: No     Drug use: No     Sexual activity: Yes     Partners: Male   Lifestyle     Physical activity:     Days per week: None     Minutes per session: None     Stress: None   Relationships     Social connections:     Talks on phone: None     Gets together: None     Attends Congregation service: None     Active member of club or organization: None     Attends meetings of clubs or organizations: None     Relationship status: None     Intimate partner violence:     Fear of current or ex partner: None     Emotionally abused: None     Physically abused: None     Forced sexual activity: None   Other Topics Concern     None   Social History Narrative    How much exercise per week? Daily walking    How much calcium per day? In vits and foods       How much caffeine per day? 0    How much vitamin D per day? In vits and foods    Do you/your  "family wear seatbelts?  Yes    Do you/your family use safety helmets? Yes    Do you/your family use sunscreen? No    Do you/your family keep firearms in the home? no    Do you/your family have a smoke detector(s)?     Do you feel safe in your home? Yes    Has anyone ever touched you in an unwanted manner?no feels safe in home.           April 20, 2015 Peyton Bernardo LPN    Reviewed Esperanza Owen CNM     PHQ-9 SCORE 11/9/2015 1/11/2016 8/14/2017   PHQ-9 Total Score - 2 -   PHQ-9 Total Score 16 - 0     RODRIGO-7 SCORE 8/14/2017   Total Score 5     EXAM:  Blood pressure 116/72, pulse 58, height 1.613 m (5' 3.5\"), weight 75.4 kg (166 lb 4.8 oz), not currently breastfeeding. Body mass index is 29 kg/m .  General - pleasant female in no acute distress.  Skin - no suspicious lesions or rashes  Abdomen - soft, nontender, nondistended, no masses or organomegaly noted.  Musculoskeletal - no gross deformities.  Neurological - normal strength, sensation, and mental status.      ASSESSMENT:  Encounter Diagnoses   Name Primary?     Bilateral low back pain without sciatica, unspecified chronicity Yes     Pelvic pain in female         PLAN:   Orders Placed This Encounter   Procedures     US GYN Complete Transvaginal - 22557 (In Clinic)     Basic Metabolic Panel     CBC with Platelets     Clinitek Urine Macroscopic POCT     Outpatient lab today, results via MyChart  Schedule transvaginal ultrasound for pelvic pain evaluation  Follow the low back pain instructions for exercise and pain reduction - patient education given  Decreasing weight can also be helpful in reducing low back pain - patient education given      I, BEBETO Woods, RN, DNP, NP Student, completed the PFSH and ROS. I then acted as a scribe for Joselyn GARCIA for the remainder of the visit.  JOSE Student. BEBETO Woods, RN, DNP FLORNP Student.    \"I agree with the PFSH and ROS as completed by the CARMITA Student, BEBETO Woods, RN except for changes " made by me. The remainder of the encounter was performed by me and scribed by the Student. The scribed note accurately reflects my personal services and decisions made by me.    Joselyn GARCIA

## 2019-02-21 NOTE — PATIENT INSTRUCTIONS
Outpatient lab today, results via MyChart  Schedule transvaginal ultrasound  Follow the low back pain instructions for exercise and pain reduction - patient education given  Decreasing weight can also be helpful in reducing low back pain - patient education given      Patient Education   When You Have Low Back Pain  Caring for Your Back  You are not alone.  Low back pain is very common. Nearly half of all adults have low back pain in any given year.  The good news is that back pain is rarely a danger to your health. Most people can manage their back pain on their own and about half of them start feeling better within 2 weeks. In 9 out of 10 cases, low back pain goes away or no longer limits daily activity within 6 weeks.  Your outlook is good!  Your symptoms tell us that your low back pain is most likely not a danger to you. Most of the time we do not know the exact cause of low back pain, even if you see a doctor or have an MRI. However, treatment can still work without knowing the cause of the pain. Less than 1 in 100 people need surgery for their back pain.  What can I do about my low back pain?  There are three things you can do to ease low back pain and help it go away.    Use heat or cold packs.    Take medicine as directed.    Use positions, movements and exercises.  Using heat or cold packs  Try cold packs or gentle heat to ease your pain. Use whichever gives you the most relief. Apply the cold pack or heat for 15 minutes at a time, as often as needed.  Taking medicine    If your doctor has prescribed medicine, be sure to follow the directions.    If you take over-the-counter medicine, read and follow the directions.    Talk to your doctor if you have any questions.  Using positions, movements and exercises  Research tells us that moving your joints and muscles can help you recover from back pain. Such activity should be simple and gentle.  Use the positions in the photos as well as walking to help relieve  your pain. Try taking a short walk every 3 to 4 hours during the day. Walk for a few minutes inside your home or take longer walks outside, on a treadmill or at a mall. Slowly increase the amount of time you walk.  Expect discomfort when you begin, but it should lessen as your back starts to heal. When your back feels better, walk daily to keep your back and body healthy.  Finding a comfortable position  When your back pain is new, certain positions will ease your pain. Gently try each of the positions below until you find one that is helpful. Once you find a position of comfort, use it as often as you like when you are resting. You will recover faster if you combine rest with activity.         When should I call my doctor?  Your back pain should improve over the first couple of weeks. As it improves, you should be able to return to your normal activities. But call your doctor if:    You have a sudden change in your ability to control?your bladder or bowels.    You feel tingling in your groin or legs.    The pain spreads down your leg and into your foot.    Your toes, feet or leg muscles feel weak.    You feel generally unwell or sick.    Your pain does not get better or gets worse.    For informational purposes only. Not to replace the advice of your health care provider.  Copyright   2013 [facility]. All rights reserved. [employer] [place] [address]6526 - REV 03/16.       Patient Education     Weight Management: Exercise and Activity    Studies show that people who exercise are the most likely to lose weight and keep it off. Exercise burns calories. It helps build muscle to make your body stronger. Make exercise an important part of your weight-management plan.  Make activity part of your day  You may not think you have the time to exercise. But you can work activity into your daily life--you just need to be committed. Take 10 minutes out of your lunch hour to take a walk. Walk to the [employer] to get your paper  instead of having it delivered. Make it a habit to take the stairs instead of the elevator. Park in a far away parking spot instead of the closest. You ll be surprised at how fast these little changes can make a difference.  Some people really cannot walk very far, and tire out quickly with exercise. Instead of becoming discouraged, resolve to do what you can do, and work to make that a regular frequent habit.   The benefits of exercise  Exercise offers many benefits including:     Exercise increases your metabolism (the speed at which your body burns calories).    Regular exercise can increase the amount of muscle in your body. Muscle burns calories faster than fat. The more muscle you have, the more calories you burn.    Exercise gives you energy and curbs your appetite.    Exercise decreases stress and helps you sleep better. Find out for yourself what time of day works best for you.  Make exercise fun  Exercise can be fun. Choose an activity you enjoy. You may even get a friend to do it with you:    Take a resistance-training or aerobics class    Join a team sport    Take a dance class    Walk the dog    Ride a bike  If you have health problems, be sure to ask your healthcare provider before you start an exercise program. Have a  help you develop a plan that s safe for you.   Date Last Reviewed: 4/1/2018 2000-2018 [employer] [address] [place] [address], [place] [address]. All rights reserved. This information is not intended as a substitute for professional medical care. Always follow your healthcare professional's instructions.

## 2019-02-28 ENCOUNTER — ANCILLARY PROCEDURE (OUTPATIENT)
Dept: ULTRASOUND IMAGING | Facility: CLINIC | Age: 34
End: 2019-02-28
Attending: NURSE PRACTITIONER
Payer: COMMERCIAL

## 2019-02-28 DIAGNOSIS — R10.2 PELVIC PAIN IN FEMALE: ICD-10-CM

## 2019-02-28 DIAGNOSIS — M54.50 BILATERAL LOW BACK PAIN WITHOUT SCIATICA, UNSPECIFIED CHRONICITY: ICD-10-CM

## 2019-02-28 LAB
ANION GAP SERPL CALCULATED.3IONS-SCNC: 6 MMOL/L (ref 3–14)
BUN SERPL-MCNC: 10 MG/DL (ref 7–30)
CALCIUM SERPL-MCNC: 9.6 MG/DL (ref 8.5–10.1)
CHLORIDE SERPL-SCNC: 103 MMOL/L (ref 94–109)
CO2 SERPL-SCNC: 29 MMOL/L (ref 20–32)
CREAT SERPL-MCNC: 0.7 MG/DL (ref 0.52–1.04)
ERYTHROCYTE [DISTWIDTH] IN BLOOD BY AUTOMATED COUNT: 12.5 % (ref 10–15)
GFR SERPL CREATININE-BSD FRML MDRD: >90 ML/MIN/{1.73_M2}
GLUCOSE SERPL-MCNC: 87 MG/DL (ref 70–99)
HCT VFR BLD AUTO: 38.8 % (ref 35–47)
HGB BLD-MCNC: 12.7 G/DL (ref 11.7–15.7)
MCH RBC QN AUTO: 28.3 PG (ref 26.5–33)
MCHC RBC AUTO-ENTMCNC: 32.7 G/DL (ref 31.5–36.5)
MCV RBC AUTO: 86 FL (ref 78–100)
PLATELET # BLD AUTO: 213 10E9/L (ref 150–450)
POTASSIUM SERPL-SCNC: 3.9 MMOL/L (ref 3.4–5.3)
RBC # BLD AUTO: 4.49 10E12/L (ref 3.8–5.2)
SODIUM SERPL-SCNC: 138 MMOL/L (ref 133–144)
WBC # BLD AUTO: 6.9 10E9/L (ref 4–11)

## 2019-02-28 PROCEDURE — 85027 COMPLETE CBC AUTOMATED: CPT | Performed by: NURSE PRACTITIONER

## 2019-02-28 PROCEDURE — 76830 TRANSVAGINAL US NON-OB: CPT

## 2019-02-28 PROCEDURE — 80048 BASIC METABOLIC PNL TOTAL CA: CPT | Performed by: NURSE PRACTITIONER

## 2019-02-28 PROCEDURE — 36415 COLL VENOUS BLD VENIPUNCTURE: CPT | Performed by: NURSE PRACTITIONER

## 2019-03-07 NOTE — RESULT ENCOUNTER NOTE
Dear Zaina,    Your blood work test was all normal. The urine test did show a trace amount of blood, and our records show that you were within a few days of your last menstrual period. If you have any burning, pain with urination, or notice any blood in your urine, please return to the clinic for further urine testing.    Your pelvic ultrasound indicates that your uterus and ovaries are completely normal, and do not appear to be a source of the discomfort you are feeling. Please return to clinic and be seen by one of our family medicine or internal medicine physcians if your symptoms persist or worsen.    Sincerely,  Joselyn GARCIA

## 2019-03-22 ENCOUNTER — HEALTH MAINTENANCE LETTER (OUTPATIENT)
Age: 34
End: 2019-03-22

## 2019-05-25 ENCOUNTER — HOSPITAL ENCOUNTER (EMERGENCY)
Facility: CLINIC | Age: 34
Discharge: HOME | End: 2019-05-25
Attending: EMERGENCY MEDICINE | Admitting: EMERGENCY MEDICINE
Payer: COMMERCIAL

## 2019-05-25 VITALS
HEIGHT: 62 IN | DIASTOLIC BLOOD PRESSURE: 75 MMHG | TEMPERATURE: 98.6 F | SYSTOLIC BLOOD PRESSURE: 157 MMHG | BODY MASS INDEX: 30 KG/M2 | OXYGEN SATURATION: 97 % | RESPIRATION RATE: 20 BRPM | WEIGHT: 163 LBS

## 2019-05-25 DIAGNOSIS — J02.9 PHARYNGITIS, UNSPECIFIED ETIOLOGY: ICD-10-CM

## 2019-05-25 LAB
DEPRECATED S PYO AG THROAT QL EIA: NORMAL
SPECIMEN SOURCE: NORMAL

## 2019-05-25 PROCEDURE — 25000131 ZZH RX MED GY IP 250 OP 636 PS 637: Performed by: EMERGENCY MEDICINE

## 2019-05-25 PROCEDURE — 87880 STREP A ASSAY W/OPTIC: CPT | Performed by: EMERGENCY MEDICINE

## 2019-05-25 PROCEDURE — 25000132 ZZH RX MED GY IP 250 OP 250 PS 637: Performed by: EMERGENCY MEDICINE

## 2019-05-25 PROCEDURE — 99283 EMERGENCY DEPT VISIT LOW MDM: CPT

## 2019-05-25 PROCEDURE — 87081 CULTURE SCREEN ONLY: CPT | Performed by: EMERGENCY MEDICINE

## 2019-05-25 RX ORDER — IBUPROFEN 600 MG/1
600 TABLET, FILM COATED ORAL ONCE
Status: COMPLETED | OUTPATIENT
Start: 2019-05-25 | End: 2019-05-25

## 2019-05-25 RX ADMIN — DEXAMETHASONE 10 MG: 2 TABLET ORAL at 23:13

## 2019-05-25 RX ADMIN — IBUPROFEN 600 MG: 600 TABLET ORAL at 23:13

## 2019-05-25 ASSESSMENT — ENCOUNTER SYMPTOMS
FATIGUE: 1
SORE THROAT: 1
VOMITING: 0
FEVER: 0

## 2019-05-25 ASSESSMENT — MIFFLIN-ST. JEOR: SCORE: 1392.61

## 2019-05-25 NOTE — ED AVS SNAPSHOT
Emergency Department  [address] [place] [address]  Phone:  [phone]  Fax:  [phone]                                    Zaina Tan   MRN: [id number]    Department:   Emergency Department   Date of Visit:  5/25/2019           After Visit Summary Signature Page    I have received my discharge instructions, and my questions have been answered. I have discussed any challenges I see with this plan with the nurse or doctor.    ..........................................................................................................................................  Patient/Patient Representative Signature      ..........................................................................................................................................  Patient Representative Print Name and Relationship to Patient    ..................................................               ................................................  Date                                   Time    ..........................................................................................................................................  Reviewed by Signature/Title    ...................................................              ..............................................  Date                                               Time          22EPIC Rev 08/18

## 2019-05-26 NOTE — ED NOTES
Pt advised to gargle with salt water, drink warm teas and to follow-up with PCP if not improving in 3 days

## 2019-05-26 NOTE — ED PROVIDER NOTES
"  History     Chief Complaint:  ***  Pharyngitis      HPI   Zaina Tan is a 33 year old female who presents with ***    Allergies:  The patient has no known drug allergies.    Medications:    The patient is currently on no regular medications.      No current outpatient medications on file.       Past Medical History:    Vitreous syneresis    Past Surgical History:    C/S  D&C    Family History:    HTN    Social History:  Negative for tobacco use.  Negative for alcohol use.  Marital Status:  Single      Review of Systems  ***    Physical Exam   First Vitals:  BP: 157/75  Heart Rate: 104  Temp: 98.6  F (37  C)  Resp: 20  Height: 157.5 cm (5' 2\")  Weight: 73.9 kg (163 lb)  SpO2: 97 %      Physical Exam  ***    Emergency Department Course   ECG:  ***    Imaging:  {Radiographic findings?:[id number]::\" \"}  ***    Laboratory:  ***    Procedures:  ***        Interventions:  Medications - No data to display      Emergency Department Course:  Nursing notes and vitals reviewed. (***) I performed an exam of the patient as documented above.     IV inserted. Medicine administered as documented above. Blood drawn. This was sent to the lab for further testing, results above. ***    The patient was sent for a *** while in the emergency department, findings above.     *** I rechecked the patient and discussed the results of his***her workup thus far.     Findings and plan explained to the {PATIENT, FAMILY MEMBER, CAREGIVER:[id number]}. Patient discharged home with instructions regarding supportive care, medications, and reasons to return. The importance of close follow-up was reviewed. The patient was prescribed ***    I personally reviewed the laboratory results with the {PATIENT, FAMILY MEMBER, CAREGIVER:[id number]} and answered all related questions prior to discharge. ***    Impression & Plan       {trauma activation?:467926::\" \"}  CMS Diagnoses: {Sepsis/Septic Shock/Stemi/Stroke:581838::\" \"}       Medical Decision " "Making:  ***  Critical Care time:  {none or minutes:754927::\"none\"}    Diagnosis:  No diagnosis found.    Disposition:  {discharged to home/discharged to home with.../Admitted to...:543450}    Discharge Medications:     Medication List      There are no discharge medications for this visit.            Serafin Marquez  5/25/2019    EMERGENCY DEPARTMENT    "

## 2019-05-28 LAB
BACTERIA SPEC CULT: NORMAL
SPECIMEN SOURCE: NORMAL

## 2019-05-28 NOTE — RESULT ENCOUNTER NOTE
Final Beta strep group A r/o culture is NEGATIVE for Group A streptococcus.    No treatment or change in treatment per [place] Strep protocol.

## 2019-06-03 ENCOUNTER — OFFICE VISIT (OUTPATIENT)
Dept: OPHTHALMOLOGY | Facility: CLINIC | Age: 34
End: 2019-06-03
Attending: OPHTHALMOLOGY
Payer: COMMERCIAL

## 2019-06-03 DIAGNOSIS — H43.22 ASTEROID HYALOSIS, LEFT: ICD-10-CM

## 2019-06-03 DIAGNOSIS — H04.123 DRY EYES, BILATERAL: Primary | ICD-10-CM

## 2019-06-03 DIAGNOSIS — H52.13 MYOPIC ASTIGMATISM OF BOTH EYES: ICD-10-CM

## 2019-06-03 DIAGNOSIS — H52.203 MYOPIC ASTIGMATISM OF BOTH EYES: ICD-10-CM

## 2019-06-03 PROCEDURE — G0463 HOSPITAL OUTPT CLINIC VISIT: HCPCS | Mod: ZF

## 2019-06-03 PROCEDURE — 92015 DETERMINE REFRACTIVE STATE: CPT | Mod: ZF

## 2019-06-03 ASSESSMENT — SLIT LAMP EXAM - LIDS
COMMENTS: NORMAL
COMMENTS: NORMAL

## 2019-06-03 ASSESSMENT — VISUAL ACUITY
CORRECTION_TYPE: GLASSES
OD_CC: 20/20
METHOD: SNELLEN - LINEAR
OS_CC: 20/20

## 2019-06-03 ASSESSMENT — CUP TO DISC RATIO
OD_RATIO: 0.4
OS_RATIO: 0.4

## 2019-06-03 ASSESSMENT — REFRACTION_MANIFEST
OS_SPHERE: -0.50
OD_AXIS: 060
OD_CYLINDER: +0.25
OS_CYLINDER: SPHERE
OD_SPHERE: -0.50

## 2019-06-03 ASSESSMENT — REFRACTION_WEARINGRX
OS_SPHERE: -0.50
OD_CYLINDER: SPHERE
OS_CYLINDER: SPHERE
OD_SPHERE: -0.50

## 2019-06-03 ASSESSMENT — CONF VISUAL FIELD
OS_NORMAL: 1
OD_NORMAL: 1

## 2019-06-03 ASSESSMENT — TONOMETRY
OS_IOP_MMHG: 17
OD_IOP_MMHG: 17
IOP_METHOD: TONOPEN

## 2019-06-03 NOTE — NURSING NOTE
Chief Complaints and History of Present Illnesses   Patient presents with     Annual Eye Exam     Chief Complaint(s) and History of Present Illness(es)     Annual Eye Exam     Laterality: both eyes              Comments     Pt. States that when she wakes up in the morning, she has had some heaviness and pressure LE for the last 4 days.  VA Has been fluctuating a lot.  Has been seeing a lot of floaters LE.   No flashes BE.  Lydia Gonzales COT 2:00 PM Radha 3, 2019

## 2019-06-03 NOTE — PROGRESS NOTES
"HPI:  Zaina Tan is a 33 year old female here for full eye exam. She has been having 4 days of left eye pressure sensation, most noticeable on waking. There is associated \"heaviness\" of the eye as well as some fluctuation in her vision in both eyes. She has persistent floaters in both eyes, also worse in that left eye. No redness or discharge. No flashes.    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   Plan: Discussed signs/sx of RT/RD and the patient knows to call immediately if they develop these symptoms.     (H52.203,  H52.13) Myopic astigmatism of both eyes  Comment: Good vision with refraction, mild refractive error, minimal change  Plan: Given updated glasses Rx.      Return in about 1 year (around 6/3/2020). or sooner as needed.      Teaching statement:  Complete documentation of historical and exam elements from today's encounter can be found in the full encounter summary report (not reduplicated in this progress note). I personally obtained the chief complaint(s) and history of present illness.  I confirmed and edited as necessary the review of systems, past medical/surgical history, family history, social history, and examination findings as documented by others; and I examined the patient myself. I personally reviewed the relevant tests, images, and reports as documented above.     I formulated and edited as necessary the assessment and plan and discussed the findings and management plan with the patient and family.    Lorenza Rome MD  Comprehensive Ophthalmology & Ocular Pathology  Department of Ophthalmology and Visual Neurosciences  [email or web address]  Pager [phone]    "

## 2019-07-05 NOTE — LETTER
Date:November 15, 2017      Patient was self referred, no letter generated. Do not send.        [facility] Information       Attending Statement:. I saw and evaluated the patient. I discussed the patient's case with the Resident.   2 month hx of llq abd pain- pos loose/liquidy  stool x 2 weeks;previous hx of anal fissure with hard stool- magnesium citrate for cleansing bowel-miralax to follow daily after bowel cleansed- 5/19 labs wnl-  F/u 2 weeks  Monty Lyman MD
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
## 2019-08-29 ENCOUNTER — OFFICE VISIT (OUTPATIENT)
Dept: OBGYN | Facility: CLINIC | Age: 34
End: 2019-08-29
Payer: COMMERCIAL

## 2019-08-29 VITALS
SYSTOLIC BLOOD PRESSURE: 125 MMHG | BODY MASS INDEX: 30.36 KG/M2 | WEIGHT: 165 LBS | DIASTOLIC BLOOD PRESSURE: 72 MMHG | HEART RATE: 69 BPM | HEIGHT: 62 IN

## 2019-08-29 DIAGNOSIS — Z13.21 ENCOUNTER FOR VITAMIN DEFICIENCY SCREENING: ICD-10-CM

## 2019-08-29 DIAGNOSIS — Z12.39 BREAST CANCER SCREENING: ICD-10-CM

## 2019-08-29 DIAGNOSIS — Z00.00 VISIT FOR PREVENTIVE HEALTH EXAMINATION: Primary | ICD-10-CM

## 2019-08-29 DIAGNOSIS — Z13.220 SCREENING FOR LIPOID DISORDERS: ICD-10-CM

## 2019-08-29 PROCEDURE — G0463 HOSPITAL OUTPT CLINIC VISIT: HCPCS | Mod: ZF

## 2019-08-29 ASSESSMENT — MIFFLIN-ST. JEOR: SCORE: 1401.69

## 2019-08-29 ASSESSMENT — ANXIETY QUESTIONNAIRES
6. BECOMING EASILY ANNOYED OR IRRITABLE: NOT AT ALL
1. FEELING NERVOUS, ANXIOUS, OR ON EDGE: NOT AT ALL
GAD7 TOTAL SCORE: 1
5. BEING SO RESTLESS THAT IT IS HARD TO SIT STILL: NOT AT ALL
3. WORRYING TOO MUCH ABOUT DIFFERENT THINGS: SEVERAL DAYS
2. NOT BEING ABLE TO STOP OR CONTROL WORRYING: NOT AT ALL
7. FEELING AFRAID AS IF SOMETHING AWFUL MIGHT HAPPEN: NOT AT ALL

## 2019-08-29 ASSESSMENT — PAIN SCALES - GENERAL: PAINLEVEL: NO PAIN (0)

## 2019-08-29 ASSESSMENT — PATIENT HEALTH QUESTIONNAIRE - PHQ9
5. POOR APPETITE OR OVEREATING: NOT AT ALL
SUM OF ALL RESPONSES TO PHQ QUESTIONS 1-9: 0

## 2019-08-29 NOTE — PATIENT INSTRUCTIONS

## 2019-08-30 ASSESSMENT — ANXIETY QUESTIONNAIRES: GAD7 TOTAL SCORE: 1

## 2019-09-03 DIAGNOSIS — N64.4 BREAST PAIN: Primary | ICD-10-CM

## 2019-09-03 DIAGNOSIS — Z13.21 ENCOUNTER FOR VITAMIN DEFICIENCY SCREENING: ICD-10-CM

## 2019-09-03 DIAGNOSIS — Z13.220 SCREENING FOR LIPOID DISORDERS: ICD-10-CM

## 2019-09-03 DIAGNOSIS — N63.0 LUMP OR MASS IN BREAST: ICD-10-CM

## 2019-09-03 LAB
CHOLEST SERPL-MCNC: 191 MG/DL
HDLC SERPL-MCNC: 51 MG/DL
LDLC SERPL CALC-MCNC: 113 MG/DL
NONHDLC SERPL-MCNC: 140 MG/DL
TRIGL SERPL-MCNC: 133 MG/DL

## 2019-09-03 PROCEDURE — 36415 COLL VENOUS BLD VENIPUNCTURE: CPT | Performed by: STUDENT IN AN ORGANIZED HEALTH CARE EDUCATION/TRAINING PROGRAM

## 2019-09-03 PROCEDURE — 80061 LIPID PANEL: CPT | Performed by: STUDENT IN AN ORGANIZED HEALTH CARE EDUCATION/TRAINING PROGRAM

## 2019-09-03 PROCEDURE — 82306 VITAMIN D 25 HYDROXY: CPT | Performed by: STUDENT IN AN ORGANIZED HEALTH CARE EDUCATION/TRAINING PROGRAM

## 2019-09-03 NOTE — PROGRESS NOTES
Pt called to request new order for diagnostic mammo -- breast center will not schedule diagnostic mammo with screening diagnosis. Reordered. Patient also notes new symptoms of pain on upper portion of left breast.

## 2019-09-03 NOTE — LETTER
9/12/2019         Zaina Tan   [address]  [facility] [address]        Dear Ms. Tan:    The results of your recent Lipid profile were abnormal. Would recommend diet and exercise for elevated LDL, recheck at annual visit next year.     Results for orders placed or performed in visit on 09/03/19   Lipid panel reflex to direct LDL Fasting   Result Value Ref Range    Cholesterol 191 <200 mg/dL    Triglycerides 133 <150 mg/dL    HDL Cholesterol 51 >49 mg/dL    LDL Cholesterol Calculated 113 (H) <100 mg/dL    Non HDL Cholesterol 140 (H) <130 mg/dL   **Vitamin D Deficiency FUTURE anytime   Result Value Ref Range    Vitamin D Deficiency screening 27 20 - 75 ug/L         Please note that test explanations are brief and do not reflect all diagnostic uses.  If you have any questions or concerns, please call the clinic at [phone].      Sincerely,      Dr. Aguilar

## 2019-09-04 LAB — DEPRECATED CALCIDIOL+CALCIFEROL SERPL-MC: 27 UG/L (ref 20–75)

## 2019-09-11 ENCOUNTER — TELEPHONE (OUTPATIENT)
Dept: OBGYN | Facility: CLINIC | Age: 34
End: 2019-09-11

## 2019-09-12 ENCOUNTER — ANCILLARY PROCEDURE (OUTPATIENT)
Dept: MAMMOGRAPHY | Facility: CLINIC | Age: 34
End: 2019-09-12
Attending: OBSTETRICS & GYNECOLOGY
Payer: COMMERCIAL

## 2019-09-12 DIAGNOSIS — N64.4 BREAST PAIN: ICD-10-CM

## 2019-09-12 DIAGNOSIS — N63.0 LUMP OR MASS IN BREAST: ICD-10-CM

## 2019-09-12 NOTE — TELEPHONE ENCOUNTER
Attempted to call patient again, no response. Message sent to [facility] RN pool to mail letter with results. Would recommend diet and exercise for elevated LDL, recheck at annual visit.    Ela Aguilar MD PGY-2  September 12, 2019 9:41 AM

## 2019-10-04 ENCOUNTER — TELEPHONE (OUTPATIENT)
Dept: OBGYN | Facility: CLINIC | Age: 34
End: 2019-10-04

## 2019-10-04 DIAGNOSIS — R30.9 PAIN WITH URINATION: Primary | ICD-10-CM

## 2019-10-04 RX ORDER — SULFAMETHOXAZOLE/TRIMETHOPRIM 800-160 MG
1 TABLET ORAL 2 TIMES DAILY
Qty: 6 TABLET | Refills: 0 | Status: SHIPPED | OUTPATIENT
Start: 2019-10-04 | End: 2020-02-14

## 2019-10-04 NOTE — TELEPHONE ENCOUNTER
Zaina calling with complaints of pain and burning with urination for the last 3 days. Denies history of frequent UTI's. Denies being pregnant. NKDA. Denies any blood in urine,fever,chills or low back pain. Instructed on bactrim for 3 days, take all medication even if s/s improve. If no improvement after 2-3 days, call clinic and schedule with provider to be seen in clinic .Pt indicated understanding and agreed with plan.  .

## 2019-10-08 ENCOUNTER — TELEPHONE (OUTPATIENT)
Dept: OBGYN | Facility: CLINIC | Age: 34
End: 2019-10-08

## 2019-10-08 ENCOUNTER — HOSPITAL ENCOUNTER (EMERGENCY)
Facility: CLINIC | Age: 34
Discharge: HOME | End: 2019-10-08
Attending: EMERGENCY MEDICINE | Admitting: EMERGENCY MEDICINE
Payer: COMMERCIAL

## 2019-10-08 VITALS
SYSTOLIC BLOOD PRESSURE: 132 MMHG | BODY MASS INDEX: 30.36 KG/M2 | OXYGEN SATURATION: 99 % | DIASTOLIC BLOOD PRESSURE: 51 MMHG | RESPIRATION RATE: 16 BRPM | WEIGHT: 165 LBS | TEMPERATURE: 97.6 F | HEIGHT: 62 IN

## 2019-10-08 DIAGNOSIS — B96.89 BACTERIAL VAGINOSIS: ICD-10-CM

## 2019-10-08 DIAGNOSIS — N76.0 BACTERIAL VAGINOSIS: ICD-10-CM

## 2019-10-08 LAB
ALBUMIN UR-MCNC: NEGATIVE MG/DL
APPEARANCE UR: CLEAR
BACTERIA #/AREA URNS HPF: ABNORMAL /HPF
BILIRUB UR QL STRIP: NEGATIVE
COLOR UR AUTO: YELLOW
GLUCOSE UR STRIP-MCNC: NEGATIVE MG/DL
HCG UR QL: NEGATIVE
HGB UR QL STRIP: ABNORMAL
KETONES UR STRIP-MCNC: NEGATIVE MG/DL
LEUKOCYTE ESTERASE UR QL STRIP: NEGATIVE
MUCOUS THREADS #/AREA URNS LPF: PRESENT /LPF
NITRATE UR QL: NEGATIVE
PH UR STRIP: 5.5 PH (ref 5–7)
RBC #/AREA URNS AUTO: <1 /HPF (ref 0–2)
SOURCE: ABNORMAL
SP GR UR STRIP: 1.02 (ref 1–1.03)
SPECIMEN SOURCE: ABNORMAL
SQUAMOUS #/AREA URNS AUTO: 1 /HPF (ref 0–1)
UROBILINOGEN UR STRIP-MCNC: NORMAL MG/DL (ref 0–2)
WBC #/AREA URNS AUTO: 1 /HPF (ref 0–5)
WET PREP SPEC: ABNORMAL

## 2019-10-08 PROCEDURE — 99284 EMERGENCY DEPT VISIT MOD MDM: CPT

## 2019-10-08 PROCEDURE — 87591 N.GONORRHOEAE DNA AMP PROB: CPT | Performed by: EMERGENCY MEDICINE

## 2019-10-08 PROCEDURE — 81001 URINALYSIS AUTO W/SCOPE: CPT | Performed by: EMERGENCY MEDICINE

## 2019-10-08 PROCEDURE — 25000132 ZZH RX MED GY IP 250 OP 250 PS 637: Performed by: EMERGENCY MEDICINE

## 2019-10-08 PROCEDURE — 87210 SMEAR WET MOUNT SALINE/INK: CPT | Performed by: EMERGENCY MEDICINE

## 2019-10-08 PROCEDURE — 81025 URINE PREGNANCY TEST: CPT | Performed by: EMERGENCY MEDICINE

## 2019-10-08 PROCEDURE — 87491 CHLMYD TRACH DNA AMP PROBE: CPT | Performed by: EMERGENCY MEDICINE

## 2019-10-08 RX ORDER — IBUPROFEN 600 MG/1
600 TABLET, FILM COATED ORAL ONCE
Status: COMPLETED | OUTPATIENT
Start: 2019-10-08 | End: 2019-10-08

## 2019-10-08 RX ORDER — ACETAMINOPHEN 500 MG
1000 TABLET ORAL ONCE
Status: COMPLETED | OUTPATIENT
Start: 2019-10-08 | End: 2019-10-08

## 2019-10-08 RX ORDER — METRONIDAZOLE 500 MG/1
500 TABLET ORAL 2 TIMES DAILY
Qty: 14 TABLET | Refills: 0 | Status: SHIPPED | OUTPATIENT
Start: 2019-10-08 | End: 2020-02-14

## 2019-10-08 RX ADMIN — ACETAMINOPHEN 1000 MG: 500 TABLET, FILM COATED ORAL at 20:35

## 2019-10-08 RX ADMIN — IBUPROFEN 600 MG: 600 TABLET, FILM COATED ORAL at 20:35

## 2019-10-08 ASSESSMENT — ENCOUNTER SYMPTOMS
FEVER: 0
HEMATURIA: 0
FREQUENCY: 0
CHILLS: 0

## 2019-10-08 ASSESSMENT — MIFFLIN-ST. JEOR: SCORE: 1401.69

## 2019-10-08 NOTE — TELEPHONE ENCOUNTER
S: Patient calling with continued symptoms of pain with urination, progressed to flank pain.    B: Patient called to triage on 10/4 with symptoms of UTI and was prescribed Bactrim. Patient states she took full course of antibiotic. However, she continues to have symptoms.    A: On assessment, patient states continued dysuria. Endorses back and flank pain. States she has felt feverish, some chills.     R: Advised patient she needs to present to the emergency department for evaluation of progressive infection. Patient is agreeable to plan of care.

## 2019-10-08 NOTE — ED AVS SNAPSHOT
Emergency Department  [address] [place] [id number]  Phone:  [phone]  Fax:  [phone]                                    Zaina Tan   MRN: [id number]    Department:   Emergency Department   Date of Visit:  10/8/2019           After Visit Summary Signature Page    I have received my discharge instructions, and my questions have been answered. I have discussed any challenges I see with this plan with the nurse or doctor.    ..........................................................................................................................................  Patient/Patient Representative Signature      ..........................................................................................................................................  Patient Representative Print Name and Relationship to Patient    ..................................................               ................................................  Date                                   Time    ..........................................................................................................................................  Reviewed by Signature/Title    ...................................................              ..............................................  Date                                               Time          22EPIC Rev 08/18

## 2019-10-08 NOTE — ED TRIAGE NOTES
Pt having dysuria for one week[facility] Finish course, now having having low back pain, fever, and weakness.

## 2019-10-09 LAB
C TRACH DNA SPEC QL NAA+PROBE: NEGATIVE
N GONORRHOEA DNA SPEC QL NAA+PROBE: NEGATIVE
SPECIMEN SOURCE: NORMAL
SPECIMEN SOURCE: NORMAL

## 2019-10-09 NOTE — ED PROVIDER NOTES
"  History     Chief Complaint:  Back Pain and Dysuria    HPI:   The history is provided by the patient.      Zaina Tan is a 33 year old female who presents with dysuria. This began last week in addition to \"pain in my vagina\" so she called OB/GYN who empirically treated her with a 3 day course of Bactrim and over the counter phenazopyridine. Zaina took these as directed and completed these yesterday. However, her symptoms persisted - primarily the pain in her vagina - with improvement or resolution of the dysuria. She called OB again and mentioned she had some lower back pain yesterday and was prompted to visit the ER. She has had no flank pain and her back pain is improved with her concurrent pain primarily in the vagina. She denies abdominal pain \"unless I push on it.\" She has not had vaginal discharge, hematuria, frequency, or urgency. She has not had fever. She does not use contraceptives and is sexually active and monogamous with her  of 9 years. She has no concern for STDs. She has never been treated for these in the past. She has taken no analgesics today.    Allergies:  No known drug allergies      Medications:    The patient is not currently taking any prescribed medications.     Past Medical History:    Uterine leiomyoma   Vitreous syneresis     Past Surgical History:     section  D&C    Family History:    Hypertension- mother    Social History:  PCP: Dilcia Singh   Marital Status:  Single  Smoking status: Never  Alcohol use: negative    Drug use: negative    Presents alone.    Review of Systems   Constitutional: Negative for chills and fever.   Gastrointestinal: Positive for abdominal pain (when she pushes only).   Genitourinary: Positive for dysuria (improved/resolved) and vaginal pain. Negative for flank pain, frequency, hematuria, urgency and vaginal discharge.   Musculoskeletal: Positive for back pain (improved, worse yesterday).   All other systems reviewed and are " "negative.    Physical Exam     Patient Vitals for the past 24 hrs:   BP Temp Temp src Heart Rate Resp SpO2 Height Weight   10/08/19 1819 132/51 97.6  F (36.4  C) Temporal 82 16 99 % 1.575 m (5' 2\") 74.8 kg (165 lb)        Physical Exam  General: Well-developed and well-nourished. Well appearing young woman. Cooperative.  Head:  Atraumatic.  Eyes:  Conjunctivae, lids, and sclerae are normal.  ENT:    Normal nose. Moist mucous membranes.  Neck:  Supple. Normal range of motion.  CV:  Regular rate and rhythm. Normal heart sounds with no murmurs, rubs, or gallops detected.  Resp:  No respiratory distress. Clear to auscultation bilaterally without decreased breath sounds, wheezing, rales, or rhonchi.  GI:  Soft. Non-distended. Non-tender. No CVA tenderness or flank tenderness bilaterally.     :  Normal external female genitalia.  Small amount of white discharge in vaginal canal without vaginal bleeding.  Tenderness to palpation of the cervix without uterine tenderness.  No adnexal masses or tenderness.  MS:  Normal ROM.   Skin:  Warm. Non-diaphoretic. No pallor.  Neuro:  Awake. A&Ox3. Normal strength.  Psych: Normal mood and affect. Normal speech.  Vitals reviewed.    Emergency Department Course     Laboratory:  UA reflex to microscopic: urine blood trace, bacteria few, mucous urine present, o/w WNL  HCG qualitative urine: negative  Wet prep: Clue cells seen.   Chlamydia trachomatis PCR: Pending.   Neisseria gonorrhea PCR: Pending.     Interventions:  2035: acetaminophen 1000 mg, PO  2035: ibuprofen 600 mg, PO     Emergency Department Course:  Past medical records, nursing notes, and vitals reviewed.  1924: I performed an exam of the patient and obtained history, as documented above.      I performed a pelvic exam with female chaperone present. Findings as above.     2059: I rechecked the patient. I discussed option for empiric STI treatment, which she declines at this time as she would rather wait for results. Findings " "and plan explained to the patient. Patient discharged home with instructions regarding supportive care, medications, and reasons to return. The importance of close follow-up was reviewed.      Impression & Plan      Medical Decision Making:  Zaina is a 33-year-old female who developed some urinary symptoms such as end stream dysuria and pain \"in my vagina\" 4 to 5 days ago.  She called her gynecologist who empirically treated her with a 3-day course of Bactrim for presumed UTI which the patient completed and felt her dysuria was improved though the pain in her vagina has persisted or worsened and she has now developed some lower back pain.  She denies flank pain and has only mild suprapubic pain.  She appears well on exam with no abdominal tenderness and no flank tenderness.  Without tenderness of the abdomen and symptoms primarily with pain in the vagina, I feel CT imaging of the abdomen can safely be deferred.  However, patient and I did discuss that without a CT scan of the abdomen we cannot definitively rule out appendicitis and if she is having worsening pain localizing to the right lower quadrant she should return to the emergency department for work-up for this.  However, that is very low on my differential and my suspicion is that symptoms are related to gynecological pathology.  Her pelvic exam is significant for tenderness to the cervix.  I have concern for cervicitis and STD in this woman though she herself has low suspicion for this as she is mongamous with her  for the last 9 years.  I offered empiric treatment for STDs but she declines and would prefer to await gonorrhea and Chlamydia PCR's which are pending.  Urinalysis reveals no evidence of UTI and patient is not pregnant.  However, wet prep does reveal evidence of bacterial vaginosis with clue cells without trichomonas or candidiasis.  Certainly bacterial vaginosis could be causing some vaginal pain or low back pain and treatment of this " would be prudent as this could very well be the source of her symptoms.  At this time, a pelvic ultrasound can safely be deferred given her reassuring exam though patient agrees to follow-up with her gynecologist if she is not improving.  She understands she will be called only with positive results of her gonorrhea and chlamydia testing.  She agrees to return with severe pain, fever, flank pain, or any other new or concerning symptoms.  All of her questions were answered and she verbalized understanding.  She will initiate Flagyl for bacterial vaginosis as presumed cause for her vaginal pain with pending gonorrhea and Chlamydia PCR's and plan to follow-up with gynecology and return to the emergency department as needed.      Diagnosis:    ICD-10-CM    1. Bacterial vaginosis N76.0     B96.89        Disposition:  discharged home    Discharge Medications:  New Prescriptions    METRONIDAZOLE (FLAGYL) 500 MG TABLET    Take 1 tablet (500 mg) by mouth 2 times daily for 7 days     I, Megan Beh, barbara serving as a scribe at 7:24 PM on 10/8/2019 to document services personally performed by Delfina Saucedo MD based on my observations and the provider's statements to me.      10/8/2019    EMERGENCY DEPARTMENT       Delfina Saucedo MD  10/10/19 1101

## 2019-10-09 NOTE — DISCHARGE INSTRUCTIONS
Antibiotics for bacterial vaginosis which is not sexually transmitted.  Tests for sexually transmitted disease are pending and you will be called only with positive results.  If you are not improving or having worsening of symptoms follow-up with your gynecologist or return immediately to the emergency department with symptoms including, but not limited to, fever greater than 101  F, worsening abdominal pain, pain higher in your back, or vomiting or diarrhea.

## 2019-10-10 ASSESSMENT — ENCOUNTER SYMPTOMS
FLANK PAIN: 0
DYSURIA: 1
ABDOMINAL PAIN: 1
BACK PAIN: 1

## 2020-02-14 ENCOUNTER — OFFICE VISIT (OUTPATIENT)
Dept: FAMILY MEDICINE | Facility: CLINIC | Age: 35
End: 2020-02-14
Attending: NURSE PRACTITIONER
Payer: COMMERCIAL

## 2020-02-14 VITALS
DIASTOLIC BLOOD PRESSURE: 65 MMHG | HEART RATE: 66 BPM | BODY MASS INDEX: 29.57 KG/M2 | SYSTOLIC BLOOD PRESSURE: 132 MMHG | HEIGHT: 62 IN | WEIGHT: 160.7 LBS

## 2020-02-14 DIAGNOSIS — E04.9 ENLARGED THYROID: Primary | ICD-10-CM

## 2020-02-14 LAB — TSH SERPL DL<=0.005 MIU/L-ACNC: 1.35 MU/L (ref 0.4–4)

## 2020-02-14 PROCEDURE — 36415 COLL VENOUS BLD VENIPUNCTURE: CPT | Performed by: FAMILY MEDICINE

## 2020-02-14 PROCEDURE — G0463 HOSPITAL OUTPT CLINIC VISIT: HCPCS

## 2020-02-14 PROCEDURE — 84443 ASSAY THYROID STIM HORMONE: CPT | Performed by: FAMILY MEDICINE

## 2020-02-14 ASSESSMENT — MIFFLIN-ST. JEOR: SCORE: 1377.18

## 2020-02-14 NOTE — PROGRESS NOTES
"HPI  Pt. With trouble breathing    Started 1-2 weeks ago, with nasal and head congestion, feels like something in throat--drainage/mucuos, hard to bring up and blocking air  From comining out. No significant fever or chills. Itching in ears, Sore throat and glands., a little better now. Hoarse voice.    Has sensation in AM--can breathe, but feels breathing out is blocked, needs to breathe out of mouth, can't use nose, more \"noisy\". No dyspnea at rest or exertion. Little lightheaded    No chronic medical  Problems  No smoker    No sick contacts; did flu vaccine.      Review of Systems     Constitutional:  Negative for fever and chills.   Respiratory:   Negative for cough and chest tightness.    Cardiovascular:  Negative for chest pain and palpitations.     Review of Systems   Constitutional: Negative for chills and fever.   Respiratory: Negative for cough and chest tightness.    Cardiovascular: Negative for chest pain and palpitations.         Physical Exam  Constitutional:       Appearance: Normal appearance.   HENT:      Right Ear: Tympanic membrane normal.      Left Ear: Tympanic membrane normal.      Nose: Nose normal.      Mouth/Throat:      Mouth: Mucous membranes are moist.      Pharynx: No oropharyngeal exudate or posterior oropharyngeal erythema.      Comments: Tonsils bilaterally enlarged 2+  Neck:      Comments: Mild thyroid enlargement global, no nodules  Cardiovascular:      Rate and Rhythm: Normal rate and regular rhythm.      Heart sounds: Normal heart sounds.   Pulmonary:      Effort: Pulmonary effort is normal. No respiratory distress.      Breath sounds: Normal breath sounds. No wheezing or rales.   Lymphadenopathy:      Cervical: Cervical adenopathy present.   Neurological:      Mental Status: She is alert.         A/P  1. URI  2. Pharyngitis  3. Mild thyroid enlargement  Recommend use Neti Pot 1-3x/daily; depending on type, should use distilled water  Drink plenty of fluids  Warm compresses to " face, warm/steamy showers  Gargle salt water  U/S thyroid, TSH with reflex   follow up if symptoms worsen, fever, dyspnea

## 2020-02-14 NOTE — LETTER
"2/14/2020       RE: Zaina Tan  [address]  [facility] [address]     Dear Colleague,    Thank you for referring your patient, Zaina Tan, to the [facility] at [facility]. Please see a copy of my visit note below.    HPI  Pt. With trouble breathing    Started 1-2 weeks ago, with nasal and head congestion, feels like something in throat--drainage/mucuos, hard to bring up and blocking air  From comining out. No significant fever or chills. Itching in ears, Sore throat and glands., a little better now. Hoarse voice.    Has sensation in AM--can breathe, but feels breathing out is blocked, needs to breathe out of mouth, can't use nose, more \"noisy\". No dyspnea at rest or exertion. Little lightheaded    No chronic medical  Problems  No smoker    No sick contacts; did flu vaccine.      Review of Systems     Constitutional:  Negative for fever and chills.   Respiratory:   Negative for cough and chest tightness.    Cardiovascular:  Negative for chest pain and palpitations.     Review of Systems   Constitutional: Negative for chills and fever.   Respiratory: Negative for cough and chest tightness.    Cardiovascular: Negative for chest pain and palpitations.         Physical Exam  Constitutional:       Appearance: Normal appearance.   HENT:      Right Ear: Tympanic membrane normal.      Left Ear: Tympanic membrane normal.      Nose: Nose normal.      Mouth/Throat:      Mouth: Mucous membranes are moist.      Pharynx: No oropharyngeal exudate or posterior oropharyngeal erythema.      Comments: Tonsils bilaterally enlarged 2+  Neck:      Comments: Mild thyroid enlargement global, no nodules  Cardiovascular:      Rate and Rhythm: Normal rate and regular rhythm.      Heart sounds: Normal heart sounds.   Pulmonary:      Effort: Pulmonary effort is normal. No respiratory distress.      Breath sounds: Normal breath sounds. No wheezing or rales. "   Lymphadenopathy:      Cervical: Cervical adenopathy present.   Neurological:      Mental Status: She is alert.         A/P  1. URI  2. Pharyngitis  3. Mild thyroid enlargement  Recommend use Neti Pot 1-3x/daily; depending on type, should use distilled water  Drink plenty of fluids  Warm compresses to face, warm/steamy showers  Gargle salt water  U/S thyroid, TSH with reflex   follow up if symptoms worsen, fever, dyspnea      Again, thank you for allowing me to participate in the care of your patient.      Sincerely,    Joce Obrien MD

## 2020-02-24 ENCOUNTER — ANCILLARY PROCEDURE (OUTPATIENT)
Dept: ULTRASOUND IMAGING | Facility: CLINIC | Age: 35
End: 2020-02-24
Attending: FAMILY MEDICINE
Payer: COMMERCIAL

## 2020-02-24 DIAGNOSIS — E04.9 ENLARGED THYROID: ICD-10-CM

## 2020-02-24 NOTE — RESULT ENCOUNTER NOTE
Dear Zaina,   Here are your recent results which are within the expected range. Please continue with your current plan of care.       Joce Obrien MD

## 2020-02-25 ASSESSMENT — ENCOUNTER SYMPTOMS
CHEST TIGHTNESS: 0
PALPITATIONS: 0
COUGH: 0
CHILLS: 0
FEVER: 0

## 2020-03-03 NOTE — RESULT ENCOUNTER NOTE
Dear Zaina,   Here are your recent results which are within the expected--mildly enlarged, but nothing abnormal range. Please continue with your current plan of care.       Joce Obrien MD

## 2020-03-16 ENCOUNTER — OFFICE VISIT (OUTPATIENT)
Dept: FAMILY MEDICINE | Facility: CLINIC | Age: 35
End: 2020-03-16
Attending: FAMILY MEDICINE
Payer: COMMERCIAL

## 2020-03-16 VITALS
SYSTOLIC BLOOD PRESSURE: 114 MMHG | WEIGHT: 161.3 LBS | DIASTOLIC BLOOD PRESSURE: 72 MMHG | BODY MASS INDEX: 29.5 KG/M2 | HEART RATE: 83 BPM

## 2020-03-16 DIAGNOSIS — R09.A2 SENSATION OF LUMP IN THROAT: Primary | ICD-10-CM

## 2020-03-16 PROCEDURE — G0463 HOSPITAL OUTPT CLINIC VISIT: HCPCS | Mod: ZF

## 2020-03-16 ASSESSMENT — PAIN SCALES - GENERAL: PAINLEVEL: MILD PAIN (2)

## 2020-03-16 NOTE — LETTER
3/16/2020       RE: Zaina Tan  [address]  [facility] [address]     Dear Colleague,    Thank you for referring your patient, Zaina Tan, to the [facility] at [facility]. Please see a copy of my visit note below.          Primary Care Center  Established Patient visit    Name: Zaina Tan MRN: [id number]     Age: 34 year old           Chief Complaint:    YOB: 1985       CC:lump in throat         HPI and ROS:   HPI:    34 year old female who was seen here mid Feb for trouble breathing and pharyngeal drainage for about a month.  It was noted her thyroid was mildly enlarged. Subsequent TSH was normal and a thyroid US showed mild enlargement of her thyroid.  She returns today with the continued sensation of  discomfort in throat, mainly the right side - no difficultly swallowing,  She feels discomfort and tightness in throat especially when she  wakes up.  Unsure if she snores.  No choking, no cough, no fever, no pain, sometimes she thinks it connects to right ear which causes itching. The discomfort is about the same.  She feels there is something there.   Her voice is hoarse since this started. No sweatiness, no jitteriness and no weight loss.    No  trouble breathing.  She can swallow pills.       ROS:  A 14 -point Review of Systems was performed and is remarkable for visual difficulty, occasional wheezing, numbness and tingling and a little dizziness otherwise is negative other than mentioned in HPI         Past Medical History:     Past Medical History:   Diagnosis Date     Vitreous syneresis              Past Surgical History:      Past Surgical History:   Procedure Laterality Date      SECTION N/A 2017    Procedure:  SECTION;;  Surgeon: Shellie Soni MD;  Location:  L+D     D & C      fetal hypoplastic leftheart syndrome               Immunizations:     Immunization History    Administered Date(s) Administered     Influenza Vaccine IM > 6 months Valent IIV4 10/12/2015     TDAP Vaccine (Boostrix) 09/28/2015, 06/12/2017             Allergies:   No Known Allergies          Medications:   No current outpatient medications on file prior to visit.  No current facility-administered medications on file prior to visit.            Exam:   /72   Pulse 83   Wt 73.2 kg (161 lb 4.8 oz)   BMI 29.50 kg/m      General: NAD, alert and conversational  Neck: supple, no mass palpated on the right side of neck, no tenderness, no palpable nodes  ENT: ears - minimal wax and good light reflex, OP large tonsils minimal inflammation, no drainage,  Nose patent and minimal inflammation, no sinus tenderness, OP moist membranes,   Resp: Lungs CTA bilaterally with no wheezes or crackles  Skin: No concerning lesions or rashes        Labs:          Assessment and Plan:       Assessment: This is a healthy 33 yo who has had a persistent  sensation of a lump in her throat for about 8 wks with a normal TSH and minimally enlarged thyroid on US  and no other symptoms of choking or trouble swallowing. Unlikely related to the thyroid.     (R22.1) Sensation of lump in throat  (primary encounter diagnosis)  Comment: Unclear etiology but symptoms are persisting.  This could be a brachial cleft cyst although it is not palpable and was  not seen on US.  This could be some nodule on the vocal cords - she is hoarse but not likely would cause a lump sensation.      Plan: OTOLARYNGOLOGY REFERRAL          RTC: bobo Henry MD, PhD

## 2020-03-16 NOTE — PROGRESS NOTES
Primary Care Center  Established Patient visit    Name: Zaina Tan MRN: [id number]     Age: 34 year old           Chief Complaint:    YOB: 1985       CC:lump in throat         HPI and ROS:   HPI:    34 year old female who was seen here mid Feb for trouble breathing and pharyngeal drainage for about a month.  It was noted her thyroid was mildly enlarged. Subsequent TSH was normal and a thyroid US showed mild enlargement of her thyroid.  She returns today with the continued sensation of  discomfort in throat, mainly the right side - no difficultly swallowing,  She feels discomfort and tightness in throat especially when she  wakes up.  Unsure if she snores.  No choking, no cough, no fever, no pain, sometimes she thinks it connects to right ear which causes itching. The discomfort is about the same.  She feels there is something there.   Her voice is hoarse since this started. No sweatiness, no jitteriness and no weight loss.    No  trouble breathing.  She can swallow pills.       ROS:  A 14 -point Review of Systems was performed and is remarkable for visual difficulty, occasional wheezing, numbness and tingling and a little dizziness otherwise is negative other than mentioned in HPI         Past Medical History:     Past Medical History:   Diagnosis Date     Vitreous syneresis              Past Surgical History:      Past Surgical History:   Procedure Laterality Date      SECTION N/A 2017    Procedure:  SECTION;;  Surgeon: Shellie Soni MD;  Location:  L+D     D & C      fetal hypoplastic leftheart syndrome               Immunizations:     Immunization History   Administered Date(s) Administered     Influenza Vaccine IM > 6 months Valent IIV4 10/12/2015     TDAP Vaccine (Boostrix) 2015, 2017             Allergies:   No Known Allergies          Medications:   No current outpatient medications on file prior to visit.  No current  facility-administered medications on file prior to visit.            Exam:   /72   Pulse 83   Wt 73.2 kg (161 lb 4.8 oz)   BMI 29.50 kg/m      General: NAD, alert and conversational  Neck: supple, no mass palpated on the right side of neck, no tenderness, no palpable nodes  ENT: ears - minimal wax and good light reflex, OP large tonsils minimal inflammation, no drainage,  Nose patent and minimal inflammation, no sinus tenderness, OP moist membranes,   Resp: Lungs CTA bilaterally with no wheezes or crackles  Skin: No concerning lesions or rashes        Labs:          Assessment and Plan:       Assessment: This is a healthy 33 yo who has had a persistent  sensation of a lump in her throat for about 8 wks with a normal TSH and minimally enlarged thyroid on US  and no other symptoms of choking or trouble swallowing. Unlikely related to the thyroid.     (R22.1) Sensation of lump in throat  (primary encounter diagnosis)  Comment: Unclear etiology but symptoms are persisting.  This could be a brachial cleft cyst although it is not palpable and was  not seen on US.  This could be some nodule on the vocal cords - she is hoarse but not likely would cause a lump sensation.      Plan: OTOLARYNGOLOGY REFERRAL          RTC: bobo Henry MD, PhD

## 2020-03-19 NOTE — TELEPHONE ENCOUNTER
FUTURE VISIT INFORMATION      FUTURE VISIT INFORMATION:    Date: 4/17/20    Time: 7:15 AM    Location: [facility]  REFERRAL INFORMATION:    Referring provider:  Dr. Megan Henry    Referring providers clinic:  [facility]    Reason for visit/diagnosis: Sensation of lump in throat    RECORDS REQUESTED FROM:       Clinic name Comments Records Status Imaging Status   [facility] 3/16/20 - [facility] OV with Dr. Henry  2/14/20 - OV with Dr. Obrien [facility] ED 5/25/19 - ED OV with Dr. Haddad [facility]    [facility] - Imaging 2/24/20 - US Thyroid [facility] PACs

## 2020-04-10 NOTE — TELEPHONE ENCOUNTER
FUTURE VISIT INFORMATION      FUTURE VISIT INFORMATION:    Date: 6/4/2020    Time: 7:15AM    [place] [facility] [place]:    Referring provider:  Dr. Megan Henry    Referring providers clinic:  [facility]    Reason for visit/diagnosis: Sensation of lump in throat     RECORDS REQUESTED FROM:         Clinic name Comments Records Status Imaging Status   [facility] 3/16/20 - [facility] OV with Dr. Henry  2/14/20 - OV with Dr. Obrien [facility] ED 5/25/19 - ED OV with Dr. Haddad [facility]     [facility] - Imaging 2/24/20 - US Thyroid [facility] PACs

## 2020-04-17 ENCOUNTER — PRE VISIT (OUTPATIENT)
Dept: OTOLARYNGOLOGY | Facility: CLINIC | Age: 35
End: 2020-04-17

## 2020-06-04 ENCOUNTER — PRE VISIT (OUTPATIENT)
Dept: OTOLARYNGOLOGY | Facility: CLINIC | Age: 35
End: 2020-06-04

## 2020-06-22 ENCOUNTER — TELEPHONE (OUTPATIENT)
Dept: OTOLARYNGOLOGY | Facility: CLINIC | Age: 35
End: 2020-06-22

## 2020-06-22 NOTE — TELEPHONE ENCOUNTER
[facility] Call Center    Phone Message    May a detailed message be left on voicemail: yes     Reason for Call: Other: Pt calling back to reschedule Appt with Dr. Trujillo that was cancelled due to provider, Pt wants seen ASAP, please call her back. Thank you     Action Taken: Message routed to:  [facility] ENT    Travel Screening: Not Applicable

## 2020-06-24 NOTE — TELEPHONE ENCOUNTER
Patient rescheduled for when Dr. Trujillo is seeing patients back in clinic.     Keyona Zuluaga RN

## 2020-07-09 ENCOUNTER — PRE VISIT (OUTPATIENT)
Dept: OTOLARYNGOLOGY | Facility: CLINIC | Age: 35
End: 2020-07-09

## 2020-07-30 ENCOUNTER — OFFICE VISIT (OUTPATIENT)
Dept: OTOLARYNGOLOGY | Facility: CLINIC | Age: 35
End: 2020-07-30
Payer: COMMERCIAL

## 2020-07-30 VITALS — HEART RATE: 100 BPM | TEMPERATURE: 98.5 F | BODY MASS INDEX: 30.18 KG/M2 | OXYGEN SATURATION: 100 % | WEIGHT: 165 LBS

## 2020-07-30 DIAGNOSIS — R13.10 DYSPHAGIA, UNSPECIFIED TYPE: Primary | ICD-10-CM

## 2020-07-30 DIAGNOSIS — K21.9 GASTROESOPHAGEAL REFLUX DISEASE WITHOUT ESOPHAGITIS: ICD-10-CM

## 2020-07-30 RX ORDER — BIOTIN 10 MG
TABLET ORAL
COMMUNITY
End: 2021-04-19

## 2020-07-30 ASSESSMENT — PAIN SCALES - GENERAL: PAINLEVEL: NO PAIN (0)

## 2020-07-30 NOTE — PATIENT INSTRUCTIONS
1. You were seen in the ENT Clinic today by Dr. Trujillo.  If you have any questions or concerns after your appointment, please call   - Option 1: ENT Clinic: [phone]   - Option 2: [place] (Dr. Trujillo's Nurse): [phone]    2.  CT neck    3. GI Consult- [facility].    4.  Plan to return to clinic after GI consult and CT     Edwige Pedroza LPN  [facility] - Otolaryngology    The patient presents with a history of a globus sensation and mild dysphagia and discomfort in the right side of the neck more than the left side of the neck.  She will be referred for a Gastroenterology evaluation with [facility] and she will be referred for a CT scan of the neck. No abnormal findings are identified on this examination to explain her symptoms. Further evaluation will be pursued.

## 2020-07-30 NOTE — TELEPHONE ENCOUNTER
RECORDS RECEIVED FROM: [facility] ENT- Dr. Louis Trujillo    DATE RECEIVED: 9/2/2020   NOTES STATUS DETAILS   OFFICE NOTE from referring provider Internal 7/30/2020 Office visit with Dr. Trujillo    OFFICE NOTE from other specialist Internal 3/16/2020 Office visit with YOHANNES Henry ([facility])    DISCHARGE SUMMARY from hospital N/A    OPERATIVE REPORT N/A    MEDICATION LIST Internal         ENDOSCOPY  N/A    COLONOSCOPY N/A    ERCP N/A    EUS N/A    STOOL TESTING N/A    PERTINENT LABS Internal    PATHOLOGY REPORTS (RELATED) N/A    IMAGING (CT, MRI, EGD) Internal CT Neck: 8/5/2020     REFERRAL INFORMATION    Date referral was placed: 9/2/2020   Date all records received: NA   Date records were scanned into Epic: NA   Date records were sent to Provider to review: NA   Date and recommendation received from provider:  LETTER SENT  SCHEDULE APPOINTMENT   Date patient was contacted to schedule: 7/30/2020

## 2020-07-30 NOTE — LETTER
2020       RE: Zaina Tan  [address]  [facility] [address]     Dear Colleague,    Thank you for referring your patient, Zaina Tan, to the [facility] at [facility]. Please see a copy of my visit note below.    The patient presents with a history of a globus sensation and mild dysphagia. She reports three months of these symptoms with a discomfort in the right side of the neck more than the left side of the neck. The patient denies odynophagia, hemoptysis, hematemasis, or unexplained weight loss.  The patient reports that the symptoms have been progressively worsening over the past few months.  The patient reports symptoms of heartburn intermittently. The patient denies sinusitis, rhinitis, facial pain, nasal obstruction or purulent nasal discharge. The patient denies chronic or recurrent tonsillitis, chronic or recurrent pharyngitis. The patient denies otalgia, otorrhea, eustachian tube dysfunction, ear infections, dizziness or tinnitus.     This patient is seen in consultation at the request of Dr. Megan Henry.     All other systems were reviewed and they are either negative or they are not directly pertinent to this Otolaryngology examination.      Past Medical History:    Past Medical History:   Diagnosis Date     Vitreous syneresis        Past Surgical History:    Past Surgical History:   Procedure Laterality Date      SECTION N/A 2017    Procedure:  SECTION;;  Surgeon: Shellie Soni MD;  Location: [facility]     D &       fetal hypoplastic leftheart syndrome       Medications:      Current Outpatient Medications:      Cyanocobalamin (VITAMIN B 12) 100 MCG LOZG, Take 1 tablet by mouth daily, Disp: , Rfl:      Multiple Vitamins-Minerals (MULTIVITAMIN ADULT) CHEW, , Disp: , Rfl:     Allergies:    Patient has no known allergies.    Physical Examination:    The patient is a well developed, well nourished  female in no apparent distress.  She is normocephalic, atraumatic with pupils equally round and reactive to light.    Oral Cavity Examination:  Normal mucosa with no masses or lesions  Nasal Examination: Normal mucosa with no masses or lesions  Ear Examination: Ear canals clear, tympanic membranes and middle ear spaces normal  Neurological Examination: Facial nerve function intact and symmetric  Integumentary Examination: No lesions on the skin of the head and neck  Neck Examination: No masses or lesions, no lymphadenopathy  Endocrine Examination: Normal thyroid examination  Flexible Fiberoptic Laryngoscopy: Normal nasopharynx, base of tongue, valleculae, pyriform sinuses, false vocal cords and true vocal cords.  The vocal cords are moving normally and there are no lesions or masses in the larynx.  The posterior vocal cords and laryngeal wall are erythematous, consistent with reflux irritation.    Assessment and Plan:    The patient presents with a history of a globus sensation and mild dysphagia and discomfort in the right side of the neck more than the left side of the neck.  She will be referred for a Gastroenterology evaluation with [facility] and she will be referred for a CT scan of the neck. No abnormal findings are identified on this examination to explain her symptoms. Further evaluation will be pursued.     CC: Dr. Megan Henry       Again, thank you for allowing me to participate in the care of your patient.      Sincerely,    Louis Trujillo MD

## 2020-07-30 NOTE — PROGRESS NOTES
The patient presents with a history of a globus sensation and mild dysphagia. She reports three months of these symptoms with a discomfort in the right side of the neck more than the left side of the neck. The patient denies odynophagia, hemoptysis, hematemasis, or unexplained weight loss.  The patient reports that the symptoms have been progressively worsening over the past few months.  The patient reports symptoms of heartburn intermittently. The patient denies sinusitis, rhinitis, facial pain, nasal obstruction or purulent nasal discharge. The patient denies chronic or recurrent tonsillitis, chronic or recurrent pharyngitis. The patient denies otalgia, otorrhea, eustachian tube dysfunction, ear infections, dizziness or tinnitus.     This patient is seen in consultation at the request of Dr. Megan Henry.     All other systems were reviewed and they are either negative or they are not directly pertinent to this Otolaryngology examination.      Past Medical History:    Past Medical History:   Diagnosis Date     Vitreous syneresis        Past Surgical History:    Past Surgical History:   Procedure Laterality Date      SECTION N/A 2017    Procedure:  SECTION;;  Surgeon: Shellie Soni MD;  Location:  L+D     D & C      fetal hypoplastic leftheart syndrome       Medications:      Current Outpatient Medications:      Cyanocobalamin (VITAMIN B 12) 100 MCG LOZG, Take 1 tablet by mouth daily, Disp: , Rfl:      Multiple Vitamins-Minerals (MULTIVITAMIN ADULT) CHEW, , Disp: , Rfl:     Allergies:    Patient has no known allergies.    Physical Examination:    The patient is a well developed, well nourished female in no apparent distress.  She is normocephalic, atraumatic with pupils equally round and reactive to light.    Oral Cavity Examination:  Normal mucosa with no masses or lesions  Nasal Examination: Normal mucosa with no masses or lesions  Ear Examination: Ear canals clear, tympanic  membranes and middle ear spaces normal  Neurological Examination: Facial nerve function intact and symmetric  Integumentary Examination: No lesions on the skin of the head and neck  Neck Examination: No masses or lesions, no lymphadenopathy  Endocrine Examination: Normal thyroid examination  Flexible Fiberoptic Laryngoscopy: Normal nasopharynx, base of tongue, valleculae, pyriform sinuses, false vocal cords and true vocal cords.  The vocal cords are moving normally and there are no lesions or masses in the larynx.  The posterior vocal cords and laryngeal wall are erythematous, consistent with reflux irritation.    Assessment and Plan:    The patient presents with a history of a globus sensation and mild dysphagia and discomfort in the right side of the neck more than the left side of the neck.  She will be referred for a Gastroenterology evaluation with [facility] and she will be referred for a CT scan of the neck. No abnormal findings are identified on this examination to explain her symptoms. Further evaluation will be pursued.     CC: Dr. Megan Henry

## 2020-07-30 NOTE — NURSING NOTE
Chief Complaint   Patient presents with     Consult     Globus sensation         Pulse 100, temperature 98.5  F (36.9  C), temperature source Temporal, weight 74.8 kg (165 lb), SpO2 100 %, not currently breastfeeding.    Zarina Abreu, EMT

## 2020-08-05 ENCOUNTER — ANCILLARY PROCEDURE (OUTPATIENT)
Dept: CT IMAGING | Facility: CLINIC | Age: 35
End: 2020-08-05
Attending: OTOLARYNGOLOGY
Payer: COMMERCIAL

## 2020-08-05 DIAGNOSIS — R13.10 DYSPHAGIA, UNSPECIFIED TYPE: ICD-10-CM

## 2020-08-05 DIAGNOSIS — K21.9 GASTROESOPHAGEAL REFLUX DISEASE WITHOUT ESOPHAGITIS: ICD-10-CM

## 2020-08-05 RX ORDER — IOPAMIDOL 755 MG/ML
100 INJECTION, SOLUTION INTRAVASCULAR ONCE
Status: COMPLETED | OUTPATIENT
Start: 2020-08-05 | End: 2020-08-05

## 2020-08-05 RX ADMIN — IOPAMIDOL 100 ML: 755 INJECTION, SOLUTION INTRAVASCULAR at 14:43

## 2020-08-17 ENCOUNTER — OFFICE VISIT (OUTPATIENT)
Dept: FAMILY MEDICINE | Facility: CLINIC | Age: 35
End: 2020-08-17
Attending: FAMILY MEDICINE
Payer: COMMERCIAL

## 2020-08-17 VITALS
WEIGHT: 164.7 LBS | HEART RATE: 60 BPM | SYSTOLIC BLOOD PRESSURE: 120 MMHG | HEIGHT: 62 IN | BODY MASS INDEX: 30.31 KG/M2 | DIASTOLIC BLOOD PRESSURE: 77 MMHG

## 2020-08-17 DIAGNOSIS — R42 SPELL OF DIZZINESS: Primary | ICD-10-CM

## 2020-08-17 LAB
ANION GAP SERPL CALCULATED.3IONS-SCNC: 3 MMOL/L (ref 3–14)
BUN SERPL-MCNC: 9 MG/DL (ref 7–30)
CALCIUM SERPL-MCNC: 9.2 MG/DL (ref 8.5–10.1)
CHLORIDE SERPL-SCNC: 107 MMOL/L (ref 94–109)
CO2 SERPL-SCNC: 29 MMOL/L (ref 20–32)
CREAT SERPL-MCNC: 0.74 MG/DL (ref 0.52–1.04)
GFR SERPL CREATININE-BSD FRML MDRD: >90 ML/MIN/{1.73_M2}
GLUCOSE SERPL-MCNC: 90 MG/DL (ref 70–99)
POTASSIUM SERPL-SCNC: 3.7 MMOL/L (ref 3.4–5.3)
SODIUM SERPL-SCNC: 139 MMOL/L (ref 133–144)

## 2020-08-17 PROCEDURE — 80048 BASIC METABOLIC PNL TOTAL CA: CPT | Performed by: FAMILY MEDICINE

## 2020-08-17 PROCEDURE — G0463 HOSPITAL OUTPT CLINIC VISIT: HCPCS | Mod: ZF

## 2020-08-17 PROCEDURE — 36415 COLL VENOUS BLD VENIPUNCTURE: CPT | Performed by: FAMILY MEDICINE

## 2020-08-17 RX ORDER — MECLIZINE HYDROCHLORIDE 25 MG/1
TABLET ORAL
Qty: 30 TABLET | Refills: 1 | Status: SHIPPED | OUTPATIENT
Start: 2020-08-17 | End: 2021-11-18

## 2020-08-17 ASSESSMENT — PAIN SCALES - GENERAL: PAINLEVEL: NO PAIN (0)

## 2020-08-17 ASSESSMENT — MIFFLIN-ST. JEOR: SCORE: 1395.32

## 2020-08-17 NOTE — PATIENT INSTRUCTIONS
Use antivert - 1/2 tablet 3x/day for 10 days and then as needed  Use nasal saline in the morning   Drink lots of water - urine should be white  Blood test today

## 2020-08-17 NOTE — PROGRESS NOTES
[facility]  Established Patient visit    Name: Zaina Tan MRN: [id number]     Age: 34 year old           Chief Complaint:    YOB: 1985       CC:dizziness         HPI and ROS:   HPI:    This is a 34-year-old female who is complaining of dizziness over the last 3 weeks.  She feels like she is passing out but she has not fainted.  She does not see stars, but she feels like darkness descends over her eyes.  This happens in the early morning when she gets up.  It seems to happen when she is lying in bed.  It seems to be more with movement.  She does not describe it as spinning.  She denies palpitations no chest pain no diaphoresis with it andno pattern to it.  She has not tried any medication.  It is not related to eating.  It is momentary feeling.  She does not drink a lot of coffee.  Her urine is yellow.  She claims that she does drink a lot of fluid.     ROS:  She endorses visual difficulty double vision sometimes nasal congestion and stuffiness, numbness and tingling in her extremities, and dizziness at times as above. She has dysphagia which is being worked up by GI  The rest of the complete review of systems is negative other than as mentioned above           Past Medical History:     Past Medical History:   Diagnosis Date     Vitreous syneresis              Past Surgical History:      Past Surgical History:   Procedure Laterality Date      SECTION N/A 2017    Procedure:  SECTION;;  Surgeon: Shellie Soni MD;  Location:  L+D     D & C      fetal hypoplastic leftheart syndrome               Immunizations:     Immunization History   Administered Date(s) Administered     Influenza Vaccine IM > 6 months Valent IIV4 10/12/2015     TDAP Vaccine (Boostrix) 2015, 2017             Allergies:   No Known Allergies          Medications:   Cyanocobalamin (VITAMIN B 12) 100 MCG LOZG, Take 1 tablet by mouth daily  Multiple Vitamins-Minerals (MULTIVITAMIN  "ADULT) CHEW,     No current facility-administered medications on file prior to visit.            Exam:   /77   Pulse 60   Ht 1.575 m (5' 2\")   Wt 74.7 kg (164 lb 11.2 oz)   BMI 30.12 kg/m    'orthostatic vitals did not change   General: NAD, alert and conversational  Neck supple, no palpable thyroid  CV: Normal S1,S2 with RRR no murmurs, rubs or gallops  Resp: Lungs CTA bilaterally with no wheezes or crackles  Skin: No concerning lesions or rashes        Labs:   A1C  Lab Results   Component Value Date    A1C 5.4 01/30/2017       Lipid panel  Cholesterol   Date Value Ref Range Status   09/03/2019 191 <200 mg/dL Final     HDL Cholesterol   Date Value Ref Range Status   09/03/2019 51 >49 mg/dL Final     LDL Cholesterol Calculated   Date Value Ref Range Status   09/03/2019 113 (H) <100 mg/dL Final     Comment:     Above desirable:  100-129 mg/dl  Borderline High:  130-159 mg/dL  High:             160-189 mg/dL  Very high:       >189 mg/dl       Triglycerides   Date Value Ref Range Status   09/03/2019 133 <150 mg/dL Final     Comment:     Fasting specimen     No results found for: CHOLHDLRATIO         Assessment and Plan:     Assessment 34-year-old female who has momentary episodes of dizzy spells with no particular pattern.    (R42) Spell of dizziness  (primary encounter diagnosis)  Comment: Her momentary spells are of unclear etiology, could be dehydration (although orthostatics are stable) , could be hypoglycemic, or with some position change could also be  vertigo.  We will check a blood sugar, will treat her with  Antivert and see if this helps.  Will do further work-up as indicated.  Emphasized to stay away from coffee and to drink water at least 6 glasses a day.  Plan: Basic Metabolic Panel, meclizine (ANTIVERT) 25         MG tablet            Megan Henry MD, PhD    RTC: prn               "

## 2020-08-17 NOTE — LETTER
2020       RE: Zaina Tan  [address]  [facility] [address]     Dear Colleague,    Thank you for referring your patient, Zaina Tan, to the [facility] at [facility]. Please see a copy of my visit note below.            s Clinic  Established Patient visit    Name: Zaina Tan MRN: [id number]     Age: 34 year old           Chief Complaint:    YOB: 1985       CC:dizziness         HPI and ROS:   HPI:    This is a 34-year-old female who is complaining of dizziness over the last 3 weeks.  She feels like she is passing out but she has not fainted.  She does not see stars, but she feels like darkness descends over her eyes.  This happens in the early morning when she gets up.  It seems to happen when she is lying in bed.  It seems to be more with movement.  She does not describe it as spinning.  She denies palpitations no chest pain no diaphoresis with it andno pattern to it.  She has not tried any medication.  It is not related to eating.  It is momentary feeling.  She does not drink a lot of coffee.  Her urine is yellow.  She claims that she does drink a lot of fluid.     ROS:  She endorses visual difficulty double vision sometimes nasal congestion and stuffiness, numbness and tingling in her extremities, and dizziness at times as above. She has dysphagia which is being worked up by GI  The rest of the complete review of systems is negative other than as mentioned above           Past Medical History:     Past Medical History:   Diagnosis Date     Vitreous syneresis              Past Surgical History:      Past Surgical History:   Procedure Laterality Date      SECTION N/A 2017    Procedure:  SECTION;;  Surgeon: Shellie Soni MD;  Location:  L+D     D & C      fetal hypoplastic leftheart syndrome               Immunizations:     Immunization History   Administered Date(s) Administered  "    Influenza Vaccine IM > 6 months Valent IIV4 10/12/2015     TDAP Vaccine (Boostrix) 09/28/2015, 06/12/2017             Allergies:   No Known Allergies          Medications:   Cyanocobalamin (VITAMIN B 12) 100 MCG LOZG, Take 1 tablet by mouth daily  Multiple Vitamins-Minerals (MULTIVITAMIN ADULT) CHEW,     No current facility-administered medications on file prior to visit.            Exam:   /77   Pulse 60   Ht 1.575 m (5' 2\")   Wt 74.7 kg (164 lb 11.2 oz)   BMI 30.12 kg/m    'orthostatic vitals did not change   General: NAD, alert and conversational  Neck supple, no palpable thyroid  CV: Normal S1,S2 with RRR no murmurs, rubs or gallops  Resp: Lungs CTA bilaterally with no wheezes or crackles  Skin: No concerning lesions or rashes        Labs:   A1C  Lab Results   Component Value Date    A1C 5.4 01/30/2017       Lipid panel  Cholesterol   Date Value Ref Range Status   09/03/2019 191 <200 mg/dL Final     HDL Cholesterol   Date Value Ref Range Status   09/03/2019 51 >49 mg/dL Final     LDL Cholesterol Calculated   Date Value Ref Range Status   09/03/2019 113 (H) <100 mg/dL Final     Comment:     Above desirable:  100-129 mg/dl  Borderline High:  130-159 mg/dL  High:             160-189 mg/dL  Very high:       >189 mg/dl       Triglycerides   Date Value Ref Range Status   09/03/2019 133 <150 mg/dL Final     Comment:     Fasting specimen     No results found for: CHOLHDLRATIO         Assessment and Plan:     Assessment 34-year-old female who has momentary episodes of dizzy spells with no particular pattern.    (R42) Spell of dizziness  (primary encounter diagnosis)  Comment: Her momentary spells are of unclear etiology, could be dehydration (although orthostatics are stable) , could be hypoglycemic, or with some position change could also be  vertigo.  We will check a blood sugar, will treat her with  Antivert and see if this helps.  Will do further work-up as indicated.  Emphasized to stay away from " coffee and to drink water at least 6 glasses a day.  Plan: Basic Metabolic Panel, meclizine (ANTIVERT) 25         MG tablet            Megan Henry MD, PhD    RTC: prn

## 2020-08-30 NOTE — LETTER
Date:March 29, 2017      Patient was self referred, no letter generated. Do not send.        [facility] Health Information       grossly assessed due to/BUE 3-/5 grossly, RLE 3/5 grossly LLE 2-/5 grossly

## 2020-09-02 ENCOUNTER — VIRTUAL VISIT (OUTPATIENT)
Dept: GASTROENTEROLOGY | Facility: CLINIC | Age: 35
End: 2020-09-02
Attending: OTOLARYNGOLOGY
Payer: COMMERCIAL

## 2020-09-02 ENCOUNTER — PRE VISIT (OUTPATIENT)
Dept: GASTROENTEROLOGY | Facility: CLINIC | Age: 35
End: 2020-09-02

## 2020-09-02 VITALS — BODY MASS INDEX: 30.18 KG/M2 | HEIGHT: 62 IN | WEIGHT: 164 LBS

## 2020-09-02 DIAGNOSIS — K21.9 GASTROESOPHAGEAL REFLUX DISEASE WITHOUT ESOPHAGITIS: Primary | ICD-10-CM

## 2020-09-02 DIAGNOSIS — R13.10 DYSPHAGIA, UNSPECIFIED TYPE: ICD-10-CM

## 2020-09-02 ASSESSMENT — MIFFLIN-ST. JEOR: SCORE: 1392.15

## 2020-09-02 ASSESSMENT — PAIN SCALES - GENERAL: PAINLEVEL: NO PAIN (0)

## 2020-09-02 NOTE — LETTER
"9/2/2020     RE: Zaina Tan  [address]  [facility] [address]    Dear Colleague,    Thank you for referring your patient, Zaina Tan, to the [facility]. Please see a copy of my visit note below.    Zaina Tan is a 34 year old female who is being evaluated via a telephone visit.      The patient has been notified of following (by Edmundo     \"We have found that certain health care needs can be provided without the need for a physical exam.  This service lets us provide the care you need with a short phone conversation.  If a prescription is necessary we can send it directly to your pharmacy.  If lab work is needed we can place an order for that and you can then stop by our lab to have the test done at a later time.    This telephone visit will be conducted via 3 way call with the you (the patient) , the physician/provider, and a me all on the line at the same time.  This allows your physician/provider to have the phone conversation with you while I will be taking notes for your medical record.  We will have full access to your [place] medical record during this entire phone call.    Since this is like an office visit,  will bill your insurance company for this service.  Please check with your medical insurance if this type of telephone/virtual is covered . You may be responsible for the cost of this service if insurance coverage is denied.  The typical cost is $30 (10min), $59(11-20min) and $85 (21-30min)     If during the course of the call the physician/provider feels a telephone visit is not appropriate, you will not be charged for this service\"    Consent has been obtained for this service by care team member: yes.  See the scanned image in the medical record.     Total time of call between patient and provider was 14 minutes     Chris Francois (MD signature)        Gastroenterology Consult/Progress Note  [facility]              Chief complaint: Throat " Pain           ASSESSMENT AND RECOMMENDATIONS:   Assessment:  34 year old female with a history of with 5 months of throat pain, nasal congestion and hoarsness.  Possible LPR     Recommendations:  EGD   Trial of PPI after that.            History of Present Illness:   Zaina Tan is a 34 year old female with a history of has had a right sided throat pain, been going on for a while for about 4-5 months.  ENT because of COVID-19 has been postponed.  GERD signs on Laryngoscopy were erythema and edema.  No heartburn, no dysphagia.  Never tried acid blocking drugs.  Feeling fine otherwise.  Recently she told MD she has some slight dizziness, comes in both eyes and goes away quick.  Recent medication Antivert prescribed.  Feel like total darkness in a second, a little dizzy and goes away.  No diabetes.  Checked her blood and every came back normal.  No smoking or drinking.  Work as a nursing assistant, lives in [place].  Two kids, 3 and 4.    No problems with skins, no joint problems, no problems with ulcers in the mouth, major nasal congestion in the morning.  On the right side of the neck.  Feels there is something down there, sharp pain occasionally.        7/30/2020  Flexible Fiberoptic Laryngoscopy:   Normal nasopharynx, base of tongue, valleculae, pyriform sinuses, false vocal cords and true vocal cords.  The vocal cords are moving normally and there are no lesions or masses in the larynx.  The posterior vocal cords and laryngeal wall are erythematous, consistent with reflux irritation.      CT Neck 8/5/2020  Findings:   Evaluation of the mucosal space demonstrates no evident abnormality in  the nasopharynx, oropharynx, hypopharynx or the glottis. The tongue  base appears normal. The major salivary glands appear unremarkable.  The thyroid gland appears normal.     There is no evident cervical lymphadenopathy. The fascial spaces in  the neck are intact bilaterally. The major vascular structures in the  neck  "appear unremarkable.     Evaluation of the osseous structures demonstrate no worrisome lytic or  sclerotic lesion. No overt spinal canal or neuroforaminal stenosis.  The visualized paranasal sinuses are clear. The mastoid air cells are  clear.      The visualized lung apices are clear.                                                                      Impression: Normal CT study of the neck with contrast.  No evident  mass or adenopathy within the neck.      Meds:  MVI  Vitamin D   Meclizine    Fmhx: negative    No allergies    P surgical Hx: .      Bowel/Bladder: normal.      Weight loss: Not losing, 164, 5' 2\".              Past Medical History:     Past Medical History:   Diagnosis Date     Vitreous syneresis             Past Surgical History:     Past Surgical History:   Procedure Laterality Date      SECTION N/A 2017    Procedure:  SECTION;;  Surgeon: Shellie Soni MD;  Location:  L+D     D & C      fetal hypoplastic leftheart syndrome            Previous Endoscopy:   No results found for this or any previous visit.         Social History:     Social History     Socioeconomic History     Marital status: Single     Spouse name: None     Number of children: None     Years of education: None     Highest education level: None   Occupational History     Occupation: [employer]   Social Needs     Financial resource strain: None     Food insecurity     Worry: None     Inability: None     Transportation needs     Medical: None     Non-medical: None   Tobacco Use     Smoking status: Never Smoker     Smokeless tobacco: Never Used   Substance and Sexual Activity     Alcohol use: No     Drug use: No     Sexual activity: Yes     Partners: Male     Birth control/protection: None   Lifestyle     Physical activity     Days per week: None     Minutes per session: None     Stress: None   Relationships     Social connections     Talks on phone: None     Gets together: None     Attends " "[employer] service: None     Active member of club or organization: None     Attends meetings of clubs or organizations: None     Relationship status: None     Intimate partner violence     Fear of current or ex partner: None     Emotionally abused: None     Physically abused: None     Forced sexual activity: None   Other Topics Concern     None   Social History Narrative    How much exercise per week? Daily walking    How much calcium per day? In vits and foods       How much caffeine per day? 0    How much vitamin D per day? In vits and foods    Do you/your family wear seatbelts?  Yes    Do you/your family use safety helmets? Yes    Do you/your family use sunscreen? No    Do you/your family keep firearms in the home? no    Do you/your family have a smoke detector(s)?     Do you feel safe in your home? Yes    Has anyone ever touched you in an unwanted manner?no feels safe in home.           February 21, 2019 Peyton Bernardo LPN                Family History:     Family History   Problem Relation Age of Onset     Hypertension Mother         had before pregnancy     Heart Disease Other         hypoplastic left heart. TAB 23 wks      Diabetes No family hx of      No known history of colorectal cancer, liver disease, or inflammatory bowel disease.         Allergies:   Reviewed and edited as appropriate   No Known Allergies         Medications:     Current Outpatient Medications   Medication Sig Dispense Refill     Multiple Vitamins-Minerals (MULTIVITAMIN ADULT) CHEW        Cyanocobalamin (VITAMIN B 12) 100 MCG LOZG Take 1 tablet by mouth daily       meclizine (ANTIVERT) 25 MG tablet Take 1/2 tablet 3x/day for 10 days then prn for symptoms (Patient not taking: Reported on 9/2/2020) 30 tablet 1             Review of Systems:     A complete 10 point review of systems was performed and is negative except as noted in the HPI           Physical Exam:   Ht 1.575 m (5' 2\")   Wt 74.4 kg (164 lb)   BMI 30.00 kg/m    Wt:   Wt " Readings from Last 2 Encounters:   09/02/20 74.4 kg (164 lb)   08/17/20 74.7 kg (164 lb 11.2 oz)      Constitutional: cooperative, pleasant, not dyspneic/diaphoretic, no acute distress  Voice: Raspy  Respiratory: Unlabored breathing  Neuro: AAO x 3  Psych: Normal affect      CBC RESULTS:   Recent Labs   Lab Test 02/28/19  1533   WBC 6.9   RBC 4.49   HGB 12.7   HCT 38.8   MCV 86   MCH 28.3   MCHC 32.7   RDW 12.5          Liver Function Studies -   Recent Labs   Lab Test 02/28/18  0912   PROTTOTAL 7.7   ALBUMIN 3.6   BILITOTAL 0.2   ALKPHOS 98   AST 14   ALT 20       Other procedures:      Chris Francois MD  Associate Professor of Medicine  Division of Gastroenterology, Hepatology, and Nutrition  [facility]                      Again, thank you for allowing me to participate in the care of your patient.        Sincerely,        Chris Francois MD

## 2020-09-02 NOTE — NURSING NOTE
"Chief Complaint   Patient presents with     Consult     Dysphagia and GERD       Vitals:    09/02/20 0822   Weight: 74.4 kg (164 lb)   Height: 1.575 m (5' 2\")       Body mass index is 30 kg/m .      Padmini Pendleton LPN                          "

## 2020-09-02 NOTE — PROGRESS NOTES
"Zaina Tan is a 34 year old female who is being evaluated via a telephone visit.      The patient has been notified of following (by Edmundo     \"We have found that certain health care needs can be provided without the need for a physical exam.  This service lets us provide the care you need with a short phone conversation.  If a prescription is necessary we can send it directly to your pharmacy.  If lab work is needed we can place an order for that and you can then stop by our lab to have the test done at a later time.    This telephone visit will be conducted via 3 way call with the you (the patient) , the physician/provider, and a me all on the line at the same time.  This allows your physician/provider to have the phone conversation with you while I will be taking notes for your medical record.  We will have full access to your [facility] medical record during this entire phone call.    Since this is like an office visit,  will bill your insurance company for this service.  Please check with your medical insurance if this type of telephone/virtual is covered . You may be responsible for the cost of this service if insurance coverage is denied.  The typical cost is $30 (10min), $59(11-20min) and $85 (21-30min)     If during the course of the call the physician/provider feels a telephone visit is not appropriate, you will not be charged for this service\"    Consent has been obtained for this service by care team member: yes.  See the scanned image in the medical record.     Total time of call between patient and provider was 14 minutes     Chris Francois (MD signature)      Gastroenterology Consult/Progress Note  ealth              Chief complaint: Throat Pain           ASSESSMENT AND RECOMMENDATIONS:   Assessment:  34 year old female with a history of with 5 months of throat pain, nasal congestion and hoarsness.  Possible LPR     Recommendations:  EGD   Trial of PPI after that.            History of Present " Illness:   Zaina Tan is a 34 year old female with a history of has had a right sided throat pain, been going on for a while for about 4-5 months.  ENT because of COVID-19 has been postponed.  GERD signs on Laryngoscopy were erythema and edema.  No heartburn, no dysphagia.  Never tried acid blocking drugs.  Feeling fine otherwise.  Recently she told MD she has some slight dizziness, comes in both eyes and goes away quick.  Recent medication Antivert prescribed.  Feel like total darkness in a second, a little dizzy and goes away.  No diabetes.  Checked her blood and every came back normal.  No smoking or drinking.  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    No problems with skins, no joint problems, no problems with ulcers in the mouth, major nasal congestion in the morning.  On the right side of the neck.  Feels there is something down there, sharp pain occasionally.        7/30/2020  Flexible Fiberoptic Laryngoscopy:   Normal nasopharynx, base of tongue, valleculae, pyriform sinuses, false vocal cords and true vocal cords.  The vocal cords are moving normally and there are no lesions or masses in the larynx.  The posterior vocal cords and laryngeal wall are erythematous, consistent with reflux irritation.      CT Neck 8/5/2020  Findings:   Evaluation of the mucosal space demonstrates no evident abnormality in  the nasopharynx, oropharynx, hypopharynx or the glottis. The tongue  base appears normal. The major salivary glands appear unremarkable.  The thyroid gland appears normal.     There is no evident cervical lymphadenopathy. The fascial spaces in  the neck are intact bilaterally. The major vascular structures in the  neck appear unremarkable.     Evaluation of the osseous structures demonstrate no worrisome lytic or  sclerotic lesion. No overt spinal canal or neuroforaminal stenosis.  The visualized paranasal sinuses are clear. The mastoid air cells are  clear.      The  "visualized lung apices are clear.                                                                      Impression: Normal CT study of the neck with contrast.  No evident  mass or adenopathy within the neck.      Meds:  MVI  Vitamin D   Meclizine    Fmhx: negative    No allergies    P surgical Hx: .      Bowel/Bladder: normal.      Weight loss: Not losing, 164, 5' 2\".              Past Medical History:     Past Medical History:   Diagnosis Date     Vitreous syneresis             Past Surgical History:     Past Surgical History:   Procedure Laterality Date      SECTION N/A 2017    Procedure:  SECTION;;  Surgeon: Shellie Soni MD;  Location:  L+D     D & C      fetal hypoplastic leftheart syndrome            Previous Endoscopy:   No results found for this or any previous visit.         Social History:     Social History     Socioeconomic History     Marital status: Single     Spouse name: None     Number of children: None     Years of education: None     Highest education level: None   Occupational History     Occupation: [employer]   Social Needs     Financial resource strain: None     Food insecurity     Worry: None     Inability: None     Transportation needs     Medical: None     Non-medical: None   Tobacco Use     Smoking status: Never Smoker     Smokeless tobacco: Never Used   Substance and Sexual Activity     Alcohol use: No     Drug use: No     Sexual activity: Yes     Partners: Male     Birth control/protection: None   Lifestyle     Physical activity     Days per week: None     Minutes per session: None     Stress: None   Relationships     Social connections     Talks on phone: None     Gets together: None     Attends Gnosticist service: None     Active member of club or organization: None     Attends meetings of clubs or organizations: None     Relationship status: None     Intimate partner violence     Fear of current or ex partner: None     Emotionally abused: None     " "Physically abused: None     Forced sexual activity: None   Other Topics Concern     None   Social History Narrative    How much exercise per week? Daily walking    How much calcium per day? In vits and foods       How much caffeine per day? 0    How much vitamin D per day? In vits and foods    Do you/your family wear seatbelts?  Yes    Do you/your family use safety helmets? Yes    Do you/your family use sunscreen? No    Do you/your family keep firearms in the home? no    Do you/your family have a smoke detector(s)?     Do you feel safe in your home? Yes    Has anyone ever touched you in an unwanted manner?no feels safe in home.           February 21, 2019 Pyeton Bernardo LPN                Family History:     Family History   Problem Relation Age of Onset     Hypertension Mother         had before pregnancy     Heart Disease Other         hypoplastic left heart. TAB 23 wks      Diabetes No family hx of      No known history of colorectal cancer, liver disease, or inflammatory bowel disease.         Allergies:   Reviewed and edited as appropriate   No Known Allergies         Medications:     Current Outpatient Medications   Medication Sig Dispense Refill     Multiple Vitamins-Minerals (MULTIVITAMIN ADULT) CHEW        Cyanocobalamin (VITAMIN B 12) 100 MCG LOZG Take 1 tablet by mouth daily       meclizine (ANTIVERT) 25 MG tablet Take 1/2 tablet 3x/day for 10 days then prn for symptoms (Patient not taking: Reported on 9/2/2020) 30 tablet 1             Review of Systems:     A complete 10 point review of systems was performed and is negative except as noted in the HPI           Physical Exam:   Ht 1.575 m (5' 2\")   Wt 74.4 kg (164 lb)   BMI 30.00 kg/m    Wt:   Wt Readings from Last 2 Encounters:   09/02/20 74.4 kg (164 lb)   08/17/20 74.7 kg (164 lb 11.2 oz)      Constitutional: cooperative, pleasant, not dyspneic/diaphoretic, no acute distress  Voice: Raspy  Respiratory: Unlabored breathing  Neuro: AAO x 3  Psych: Normal " affect      CBC RESULTS:   Recent Labs   Lab Test 02/28/19  1533   WBC 6.9   RBC 4.49   HGB 12.7   HCT 38.8   MCV 86   MCH 28.3   MCHC 32.7   RDW 12.5              Liver Function Studies -   Recent Labs   Lab Test 02/28/18  0912   PROTTOTAL 7.7   ALBUMIN 3.6   BILITOTAL 0.2   ALKPHOS 98   AST 14   ALT 20         Other procedures:      Chris Francois MD  Associate Professor of Medicine  Division of Gastroenterology, Hepatology, and Nutrition  [facility]

## 2020-09-07 ENCOUNTER — TELEPHONE (OUTPATIENT)
Dept: GASTROENTEROLOGY | Facility: CLINIC | Age: 35
End: 2020-09-07

## 2020-09-07 DIAGNOSIS — Z11.59 ENCOUNTER FOR SCREENING FOR OTHER VIRAL DISEASES: Primary | ICD-10-CM

## 2020-09-07 NOTE — TELEPHONE ENCOUNTER
Patient is scheduled for EGD with Dr. Cramer    Spoke with: patient    Date of Procedure: 8/23/20    Location: [facility]    Sedation Type Concious    Informed patient they will need an adult  Yes    Informed Patient of COVID Test Requirement Yes    Preferred Pharmacy for Pre Prescription [facility] in [facility] Nurse will call to complete assessment Yes    Additional comments: none

## 2020-09-19 DIAGNOSIS — Z11.59 ENCOUNTER FOR SCREENING FOR OTHER VIRAL DISEASES: ICD-10-CM

## 2020-09-19 PROCEDURE — U0003 INFECTIOUS AGENT DETECTION BY NUCLEIC ACID (DNA OR RNA); SEVERE ACUTE RESPIRATORY SYNDROME CORONAVIRUS 2 (SARS-COV-2) (CORONAVIRUS DISEASE [COVID-19]), AMPLIFIED PROBE TECHNIQUE, MAKING USE OF HIGH THROUGHPUT TECHNOLOGIES AS DESCRIBED BY CMS-2020-01-R: HCPCS | Performed by: INTERNAL MEDICINE

## 2020-09-20 LAB
SARS-COV-2 RNA SPEC QL NAA+PROBE: NOT DETECTED
SPECIMEN SOURCE: NORMAL

## 2020-09-23 ENCOUNTER — HOSPITAL ENCOUNTER (EMERGENCY)
Facility: CLINIC | Age: 35
Discharge: HOME | End: 2020-09-23
Attending: EMERGENCY MEDICINE | Admitting: EMERGENCY MEDICINE
Payer: COMMERCIAL

## 2020-09-23 ENCOUNTER — HOSPITAL ENCOUNTER (OUTPATIENT)
Facility: AMBULATORY SURGERY CENTER | Age: 35
End: 2020-09-23
Attending: INTERNAL MEDICINE
Payer: COMMERCIAL

## 2020-09-23 ENCOUNTER — APPOINTMENT (OUTPATIENT)
Dept: ULTRASOUND IMAGING | Facility: CLINIC | Age: 35
End: 2020-09-23
Attending: EMERGENCY MEDICINE
Payer: COMMERCIAL

## 2020-09-23 VITALS
OXYGEN SATURATION: 100 % | TEMPERATURE: 98 F | SYSTOLIC BLOOD PRESSURE: 133 MMHG | DIASTOLIC BLOOD PRESSURE: 76 MMHG | BODY MASS INDEX: 30.18 KG/M2 | RESPIRATION RATE: 16 BRPM | HEIGHT: 62 IN | WEIGHT: 164 LBS

## 2020-09-23 VITALS
OXYGEN SATURATION: 100 % | RESPIRATION RATE: 16 BRPM | SYSTOLIC BLOOD PRESSURE: 120 MMHG | WEIGHT: 164 LBS | HEIGHT: 62 IN | BODY MASS INDEX: 30.18 KG/M2 | DIASTOLIC BLOOD PRESSURE: 57 MMHG | TEMPERATURE: 96.9 F | HEART RATE: 68 BPM

## 2020-09-23 DIAGNOSIS — M79.662 PAIN OF LEFT LOWER LEG: ICD-10-CM

## 2020-09-23 DIAGNOSIS — M79.89 ARM SWELLING: ICD-10-CM

## 2020-09-23 LAB
ALBUMIN SERPL-MCNC: 4.1 G/DL (ref 3.4–5)
ALP SERPL-CCNC: 105 U/L (ref 40–150)
ALT SERPL W P-5'-P-CCNC: 26 U/L (ref 0–50)
ANION GAP SERPL CALCULATED.3IONS-SCNC: 3 MMOL/L (ref 3–14)
AST SERPL W P-5'-P-CCNC: 14 U/L (ref 0–45)
BILIRUB DIRECT SERPL-MCNC: <0.1 MG/DL (ref 0–0.2)
BILIRUB SERPL-MCNC: 0.3 MG/DL (ref 0.2–1.3)
BUN SERPL-MCNC: 9 MG/DL (ref 7–30)
CALCIUM SERPL-MCNC: 9.1 MG/DL (ref 8.5–10.1)
CHLORIDE SERPL-SCNC: 106 MMOL/L (ref 94–109)
CO2 SERPL-SCNC: 28 MMOL/L (ref 20–32)
CREAT SERPL-MCNC: 0.61 MG/DL (ref 0.52–1.04)
D DIMER PPP FEU-MCNC: 0.6 UG/ML FEU (ref 0–0.5)
GFR SERPL CREATININE-BSD FRML MDRD: >90 ML/MIN/{1.73_M2}
GLUCOSE SERPL-MCNC: 93 MG/DL (ref 70–99)
HCG UR QL: NEGATIVE
INTERNAL QC OK POCT: YES
POTASSIUM SERPL-SCNC: 3.4 MMOL/L (ref 3.4–5.3)
PROT SERPL-MCNC: 8.5 G/DL (ref 6.8–8.8)
SODIUM SERPL-SCNC: 137 MMOL/L (ref 133–144)

## 2020-09-23 PROCEDURE — 85379 FIBRIN DEGRADATION QUANT: CPT | Performed by: EMERGENCY MEDICINE

## 2020-09-23 PROCEDURE — 93971 EXTREMITY STUDY: CPT | Mod: LT

## 2020-09-23 PROCEDURE — 93971 EXTREMITY STUDY: CPT | Mod: LT,XU

## 2020-09-23 PROCEDURE — 80053 COMPREHEN METABOLIC PANEL: CPT | Performed by: EMERGENCY MEDICINE

## 2020-09-23 PROCEDURE — 82248 BILIRUBIN DIRECT: CPT | Performed by: EMERGENCY MEDICINE

## 2020-09-23 PROCEDURE — 99285 EMERGENCY DEPT VISIT HI MDM: CPT | Mod: 25

## 2020-09-23 ASSESSMENT — ENCOUNTER SYMPTOMS
FEVER: 0
COUGH: 0
SHORTNESS OF BREATH: 0
MYALGIAS: 1

## 2020-09-23 ASSESSMENT — MIFFLIN-ST. JEOR
SCORE: 1392.15
SCORE: 1392.15

## 2020-09-23 NOTE — OR NURSING
Patient presents with left arm pain and swelling. Denies numbness or tingling. States that pain is from above shoulder at base of neck to fingertips. Notable swelling in left upper arm above the elbow.

## 2020-09-23 NOTE — OR NURSING
Dr Cramer notified of swollen arm and pain, case cancelled. Dr Cramer told patient that she is to go to urgent care or the ED.

## 2020-09-23 NOTE — ED AVS SNAPSHOT
Emergency Department  [address] [facility] [address]  Phone:  [phone]  Fax:  [phone]                                    Zaina Tan   MRN: [id number]    Department:   Emergency Department   Date of Visit:  9/23/2020           After Visit Summary Signature Page    I have received my discharge instructions, and my questions have been answered. I have discussed any challenges I see with this plan with the nurse or doctor.    ..........................................................................................................................................  Patient/Patient Representative Signature      ..........................................................................................................................................  Patient Representative Print Name and Relationship to Patient    ..................................................               ................................................  Date                                   Time    ..........................................................................................................................................  Reviewed by Signature/Title    ...................................................              ..............................................  Date                                               Time          22EPIC Rev 08/18

## 2020-09-23 NOTE — ED PROVIDER NOTES
"  History     Chief Complaint:  Arm Pain      HPI   Zaina Tan is a 34 year old female who presents for the evaluation of arm pain. The patient reports that three days ago she experienced an episode of left forearm pain on the palmar aspect of her forearm and that one day ago this portion of her forearm became swollen, prompting her to the ED. The patient notes that her pain is now constant and present throughout her whole left arm and left leg generally. She states that she mentioned her symptoms to her PCP today and notes that they instructed her to present to the ED. The patient denies fever, cough, shortness of breath, and other issues.  No IV drug use, trauma, or recent heavy lifting. No new activities.     PE/DVT RISK FACTORS:  Sex:    Female  Hormones:   No  Tobacco:   No  Cancer:   No  Travel:   No  Surgery:   No  Other immobilization: No  Personal history:  No  Family history:  No     Allergies:  No known drug allergies      Medications:    Vitamin B12  Antivert    Past Medical History:    Vitreous syneresis  Uterine leiomyoma  Vitamin D deficiency     Past Surgical History:    Cesarea section - 2017     Family History:    Hypertension - mother    Social History:  Smoking status: Never smoker  Alcohol use: No  Drug use: No  PCP: Megan Henry   Marital Status:  Single [1]     Review of Systems   Constitutional: Negative for fever.   Respiratory: Negative for cough and shortness of breath.    Musculoskeletal: Positive for myalgias.   All other systems reviewed and are negative.      Physical Exam     Patient Vitals for the past 24 hrs:   BP Temp Temp src Pulse Resp SpO2 Height Weight   09/23/20 2049 -- -- -- -- -- 100 % -- --   09/23/20 2043 -- -- -- -- -- 100 % -- --   09/23/20 1927 -- -- -- -- -- 100 % -- --   09/23/20 1358 120/57 96.9  F (36.1  C) Temporal 68 16 100 % 1.575 m (5' 2\") 74.4 kg (164 lb)      Physical Exam  Vitals: reviewed by me  General: Pt seen on [facility] noman mark, " cooperative, and alert to conversation  Eyes: Tracking well, clear conjunctiva BL  ENT: MMM, midline trachea.   Lungs: No tachypnea, no accessory muscle use. No respiratory distress.   CV: Rate as above, regular rhythm.    Abd: Soft, non tender, no guarding, no rebound. Non distended  MSK: no peripheral edema or joint effusion.  No evidence of trauma.  No evidence of swelling or skin abnormalities left upper extremity or left lower extremity.  All extremities are warm and well-perfused.  Skin: No rash, normal turgor and temperature  Neuro: Clear speech and no facial droop.  Psych: Not RIS, no e/o AH/VH      Emergency Department Course   Imaging:   Radiographic findings were communicated with the patient who voiced understanding of the findings.   US Upper Extremity Venous Duplex Left  IMPRESSION: No evidence for deep venous thrombosis in the left upper  extremity.  As read by Radiology.    US Lower Extremity Venous Duplex Left  IMPRESSION:  No evidence for deep venous thrombosis in the left lower  extremity veins  As read by Radiology.    Laboratory:  CMP: WNL (Creatinine 0.61)  D dimer quantitative (1918): 0.6 (H)   Bilirubin direct: WNL <0.1     Emergency Department Course:  Past medical records, nursing notes, and vitals reviewed.  1900: I performed an exam of the patient and obtained history, as documented above.     IV inserted and blood drawn.     1945: I rechecked the patient. Explained findings to patient.     The patient was sent for a left leg and arm ultra sound while in the emergency department, findings above.    2210: I rechecked the patient. Explained findings to patient.     Findings and plan explained to the Patient. Patient discharged home with instructions regarding supportive care, medications, and reasons to return. The importance of close follow-up was reviewed.       Impression & Plan    Medical Decision Making:  This is a very pleasant 34 year old female who presents to the ED with left arm  swelling with some pain, as well as a very small area of pain on her left ankle as well. There is no history of trauma, her workup is essentially negative, she has no evidence of a DVT or coronary event. She has no significant abnormalities on my exam at a musculoskeletal level. Unclear cause for her pain, I do think watchful waiting is appropriate and that she is appropriate for outpatient management. She feels comfortable that no emergent diagnosis, and we went return over return to ED cautions. Will discharge as above.     Diagnosis:    ICD-10-CM    1. Pain of left lower leg  M79.662    2. Arm swelling  M79.89        Disposition:  Discharged to home    Scribe Disclosure:  I, Alvin Rojas, am serving as a scribe at 7:04 PM on 9/23/2020 to document services personally performed by Todd Urbina MD based on my observations and the provider's statements to me.      Alvin Rojas  9/23/2020    EMERGENCY DEPARTMENT       Todd Urbina MD  09/23/20 3745

## 2020-09-23 NOTE — ED TRIAGE NOTES
Pt had an appt today for an endoscopy.  MD did not do procedure today because pt is having swelling and pain to left arm.  Also has small spot of swelling to left leg.  No hx of blood clots.  Swelling started about 3 days.  No new activities or injury.

## 2020-09-23 NOTE — ED TRIAGE NOTES
"Pt reports tingling or \"different sensation to left arm and intermittently travels down through left leg.\"  "

## 2020-09-24 NOTE — DISCHARGE INSTRUCTIONS
No clear cause for your pain or edema was found today, though thankfully your work-up was essentially negative.  You have no evidence of a blood clot, no evidence of an infection, and I do think that you are stable for outpatient management.  Please do follow with your regular provider as we discussed.

## 2020-12-07 ENCOUNTER — TELEPHONE (OUTPATIENT)
Dept: OBGYN | Facility: CLINIC | Age: 35
End: 2020-12-07

## 2020-12-07 DIAGNOSIS — R30.9 PAIN WITH URINATION: Primary | ICD-10-CM

## 2020-12-07 DIAGNOSIS — N39.0 URINARY TRACT INFECTION: ICD-10-CM

## 2020-12-07 RX ORDER — PHENAZOPYRIDINE HYDROCHLORIDE 200 MG/1
200 TABLET, FILM COATED ORAL 3 TIMES DAILY PRN
Qty: 6 TABLET | Refills: 0 | Status: SHIPPED | OUTPATIENT
Start: 2020-12-07 | End: 2021-04-19

## 2020-12-07 RX ORDER — SULFAMETHOXAZOLE/TRIMETHOPRIM 800-160 MG
1 TABLET ORAL 2 TIMES DAILY
Qty: 6 TABLET | Refills: 0 | Status: SHIPPED | OUTPATIENT
Start: 2020-12-07 | End: 2020-12-09

## 2020-12-07 NOTE — TELEPHONE ENCOUNTER
Message received from patient regarding symptoms of UTI    Spoke with Zaina and she states she has been having painful urination with bladder spasm x3 days. She reports trying AZO OTC which did not help. She denies fever, flank pain. Patient denies being pregnant or currently breastfeeding.    Instructed patient that an antibiotic and medication for bladder pain/spasm is sent to her preferred pharmacy. Instructed patient to call if her symptoms have not resolved in 3 days. Instructed her to call if she has fever, chills, flank pain. Patient verbalized understanding and agreement, denies further questions. Preferred pharmacy confirmed.     Bactrim DS and pyridium ordered per protocol and sent to patient's preferred pharmacy.

## 2020-12-09 ENCOUNTER — HOSPITAL ENCOUNTER (EMERGENCY)
Facility: CLINIC | Age: 35
Discharge: HOME | End: 2020-12-09
Attending: EMERGENCY MEDICINE | Admitting: EMERGENCY MEDICINE
Payer: COMMERCIAL

## 2020-12-09 VITALS
RESPIRATION RATE: 16 BRPM | DIASTOLIC BLOOD PRESSURE: 75 MMHG | SYSTOLIC BLOOD PRESSURE: 121 MMHG | TEMPERATURE: 98 F | OXYGEN SATURATION: 98 % | HEART RATE: 72 BPM

## 2020-12-09 DIAGNOSIS — N30.00 ACUTE CYSTITIS WITHOUT HEMATURIA: ICD-10-CM

## 2020-12-09 LAB
ALBUMIN UR-MCNC: 10 MG/DL
ANION GAP SERPL CALCULATED.3IONS-SCNC: 2 MMOL/L (ref 3–14)
APPEARANCE UR: CLEAR
BACTERIA #/AREA URNS HPF: ABNORMAL /HPF
BASOPHILS # BLD AUTO: 0 10E9/L (ref 0–0.2)
BASOPHILS NFR BLD AUTO: 0.6 %
BILIRUB UR QL STRIP: ABNORMAL
BUN SERPL-MCNC: 11 MG/DL (ref 7–30)
C TRACH DNA SPEC QL NAA+PROBE: NEGATIVE
CALCIUM SERPL-MCNC: 8.8 MG/DL (ref 8.5–10.1)
CHLORIDE SERPL-SCNC: 106 MMOL/L (ref 94–109)
CO2 SERPL-SCNC: 29 MMOL/L (ref 20–32)
COLOR UR AUTO: ABNORMAL
CREAT SERPL-MCNC: 1.02 MG/DL (ref 0.52–1.04)
DIFFERENTIAL METHOD BLD: NORMAL
EOSINOPHIL # BLD AUTO: 0.1 10E9/L (ref 0–0.7)
EOSINOPHIL NFR BLD AUTO: 2.1 %
ERYTHROCYTE [DISTWIDTH] IN BLOOD BY AUTOMATED COUNT: 12.5 % (ref 10–15)
GFR SERPL CREATININE-BSD FRML MDRD: 71 ML/MIN/{1.73_M2}
GLUCOSE SERPL-MCNC: 113 MG/DL (ref 70–99)
GLUCOSE UR STRIP-MCNC: NEGATIVE MG/DL
HCG SERPL QL: NEGATIVE
HCT VFR BLD AUTO: 35.6 % (ref 35–47)
HGB BLD-MCNC: 11.7 G/DL (ref 11.7–15.7)
HGB UR QL STRIP: NEGATIVE
IMM GRANULOCYTES # BLD: 0 10E9/L (ref 0–0.4)
IMM GRANULOCYTES NFR BLD: 0.2 %
KETONES UR STRIP-MCNC: NEGATIVE MG/DL
LEUKOCYTE ESTERASE UR QL STRIP: NEGATIVE
LYMPHOCYTES # BLD AUTO: 2.5 10E9/L (ref 0.8–5.3)
LYMPHOCYTES NFR BLD AUTO: 47.3 %
MCH RBC QN AUTO: 28.1 PG (ref 26.5–33)
MCHC RBC AUTO-ENTMCNC: 32.9 G/DL (ref 31.5–36.5)
MCV RBC AUTO: 86 FL (ref 78–100)
MONOCYTES # BLD AUTO: 0.3 10E9/L (ref 0–1.3)
MONOCYTES NFR BLD AUTO: 5.8 %
MUCOUS THREADS #/AREA URNS LPF: PRESENT /LPF
N GONORRHOEA DNA SPEC QL NAA+PROBE: NEGATIVE
NEUTROPHILS # BLD AUTO: 2.4 10E9/L (ref 1.6–8.3)
NEUTROPHILS NFR BLD AUTO: 44 %
NITRATE UR QL: POSITIVE
NRBC # BLD AUTO: 0 10*3/UL
NRBC BLD AUTO-RTO: 0 /100
PH UR STRIP: 6 PH (ref 5–7)
PLATELET # BLD AUTO: 186 10E9/L (ref 150–450)
POTASSIUM SERPL-SCNC: 3.6 MMOL/L (ref 3.4–5.3)
RBC # BLD AUTO: 4.16 10E12/L (ref 3.8–5.2)
RBC #/AREA URNS AUTO: 2 /HPF (ref 0–2)
SODIUM SERPL-SCNC: 137 MMOL/L (ref 133–144)
SOURCE: ABNORMAL
SP GR UR STRIP: 1.01 (ref 1–1.03)
SPECIMEN SOURCE: NORMAL
SQUAMOUS #/AREA URNS AUTO: <1 /HPF (ref 0–1)
UROBILINOGEN UR STRIP-MCNC: 4 MG/DL (ref 0–2)
WBC # BLD AUTO: 5.4 10E9/L (ref 4–11)
WBC #/AREA URNS AUTO: 7 /HPF (ref 0–5)
WET PREP SPEC: NORMAL

## 2020-12-09 PROCEDURE — 85025 COMPLETE CBC W/AUTO DIFF WBC: CPT | Performed by: EMERGENCY MEDICINE

## 2020-12-09 PROCEDURE — 87210 SMEAR WET MOUNT SALINE/INK: CPT | Performed by: EMERGENCY MEDICINE

## 2020-12-09 PROCEDURE — 99283 EMERGENCY DEPT VISIT LOW MDM: CPT

## 2020-12-09 PROCEDURE — 250N000013 HC RX MED GY IP 250 OP 250 PS 637: Performed by: EMERGENCY MEDICINE

## 2020-12-09 PROCEDURE — 81001 URINALYSIS AUTO W/SCOPE: CPT | Performed by: EMERGENCY MEDICINE

## 2020-12-09 PROCEDURE — 84703 CHORIONIC GONADOTROPIN ASSAY: CPT | Performed by: EMERGENCY MEDICINE

## 2020-12-09 PROCEDURE — 87491 CHLMYD TRACH DNA AMP PROBE: CPT | Performed by: EMERGENCY MEDICINE

## 2020-12-09 PROCEDURE — 87591 N.GONORRHOEAE DNA AMP PROB: CPT | Performed by: EMERGENCY MEDICINE

## 2020-12-09 PROCEDURE — 87086 URINE CULTURE/COLONY COUNT: CPT | Performed by: EMERGENCY MEDICINE

## 2020-12-09 PROCEDURE — 80048 BASIC METABOLIC PNL TOTAL CA: CPT | Performed by: EMERGENCY MEDICINE

## 2020-12-09 PROCEDURE — 99284 EMERGENCY DEPT VISIT MOD MDM: CPT

## 2020-12-09 RX ORDER — CEPHALEXIN 500 MG/1
500 CAPSULE ORAL 2 TIMES DAILY
Qty: 20 CAPSULE | Refills: 0 | Status: SHIPPED | OUTPATIENT
Start: 2020-12-09 | End: 2020-12-19

## 2020-12-09 RX ORDER — IBUPROFEN 600 MG/1
600 TABLET, FILM COATED ORAL ONCE
Status: COMPLETED | OUTPATIENT
Start: 2020-12-09 | End: 2020-12-09

## 2020-12-09 RX ORDER — ACETAMINOPHEN 500 MG
1000 TABLET ORAL ONCE
Status: COMPLETED | OUTPATIENT
Start: 2020-12-09 | End: 2020-12-09

## 2020-12-09 RX ADMIN — IBUPROFEN 600 MG: 600 TABLET, FILM COATED ORAL at 03:37

## 2020-12-09 RX ADMIN — ACETAMINOPHEN 1000 MG: 500 TABLET, FILM COATED ORAL at 02:27

## 2020-12-09 ASSESSMENT — ENCOUNTER SYMPTOMS
VOMITING: 0
BACK PAIN: 1
FEVER: 0
NAUSEA: 0

## 2020-12-09 NOTE — RESULT ENCOUNTER NOTE
Emergency Dept/Urgent Care discharge antibiotic (if prescribed): Cephalexin (Keflex) 500 mg capsule, 1 capsule (500 mg) by mouth 2 times daily for 10 days.  Date of Rx (if applicable):  12/9/20  No changes in treatment per Urine culture protocol.

## 2020-12-09 NOTE — ED AVS SNAPSHOT
[facility] Emergency Dept  [address] [facility] [address]  Phone: [phone]  Fax: [phone]                                    Zaina Tan   MRN: [id number]    Department: [facility] Emergency Dept   Date of Visit: 12/9/2020           After Visit Summary Signature Page    I have received my discharge instructions, and my questions have been answered. I have discussed any challenges I see with this plan with the nurse or doctor.    ..........................................................................................................................................  Patient/Patient Representative Signature      ..........................................................................................................................................  Patient Representative Print Name and Relationship to Patient    ..................................................               ................................................  Date                                   Time    ..........................................................................................................................................  Reviewed by Signature/Title    ...................................................              ..............................................  Date                                               Time          22EPIC Rev 08/18

## 2020-12-09 NOTE — ED TRIAGE NOTES
Patient was diagnosed with UTI on Friday; prescribed Bactrim and Pyridium.  Patient presents today for bilateral lower back pain and persistent discomfort.

## 2020-12-09 NOTE — ED PROVIDER NOTES
History     Chief Complaint:  Back pain and vaginal pain     MELCHOR Tan is a 35 year old female who presents with back pain and vaginal pain. The patient was diagnosed with a UTI 5 days ago and was started on Bactrim and pyridium 2 days ago. She states that she is still having discomfort, itching, and pain in her vagina. She also reports having lower back pain after she returned from work tonight. The patient reports taking some Tylenol yesterday afternoon with some improvement. She denies any fever, nausea, or vomiting.     Allergies:  No Known Allergies     Medications:    Pyridium   Bactrim     Past Medical History:    Vitreous syneresis     Past Surgical History:       D&C    Family History:    Hypertension   Heart disease     Social History:  Smoking Status: Never Smoker  Smokeless Tobacco: Never Used  Alcohol Use: No  Drug Use: No  PCP: Megan Henry     Review of Systems   Constitutional: Negative for fever.   Gastrointestinal: Negative for nausea and vomiting.   Genitourinary: Positive for vaginal pain.        Vagina discomfort and itching   Musculoskeletal: Positive for back pain (lower).   All other systems reviewed and are negative.        Physical Exam     Patient Vitals for the past 24 hrs:   BP Temp Temp src Pulse Resp SpO2   20 0207 105/80 98  F (36.7  C) Oral 74 16 98 %        Physical Exam  General: Appears well-developed and well-nourished.   Head: No signs of trauma.   CV: Normal rate and regular rhythm.    Resp: Effort normal and breath sounds normal. No respiratory distress.   GI: Soft. There is no tenderness.  No rebound or guarding.  Normal bowel sounds.  No CVA tenderness.  Pelvic:  No vaginal lesions.  No abnormal discharge.    MSK: Normal range of motion.  Neuro: The patient is alert and oriented.  Speech normal.  Skin: Skin is warm and dry. No rash noted.   Psych: normal mood and affect. behavior is normal.       Emergency Department Course      Laboratory:  Laboratory findings were communicated with the patient who voiced understanding of the findings.    Wet prep: wnl     Chlamydia trachomatis PCR: Pending   Neisseria gonorrhoea PCR: Pending     CBC:  WBC 5.4, HGB 11.7, , o/w WNL     BMP: Glucose 113 (H), anion gap 2 (L), o/w WNL (Creatinine: 1.02)     HCG Qualitative blood: negative     UA with micro: UrineBilin small (A) Protein Albumin Urine 10 (A) Urobilinogen mg/dL 4.0 (H) Nitrite positive (A) Bacteria few (A) WBC/HPF 7 (H) Mucous urine present (A)  o/w wnl/negative       Urine culture: pending     Interventions:  0227 Tylenol 1000 mg oral     Emergency Department Course:  Past medical records, nursing notes, and vitals reviewed.    0203 I performed an exam of the patient as documented above.    IV was inserted and blood was drawn for laboratory testing, results above.     The patient provided a urine sample here in the emergency department. This was sent for laboratory testing, findings above.      0249 Patient rechecked and updated.      0326 Patient rechecked and updated.      Findings and plan explained to the Patient. Patient discharged home with instructions regarding supportive care, medications, and reasons to return. The importance of close follow-up was reviewed. The patient was prescribed keflex.       Impression & Plan     Medical Decision Making:  Zaina Tan is a 35 year old female who presents to the emergency department today with back pain and dysuria.  She has been having some UTI type symptoms and had called in and received a prescription for Bactrim and Pyridium.  She developed some body aches and back pain and so she came to the ER. On my evaluation there is no significant CVA tenderness that I was able to elicit.  She did report some vaginal discomfort so I did do a pelvic exam and this did not show any signs of any lesions or abnormal discharge and a wet prep was negative.  UA did show some signs of possible  infection with positive positive nitrate although the patient has been on antibiotics for the last few days.  Patient symptoms are concerning for the possibility of early pyelonephritis and I felt was reasonable to switch her to Keflex for more extended period and recommended that she continue with Tylenol along with ibuprofen for symptomatic control and follow-up in clinic. I did not feel that admission was necessary at this time as her symptoms were overall well controlled.  I did instruct her to return for any worsening symptoms or further concerns.        Discharge Diagnosis:    ICD-10-CM    1. Acute cystitis without hematuria  N30.00        Disposition:  Discharged to home.    Discharge Medications:  New Prescriptions    CEPHALEXIN (KEFLEX) 500 MG CAPSULE    Take 1 capsule (500 mg) by mouth 2 times daily for 10 days       Scribe Disclosure:  I, Dorian De La Rosa, am serving as a scribe at 2:03 AM on 12/9/2020 to document services personally performed by Arsh Holcomb MD based on my observations and the provider's statements to me.      12/9/2020   Arsh Holcomb MD Bergenstal, John A, MD  12/09/20 0619

## 2020-12-09 NOTE — RESULT ENCOUNTER NOTE
Final result for both N. Gonorrhoeae PCR and Chlamydia Trachomatis PCR are NEGATIVE.  No treatment or change in treatment per [facility] Result protocol.

## 2020-12-10 LAB
BACTERIA SPEC CULT: NO GROWTH
Lab: NORMAL
SPECIMEN SOURCE: NORMAL

## 2021-04-19 ENCOUNTER — ANCILLARY PROCEDURE (OUTPATIENT)
Dept: ULTRASOUND IMAGING | Facility: CLINIC | Age: 36
End: 2021-04-19
Attending: OBSTETRICS & GYNECOLOGY
Payer: COMMERCIAL

## 2021-04-19 ENCOUNTER — OFFICE VISIT (OUTPATIENT)
Dept: OBGYN | Facility: CLINIC | Age: 36
End: 2021-04-19
Attending: MIDWIFE
Payer: COMMERCIAL

## 2021-04-19 VITALS
DIASTOLIC BLOOD PRESSURE: 81 MMHG | BODY MASS INDEX: 30.64 KG/M2 | HEIGHT: 62 IN | SYSTOLIC BLOOD PRESSURE: 132 MMHG | WEIGHT: 166.5 LBS | HEART RATE: 69 BPM

## 2021-04-19 DIAGNOSIS — O09.529 HIGH-RISK PREGNANCY, ELDERLY MULTIGRAVIDA, UNSPECIFIED TRIMESTER: Primary | ICD-10-CM

## 2021-04-19 DIAGNOSIS — Z86.32 HISTORY OF GESTATIONAL DIABETES MELLITUS (GDM) IN PRIOR PREGNANCY, CURRENTLY PREGNANT: ICD-10-CM

## 2021-04-19 DIAGNOSIS — Z98.891 HISTORY OF CESAREAN DELIVERY: ICD-10-CM

## 2021-04-19 DIAGNOSIS — Z34.91 VIABLE PREGNANCY IN FIRST TRIMESTER: ICD-10-CM

## 2021-04-19 DIAGNOSIS — O09.291 HISTORY OF FETAL ANOMALY IN PRIOR PREGNANCY, CURRENTLY PREGNANT IN FIRST TRIMESTER: ICD-10-CM

## 2021-04-19 DIAGNOSIS — O09.299 HISTORY OF GESTATIONAL DIABETES MELLITUS (GDM) IN PRIOR PREGNANCY, CURRENTLY PREGNANT: ICD-10-CM

## 2021-04-19 LAB
ABO + RH BLD: NORMAL
ABO + RH BLD: NORMAL
ALT SERPL W P-5'-P-CCNC: 18 U/L (ref 0–50)
AST SERPL W P-5'-P-CCNC: 7 U/L (ref 0–45)
BASOPHILS # BLD AUTO: 0 10E9/L (ref 0–0.2)
BASOPHILS NFR BLD AUTO: 0.2 %
BLD GP AB SCN SERPL QL: NORMAL
BLOOD BANK CMNT PATIENT-IMP: NORMAL
CREAT SERPL-MCNC: 0.61 MG/DL (ref 0.52–1.04)
CREAT UR-MCNC: 108 MG/DL
DIFFERENTIAL METHOD BLD: NORMAL
EOSINOPHIL # BLD AUTO: 0.1 10E9/L (ref 0–0.7)
EOSINOPHIL NFR BLD AUTO: 0.6 %
ERYTHROCYTE [DISTWIDTH] IN BLOOD BY AUTOMATED COUNT: 13.5 % (ref 10–15)
GFR SERPL CREATININE-BSD FRML MDRD: >90 ML/MIN/{1.73_M2}
HCT VFR BLD AUTO: 40.8 % (ref 35–47)
HGB BLD-MCNC: 13.6 G/DL (ref 11.7–15.7)
IMM GRANULOCYTES # BLD: 0 10E9/L (ref 0–0.4)
IMM GRANULOCYTES NFR BLD: 0.3 %
LYMPHOCYTES # BLD AUTO: 2.1 10E9/L (ref 0.8–5.3)
LYMPHOCYTES NFR BLD AUTO: 22.8 %
MCH RBC QN AUTO: 28.9 PG (ref 26.5–33)
MCHC RBC AUTO-ENTMCNC: 33.3 G/DL (ref 31.5–36.5)
MCV RBC AUTO: 87 FL (ref 78–100)
MONOCYTES # BLD AUTO: 0.7 10E9/L (ref 0–1.3)
MONOCYTES NFR BLD AUTO: 8.2 %
NEUTROPHILS # BLD AUTO: 6.2 10E9/L (ref 1.6–8.3)
NEUTROPHILS NFR BLD AUTO: 67.9 %
NRBC # BLD AUTO: 0 10*3/UL
NRBC BLD AUTO-RTO: 0 /100
PLATELET # BLD AUTO: 253 10E9/L (ref 150–450)
PROT UR-MCNC: 0.09 G/L
PROT/CREAT 24H UR: 0.08 G/G CR (ref 0–0.2)
RBC # BLD AUTO: 4.71 10E12/L (ref 3.8–5.2)
SPECIMEN EXP DATE BLD: NORMAL
TSH SERPL DL<=0.005 MIU/L-ACNC: 0.85 MU/L (ref 0.4–4)
WBC # BLD AUTO: 9.1 10E9/L (ref 4–11)

## 2021-04-19 PROCEDURE — 86780 TREPONEMA PALLIDUM: CPT | Performed by: MIDWIFE

## 2021-04-19 PROCEDURE — 36415 COLL VENOUS BLD VENIPUNCTURE: CPT | Performed by: MIDWIFE

## 2021-04-19 PROCEDURE — 86706 HEP B SURFACE ANTIBODY: CPT | Performed by: MIDWIFE

## 2021-04-19 PROCEDURE — 84450 TRANSFERASE (AST) (SGOT): CPT | Performed by: MIDWIFE

## 2021-04-19 PROCEDURE — 99207 PR PRENATAL VISIT: CPT | Performed by: MIDWIFE

## 2021-04-19 PROCEDURE — 86803 HEPATITIS C AB TEST: CPT | Performed by: MIDWIFE

## 2021-04-19 PROCEDURE — 87389 HIV-1 AG W/HIV-1&-2 AB AG IA: CPT | Performed by: MIDWIFE

## 2021-04-19 PROCEDURE — G0463 HOSPITAL OUTPT CLINIC VISIT: HCPCS | Mod: 25

## 2021-04-19 PROCEDURE — 82565 ASSAY OF CREATININE: CPT | Performed by: MIDWIFE

## 2021-04-19 PROCEDURE — 86850 RBC ANTIBODY SCREEN: CPT | Performed by: MIDWIFE

## 2021-04-19 PROCEDURE — 84443 ASSAY THYROID STIM HORMONE: CPT | Performed by: MIDWIFE

## 2021-04-19 PROCEDURE — 87086 URINE CULTURE/COLONY COUNT: CPT | Performed by: MIDWIFE

## 2021-04-19 PROCEDURE — 76817 TRANSVAGINAL US OBSTETRIC: CPT | Mod: 26 | Performed by: OBSTETRICS & GYNECOLOGY

## 2021-04-19 PROCEDURE — 84460 ALANINE AMINO (ALT) (SGPT): CPT | Performed by: MIDWIFE

## 2021-04-19 PROCEDURE — 83021 HEMOGLOBIN CHROMOTOGRAPHY: CPT | Performed by: MIDWIFE

## 2021-04-19 PROCEDURE — 86762 RUBELLA ANTIBODY: CPT | Performed by: MIDWIFE

## 2021-04-19 PROCEDURE — 84156 ASSAY OF PROTEIN URINE: CPT | Performed by: MIDWIFE

## 2021-04-19 PROCEDURE — 86901 BLOOD TYPING SEROLOGIC RH(D): CPT | Performed by: MIDWIFE

## 2021-04-19 PROCEDURE — 76817 TRANSVAGINAL US OBSTETRIC: CPT

## 2021-04-19 PROCEDURE — 85025 COMPLETE CBC W/AUTO DIFF WBC: CPT | Performed by: MIDWIFE

## 2021-04-19 PROCEDURE — 82306 VITAMIN D 25 HYDROXY: CPT | Performed by: MIDWIFE

## 2021-04-19 PROCEDURE — 87340 HEPATITIS B SURFACE AG IA: CPT | Performed by: MIDWIFE

## 2021-04-19 PROCEDURE — 86900 BLOOD TYPING SEROLOGIC ABO: CPT | Performed by: MIDWIFE

## 2021-04-19 RX ORDER — CYANOCOBALAMIN (VITAMIN B-12) 500 MCG
TABLET ORAL
COMMUNITY
End: 2021-11-18

## 2021-04-19 RX ORDER — PRENATAL VIT/IRON FUM/FOLIC AC 27MG-0.8MG
1 TABLET ORAL DAILY
Status: ON HOLD | COMMUNITY
End: 2021-12-22

## 2021-04-19 ASSESSMENT — PAIN SCALES - GENERAL: PAINLEVEL: NO PAIN (0)

## 2021-04-19 ASSESSMENT — MIFFLIN-ST. JEOR: SCORE: 1398.62

## 2021-04-19 NOTE — LETTER
April 19, 2021    To Whom It May Concern:    Zaina Tan is being seen in our clinic for prenatal care.      Her Estimated Date of Delivery: Nov 27, 2021.    LMP:  Patient's last menstrual period was 02/20/2021.     Sincerely,        RERE Chau, YUNIOR

## 2021-04-19 NOTE — PROGRESS NOTES
[facility] OB Intake note  Subjective   35 year old femalepresents to clinic for initiation of OB care. Patient's last menstrual period was 2021.  at Unknown by Estimated Date of Delivery: 2021 based on LMP. Reviewed dating ultrasound. Pregnancy is planned.    Partner name - prefers not to have name in chart. Reports good support and is same father as other children. Feels safe in relationship.       Symptoms since LMP include breast tenderness and fatigue. Occs vomiting while brushing teeth, but then feels fine. Patient has tried these relief measures: diet modification and increased rest. Hasn't been able to eat sweets.       - Plans  birth with this pregnancy  - 2017 C/S- for minimal cervical change from 6 to 7 cm over 6 hours and cat 2 RFD. PreE without severe features diagnosed in labor. 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     - Genetic/Infection questionnaire completed, risks include A.A.. Pt  does not have a recent known exposure to Parvo or CMV so IgG/IgM testing WILL NOT be ordered.   Recommended Flu Vaccine.  Offer at next visit  Have you traveled during the pregnancy?No  Have your sexual partner(s) travelled during the pregnancy?No    - Current Medications    Current Outpatient Medications   Medication Sig Dispense Refill     folic acid 0.8 MG CAPS        Prenatal Vit-Fe Fumarate-FA (PRENATAL MULTIVITAMIN W/IRON) 27-0.8 MG tablet Take 1 tablet by mouth daily       meclizine (ANTIVERT) 25 MG tablet Take 1/2 tablet 3x/day for 10 days then prn for symptoms 30 tablet 1         - Co-morbids    Past Medical History:   Diagnosis Date     Vitreous syneresis      - Risk for GDM : Pre pregnancy BMI>30 so  WILL have an early GCT and Hgb A1C    - High risk factors for Pre E-  History of Pre Eclampsia       - Moderate risk factor for Pre E  Age =35 years or older  and Pre Pregnancy body mass index >30   Meets one high risk factors or two  of the moderate risk facrtors  so WILL consider starting low dose aspirin (81mg) starting between 12 and 28 weeks to prevent early onset preeclampsia    - The patient  does not have a history of spontaneous  birth so  WILL NOT consider progesterone starting at 16-20 weeks and/or serial transvaginal cervical length ultrasounds from 16-24 weeks.     -The patient does not have a history of immunosuppresion or HIV so Toxoplasma IgG/IgM WILL NOT be ordered.    PERSONAL/SOCIAL HISTORY  Partnered with children and their dad.   Employment: Full time as a CNA.  Job involves moderate activity.  History of anxiety or depression  None  Additional items: Has been on WIC    Objective  -VS: reviewed and within normal limits   -General appearance: no acute distress, patient is comfortable   NEUROLOGICAL/PSYCHIATRIC   - Orientated x3,   -Mood and affect: : normal     Assessment/Plan  Zaina was seen today for prenatal care.    Diagnoses and all orders for this visit:    High-risk pregnancy, elderly multigravida, unspecified trimester  -     Vitamin D Deficiency  -     ABO/Rh type and screen  -     CBC with platelets differential  -     Hepatitis B surface antigen  -     Hepatitis C antibody  -     HIV Antigen Antibody Combo  -     Rubella Antibody IgG Quantitative  -     Treponema Abs w Reflex to RPR and Titer  -     Urine Culture Aerobic Bacterial  -     Hepatitis B Surface Antibody  -     MAT FETAL MED CTR REFERRAL-PREGNANCY  -     ALT  -     AST  -     Protein  random urine with Creat Ratio  -     Creatinine  -     HGB Eval Reflex to ELP or RBC Solubility  -     TSH with free T4 reflex  -     Creatinine urine calculation only    History of fetal anomaly in prior pregnancy, currently pregnant in first trimester    History of gestational diabetes mellitus (GDM) in prior pregnancy, currently pregnant    History of   delivery        35 year old  Unknown weeks of pregnancy with FAITH of 2021 by LMP of Patient's last menstrual period was 2021.. Ultrasound confirms.   Outpatient Encounter Medications as of 2021   Medication Sig Dispense Refill     folic acid 0.8 MG CAPS        Prenatal Vit-Fe Fumarate-FA (PRENATAL MULTIVITAMIN W/IRON) 27-0.8 MG tablet Take 1 tablet by mouth daily       meclizine (ANTIVERT) 25 MG tablet Take 1/2 tablet 3x/day for 10 days then prn for symptoms 30 tablet 1     [DISCONTINUED] Cyanocobalamin (VITAMIN B 12) 100 MCG LOZG Take 1 tablet by mouth daily       [DISCONTINUED] Multiple Vitamins-Minerals (MULTIVITAMIN ADULT) CHEW        [DISCONTINUED] phenazopyridine (PYRIDIUM) 200 MG tablet Take 1 tablet (200 mg) by mouth 3 times daily as needed for irritation 6 tablet 0     No facility-administered encounter medications on file as of 2021.       Orders Placed This Encounter   Procedures     Vitamin D Deficiency     CBC with platelets differential     Hepatitis B surface antigen     Hepatitis C antibody     HIV Antigen Antibody Combo     Rubella Antibody IgG Quantitative     Treponema Abs w Reflex to RPR and Titer     Hepatitis B Surface Antibody     ALT     AST     Protein  random urine with Creat Ratio     Creatinine     HGB Eval Reflex to ELP or RBC Solubility     TSH with free T4 reflex     Creatinine urine calculation only     MAT FETAL MED CTR REFERRAL-PREGNANCY     ABO/Rh type and screen     - Oriented to Practice, types of care, and how to reach a provider.  Pt prefers Undecided between CNM and MD, will continue to consider.   Would like to see both. Has relationship with CNMs, but is planning a repeat C/S.   - Patient received 1st trimester new OB education packet complete with aide of [employer] booklet including information on genetic screening test options.  - Patient desires 1st trimester screening which was ordered. Also considering amniocentesis.   -  Educational handout on the prevention of infections diseases during pregnancy provided.  - Patient was encouraged to start prenatal vitamins as tolerated.    - Patient was sent to lab for routine OB labs including PreE baseline labs and TSH at patient request due to history of enlarged thyroid on exam.   - Reviewed risk for diabetes in pregnancy, pt agrees to early 1 hour- plan for NOB visit.  - Reviewed recommendation for low dose aspirin daily to prevent pre eclampsia, pt agrees, follow up at NOB visit.   - Pregnancy concerns to be addressed by provider at new OB exam include: previous C/S x1 (plans repeat), early 1 hour glucose, start aspirin, pap due.     Pt to RTO for NOB visit in 4 weeks and prn if questions or concerns    RERE Chau CNM

## 2021-04-19 NOTE — LETTER
2021       RE: Zaina Tan  [address]  [facility] [place] [address]     Dear Colleague,    Thank you for referring your patient, Zaina Tan, to the [facility] [place] at [facility]. Please see a copy of my visit note below.    [facility] OB Intake note  Subjective   35 year old femalepresents to clinic for initiation of OB care. Patient's last menstrual period was 2021.  at Unknown by Estimated Date of Delivery: 2021 based on LMP. Reviewed dating ultrasound. Pregnancy is planned.    Partner name - prefers not to have name in chart. Reports good support and is same father as other children. Feels safe in relationship.       Symptoms since LMP include breast tenderness and fatigue. Occs vomiting while brushing teeth, but then feels fine. Patient has tried these relief measures: diet modification and increased rest. Hasn't been able to eat sweets.       - Plans  birth with this pregnancy  - 2017 C/S- for minimal cervical change from 6 to 7 cm over 6 hours and cat 2 RFD. PreE without severe features diagnosed in labor. 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     - Genetic/Infection questionnaire completed, risks include A.A.. Pt  does not have a recent known exposure to Parvo or CMV so IgG/IgM testing WILL NOT be ordered.   Recommended Flu Vaccine.  Offer at next visit  Have you traveled during the pregnancy?No  Have your sexual partner(s) travelled during the pregnancy?No    - Current Medications    Current Outpatient Medications   Medication Sig Dispense Refill     folic acid 0.8 MG CAPS        Prenatal Vit-Fe Fumarate-FA (PRENATAL MULTIVITAMIN W/IRON) 27-0.8 MG tablet Take 1 tablet by mouth daily       meclizine (ANTIVERT) 25 MG tablet Take 1/2 tablet  3x/day for 10 days then prn for symptoms 30 tablet 1         - Co-morbids    Past Medical History:   Diagnosis Date     Vitreous syneresis      - Risk for GDM : Pre pregnancy BMI>30 so  WILL have an early GCT and Hgb A1C    - High risk factors for Pre E-  History of Pre Eclampsia       - Moderate risk factor for Pre E Age =35 years or older  and Pre Pregnancy body mass index >30   Meets one high risk factors or two  of the moderate risk facrtors  so WILL consider starting low dose aspirin (81mg) starting between 12 and 28 weeks to prevent early onset preeclampsia    - The patient  does not have a history of spontaneous  birth so  WILL NOT consider progesterone starting at 16-20 weeks and/or serial transvaginal cervical length ultrasounds from 16-24 weeks.     -The patient does not have a history of immunosuppresion or HIV so Toxoplasma IgG/IgM WILL NOT be ordered.    PERSONAL/SOCIAL HISTORY  Partnered with children and their dad.   Employment: Full time as a CNA.  Job involves moderate activity.  History of anxiety or depression  None  Additional items: Has been on WIC    Objective  -VS: reviewed and within normal limits   -General appearance: no acute distress, patient is comfortable   NEUROLOGICAL/PSYCHIATRIC   - Orientated x3,   -Mood and affect: : normal     Assessment/Plan  Zaina was seen today for prenatal care.    Diagnoses and all orders for this visit:    High-risk pregnancy, elderly multigravida, unspecified trimester  -     Vitamin D Deficiency  -     ABO/Rh type and screen  -     CBC with platelets differential  -     Hepatitis B surface antigen  -     Hepatitis C antibody  -     HIV Antigen Antibody Combo  -     Rubella Antibody IgG Quantitative  -     Treponema Abs w Reflex to RPR and Titer  -     Urine Culture Aerobic Bacterial  -     Hepatitis B Surface Antibody  -     MAT FETAL MED CTR REFERRAL-PREGNANCY  -     ALT  -     AST  -     Protein  random urine with Creat Ratio  -     Creatinine  -      HGB Eval Reflex to ELP or RBC Solubility  -     TSH with free T4 reflex  -     Creatinine urine calculation only    History of fetal anomaly in prior pregnancy, currently pregnant in first trimester    History of gestational diabetes mellitus (GDM) in prior pregnancy, currently pregnant    History of  delivery        35 year old  Unknown weeks of pregnancy with FAITH of 2021 by LMP of Patient's last menstrual period was 2021.. Ultrasound confirms.   Outpatient Encounter Medications as of 2021   Medication Sig Dispense Refill     folic acid 0.8 MG CAPS        Prenatal Vit-Fe Fumarate-FA (PRENATAL MULTIVITAMIN W/IRON) 27-0.8 MG tablet Take 1 tablet by mouth daily       meclizine (ANTIVERT) 25 MG tablet Take 1/2 tablet 3x/day for 10 days then prn for symptoms 30 tablet 1     [DISCONTINUED] Cyanocobalamin (VITAMIN B 12) 100 MCG LOZG Take 1 tablet by mouth daily       [DISCONTINUED] Multiple Vitamins-Minerals (MULTIVITAMIN ADULT) CHEW        [DISCONTINUED] phenazopyridine (PYRIDIUM) 200 MG tablet Take 1 tablet (200 mg) by mouth 3 times daily as needed for irritation 6 tablet 0     No facility-administered encounter medications on file as of 2021.       Orders Placed This Encounter   Procedures     Vitamin D Deficiency     CBC with platelets differential     Hepatitis B surface antigen     Hepatitis C antibody     HIV Antigen Antibody Combo     Rubella Antibody IgG Quantitative     Treponema Abs w Reflex to RPR and Titer     Hepatitis B Surface Antibody     ALT     AST     Protein  random urine with Creat Ratio     Creatinine     HGB Eval Reflex to ELP or RBC Solubility     TSH with free T4 reflex     Creatinine urine calculation only     MAT FETAL MED CTR REFERRAL-PREGNANCY     ABO/Rh type and screen     - Oriented to Practice, types of care, and how to reach a provider.  Pt prefers Undecided between CNM and MD, will continue to consider.   Would like to see both. Has relationship  with CNMs, but is planning a repeat C/S.   - Patient received 1st trimester new OB education packet complete with aide of [employer] booklet including information on genetic screening test options.  - Patient desires 1st trimester screening which was ordered. Also considering amniocentesis.   - Educational handout on the prevention of infections diseases during pregnancy provided.  - Patient was encouraged to start prenatal vitamins as tolerated.    - Patient was sent to lab for routine OB labs including PreE baseline labs and TSH at patient request due to history of enlarged thyroid on exam.   - Reviewed risk for diabetes in pregnancy, pt agrees to early 1 hour- plan for NOB visit.  - Reviewed recommendation for low dose aspirin daily to prevent pre eclampsia, pt agrees, follow up at NOB visit.   - Pregnancy concerns to be addressed by provider at new OB exam include: previous C/S x1 (plans repeat), early 1 hour glucose, start aspirin, pap due.     Pt to RTO for NOB visit in 4 weeks and prn if questions or concerns    RERE Chau CNM

## 2021-04-20 ENCOUNTER — TRANSCRIBE ORDERS (OUTPATIENT)
Dept: MATERNAL FETAL MEDICINE | Facility: CLINIC | Age: 36
End: 2021-04-20

## 2021-04-20 DIAGNOSIS — O26.90 PREGNANCY RELATED CONDITION, ANTEPARTUM: Primary | ICD-10-CM

## 2021-04-20 PROBLEM — Z98.891 HISTORY OF CESAREAN DELIVERY: Status: ACTIVE | Noted: 2021-04-20

## 2021-04-20 PROBLEM — O09.529 HIGH-RISK PREGNANCY, ELDERLY MULTIGRAVIDA, UNSPECIFIED TRIMESTER: Status: ACTIVE | Noted: 2021-04-20

## 2021-04-20 PROBLEM — Z34.80 SUPERVISION OF NORMAL INTRAUTERINE PREGNANCY IN MULTIGRAVIDA: Status: RESOLVED | Noted: 2017-01-30 | Resolved: 2021-04-20

## 2021-04-20 LAB
BACTERIA SPEC CULT: NO GROWTH
DEPRECATED CALCIDIOL+CALCIFEROL SERPL-MC: 22 UG/L (ref 20–75)
HBV SURFACE AB SERPL IA-ACNC: >1000 M[IU]/ML
HBV SURFACE AG SERPL QL IA: NONREACTIVE
HCV AB SERPL QL IA: NONREACTIVE
HIV 1+2 AB+HIV1 P24 AG SERPL QL IA: NONREACTIVE
Lab: NORMAL
RUBV IGG SERPL IA-ACNC: 89 IU/ML
SPECIMEN SOURCE: NORMAL
T PALLIDUM AB SER QL: NONREACTIVE

## 2021-04-21 LAB
HGB A1 MFR BLD: 96.4 % (ref 95–97.9)
HGB A2 MFR BLD: 2.9 % (ref 2–3.5)
HGB C MFR BLD: 0 % (ref 0–0)
HGB E MFR BLD: 0 % (ref 0–0)
HGB F MFR BLD: 0.7 % (ref 0–2.1)
HGB FRACT BLD ELPH-IMP: NORMAL
HGB OTHER MFR BLD: 0 % (ref 0–0)
HGB S BLD QL SOLY: NORMAL
HGB S MFR BLD: 0 % (ref 0–0)
PATH INTERP BLD-IMP: NORMAL

## 2021-05-17 ENCOUNTER — OFFICE VISIT (OUTPATIENT)
Dept: OBGYN | Facility: CLINIC | Age: 36
End: 2021-05-17
Attending: ADVANCED PRACTICE MIDWIFE
Payer: COMMERCIAL

## 2021-05-17 VITALS
WEIGHT: 164.4 LBS | DIASTOLIC BLOOD PRESSURE: 73 MMHG | HEART RATE: 80 BPM | SYSTOLIC BLOOD PRESSURE: 115 MMHG | HEIGHT: 62 IN | BODY MASS INDEX: 30.25 KG/M2

## 2021-05-17 DIAGNOSIS — O09.529 HIGH-RISK PREGNANCY, ELDERLY MULTIGRAVIDA, UNSPECIFIED TRIMESTER: Primary | ICD-10-CM

## 2021-05-17 DIAGNOSIS — Z12.4 SCREENING FOR MALIGNANT NEOPLASM OF CERVIX: ICD-10-CM

## 2021-05-17 LAB — GLUCOSE 1H P 50 G GLC PO SERPL-MCNC: 148 MG/DL (ref 60–129)

## 2021-05-17 PROCEDURE — G0145 SCR C/V CYTO,THINLAYER,RESCR: HCPCS | Performed by: ADVANCED PRACTICE MIDWIFE

## 2021-05-17 PROCEDURE — 87591 N.GONORRHOEAE DNA AMP PROB: CPT | Performed by: ADVANCED PRACTICE MIDWIFE

## 2021-05-17 PROCEDURE — G0463 HOSPITAL OUTPT CLINIC VISIT: HCPCS

## 2021-05-17 PROCEDURE — 87624 HPV HI-RISK TYP POOLED RSLT: CPT | Performed by: ADVANCED PRACTICE MIDWIFE

## 2021-05-17 PROCEDURE — 99207 PR PRENATAL VISIT: CPT | Performed by: ADVANCED PRACTICE MIDWIFE

## 2021-05-17 PROCEDURE — 87491 CHLMYD TRACH DNA AMP PROBE: CPT | Performed by: ADVANCED PRACTICE MIDWIFE

## 2021-05-17 PROCEDURE — 36415 COLL VENOUS BLD VENIPUNCTURE: CPT | Performed by: ADVANCED PRACTICE MIDWIFE

## 2021-05-17 PROCEDURE — 82950 GLUCOSE TEST: CPT | Performed by: ADVANCED PRACTICE MIDWIFE

## 2021-05-17 ASSESSMENT — PAIN SCALES - GENERAL: PAINLEVEL: NO PAIN (0)

## 2021-05-17 ASSESSMENT — MIFFLIN-ST. JEOR: SCORE: 1388.96

## 2021-05-17 NOTE — LETTER
2021       RE: Zaina Tan  [address]  [place] [facility] [address]     Dear Colleague,    Thank you for referring your patient, Zaina Tan, to the [facility] [place] at [facility]. Please see a copy of my visit note below.    SUBJECTIVE:   Zaina is a 35 year old female who presents to clinic for a new OB visit.   at 12w2d with Estimated Date of Delivery: 2021 based on LMP. Feels well. Has started PNV.     She has not had bleeding since her LMP.   She has had mild nausea. Weight loss has occurred, for a total of 2 pounds. Just nauseated not vomiting, is trying not to gain too much weight as she had a hard time losing it after her last pregnancy.  This was a planned pregnancy.     Partnered but prefers not to list his name.   OTHER CONCERNS: No concerns, feeling quite well.     ===========================================   ROS: 10 point ROS neg other than the symptoms noted above in the HPI.      PSYCHIATRIC:  Denies mood changes - mood is good!    PHQ-9 score:    PHQ-9 SCORE 2019   PHQ-9 Total Score -   PHQ-9 Total Score 0     RODRIGO-7 SCORE 2017   Total Score 5 1       Past History:  Her past medical history   Past Medical History:   Diagnosis Date     Vitreous syneresis    .   She has a history of  prior  section  Since her last LMP she denies use of alcohol, tobacco and street drugs.  HISTORY:  Family History   Problem Relation Age of Onset     Hypertension Mother         had before pregnancy     Heart Disease Other         hypoplastic left heart. TAB 23 wks      Diabetes No family hx of      Cerebrovascular Disease No family hx of      Coronary Artery Disease No family hx of      Osteoporosis No family hx of      Anxiety Disorder No family hx of      Depression No family hx of      Social History     Socioeconomic History     Marital status: Single     Spouse name: Not on file      Number of children: Not on file     Years of education: Not on file     Highest education level: Not on file   Occupational History     Occupation: liu   Social Needs     Financial resource strain: Not on file     Food insecurity     Worry: Not on file     Inability: Not on file     Transportation needs     Medical: Not on file     Non-medical: Not on file   Tobacco Use     Smoking status: Never Smoker     Smokeless tobacco: Never Used   Substance and Sexual Activity     Alcohol use: No     Drug use: No     Sexual activity: Yes     Partners: Male     Birth control/protection: None   Lifestyle     Physical activity     Days per week: Not on file     Minutes per session: Not on file     Stress: Not on file   Relationships     Social connections     Talks on phone: Not on file     Gets together: Not on file     Attends Sikh service: Not on file     Active member of club or organization: Not on file     Attends meetings of clubs or organizations: Not on file     Relationship status: Not on file     Intimate partner violence     Fear of current or ex partner: Not on file     Emotionally abused: Not on file     Physically abused: Not on file     Forced sexual activity: Not on file   Other Topics Concern     Not on file   Social History Narrative    How much exercise per week? Daily walking    How much calcium per day? In vits and foods       How much caffeine per day? 0    How much vitamin D per day? In vits and foods    Do you/your family wear seatbelts?  Yes    Do you/your family use safety helmets? Yes    Do you/your family use sunscreen? No    Do you/your family keep firearms in the home? no    Do you/your family have a smoke detector(s)?     Do you feel safe in your home? Yes    Has anyone ever touched you in an unwanted manner?no feels safe in home.           February 21, 2019 Peyton Guerra cmckim pn  4-         Current Outpatient Medications   Medication Sig     folic acid 0.8 MG  "CAPS      meclizine (ANTIVERT) 25 MG tablet Take 1/2 tablet 3x/day for 10 days then prn for symptoms     Prenatal Vit-Fe Fumarate-FA (PRENATAL MULTIVITAMIN W/IRON) 27-0.8 MG tablet Take 1 tablet by mouth daily     No current facility-administered medications for this visit.      No Known Allergies    ============================================  MEDICAL HISTORY  Past Medical History:   Diagnosis Date     Vitreous syneresis      Past Surgical History:   Procedure Laterality Date      SECTION N/A 2017    Procedure:  SECTION;;  Surgeon: Shellie Soni MD;  Location:  L+D     D & C      fetal hypoplastic leftheart syndrome       OB History    Para Term  AB Living   4 2 2 0 1 2   SAB TAB Ectopic Multiple Live Births   0 0 0 0 2      # Outcome Date GA Lbr Alireza/2nd Weight Sex Delivery Anes PTL Lv   4 Current            3 Term 17 39w3d  4.03 kg (8 lb 14.2 oz) M CS-LTranv EPI  PREETI      Complications: Preeclampsia/Hypertension, Failure to Progress in First Stage      Name: BROOKE,BABY1 SANTHOSH      Apgar1: 1  Apgar5: 7   2 Term 12/01/15 39w5d 11:55 / 01:02 3.43 kg (7 lb 9 oz) F Vag-Spont Local, EPI  PREETI      Apgar1: 4  Apgar5: 8   1 AB 14 23w0d             Birth Comments: 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 PreE without severe features with . Denies PPD     GYN History- Abnormal Pap Smears                        Cervical procedures: no                        History of STI: no    I personally reviewed the past social/family/medical and surgical history on the date of service.   I reviewed lab work done at Intake visit with patient.    EXAM:  /73   Pulse 80   Ht 1.575 m (5' 2\")   Wt 74.6 kg (164 lb 6.4 oz)   LMP 2021   BMI 30.07 kg/m     EXAM:  GENERAL:  Pleasant pregnant female, alert, cooperative and well groomed.  SKIN:  Warm and dry, without lesions or rashes  HEAD: Symmetrical " features.  MOUTH:  Buccal mucosa pink, moist without lesions.  Teeth in good repair.    NECK:  Thyroid without enlargement and nodules.  Lymph nodes not palpable.   LUNGS:  Clear to auscultation.  BREAST:    No dominant, fixed or suspicious masses are noted.  No skin or nipple changes or axillary nodes.   Nipples everted.      HEART:  RRR without murmur.  ABDOMEN: Soft without masses , tenderness or organomegaly.  No CVA tenderness.  Uterus palpable at size equal to dates.  Well healed scar from  section. Fetal heart tones present.  MUSCULOSKELETAL:  Full range of motion  EXTREMITIES:  No edema. No significant varicosities.   PELVIC EXAM:  GENITALIA: EGBUS  External genitalia, Bartholin's glands, urethra & [place]'s glands:normal. Vulva reveals no erythema or lesions.         VAGINA:  pink, normal rugae and discharge, no lesions, good tone.   CERVIX:  smooth, without discharge or CMT.                UTERUS: Anteverted,  nontender 12 week size.   ADNEXA:  Without masses or tenderness.   RECTAL:  Normal appearance.  Digital exam deferred.  WET PREP:Not done  GC/CHLAMYDIA CULTURE OBTAINED:YES    Lab Results   Component Value Date    PAP NIL 2016      ASSESSMENT:  35 year old , 12w1d weeks of pregnancy with FAITH of 2021 by LMP  Intrauterine pregnancy 12w1d size is consistent with dates.    Genetic Screening:scheduled next week    ICD-10-CM    1. High-risk pregnancy, elderly multigravida, unspecified trimester  O09.529 Glucose 1 Hour     Chlamydia PCR (Clinic Collect)     Gonorrhea PCR   2. Screening for malignant neoplasm of cervix  Z12.4 Obtaining, preparing and conveyance of cervical or vaginal smear to laboratory.     Pap imaged thin layer screen with HPV - recommended age 30 - 65 years (select HPV order below)     HPV High Risk Types DNA Cervical       PLAN:  - Reviewed use of triage nurse line and contacting the on-call provider after hours for an urgent need such as fever, vagina  bleeding, bladder or vaginal infection, rupture of membranes,  or term labor.    - Reviewed best evidence for: weight gain for her weight and height for pregnancy:  Based on pre-pregnancy weight of 164lb and Body mass index is 30.07 kg/m . RECOMMENDED WEIGHT GAIN: < 15 lbs.  -If BMI>=30, weight gain/exercise handout given and reviewed. BMI entered on problem list with plan for possible referrals and  testing  - Reviewed healthy diet and foods to avoid; exercise and activity during pregnancy; avoiding exposure to toxoplasmosis; and maintenance of a generally healthy lifestyle.   - Discussed the harms, benefits, side effects and alternative therapies for current prescribed and OTC medications.  - Reviewed high risk for gestational diabetes, early 1 hour completed today.    - PAP today.    - Reviewed high risk for Pre Eclampsia d/t high BMI and AMA,  agreeable to starting 81mg aspirin daily after 12 weeks - rx today, she will start.    - All pt's questions discussed and answered.  Pt verbalized understanding of and agreement to plan of care.     - Continue scheduled prenatal care and prn if questions or concerns    Ela Lopez CNM

## 2021-05-17 NOTE — PROGRESS NOTES
SUBJECTIVE:   Zaina is a 35 year old female who presents to clinic for a new OB visit.   at 12w2d with Estimated Date of Delivery: 2021 based on LMP. Feels well. Has started PNV.     She has not had bleeding since her LMP.   She has had mild nausea. Weight loss has occurred, for a total of 2 pounds. Just nauseated not vomiting, is trying not to gain too much weight as she had a hard time losing it after her last pregnancy.  This was a planned pregnancy.     Partnered but prefers not to list his name.   OTHER CONCERNS: No concerns, feeling quite well.     ===========================================   ROS: 10 point ROS neg other than the symptoms noted above in the HPI.      PSYCHIATRIC:  Denies mood changes - mood is good!    PHQ-9 score:    PHQ-9 SCORE 2019   PHQ-9 Total Score -   PHQ-9 Total Score 0     RODRIGO-7 SCORE 2017   Total Score 5 1       Past History:  Her past medical history   Past Medical History:   Diagnosis Date     Vitreous syneresis    .   She has a history of  prior  section  Since her last LMP she denies use of alcohol, tobacco and street drugs.  HISTORY:  Family History   Problem Relation Age of Onset     Hypertension Mother         had before pregnancy     Heart Disease Other         hypoplastic left heart. TAB 23 wks      Diabetes No family hx of      Cerebrovascular Disease No family hx of      Coronary Artery Disease No family hx of      Osteoporosis No family hx of      Anxiety Disorder No family hx of      Depression No family hx of      Social History     Socioeconomic History     Marital status: Single     Spouse name: Not on file     Number of children: Not on file     Years of education: Not on file     Highest education level: Not on file   Occupational History     Occupation: [email or web address]   Social Needs     Financial resource strain: Not on file     Food insecurity     Worry: Not on file     Inability: Not on file     Transportation needs      Medical: Not on file     Non-medical: Not on file   Tobacco Use     Smoking status: Never Smoker     Smokeless tobacco: Never Used   Substance and Sexual Activity     Alcohol use: No     Drug use: No     Sexual activity: Yes     Partners: Male     Birth control/protection: None   Lifestyle     Physical activity     Days per week: Not on file     Minutes per session: Not on file     Stress: Not on file   Relationships     Social connections     Talks on phone: Not on file     Gets together: Not on file     Attends Mandaeism service: Not on file     Active member of club or organization: Not on file     Attends meetings of clubs or organizations: Not on file     Relationship status: Not on file     Intimate partner violence     Fear of current or ex partner: Not on file     Emotionally abused: Not on file     Physically abused: Not on file     Forced sexual activity: Not on file   Other Topics Concern     Not on file   Social History Narrative    How much exercise per week? Daily walking    How much calcium per day? In vits and foods       How much caffeine per day? 0    How much vitamin D per day? In vits and foods    Do you/your family wear seatbelts?  Yes    Do you/your family use safety helmets? Yes    Do you/your family use sunscreen? No    Do you/your family keep firearms in the home? no    Do you/your family have a smoke detector(s)?     Do you feel safe in your home? Yes    Has anyone ever touched you in an unwanted manner?no feels safe in home.           February 21, 2019 Peyton Yostiwed cmckim pn  4-         Current Outpatient Medications   Medication Sig     folic acid 0.8 MG CAPS      meclizine (ANTIVERT) 25 MG tablet Take 1/2 tablet 3x/day for 10 days then prn for symptoms     Prenatal Vit-Fe Fumarate-FA (PRENATAL MULTIVITAMIN W/IRON) 27-0.8 MG tablet Take 1 tablet by mouth daily     No current facility-administered medications for this visit.      No Known  "Allergies    ============================================  MEDICAL HISTORY  Past Medical History:   Diagnosis Date     Vitreous syneresis      Past Surgical History:   Procedure Laterality Date      SECTION N/A 2017    Procedure:  SECTION;;  Surgeon: Shellie Soni MD;  Location: [facility] L+D     D & C      fetal hypoplastic leftheart syndrome       OB History    Para Term  AB Living   4 2 2 0 1 2   SAB TAB Ectopic Multiple Live Births   0 0 0 0 2      # Outcome Date GA Lbr Alireza/2nd Weight Sex Delivery Anes PTL Lv   4 Current            3 Term 17 39w3d  4.03 kg (8 lb 14.2 oz) M CS-LTranv EPI  PREETI      Complications: Preeclampsia/Hypertension, Failure to Progress in First Stage      Name: [id number] SANTHOSH      Apgar1: 1  Apgar5: 7   2 Term 12/01/15 39w5d 11:55 / 01:02 3.43 kg (7 lb 9 oz) F Vag-Spont Local, EPI  PREETI      Apgar1: 4  Apgar5: 8   1 AB 14 23w0d             Birth Comments: 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 PreE without severe features with . Denies PPD     GYN History- Abnormal Pap Smears                        Cervical procedures: no                        History of STI: no    I personally reviewed the past social/family/medical and surgical history on the date of service.   I reviewed lab work done at Intake visit with patient.    EXAM:  /73   Pulse 80   Ht 1.575 m (5' 2\")   Wt 74.6 kg (164 lb 6.4 oz)   LMP 2021   BMI 30.07 kg/m     EXAM:  GENERAL:  Pleasant pregnant female, alert, cooperative and well groomed.  SKIN:  Warm and dry, without lesions or rashes  HEAD: Symmetrical features.  MOUTH:  Buccal mucosa pink, moist without lesions.  Teeth in good repair.    NECK:  Thyroid without enlargement and nodules.  Lymph nodes not palpable.   LUNGS:  Clear to auscultation.  BREAST:    No dominant, fixed or suspicious masses are noted.  No skin or nipple changes or axillary " nodes.   Nipples everted.      HEART:  RRR without murmur.  ABDOMEN: Soft without masses , tenderness or organomegaly.  No CVA tenderness.  Uterus palpable at size equal to dates.  Well healed scar from  section. Fetal heart tones present.  MUSCULOSKELETAL:  Full range of motion  EXTREMITIES:  No edema. No significant varicosities.   PELVIC EXAM:  GENITALIA: EGBUS  External genitalia, Bartholin's glands, urethra & Stonewood's glands:normal. Vulva reveals no erythema or lesions.         VAGINA:  pink, normal rugae and discharge, no lesions, good tone.   CERVIX:  smooth, without discharge or CMT.                UTERUS: Anteverted,  nontender 12 week size.   ADNEXA:  Without masses or tenderness.   RECTAL:  Normal appearance.  Digital exam deferred.  WET PREP:Not done  GC/CHLAMYDIA CULTURE OBTAINED:YES    Lab Results   Component Value Date    PAP NIL 2016      ASSESSMENT:  35 year old , 12w1d weeks of pregnancy with FAITH of 2021 by LMP  Intrauterine pregnancy 12w1d size is consistent with dates.    Genetic Screening:scheduled next week    ICD-10-CM    1. High-risk pregnancy, elderly multigravida, unspecified trimester  O09.529 Glucose 1 Hour     Chlamydia PCR (Clinic Collect)     Gonorrhea PCR   2. Screening for malignant neoplasm of cervix  Z12.4 Obtaining, preparing and conveyance of cervical or vaginal smear to laboratory.     Pap imaged thin layer screen with HPV - recommended age 30 - 65 years (select HPV order below)     HPV High Risk Types DNA Cervical       PLAN:  - Reviewed use of triage nurse line and contacting the on-call provider after hours for an urgent need such as fever, vagina bleeding, bladder or vaginal infection, rupture of membranes,  or term labor.    - Reviewed best evidence for: weight gain for her weight and height for pregnancy:  Based on pre-pregnancy weight of 164lb and Body mass index is 30.07 kg/m . RECOMMENDED WEIGHT GAIN: < 15 lbs.  -If BMI>=30, weight  gain/exercise handout given and reviewed. BMI entered on problem list with plan for possible referrals and  testing  - Reviewed healthy diet and foods to avoid; exercise and activity during pregnancy; avoiding exposure to toxoplasmosis; and maintenance of a generally healthy lifestyle.   - Discussed the harms, benefits, side effects and alternative therapies for current prescribed and OTC medications.  - Reviewed high risk for gestational diabetes, early 1 hour completed today.    - PAP today.    - Reviewed high risk for Pre Eclampsia d/t high BMI and AMA,  agreeable to starting 81mg aspirin daily after 12 weeks - rx today, she will start.    - All pt's questions discussed and answered.  Pt verbalized understanding of and agreement to plan of care.     - Continue scheduled prenatal care and prn if questions or concerns    Ela Lopez CNM

## 2021-05-18 DIAGNOSIS — R73.09 ELEVATED GLUCOSE: Primary | ICD-10-CM

## 2021-05-18 DIAGNOSIS — R73.09 ELEVATED GLUCOSE: ICD-10-CM

## 2021-05-18 NOTE — RESULT ENCOUNTER NOTE
Called and spoke to patient about glucose results and to go to lab for her 3 hour glucose testing. Patient aware and is planning to go to the lab in the morning.

## 2021-05-19 ENCOUNTER — PRE VISIT (OUTPATIENT)
Dept: MATERNAL FETAL MEDICINE | Facility: CLINIC | Age: 36
End: 2021-05-19

## 2021-05-19 DIAGNOSIS — R73.09 ELEVATED GLUCOSE: ICD-10-CM

## 2021-05-19 LAB
GLUCOSE 1H P 100 G GLC PO SERPL-MCNC: 143 MG/DL (ref 60–179)
GLUCOSE 2H P 100 G GLC PO SERPL-MCNC: 134 MG/DL (ref 60–154)
GLUCOSE 3H P 100 G GLC PO SERPL-MCNC: 122 MG/DL (ref 60–139)
GLUCOSE P FAST SERPL-MCNC: 90 MG/DL (ref 60–94)

## 2021-05-19 PROCEDURE — 82952 GTT-ADDED SAMPLES: CPT | Performed by: MIDWIFE

## 2021-05-19 PROCEDURE — 36415 COLL VENOUS BLD VENIPUNCTURE: CPT | Performed by: MIDWIFE

## 2021-05-19 PROCEDURE — 82951 GLUCOSE TOLERANCE TEST (GTT): CPT | Performed by: MIDWIFE

## 2021-05-20 LAB
COPATH REPORT: NORMAL
PAP: NORMAL

## 2021-05-21 LAB
FINAL DIAGNOSIS: NORMAL
HPV HR 12 DNA CVX QL NAA+PROBE: NEGATIVE
HPV16 DNA SPEC QL NAA+PROBE: NEGATIVE
HPV18 DNA SPEC QL NAA+PROBE: NEGATIVE
SPECIMEN DESCRIPTION: NORMAL
SPECIMEN SOURCE CVX/VAG CYTO: NORMAL

## 2021-05-24 ENCOUNTER — OFFICE VISIT (OUTPATIENT)
Dept: MATERNAL FETAL MEDICINE | Facility: CLINIC | Age: 36
End: 2021-05-24
Attending: MIDWIFE
Payer: COMMERCIAL

## 2021-05-24 ENCOUNTER — HOSPITAL ENCOUNTER (OUTPATIENT)
Dept: ULTRASOUND IMAGING | Facility: CLINIC | Age: 36
End: 2021-05-24
Attending: MIDWIFE
Payer: COMMERCIAL

## 2021-05-24 DIAGNOSIS — Z3A.13 13 WEEKS GESTATION OF PREGNANCY: ICD-10-CM

## 2021-05-24 DIAGNOSIS — O26.90 PREGNANCY RELATED CONDITION, ANTEPARTUM: Primary | ICD-10-CM

## 2021-05-24 DIAGNOSIS — O26.90 PREGNANCY RELATED CONDITION, ANTEPARTUM: ICD-10-CM

## 2021-05-24 DIAGNOSIS — O09.521 SUPERVISION OF ELDERLY MULTIGRAVIDA IN FIRST TRIMESTER: ICD-10-CM

## 2021-05-24 DIAGNOSIS — O09.521 SUPERVISION OF ELDERLY MULTIGRAVIDA IN FIRST TRIMESTER: Primary | ICD-10-CM

## 2021-05-24 PROCEDURE — 999N001100 HC STATISTIC VERIFI PRENATAL TRISOMY 21,18,13: Performed by: OBSTETRICS & GYNECOLOGY

## 2021-05-24 PROCEDURE — 36415 COLL VENOUS BLD VENIPUNCTURE: CPT | Performed by: OBSTETRICS & GYNECOLOGY

## 2021-05-24 PROCEDURE — 76813 OB US NUCHAL MEAS 1 GEST: CPT

## 2021-05-24 PROCEDURE — 999N001121 HC STATISTIC RESEARCH KIT COLLECTION: Performed by: OBSTETRICS & GYNECOLOGY

## 2021-05-24 PROCEDURE — 76813 OB US NUCHAL MEAS 1 GEST: CPT | Mod: 26 | Performed by: OBSTETRICS & GYNECOLOGY

## 2021-05-24 PROCEDURE — 96040 HC GENETIC COUNSELING, EACH 30 MINUTES: CPT | Performed by: GENETIC COUNSELOR, MS

## 2021-05-24 NOTE — PROGRESS NOTES
"[facility] [place]  Genetic Counseling Consult    Patient: Zaina Tan YOB: 1985   Date of Service: 21      Zaina Tan was seen at [facility] [place] for genetic consultation to discuss the options for screening and testing for fetal chromosome abnormalities. The indication for genetic counseling is advanced maternal age. She was unaccompanied to this visit.        Impression/Plan:   1.  Zaina had an ultrasound and blood draw for NIPT (Innatal test through [facility]).  Results are expected within 5-7 days, and will be available in EPIC.  We will contact her to discuss the results, and a copy will be forwarded to the office of the referring OB provider. She would like results e-mailed to her at [email or web address]    2.  Maternal serum AFP (single marker screen) is recommended after 15 weeks to screen for open neural tube defects. A quad screen should not be performed.    3.  An 18-20 week comprehensive ultrasound is standard of care for all women 35 or older at delivery.    4. Given the family history of hypoplastic left heart syndrome in prior pregnancy, a fetal echocardiogram is recommended.     Pregnancy History:   /Parity:     Age at Delivery: 36 year old  FAITH: 2021, by Last Menstrual Period  Gestational Age: 13w2d    No significant complications or exposures were reported in the current pregnancy.    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       Family History:   A " three-generation pedigree was obtained during previous visits, and is scanned under the  Media  tab. Zaina had no significant updates today. We reviewed the following:    Zaina, 35, is healthy and her partner, Bravo, is also healthy.     Bravo and Zaina have one healthy daughter and one healthy son      Their first pregnancy was complicated by hypoplastic left heart syndrome  Congenital heart defects can be isolated or associated with multiple birth defects (syndromic).There are many genetic syndromes, single gene disorders or chromosomal abnormalities, that can be associated with congenital heart defects. Isolated congenital heart defects are usually a multi-factorial condition caused by the combination of genetic and environmental factors and familial clustering is not uncommon. Since there is a genetic component to multi-factorial conditions, the recurrence risk is higher for first degree relatives (siblings) than in second degree relatives and decreases with distance in relationship.    We discussed that congenital heart defects are common, occurring in 0.5 to 1.0% live births. The specific recurrence risk for first degree relatives differs according to the type of congenital heart defect and if there is an associated genetic syndrome present. In general, studies show that the overall risk contributed by a positive family history of a congenital heart defect in 1st degree relatives for the same defect is 8.15% whereas the recurrence risk for a different heart defect is 2.68%. There are also recurrence risks specific to the heart defect. Often risks for second degree relatives are not available. Furthermore, for third degree relatives the risk is likely equal to general population risks.    Hypoplastic left heart syndrome (HLHS) is an underdevelopment of the heart's left side including the aorta, aortic valve, left ventricle, and the mitral valve. Since the left side of the heart is responsible for receiving  "oxygen-rich blood from the lungs and then pumping it out to the body through the aorta the baby may have significant problems breathing. This defect can be fatal for this reason. Significant multi-stage surgery is often needed. The risk for hypoplastic left heart sydnrome in a baby is dependent on the affected relative: 8.0% for first degree relatives     This pregnancy is a first degree relative to the family member with a hypoplastic left heart syndrome; therefore, the recurrence risk is 8% and possibly up to 22% for any congenital heart defect.    A fetal echocardiogram is often recommended for pregnancies of a second or first degree relation to a family member with a congenital heart defect. Fetal echocardiograms can also be recommended, in the absence of family history, due to maternal diabetes, an IVF pregnancy, or suspected heart defect on comprehensive ultrasound, among other reasons.       Bravo's brother has recently been reported to have intellectual disabilities. Zaina reports this was due to an illness or medication to treat that illness and he is now doing well and \"outgrew\" the illness.     Otherwise, the reported family history is negative for multiple miscarriages, stillbirths, birth defects, mental retardation, known genetic conditions, and consanguinity.       Carrier Screening:   The patient reports that she and the father of the pregnancy have  ancestry:      We reviewed the clinical features, autosomal recessive inheritance, and options for carrier screening and  screening for hemoglobinopathies.      Expanded carrier screening for mutations in a large panel of genes associated with autosomal recessive conditions including cystic fibrosis, spinal muscular atrophy, and others, is now available.      The patient has declined the carrier screening options reviewed today.       Risk Assessment for Chromosome Conditions:   We explained that the risk for fetal chromosome " abnormalities increases with maternal age. We discussed specific features of common chromosome abnormalities, including Down syndrome, trisomy 13, trisomy 18, and sex chromosome trisomies.      At age 36 at midtrimester, the risk to have a baby with Down syndrome is 1 in 168.     At age 36 at midtrimester, the risk to have a baby with any chromosome abnormality is 1 in 82.     Testing Options:   We discussed the following options:   First trimester screening    First trimester ultrasound with nuchal translucency and nasal bone assessments, maternal plasma hCG, IVETTE-A, and AFP measurement    Screens for fetal trisomy 21, trisomy 13, and trisomy 18    Cannot screen for open neural tube defects; maternal serum AFP after 15 weeks is recommended     Non-invasive Prenatal Testing (NIPT)    Maternal plasma cell-free DNA testing; first trimester ultrasound with nuchal translucency and nasal bone assessment is recommended, when appropriate    Screens for fetal trisomy 21, trisomy 13, trisomy 18, and sex chromosome aneuploidy    Cannot screen for open neural tube defects; maternal serum AFP after 15 weeks is recommended     Chorionic villus sampling (CVS)    Invasive procedure typically performed in the first trimester by which placental villi are obtained for the purpose of chromosome analysis and/or other prenatal genetic analysis    Diagnostic results; >99% sensitivity for fetal chromosome abnormalities    Cannot test for open neural tube defects; maternal serum AFP after 15 weeks is recommended     Genetic Amniocentesis    Invasive procedure typically performed in the second trimester by which amniotic fluid is obtained for the purpose of chromosome analysis and/or other prenatal genetic analysis    Diagnostic results; >99% sensitivity for fetal chromosome abnormalities    AFAFP measurement tests for open neural tube defects     Comprehensive (Level II) ultrasound: Detailed ultrasound performed between 18-22 weeks  gestation to screen for major birth defects and markers for aneuploidy.      We reviewed the benefits and limitations of this testing.  Screening tests provide a risk assessment specific to the pregnancy for certain fetal chromosome abnormalities, but cannot definitively diagnose or exclude a fetal chromosome abnormality.  Follow-up genetic counseling and consideration of diagnostic testing is recommended with any abnormal screening result.     Diagnostic tests carry inherent risks- including risk of miscarriage- that require careful consideration.  These tests can detect fetal chromosome abnormalities with greater than 99% certainty.  Results can be compromised by maternal cell contamination or mosaicism, and are limited by the resolution of cytogenetic G-banding technology.  There is no screening nor diagnostic test that can detect all forms of birth defects or mental disability.     It was a pleasure to be involved with Zaina thurston [facility]. Face-to-face time of the meeting was 35 minutes.    Kendra Posadas MS, formerly [facility]  Licensed Genetic Counselor   [facility]  Maternal Fetal Medicine  [email or web address]  [email or web address]  Office: [phone]  Pager [phone]  M: [phone]   Fax: [phone]

## 2021-05-24 NOTE — PROGRESS NOTES
The patient was seen for an ultrasound in the Maternal-Fetal Medicine Center at the [facility] today.  For a detailed report of the ultrasound examination, please see the ultrasound report which can be found under the imaging tab.    Gabby Perdomo MD  , OB/GYN  Maternal-Fetal Medicine  [phone] (Pager)

## 2021-05-25 LAB — RESEARCH KIT COLLECTION: NORMAL

## 2021-05-28 ENCOUNTER — TELEPHONE (OUTPATIENT)
Dept: MATERNAL FETAL MEDICINE | Facility: CLINIC | Age: 36
End: 2021-05-28

## 2021-05-28 LAB — LAB SCANNED RESULT: NORMAL

## 2021-05-28 NOTE — TELEPHONE ENCOUNTER
Called and discussed normal NIPT results with Zaina. Results indicate NO ANEUPLOIDY DETECTED for chromosomes 21, 18, 13, or sex chromosomes (XX). Predicted female sex was disclosed. Patient is very excited.     This puts her current pregnancy at low risk for Down syndrome, trisomy 18, trisomy 13 and sex chromosome abnormalities. This test is reported to have the following sensitivities: Down syndrome- 99%, trisomy 18- 98%, and trisomy 13- 98%. Although these results are reassuring, this does not replace a standard chromosome analysis from a chorionic villus sampling or amniocentesis.     Level II ultrasound is recommended, given AMA and Zaina has scheduled this for 06/28/2021.     MSAFP is the appropriate second trimester screening test for open neural tube defects; the maternal quad screen is not recommended.    Her results are available in her Epic chart for her primary OB to review.    Kendra Posadas MS, [facility]  Licensed Genetic Counselor   [facility]  Maternal Fetal Medicine  [email or web address]  [email or web address]  Office: [phone]  Pager [phone]  MFM: [phone]   Fax: [phone]

## 2021-06-14 ENCOUNTER — OFFICE VISIT (OUTPATIENT)
Dept: OBGYN | Facility: CLINIC | Age: 36
End: 2021-06-14
Attending: ADVANCED PRACTICE MIDWIFE
Payer: COMMERCIAL

## 2021-06-14 VITALS
SYSTOLIC BLOOD PRESSURE: 107 MMHG | WEIGHT: 169 LBS | HEART RATE: 87 BPM | DIASTOLIC BLOOD PRESSURE: 63 MMHG | BODY MASS INDEX: 30.91 KG/M2

## 2021-06-14 DIAGNOSIS — O09.299 HX OF FETAL ANOMALY IN PRIOR PREGNANCY, CURRENTLY PREGNANT: ICD-10-CM

## 2021-06-14 DIAGNOSIS — R73.09 ELEVATED GLUCOSE: ICD-10-CM

## 2021-06-14 DIAGNOSIS — O09.522 HIGH-RISK PREGNANCY, ELDERLY MULTIGRAVIDA IN SECOND TRIMESTER: Primary | ICD-10-CM

## 2021-06-14 DIAGNOSIS — O09.529 HIGH-RISK PREGNANCY, ELDERLY MULTIGRAVIDA, UNSPECIFIED TRIMESTER: ICD-10-CM

## 2021-06-14 PROCEDURE — G0463 HOSPITAL OUTPT CLINIC VISIT: HCPCS

## 2021-06-14 PROCEDURE — 99207 PR PRENATAL VISIT: CPT | Performed by: ADVANCED PRACTICE MIDWIFE

## 2021-06-14 ASSESSMENT — PAIN SCALES - GENERAL: PAINLEVEL: NO PAIN (0)

## 2021-06-14 NOTE — NURSING NOTE
Chief Complaint   Patient presents with     Prenatal Care     ZEKE 16 weeks and 2 days   Ania Justin LPN

## 2021-06-14 NOTE — PROGRESS NOTES
Subjective:     35 year old  at 16w2d presents for a routine prenatal appointment.      Denies vaginal bleeding or leakage of fluid.  Denies contractions or cramping.  No consistent fetal movement yet.       No HA, visual changes, RUQ or epigastric pain.   The patient presents with the following concerns:  Feeling well overall.    - Occasional headaches when hungry, resolve with eating    - Has been taking ASA, needs refill      Level II US  Scheduled.   Offered AFP, pt declined at this time.     Objective:  Vitals:    21 1638   BP: 107/63   Pulse: 87   Weight: 76.7 kg (169 lb)     See OB flowsheet    Assessment/Plan:  Encounter Diagnoses   Name Primary?     High-risk pregnancy, elderly multigravida in second trimester Yes     High-risk pregnancy, elderly multigravida, unspecified trimester      Orders Placed This Encounter   Medications     aspirin (ASA) 81 MG EC tablet     Sig: Take 1 tablet (81 mg) by mouth daily     Dispense:  90 tablet     Refill:  1     - Reviewed total weight gain, encouraged continued healthy diet and exercise.      - Reviewed why/how to contact provider.   - Patient education/orders or handouts today: PTL signs/symptoms, AFP only, Fetal movement and Level 2 u/s scheduled   - Discussed normal early 3 hr GTT results and recommendation to complete 3 hr GTT near 28 week visit.  - Return to clinic in 4-6 weeks and prn if questions or concerns.     RERE Cintron CNM

## 2021-06-14 NOTE — LETTER
2021       RE: Zaina Tan  [address]  [place] [address]     Dear Colleague,    Thank you for referring your patient, Zaina Tan, to the [facility] [place] at [facility]. Please see a copy of my visit note below.    Subjective:     35 year old  at 16w2d presents for a routine prenatal appointment.      Denies vaginal bleeding or leakage of fluid.  Denies contractions or cramping.  No consistent fetal movement yet.       No HA, visual changes, RUQ or epigastric pain.   The patient presents with the following concerns:  Feeling well overall.    - Occasional headaches when hungry, resolve with eating    - Has been taking ASA, needs refill      Level II US  Scheduled.   Offered AFP, pt declined at this time.     Objective:  Vitals:    21 1638   BP: 107/63   Pulse: 87   Weight: 76.7 kg (169 lb)     See OB flowsheet    Assessment/Plan:  Encounter Diagnoses   Name Primary?     High-risk pregnancy, elderly multigravida in second trimester Yes     High-risk pregnancy, elderly multigravida, unspecified trimester      Orders Placed This Encounter   Medications     aspirin (ASA) 81 MG EC tablet     Sig: Take 1 tablet (81 mg) by mouth daily     Dispense:  90 tablet     Refill:  1     - Reviewed total weight gain, encouraged continued healthy diet and exercise.      - Reviewed why/how to contact provider.   - Patient education/orders or handouts today: PTL signs/symptoms, AFP only, Fetal movement and Level 2 u/s scheduled   - Discussed normal early 3 hr GTT results and recommendation to complete 3 hr GTT near 28 week visit.  - Return to clinic in 4-6 weeks and prn if questions or concerns.     RERE Cintron CNM

## 2021-06-28 ENCOUNTER — OFFICE VISIT (OUTPATIENT)
Dept: MATERNAL FETAL MEDICINE | Facility: CLINIC | Age: 36
End: 2021-06-28
Attending: MIDWIFE
Payer: COMMERCIAL

## 2021-06-28 ENCOUNTER — HOSPITAL ENCOUNTER (OUTPATIENT)
Dept: ULTRASOUND IMAGING | Facility: CLINIC | Age: 36
End: 2021-06-28
Attending: MIDWIFE
Payer: COMMERCIAL

## 2021-06-28 DIAGNOSIS — O09.522 AMA (ADVANCED MATERNAL AGE) MULTIGRAVIDA 35+, SECOND TRIMESTER: Primary | ICD-10-CM

## 2021-06-28 DIAGNOSIS — O26.90 PREGNANCY RELATED CONDITION, ANTEPARTUM: ICD-10-CM

## 2021-06-28 DIAGNOSIS — Z82.79 FAMILY HISTORY OF CONGENITAL HEART DEFECT: ICD-10-CM

## 2021-06-28 PROCEDURE — 76811 OB US DETAILED SNGL FETUS: CPT

## 2021-06-28 PROCEDURE — 76811 OB US DETAILED SNGL FETUS: CPT | Mod: 26 | Performed by: OBSTETRICS & GYNECOLOGY

## 2021-06-28 NOTE — PROGRESS NOTES
"Please see \"Imaging\" tab under \"Chart Review\" for details of today's US at the [facility].    Santos Rodriguez MD  Maternal-Fetal Medicine      "

## 2021-06-29 ENCOUNTER — TELEPHONE (OUTPATIENT)
Dept: OBGYN | Facility: CLINIC | Age: 36
End: 2021-06-29

## 2021-06-29 PROBLEM — O09.293 H/O FETAL ANOMALY IN PRIOR PREGNANCY, CURRENTLY PREGNANT, THIRD TRIMESTER: Status: ACTIVE | Noted: 2017-01-30

## 2021-06-29 NOTE — TELEPHONE ENCOUNTER
Message received from patient wanting to discuss her US results completed 6/28 at [facility].    Called and spoke with the patient, advised that US was completed at [facility] and offered to transfer call. She agreed, call transferred.

## 2021-07-12 ENCOUNTER — OFFICE VISIT (OUTPATIENT)
Dept: OBGYN | Facility: CLINIC | Age: 36
End: 2021-07-12
Attending: ADVANCED PRACTICE MIDWIFE
Payer: COMMERCIAL

## 2021-07-12 VITALS
SYSTOLIC BLOOD PRESSURE: 135 MMHG | HEIGHT: 62 IN | BODY MASS INDEX: 31.36 KG/M2 | WEIGHT: 170.4 LBS | HEART RATE: 85 BPM | DIASTOLIC BLOOD PRESSURE: 74 MMHG

## 2021-07-12 DIAGNOSIS — O09.93 HIGH-RISK PREGNANCY IN THIRD TRIMESTER: Primary | ICD-10-CM

## 2021-07-12 PROCEDURE — 99207 PR PRENATAL VISIT: CPT | Performed by: ADVANCED PRACTICE MIDWIFE

## 2021-07-12 PROCEDURE — G0463 HOSPITAL OUTPT CLINIC VISIT: HCPCS

## 2021-07-12 ASSESSMENT — MIFFLIN-ST. JEOR: SCORE: 1416.18

## 2021-07-12 NOTE — LETTER
"2021       RE: Zaina Tan  [address]  [facility] [address]     Dear Colleague,    Thank you for referring your patient, Zaina Tan, to the [facility] [place] at [facility]. Please see a copy of my visit note below.    Subjective:      35 year old  at 20w2d presents for a routine prenatal appointment.       Denies vaginal bleeding or leakage of fluid.  Denie contractions or cramping.    Has not yet started to feel fetal movement.  Anterior placenta.     No HA, visual changes, RUQ or epigastric pain.   The patient presents with the following concerns: no concerns today   Level II US:  Normal views, follow up planned for incomplete facial views.  Has ECHO  scheduled       Objective:  Vitals:    21 1509   BP: 135/74   Pulse: 85   Weight: 77.3 kg (170 lb 6.4 oz)   Height: 1.575 m (5' 2\")     See OB flowsheet    Assessment/Plan     Encounter Diagnosis   Name Primary?     High-risk pregnancy in third trimester Yes     No orders of the defined types were placed in this encounter.    No orders of the defined types were placed in this encounter.      - Reviewed total weight gain, encouraged continued healthy diet and exercise.      - Reviewed why/how to contact provider.    Patient education/orders or handouts today:  PTL signs/symptoms and fetal movement   Return to clinic in 4 weeks and prn if questions or concerns.   RERE Chan CNM      "

## 2021-07-21 ENCOUNTER — OFFICE VISIT (OUTPATIENT)
Dept: MATERNAL FETAL MEDICINE | Facility: CLINIC | Age: 36
End: 2021-07-21
Attending: OBSTETRICS & GYNECOLOGY
Payer: COMMERCIAL

## 2021-07-21 ENCOUNTER — HOSPITAL ENCOUNTER (OUTPATIENT)
Dept: ULTRASOUND IMAGING | Facility: CLINIC | Age: 36
End: 2021-07-21
Attending: OBSTETRICS & GYNECOLOGY
Payer: COMMERCIAL

## 2021-07-21 ENCOUNTER — HOSPITAL ENCOUNTER (OUTPATIENT)
Dept: CARDIOLOGY | Facility: CLINIC | Age: 36
End: 2021-07-21
Attending: OBSTETRICS & GYNECOLOGY
Payer: COMMERCIAL

## 2021-07-21 ENCOUNTER — OFFICE VISIT (OUTPATIENT)
Dept: CARDIOLOGY | Facility: CLINIC | Age: 36
End: 2021-07-21

## 2021-07-21 DIAGNOSIS — O09.522 AMA (ADVANCED MATERNAL AGE) MULTIGRAVIDA 35+, SECOND TRIMESTER: ICD-10-CM

## 2021-07-21 DIAGNOSIS — O35.BXX0 FETAL CARDIAC DISEASE AFFECTING PREGNANCY, SINGLE OR UNSPECIFIED FETUS: Primary | ICD-10-CM

## 2021-07-21 DIAGNOSIS — O35.8XX0 FAMILY OR MATERNAL HISTORIC RISK OF CONGENITAL ANOMALY, ANTEPARTUM, SINGLE OR UNSPECIFIED FETUS: Primary | ICD-10-CM

## 2021-07-21 PROCEDURE — 76816 OB US FOLLOW-UP PER FETUS: CPT

## 2021-07-21 PROCEDURE — 93325 DOPPLER ECHO COLOR FLOW MAPG: CPT | Mod: 26 | Performed by: PEDIATRICS

## 2021-07-21 PROCEDURE — 76825 ECHO EXAM OF FETAL HEART: CPT | Mod: 26 | Performed by: PEDIATRICS

## 2021-07-21 PROCEDURE — 76816 OB US FOLLOW-UP PER FETUS: CPT | Mod: 26 | Performed by: OBSTETRICS & GYNECOLOGY

## 2021-07-21 PROCEDURE — 99202 OFFICE O/P NEW SF 15 MIN: CPT | Mod: 25 | Performed by: PEDIATRICS

## 2021-07-21 PROCEDURE — 93325 DOPPLER ECHO COLOR FLOW MAPG: CPT

## 2021-07-21 PROCEDURE — 76827 ECHO EXAM OF FETAL HEART: CPT | Mod: 26 | Performed by: PEDIATRICS

## 2021-07-21 NOTE — PROGRESS NOTES
"Please see \"Imaging\" tab under \"Chart Review\" for details of today's visit.    Jonathan Dominguez    "

## 2021-07-21 NOTE — PROGRESS NOTES
[facility]   Heart Center Fetal Consult Note    Patient:  Zaina Tan MRN:  [id number]   YOB: 1985 Age:  35 year old   Date of Visit:  2021 PCP:  Megan Henry MD PhD     Dear Doctor,     I had the pleasure of seeing Zaina Tan at the [facility] on 2021 in fetal cardiology consultation for fetal echocardiogram results. She presented today accompanied by herself. As you know, she is a 35 year old  at 21w4d who presented for fetal echocardiogram today because of previous child with hypoplastic left heart syndrome.    I performed and interpreted the fetal echocardiogram today, which demonstrated normal fetal cardiac anatomy. Normal fetal intracardiac connections. Normal right and left ventricular size and function. Fetal heart rate is regular at 141 bpm. No hydrops.    I reviewed the echo findings today with Zaina Tan. She is aware that the study was within normal limits with no major cardiac abnormalities. She is aware of the general limitations of fetal echocardiography. No additional fetal echocardiograms are recommended. Post-johanne echocardiogram should be performed as an outpatient within 2 weeks.     Thank you for allowing me to participate in Zaina's care. Please do not hesitate to contact me with questions or concerns.    This visit was separate from the performance and interpretation of the ultrasound. The majority of the time (>50%) was spent in counseling and coordination of care. I spent approximately 15 minutes in face-to-face time reviewing the above considerations.    Alok Manning M.D.  Pediatric Cardiology  [address], 5th floor, [facility] [address]  Phone [phone]  Fax [phone]

## 2021-08-09 ENCOUNTER — OFFICE VISIT (OUTPATIENT)
Dept: OBGYN | Facility: CLINIC | Age: 36
End: 2021-08-09
Attending: ADVANCED PRACTICE MIDWIFE
Payer: COMMERCIAL

## 2021-08-09 VITALS
BODY MASS INDEX: 31.89 KG/M2 | HEIGHT: 62 IN | HEART RATE: 79 BPM | SYSTOLIC BLOOD PRESSURE: 103 MMHG | DIASTOLIC BLOOD PRESSURE: 67 MMHG | WEIGHT: 173.3 LBS

## 2021-08-09 DIAGNOSIS — O09.529 HIGH-RISK PREGNANCY, ELDERLY MULTIGRAVIDA, UNSPECIFIED TRIMESTER: Primary | ICD-10-CM

## 2021-08-09 PROCEDURE — 99207 PR PRENATAL VISIT: CPT | Performed by: ADVANCED PRACTICE MIDWIFE

## 2021-08-09 PROCEDURE — G0463 HOSPITAL OUTPT CLINIC VISIT: HCPCS

## 2021-08-09 ASSESSMENT — MIFFLIN-ST. JEOR: SCORE: 1429.33

## 2021-08-09 ASSESSMENT — PAIN SCALES - GENERAL: PAINLEVEL: MILD PAIN (3)

## 2021-08-09 NOTE — PROGRESS NOTES
"Subjective:      35 year old  at 24w2d presents for a routine prenatal appointment.         Denies cramping/contractions, vaginal bleeding, discharge or leakage of fluid.  Reports +fetal movement.  No HA, vision changes, ruq/epigastric pain.       Patient concerns: Feeling well overall. Had repeat MFM ultrasound on - all wnl. Fetal echo was done as well as was normal (has hx of child with heart condition).   Having a girl!    Has a hx of pre-eclampsia in prior pregnancy.   Desires repeat c/s  (has hx of  then c/s). Pt states she still has some pain with intercourse since the time of the vaginal delivery. Prefers to avoid vaginal birth and plan 39 week c/s.     Failed early 1 hour glucose, passed early 3 hour. Wondering why she needs 3 hour at EOB.     Objective:  Vitals:    21 1621   BP: 103/67   Pulse: 79   Weight: 78.6 kg (173 lb 4.8 oz)   Height: 1.575 m (5' 2\")         See OB flowsheet    Assessment/Plan     Encounter Diagnosis   Name Primary?     High-risk pregnancy, elderly multigravida, unspecified trimester Yes     Orders Placed This Encounter   Procedures     CBC with Platelets     Treponema Abs w Reflex to RPR and Titer     25- OH-Vitamin D     Orders Placed This Encounter   Medications     aspirin (ASA) 81 MG EC tablet     Sig: Take 1 tablet (81 mg) by mouth daily     Dispense:  90 tablet     Refill:  3       - Reviewed total weight gain, encouraged continued healthy diet and exercise.      - Reviewed why/how to contact provider.      Patient education/orders or handouts today:  PTL signs/symptoms, fetal movement and Plan for EOB visit w labs    - Reviewed rationale for recommendation for 3 hour glucose. Pt agreeable.  - Future orders placed for: 3 hour glucose, cbc with plts, anti-trep, vitamin D.  To present to lab fasting to complete between 24-29 weeks.  - Plan EOB education visit next.  - Will need to schedule c/s at an upcoming visit.    Continue scheduled prenatal care, RTC in " 4 weeks and prn if questions or concerns.      Dee Mcclellan, APRN, CNM

## 2021-08-09 NOTE — LETTER
"2021       RE: Zaina Tan  [address]  [facility] [address]     Dear Colleague,    Thank you for referring your patient, Zaina Tan, to the [facility] CL[facility] [place] at [facility]. Please see a copy of my visit note below.    Subjective:      35 year old  at 24w2d presents for a routine prenatal appointment.         Denies cramping/contractions, vaginal bleeding, discharge or leakage of fluid.  Reports +fetal movement.  No HA, vision changes, ruq/epigastric pain.       Patient concerns: Feeling well overall. Had repeat MFM ultrasound on - all wnl. Fetal echo was done as well as was normal (has hx of child with heart condition).   Having a girl!    Has a hx of pre-eclampsia in prior pregnancy.   Desires repeat c/s  (has hx of  then c/s). Pt states she still has some pain with intercourse since the time of the vaginal delivery. Prefers to avoid vaginal birth and plan 39 week c/s.     Failed early 1 hour glucose, passed early 3 hour. Wondering why she needs 3 hour at EOB.     Objective:  Vitals:    21 1621   BP: 103/67   Pulse: 79   Weight: 78.6 kg (173 lb 4.8 oz)   Height: 1.575 m (5' 2\")         See OB flowsheet    Assessment/Plan     Encounter Diagnosis   Name Primary?     High-risk pregnancy, elderly multigravida, unspecified trimester Yes     Orders Placed This Encounter   Procedures     CBC with Platelets     Treponema Abs w Reflex to RPR and Titer     25- OH-Vitamin D     Orders Placed This Encounter   Medications     aspirin (ASA) 81 MG EC tablet     Sig: Take 1 tablet (81 mg) by mouth daily     Dispense:  90 tablet     Refill:  3       - Reviewed total weight gain, encouraged continued healthy diet and exercise.      - Reviewed why/how to contact provider.      Patient education/orders or handouts today:  PTL signs/symptoms, fetal movement and Plan for EOB visit w labs    - Reviewed rationale for " recommendation for 3 hour glucose. Pt agreeable.  - Future orders placed for: 3 hour glucose, cbc with plts, anti-trep, vitamin D.  To present to lab fasting to complete between 24-29 weeks.  - Plan EOB education visit next.  - Will need to schedule c/s at an upcoming visit.    Continue scheduled prenatal care, RTC in 4 weeks and prn if questions or concerns.      RERE Juárez, CNM

## 2021-08-10 ENCOUNTER — TELEPHONE (OUTPATIENT)
Dept: OBGYN | Facility: CLINIC | Age: 36
End: 2021-08-10

## 2021-08-10 NOTE — TELEPHONE ENCOUNTER
----- Message from Vidhya Sanchez RN sent at 8/10/2021 12:59 PM CDT -----  Regarding: Returning call

## 2021-09-03 ENCOUNTER — LAB (OUTPATIENT)
Dept: LAB | Facility: CLINIC | Age: 36
End: 2021-09-03
Payer: COMMERCIAL

## 2021-09-03 DIAGNOSIS — O09.529 HIGH-RISK PREGNANCY, ELDERLY MULTIGRAVIDA, UNSPECIFIED TRIMESTER: ICD-10-CM

## 2021-09-03 PROBLEM — R73.09 ELEVATED GLUCOSE: Status: RESOLVED | Noted: 2021-05-18 | Resolved: 2021-09-03

## 2021-09-03 LAB
DEPRECATED CALCIDIOL+CALCIFEROL SERPL-MC: 38 UG/L (ref 20–75)
ERYTHROCYTE [DISTWIDTH] IN BLOOD BY AUTOMATED COUNT: 13.2 % (ref 10–15)
GESTATIONAL GTT 1 HR POST DOSE: 151 MG/DL (ref 60–179)
GESTATIONAL GTT 2 HR POST DOSE: 158 MG/DL (ref 60–154)
GESTATIONAL GTT 3 HR POST DOSE: 136 MG/DL (ref 60–139)
GLUCOSE P FAST SERPL-MCNC: 92 MG/DL (ref 60–94)
HCT VFR BLD AUTO: 34.9 % (ref 35–47)
HGB BLD-MCNC: 11.7 G/DL (ref 11.7–15.7)
MCH RBC QN AUTO: 29.7 PG (ref 26.5–33)
MCHC RBC AUTO-ENTMCNC: 33.5 G/DL (ref 31.5–36.5)
MCV RBC AUTO: 89 FL (ref 78–100)
PLATELET # BLD AUTO: 201 10E3/UL (ref 150–450)
RBC # BLD AUTO: 3.94 10E6/UL (ref 3.8–5.2)
T PALLIDUM AB SER QL: NONREACTIVE
WBC # BLD AUTO: 6.4 10E3/UL (ref 4–11)

## 2021-09-03 PROCEDURE — 85018 HEMOGLOBIN: CPT

## 2021-09-03 PROCEDURE — 82950 GLUCOSE TEST: CPT

## 2021-09-03 PROCEDURE — 82947 ASSAY GLUCOSE BLOOD QUANT: CPT

## 2021-09-03 PROCEDURE — 36415 COLL VENOUS BLD VENIPUNCTURE: CPT

## 2021-09-03 PROCEDURE — 82951 GLUCOSE TOLERANCE TEST (GTT): CPT

## 2021-09-03 PROCEDURE — 86780 TREPONEMA PALLIDUM: CPT

## 2021-09-03 PROCEDURE — 82306 VITAMIN D 25 HYDROXY: CPT

## 2021-09-08 NOTE — TELEPHONE ENCOUNTER
----- Message from Carline Sanders sent at 12/7/2020 10:17 AM CST -----  Regarding: Call Back UTI Concern  [facility] Call Center    Phone Message    May a detailed message be left on voicemail: yes     Reason for Call: Other:  Zaina calling to request a call back from a nurse. Zaina believes she has a UTI, and would like to get an antibiotic prescribed. Zaina said she has had the symptoms for three days now, and AZO did not help her. Please give Zaina a call back at yourt earliest convenience.    Thank you,  Carline Sanders    Please do not send message and/or reply back to sender. Call Center Representatives do not not respond to messages.                                                          Patient attended Phase 2 Cardiac Rehab Exercise Session. Further documentation will be completed in Cardiac Science/[employer] System and will be scanned into the medical record upon discharge.

## 2021-09-15 ENCOUNTER — OFFICE VISIT (OUTPATIENT)
Dept: OBGYN | Facility: CLINIC | Age: 36
End: 2021-09-15
Attending: ADVANCED PRACTICE MIDWIFE
Payer: COMMERCIAL

## 2021-09-15 VITALS
SYSTOLIC BLOOD PRESSURE: 134 MMHG | BODY MASS INDEX: 32.03 KG/M2 | WEIGHT: 175.1 LBS | DIASTOLIC BLOOD PRESSURE: 78 MMHG | HEART RATE: 83 BPM

## 2021-09-15 DIAGNOSIS — O09.299 HISTORY OF GESTATIONAL DIABETES MELLITUS (GDM) IN PRIOR PREGNANCY, CURRENTLY PREGNANT: ICD-10-CM

## 2021-09-15 DIAGNOSIS — O09.529 HIGH-RISK PREGNANCY, ELDERLY MULTIGRAVIDA, UNSPECIFIED TRIMESTER: Primary | ICD-10-CM

## 2021-09-15 DIAGNOSIS — Z23 NEED FOR TDAP VACCINATION: ICD-10-CM

## 2021-09-15 DIAGNOSIS — Z86.32 HISTORY OF GESTATIONAL DIABETES MELLITUS (GDM) IN PRIOR PREGNANCY, CURRENTLY PREGNANT: ICD-10-CM

## 2021-09-15 DIAGNOSIS — R73.09 ELEVATED GLUCOSE: ICD-10-CM

## 2021-09-15 DIAGNOSIS — Z98.891 HISTORY OF CESAREAN DELIVERY: ICD-10-CM

## 2021-09-15 DIAGNOSIS — D25.9 UTERINE LEIOMYOMA, UNSPECIFIED LOCATION: ICD-10-CM

## 2021-09-15 DIAGNOSIS — O35.2XX0 PREVIOUS CHILD WITH CONGENITAL ANOMALY, CURRENTLY PREGNANT, ANTEPARTUM, NOT APPLICABLE OR UNSPECIFIED FETUS: ICD-10-CM

## 2021-09-15 PROBLEM — E55.9 VITAMIN D DEFICIENCY: Status: RESOLVED | Noted: 2017-01-31 | Resolved: 2021-09-15

## 2021-09-15 PROCEDURE — 250N000011 HC RX IP 250 OP 636

## 2021-09-15 PROCEDURE — 90715 TDAP VACCINE 7 YRS/> IM: CPT

## 2021-09-15 PROCEDURE — G0463 HOSPITAL OUTPT CLINIC VISIT: HCPCS | Mod: 25

## 2021-09-15 PROCEDURE — 90471 IMMUNIZATION ADMIN: CPT

## 2021-09-15 PROCEDURE — 99207 PR PRENATAL VISIT: CPT | Performed by: REGISTERED NURSE

## 2021-09-15 ASSESSMENT — ANXIETY QUESTIONNAIRES
GAD7 TOTAL SCORE: 0
6. BECOMING EASILY ANNOYED OR IRRITABLE: NOT AT ALL
2. NOT BEING ABLE TO STOP OR CONTROL WORRYING: NOT AT ALL
7. FEELING AFRAID AS IF SOMETHING AWFUL MIGHT HAPPEN: NOT AT ALL
1. FEELING NERVOUS, ANXIOUS, OR ON EDGE: NOT AT ALL
3. WORRYING TOO MUCH ABOUT DIFFERENT THINGS: NOT AT ALL
5. BEING SO RESTLESS THAT IT IS HARD TO SIT STILL: NOT AT ALL

## 2021-09-15 ASSESSMENT — PATIENT HEALTH QUESTIONNAIRE - PHQ9
SUM OF ALL RESPONSES TO PHQ QUESTIONS 1-9: 0
5. POOR APPETITE OR OVEREATING: NOT AT ALL

## 2021-09-15 NOTE — PROGRESS NOTES
35 year old, , 29w4d,   The patient presents with the following concerns: none    Reports good fetal movement.   Denies vaginal bleeding, LOF, HA, visual changes, cramping or contractions.     PHQ-9 score: 0  RODRIGO-7 score: 0  HARK-4 score: 0    Education completed today includes breast feeding, [facility] hand out, contraception, counting movements, signs of pre-term labor, when to present to birthplace, post partum depression, GBS and getting enough iron.  Birth preferences reviewed: RPCS  Labor support:  Partner   [place] Feeding plans :    Contraception planned:  Unsure, aware of return of fertility and pelvic rest recommendations x6 weeks pp.   The following labs were ordered today: n/a previously completed 9/3/21  Water birth consent form was not given.  Blood type:   ABO   Date Value Ref Range Status   2021 B  Final     RH(D)   Date Value Ref Range Status   2021 Pos  Final     Antibody Screen   Date Value Ref Range Status   2021 Neg  Final   , Rhogam  was not given.  TDAP  Was given.  A/P:  Encounter Diagnoses   Name Primary?     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  Continue scheduled prenatal care  RERE Kwok CNM

## 2021-09-15 NOTE — LETTER
9/15/2021       RE: Zaina Tan  [address]  [facility] [address]     Dear Colleague,    Thank you for referring your patient, Zaina Tan, to the [facility]OMEN[facility]S CLIN[facility] [place] at [facility]. Please see a copy of my visit note below.     35 year old, , 29w4d,   The patient presents with the following concerns: none    Reports good fetal movement.   Denies vaginal bleeding, LOF, HA, visual changes, cramping or contractions.     PHQ-9 score: 0  RODRIGO-7 score: 0  HARK-4 score: 0    Education completed today includes breast feeding, [facility] hand out, contraception, counting movements, signs of pre-term labor, when to present to birthplace, post partum depression, GBS and getting enough iron.  Birth preferences reviewed: RPCS  Labor support:  Partner    Feeding plans :    Contraception planned:  Unsure, aware of return of fertility and pelvic rest recommendations x6 weeks pp.   The following labs were ordered today: n/a previously completed 9/3/21  Water birth consent form was not given.  Blood type:   ABO   Date Value Ref Range Status   2021 B  Final     RH(D)   Date Value Ref Range Status   2021 Pos  Final     Antibody Screen   Date Value Ref Range Status   2021 Neg  Final   , Rhogam  was not given.  TDAP  Was given.  A/P:  Encounter Diagnoses   Name Primary?     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  Continue scheduled prenatal  RERE AraizaM

## 2021-09-16 ASSESSMENT — ANXIETY QUESTIONNAIRES: GAD7 TOTAL SCORE: 0

## 2021-09-27 ENCOUNTER — TELEPHONE (OUTPATIENT)
Dept: OBGYN | Facility: CLINIC | Age: 36
End: 2021-09-27

## 2021-09-27 ENCOUNTER — VIRTUAL VISIT (OUTPATIENT)
Dept: OBGYN | Facility: CLINIC | Age: 36
End: 2021-09-27
Attending: OBSTETRICS & GYNECOLOGY
Payer: COMMERCIAL

## 2021-09-27 DIAGNOSIS — Z98.891 HISTORY OF CESAREAN DELIVERY: ICD-10-CM

## 2021-09-27 DIAGNOSIS — O09.529 HIGH-RISK PREGNANCY, ELDERLY MULTIGRAVIDA, UNSPECIFIED TRIMESTER: Primary | ICD-10-CM

## 2021-09-27 DIAGNOSIS — O09.299 HISTORY OF GESTATIONAL DIABETES MELLITUS (GDM) IN PRIOR PREGNANCY, CURRENTLY PREGNANT: ICD-10-CM

## 2021-09-27 DIAGNOSIS — Z86.32 HISTORY OF GESTATIONAL DIABETES MELLITUS (GDM) IN PRIOR PREGNANCY, CURRENTLY PREGNANT: ICD-10-CM

## 2021-09-27 DIAGNOSIS — O09.293 HISTORY OF PRE-ECLAMPSIA IN PRIOR PREGNANCY, CURRENTLY PREGNANT IN THIRD TRIMESTER: ICD-10-CM

## 2021-09-27 DIAGNOSIS — U07.1 LAB TEST POSITIVE FOR DETECTION OF COVID-19 VIRUS: ICD-10-CM

## 2021-09-27 PROCEDURE — 99207 PR PRENATAL VISIT: CPT | Mod: 95 | Performed by: OBSTETRICS & GYNECOLOGY

## 2021-09-27 RX ORDER — CEFAZOLIN SODIUM 2 G/100ML
2 INJECTION, SOLUTION INTRAVENOUS
Status: CANCELLED | OUTPATIENT
Start: 2021-09-27

## 2021-09-27 RX ORDER — CITRIC ACID/SODIUM CITRATE 334-500MG
30 SOLUTION, ORAL ORAL
Status: CANCELLED | OUTPATIENT
Start: 2021-09-27

## 2021-09-27 RX ORDER — ACETAMINOPHEN 325 MG/1
975 TABLET ORAL ONCE
Status: CANCELLED | OUTPATIENT
Start: 2021-09-27 | End: 2021-09-27

## 2021-09-27 RX ORDER — OXYTOCIN/0.9 % SODIUM CHLORIDE 30/500 ML
100-340 PLASTIC BAG, INJECTION (ML) INTRAVENOUS CONTINUOUS PRN
Status: CANCELLED | OUTPATIENT
Start: 2021-09-27

## 2021-09-27 RX ORDER — TRANEXAMIC ACID 10 MG/ML
1 INJECTION, SOLUTION INTRAVENOUS EVERY 30 MIN PRN
Status: CANCELLED | OUTPATIENT
Start: 2021-09-27

## 2021-09-27 RX ORDER — SODIUM CHLORIDE, SODIUM LACTATE, POTASSIUM CHLORIDE, CALCIUM CHLORIDE 600; 310; 30; 20 MG/100ML; MG/100ML; MG/100ML; MG/100ML
INJECTION, SOLUTION INTRAVENOUS CONTINUOUS
Status: CANCELLED | OUTPATIENT
Start: 2021-09-27

## 2021-09-27 RX ORDER — CEFAZOLIN SODIUM 2 G/100ML
2 INJECTION, SOLUTION INTRAVENOUS SEE ADMIN INSTRUCTIONS
Status: CANCELLED | OUTPATIENT
Start: 2021-09-27

## 2021-09-27 RX ORDER — METHYLERGONOVINE MALEATE 0.2 MG/ML
200 INJECTION INTRAVENOUS
Status: CANCELLED | OUTPATIENT
Start: 2021-09-27

## 2021-09-27 RX ORDER — LIDOCAINE 40 MG/G
CREAM TOPICAL
Status: CANCELLED | OUTPATIENT
Start: 2021-09-27

## 2021-09-27 RX ORDER — OXYTOCIN/0.9 % SODIUM CHLORIDE 30/500 ML
340 PLASTIC BAG, INJECTION (ML) INTRAVENOUS CONTINUOUS PRN
Status: CANCELLED | OUTPATIENT
Start: 2021-09-27

## 2021-09-27 RX ORDER — CARBOPROST TROMETHAMINE 250 UG/ML
250 INJECTION, SOLUTION INTRAMUSCULAR
Status: CANCELLED | OUTPATIENT
Start: 2021-09-27

## 2021-09-27 RX ORDER — OXYTOCIN 10 [USP'U]/ML
10 INJECTION, SOLUTION INTRAMUSCULAR; INTRAVENOUS
Status: CANCELLED | OUTPATIENT
Start: 2021-09-27

## 2021-09-27 RX ORDER — MISOPROSTOL 200 UG/1
800 TABLET ORAL
Status: CANCELLED | OUTPATIENT
Start: 2021-09-27

## 2021-09-27 RX ORDER — MISOPROSTOL 200 UG/1
400 TABLET ORAL
Status: CANCELLED | OUTPATIENT
Start: 2021-09-27

## 2021-09-27 NOTE — LETTER
"2021       RE: Zaina Tan  [address]  [facility] [address]     Dear Colleague,    Thank you for referring your patient, Zaina Tan, to the [facility]OMEN[facility]S CLINIC [place] at [facility]. Please see a copy of my visit note below.    ZEKE Visit 2021    SUBJECTIVE     Zaina is a 35 year old female who is being evaluated via a billable telephone visit.    Patient opted to conduct today's return visit via telephone secondary to the COVID-19 pandemic vs. an in person visit to the clinic.    I spoke with: Zaina Tan    The patient has been notified of following:   \"This telephone visit will be conducted via a call between you and your physician/provider. We have found that certain health care needs can be provided without the need for a physical exam.  This service lets us provide the care you need with a short phone conversation.  If a prescription is necessary we can send it directly to your pharmacy.  If lab work is needed we can place an order for that and you can then stop by our lab to have the test done at a later time.  If during the course of the call the physician/provider feels a telephone visit is not appropriate, you will not be charged for this service.\"     The reason for the telephone visit: Prenatal visit, positive COVID test    S: Doing ok.  Had positive test for COVID today, son sick first.  Denies any fevers or respiratory symptoms, solely MSK discomfort.  Occasional ctx, nothing regular or painful.  Denies VB or LOF.  No HA, vision changes, SOB, RUQ pain or increased swelling.  Asks to have her CS scheduled with Dr. Soni, she delivered her son too.  Questions about what to pay attention to with COVID.    O:  No vitals as remote visit    General: NAD, A&Ox3  Resp: Non-labored breathing on phone      A/P: 36 yo  @ 31w2d presents for ZEKE visit  1) PNC: Rh positive, Love negative, Infectious labs wnl, Failed " early GCT, passed early 3 hour GTT, 28 week GTT passed although failed 2 hour value   2) AMA/Genetic screening: Normal NT, Low risk NIPT, AFP declined, Level II US normal although suboptimal views of heat and face, repeat US with normal appearance of these structures  3) History of fetus with hypoplastic left heart, s/p TOP: Had fetal echo this pregnancy which was normal  4) History of CS: Completed in 2017 for arrest of dilation and Cat 2 tracing.  Op note reviewed and without issues.  Patient planning RCS.  Asks to have Dr. Soni do it, will schedule 11/23/2021 at 39+3.    5) History of preeclampsia w/o SF: Had baseline HELLP labs normal UPC 0.08.  On ASA prophylaxis.  6) History of GDM: Had testing as above, no GDM this pregnancy.  7) Fibroid uterus: Small < 2 cm, fundal and left lateral.  8) s/p COVID vaccine, s/p Tdap, flu at next visit given remote visit  9) Positive COVID TEST: 9/27/2021, son had first.  Has some MSK pain but no fever or respiratory symptoms.  Discussed symptoms to pay attention to and when to seek emergency care today.  10) Delivery planning: Plans RCS, orders placed today for 39+3 with Nae as requested by patient.  Discussed indications for earlier delivery.  Plans breastfeed.  Unsure contraception.  11) RTC in 2 weeks for ZEKE visit, will be outside of COVID quarantine time at that time    Phone call start: 1:28 PM  Phone call end: 1:36 PM  Phone call duration:  8 minutes    Shellie Soni MD

## 2021-09-27 NOTE — PROGRESS NOTES
"ZEKE Visit 2021    AP Mahajan is a 35 year old female who is being evaluated via a billable telephone visit.    Patient opted to conduct today's return visit via telephone secondary to the COVID-19 pandemic vs. an in person visit to the clinic.    I spoke with: Zaina Tan    The patient has been notified of following:   \"This telephone visit will be conducted via a call between you and your physician/provider. We have found that certain health care needs can be provided without the need for a physical exam.  This service lets us provide the care you need with a short phone conversation.  If a prescription is necessary we can send it directly to your pharmacy.  If lab work is needed we can place an order for that and you can then stop by our lab to have the test done at a later time.  If during the course of the call the physician/provider feels a telephone visit is not appropriate, you will not be charged for this service.\"     The reason for the telephone visit: Prenatal visit, positive COVID test    S: Doing ok.  Had positive test for COVID today, son sick first.  Denies any fevers or respiratory symptoms, solely MSK discomfort.  Occasional ctx, nothing regular or painful.  Denies VB or LOF.  No HA, vision changes, SOB, RUQ pain or increased swelling.  Asks to have her CS scheduled with Dr. Soni, she delivered her son too.  Questions about what to pay attention to with COVID.    O:  No vitals as remote visit    General: NAD, A&Ox3  Resp: Non-labored breathing on phone      A/P: 36 yo  @ 31w2d presents for ZEKE visit  1) PNC: Rh positive, Love negative, Infectious labs wnl, Failed early GCT, passed early 3 hour GTT, 28 week GTT passed although failed 2 hour value   2) AMA/Genetic screening: Normal NT, Low risk NIPT, AFP declined, Level II US normal although suboptimal views of heat and face, repeat US with normal appearance of these structures  3) History of fetus with hypoplastic left heart, " s/p TOP: Had fetal echo this pregnancy which was normal  4) History of CS: Completed in 2017 for arrest of dilation and Cat 2 tracing.  Op note reviewed and without issues.  Patient planning RCS.  Asks to have Dr. Soni do it, will schedule 11/23/2021 at 39+3.    5) History of preeclampsia w/o SF: Had baseline HELLP labs normal UPC 0.08.  On ASA prophylaxis.  6) History of GDM: Had testing as above, no GDM this pregnancy.  7) Fibroid uterus: Small < 2 cm, fundal and left lateral.  8) s/p COVID vaccine, s/p Tdap, flu at next visit given remote visit  9) Positive COVID TEST: 9/27/2021, son had first.  Has some MSK pain but no fever or respiratory symptoms.  Discussed symptoms to pay attention to and when to seek emergency care today.  10) Delivery planning: Plans RCS, orders placed today for 39+3 with Nae as requested by patient.  Discussed indications for earlier delivery.  Plans breastfeed.  Unsure contraception.  11) RTC in 2 weeks for ZEKE visit, will be outside of COVID quarantine time at that time    Phone call start: 1:28 PM  Phone call end: 1:36 PM  Phone call duration:  8 minutes    Shellie Soni MD

## 2021-09-27 NOTE — TELEPHONE ENCOUNTER
Returned call to patient. Patient's son was symptomatic with fever and test positive for Covid over the weekend. Patient is fully vaccinated. States she had some chills two days ago and notes a mild runny nose.     Advised patient to continue to isolate with son and Covid testing for patient 3-5 days from when son tested positive, per CDC recommendation.     Patient comfortable with changing today's ZEKE visit to telephone. Also scheduled for next ZEKE in person.

## 2021-09-27 NOTE — TELEPHONE ENCOUNTER
[facility] Call Center    Phone Message    May a detailed message be left on voicemail: yes     Reason for Call: Other: Zaina called to report that her son tested positive over the weekend for Covid. Patient is wondering if she can make her appointment today a phone call. She is also reporting to have cold like symptoms- stuffy nose for the past few days. She has not been test for Covid as of yet. Please call her back to discuss.     Action Taken: Message routed to:  Other: [facility]     Travel Screening: Not Applicable

## 2021-09-28 ENCOUNTER — TELEPHONE (OUTPATIENT)
Dept: OBGYN | Facility: CLINIC | Age: 36
End: 2021-09-28

## 2021-10-11 ENCOUNTER — TELEPHONE (OUTPATIENT)
Dept: OBGYN | Facility: CLINIC | Age: 36
End: 2021-10-11

## 2021-10-11 NOTE — TELEPHONE ENCOUNTER
[facility] Call Center    Phone Message    May a detailed message be left on voicemail: yes     Reason for Call: Other: Zaina was told to schedule a two week follow after her appointment on 09/27/21 (10/11/21-10/17/21). Writer attempted to schedule however next available appointment was 10/26/21. Are we able to squeeze her in anywhere on the schedule? Please give Zaina a call back to discuss.      Action Taken: Message routed to:  Other: S     Travel Screening: Not Applicable

## 2021-10-12 ENCOUNTER — TELEPHONE (OUTPATIENT)
Dept: OBGYN | Facility: CLINIC | Age: 36
End: 2021-10-12

## 2021-10-12 NOTE — TELEPHONE ENCOUNTER
Spoke with patient, refused same day appointments for today 10/12. Patient stated she would just wait until the 25th to come in, can feel the baby kick and is not feeling sick at all.     Yin Johnson  Clinical Services Assistant

## 2021-10-12 NOTE — TELEPHONE ENCOUNTER
Left message for patient to call back and schedule an LAINEY Johnson  Clinical Services Assistant

## 2021-10-12 NOTE — TELEPHONE ENCOUNTER
[facility] Call Center    Phone Message    May a detailed message be left on voicemail: yes     Reason for Call: Other: Manuel Esqueda's call. Please call Manuel Mahajan [phone]     Action Taken: Message routed to:  [facility]    Travel Screening: Not Applicable

## 2021-10-21 DIAGNOSIS — Z11.59 ENCOUNTER FOR SCREENING FOR OTHER VIRAL DISEASES: ICD-10-CM

## 2021-10-25 ENCOUNTER — OFFICE VISIT (OUTPATIENT)
Dept: OBGYN | Facility: CLINIC | Age: 36
End: 2021-10-25
Attending: OBSTETRICS & GYNECOLOGY
Payer: COMMERCIAL

## 2021-10-25 VITALS
WEIGHT: 179.4 LBS | HEART RATE: 70 BPM | BODY MASS INDEX: 33.01 KG/M2 | SYSTOLIC BLOOD PRESSURE: 129 MMHG | HEIGHT: 62 IN | DIASTOLIC BLOOD PRESSURE: 77 MMHG

## 2021-10-25 DIAGNOSIS — Z98.891 HISTORY OF CESAREAN DELIVERY: ICD-10-CM

## 2021-10-25 DIAGNOSIS — O09.893 SUPERVISION OF OTHER HIGH RISK PREGNANCIES, THIRD TRIMESTER: Primary | ICD-10-CM

## 2021-10-25 DIAGNOSIS — O09.293 HISTORY OF PRE-ECLAMPSIA IN PRIOR PREGNANCY, CURRENTLY PREGNANT IN THIRD TRIMESTER: ICD-10-CM

## 2021-10-25 PROCEDURE — 87653 STREP B DNA AMP PROBE: CPT | Performed by: OBSTETRICS & GYNECOLOGY

## 2021-10-25 PROCEDURE — G0463 HOSPITAL OUTPT CLINIC VISIT: HCPCS

## 2021-10-25 PROCEDURE — 99207 PR PRENATAL VISIT: CPT | Mod: GC | Performed by: OBSTETRICS & GYNECOLOGY

## 2021-10-25 ASSESSMENT — MIFFLIN-ST. JEOR: SCORE: 1452

## 2021-10-25 NOTE — PROGRESS NOTES
"[facility]  [facility]  Return OB Visit     SUBJECTIVE   Has been feeling increasing lower pelvic pain and pressure. She notes good fetal movement, and has occasional mild contractions.  Denies vaginal bleeding, vaginal discharge, LOF, or pre-eclampsia signs or symptoms.     Pregnancy notable for  - Hx CS, planning a repeat  - Hx PreE without severe features  - Fibroid uterus (fundal, left lateral)  - COVID in pregnancy (21)    OBJECTIVE   /77   Pulse 70   Ht 1.575 m (5' 2\")   Wt 81.4 kg (179 lb 6.4 oz)   LMP 2021   BMI 32.81 kg/m     Weight gain: 12lb 6.4oz  Gen: Well-appearing, NAD     GBS collected  Cervix: Closed/long    Fundal Height:  36cm  FHR: 130    See OB Flowsheet for additional information     ASSESSMENT & PLAN   36 year old  at 35w2d by LMP confirmed with 9w0d US who presents today for return OB visit. She is doing well. Recovered from recent COVID infection.     # PNC:   - Routine labs reviewedt.   - GBS collected today   - Prenatal labs, Rh positive, antibody negative, Rubella immune, failed early GCT, passed early 3 hour GTT and 28week GTT.  - Genetics: Normal NT, Low risk NIPT, declined AFP  - Imaging: Level II suboptimal views of heart and face otherwise wnl. Fetal echo normal   - Immunizations: agrees to flu shot-left before getting-please give next visit, s/p COVIDx2 D#1 21; D#2 3/6/21(Moderna), 9/15/21 Tdap  - Given ERAS packet, and pre-CS instructions today     # Delivery Planning  - Repeat CS scheduled 21  - Pediatrician: Dr. Vu - [facility].  - Feeding: planning on breast feeding  - Contraception: Natural family planning    # Hx of PreE  - asymptomatic today  - reviewed signs/symptoms for pre-eclampsia. Patient verbalizes understanding of this.     # breakthrough COVID in pregnancy  - positive test 21 - had loss of smell and chills  - asymptomatic today     Return to " clinic in 1 week. Discussed Return Precautions    Staffed with Dr. Kyle Bailey DO, MS  OBGYN Resident, PGY1  [facility]  10/25/2021 10:46 AM     The patient was seen and examined with Dr. Bailey.  I have reviewed and edited the above note.    Merced Wright MD, FACOG

## 2021-10-26 LAB
GP B STREP DNA SPEC QL NAA+PROBE: NEGATIVE
PATIENT PENICILLIN, AMOXICILLIN, CEPHALOSPORINS ALLERGY: NO

## 2021-11-04 NOTE — PROGRESS NOTES
"Subjective:     36 year old  at 36w6d presents for a routine prenatal appointment.  No vaginal bleeding,  leakage of fluid, or change in vaginal discharge.  Occs BH contractions.  Good fetal movement.         Patient concerns: Feeling well overall.     - Reviewed GBS negative results.   - History of preE, BP today WNL. No HA, visual changes, RUQ or epigastric pain.   - Discussed recommended postpartum medications- acetaminophen, ibuprofen and senna-docusate. Reviewed no use of ibuprofen during pregnancy. RX sent.     Objective:  Vitals:    21 0942   BP: 127/77   BP Location: Left arm   Patient Position: Chair   Pulse: 79   Weight: 82.3 kg (181 lb 8 oz)   Height: 1.575 m (5' 2\")    See OB flowsheet    Assessment/Plan     Encounter Diagnoses   Name Primary?     High-risk pregnancy, elderly multigravida, unspecified trimester Yes     History of pre-eclampsia in prior pregnancy, currently pregnant in third trimester      Elevated early 1hour- passed early 3 hour and 3 hour at EOB      History of  delivery- plans repeat      History of fetal abnormality in previous pregnancy, currently pregnant      No orders of the defined types were placed in this encounter.    Orders Placed This Encounter   Medications     acetaminophen (TYLENOL) 325 MG tablet     Sig: Take 2 tablets (650 mg) by mouth every 6 hours as needed for mild pain Start after Delivery.     Dispense:  100 tablet     Refill:  0     ibuprofen (ADVIL/MOTRIN) 600 MG tablet     Sig: Take 1 tablet (600 mg) by mouth every 6 hours as needed for moderate pain Start after delivery     Dispense:  60 tablet     Refill:  0     senna-docusate (SENOKOT-S/PERICOLACE) 8.6-50 MG tablet     Sig: Take 1 tablet by mouth daily Start after delivery.     Dispense:  100 tablet     Refill:  0       PHQ-9 SCORE 2017 2019 9/15/2021   PHQ-9 Total Score - - -   PHQ-9 Total Score 0 0 0     PHQ 2017 2019 9/15/2021   PHQ-9 Total Score 0 0 0   Q9: " Thoughts of better off dead/self-harm past 2 weeks Not at all Not at all Not at all       Birth preferences reviewed: C/S scheduled   Labor signs discussed.   Reinforced daily fetal movement counts.  Reviewed why/how to contact provider if headache/visual changes/RUQ or epigastric pain, decreased fetal movement, vaginal bleeding, leakage of fluid.    Return for visit in 1 week  Repeat c/s scheduled for 11/23, pt has ERAS info and soap  Recommend covid vaccine booster. Had covid 9/27/21  Call prn if questions or concerns.     RERE Chau CNM

## 2021-11-05 ENCOUNTER — OFFICE VISIT (OUTPATIENT)
Dept: OBGYN | Facility: CLINIC | Age: 36
End: 2021-11-05
Attending: MIDWIFE
Payer: COMMERCIAL

## 2021-11-05 VITALS
HEIGHT: 62 IN | HEART RATE: 79 BPM | WEIGHT: 181.5 LBS | DIASTOLIC BLOOD PRESSURE: 77 MMHG | SYSTOLIC BLOOD PRESSURE: 127 MMHG | BODY MASS INDEX: 33.4 KG/M2

## 2021-11-05 DIAGNOSIS — O09.293 HISTORY OF PRE-ECLAMPSIA IN PRIOR PREGNANCY, CURRENTLY PREGNANT IN THIRD TRIMESTER: ICD-10-CM

## 2021-11-05 DIAGNOSIS — Z98.891 HISTORY OF CESAREAN DELIVERY: ICD-10-CM

## 2021-11-05 DIAGNOSIS — O09.529 HIGH-RISK PREGNANCY, ELDERLY MULTIGRAVIDA, UNSPECIFIED TRIMESTER: Primary | ICD-10-CM

## 2021-11-05 DIAGNOSIS — O09.299 HISTORY OF FETAL ABNORMALITY IN PREVIOUS PREGNANCY, CURRENTLY PREGNANT: ICD-10-CM

## 2021-11-05 DIAGNOSIS — R73.09 ELEVATED GLUCOSE: ICD-10-CM

## 2021-11-05 PROCEDURE — 99207 PR PRENATAL VISIT: CPT | Performed by: MIDWIFE

## 2021-11-05 PROCEDURE — G0463 HOSPITAL OUTPT CLINIC VISIT: HCPCS

## 2021-11-05 RX ORDER — IBUPROFEN 600 MG/1
600 TABLET, FILM COATED ORAL EVERY 6 HOURS PRN
Qty: 60 TABLET | Refills: 0 | Status: SHIPPED | OUTPATIENT
Start: 2021-11-05 | End: 2021-11-18

## 2021-11-05 RX ORDER — AMOXICILLIN 250 MG
1 CAPSULE ORAL DAILY
Qty: 100 TABLET | Refills: 0 | Status: SHIPPED | OUTPATIENT
Start: 2021-11-05 | End: 2021-11-18

## 2021-11-05 RX ORDER — ACETAMINOPHEN 325 MG/1
650 TABLET ORAL EVERY 6 HOURS PRN
Qty: 100 TABLET | Refills: 0 | Status: SHIPPED | OUTPATIENT
Start: 2021-11-05 | End: 2021-11-18

## 2021-11-05 ASSESSMENT — MIFFLIN-ST. JEOR: SCORE: 1461.53

## 2021-11-05 NOTE — LETTER
"2021       RE: Zaina Tan  [address]  [facility] [address]     Dear Colleague,    Thank you for referring your patient, Zaina Tan, to the [facility] [place] at [facility]. Please see a copy of my visit note below.    Subjective:     36 year old  at 36w6d presents for a routine prenatal appointment.  No vaginal bleeding,  leakage of fluid, or change in vaginal discharge.  Occs BH contractions.  Good fetal movement.         Patient concerns: Feeling well overall.     - Reviewed GBS negative results.   - History of preE, BP today WNL. No HA, visual changes, RUQ or epigastric pain.   - Discussed recommended postpartum medications- acetaminophen, ibuprofen and senna-docusate. Reviewed no use of ibuprofen during pregnancy. RX sent.     Objective:  Vitals:    21 0942   BP: 127/77   BP Location: Left arm   Patient Position: Chair   Pulse: 79   Weight: 82.3 kg (181 lb 8 oz)   Height: 1.575 m (5' 2\")    See OB flowsheet    Assessment/Plan     Encounter Diagnoses   Name Primary?     High-risk pregnancy, elderly multigravida, unspecified trimester Yes     History of pre-eclampsia in prior pregnancy, currently pregnant in third trimester      Elevated early 1hour- passed early 3 hour and 3 hour at EOB      History of  delivery- plans repeat      History of fetal abnormality in previous pregnancy, currently pregnant      No orders of the defined types were placed in this encounter.    Orders Placed This Encounter   Medications     acetaminophen (TYLENOL) 325 MG tablet     Sig: Take 2 tablets (650 mg) by mouth every 6 hours as needed for mild pain Start after Delivery.     Dispense:  100 tablet     Refill:  0     ibuprofen (ADVIL/MOTRIN) 600 MG tablet     Sig: Take 1 tablet (600 mg) by mouth every 6 hours as needed for moderate pain Start after delivery     Dispense:  60 tablet     Refill:  0     senna-docusate " (SENOKOT-S/PERICOLACE) 8.6-50 MG tablet     Sig: Take 1 tablet by mouth daily Start after delivery.     Dispense:  100 tablet     Refill:  0       PHQ-9 SCORE 8/14/2017 8/29/2019 9/15/2021   PHQ-9 Total Score - - -   PHQ-9 Total Score 0 0 0     PHQ 8/14/2017 8/29/2019 9/15/2021   PHQ-9 Total Score 0 0 0   Q9: Thoughts of better off dead/self-harm past 2 weeks Not at all Not at all Not at all       Birth preferences reviewed: C/S scheduled   Labor signs discussed.   Reinforced daily fetal movement counts.  Reviewed why/how to contact provider if headache/visual changes/RUQ or epigastric pain, decreased fetal movement, vaginal bleeding, leakage of fluid.    Return for visit in 1 week  Repeat c/s scheduled for 11/23, pt has ERAS info and soap  Recommend covid vaccine booster. Had covid 9/27/21  Call prn if questions or concerns.     RERE Chau CNM

## 2021-11-10 ENCOUNTER — OFFICE VISIT (OUTPATIENT)
Dept: OBGYN | Facility: CLINIC | Age: 36
End: 2021-11-10
Attending: MIDWIFE
Payer: COMMERCIAL

## 2021-11-10 VITALS
SYSTOLIC BLOOD PRESSURE: 124 MMHG | HEART RATE: 71 BPM | HEIGHT: 62 IN | DIASTOLIC BLOOD PRESSURE: 80 MMHG | BODY MASS INDEX: 33.57 KG/M2 | WEIGHT: 182.4 LBS

## 2021-11-10 DIAGNOSIS — D25.9 UTERINE LEIOMYOMA, UNSPECIFIED LOCATION: ICD-10-CM

## 2021-11-10 DIAGNOSIS — R73.09 ELEVATED GLUCOSE: ICD-10-CM

## 2021-11-10 DIAGNOSIS — O09.529 HIGH-RISK PREGNANCY, ELDERLY MULTIGRAVIDA, UNSPECIFIED TRIMESTER: Primary | ICD-10-CM

## 2021-11-10 DIAGNOSIS — O09.293 H/O FETAL ANOMALY IN PRIOR PREGNANCY, CURRENTLY PREGNANT, THIRD TRIMESTER: ICD-10-CM

## 2021-11-10 DIAGNOSIS — O09.293 HISTORY OF PRE-ECLAMPSIA IN PRIOR PREGNANCY, CURRENTLY PREGNANT IN THIRD TRIMESTER: ICD-10-CM

## 2021-11-10 DIAGNOSIS — O32.9XX0 MALPOSITION OF FETUS, SINGLE OR UNSPECIFIED FETUS: ICD-10-CM

## 2021-11-10 PROBLEM — Z23 NEED FOR TDAP VACCINATION: Status: RESOLVED | Noted: 2021-09-15 | Resolved: 2021-11-10

## 2021-11-10 PROCEDURE — 99207 PR PRENATAL VISIT: CPT | Performed by: MIDWIFE

## 2021-11-10 PROCEDURE — G0463 HOSPITAL OUTPT CLINIC VISIT: HCPCS

## 2021-11-10 ASSESSMENT — MIFFLIN-ST. JEOR: SCORE: 1465.61

## 2021-11-10 NOTE — PROGRESS NOTES
"Subjective:     36 year old  at 37w4d presents for a routine prenatal appointment with 4 year old son, Kanu.   No vaginal bleeding,  leakage of fluid, or change in vaginal discharge.  No contractions.  Good fetal movement.       No HA, visual changes, RUQ or epigastric pain.     Patient concerns: Feeling well overall.   - Some lower pelvic discomfort and occasional sharp pain, especially with position changes. Not too bothered by this.     Objective:  Vitals:    11/10/21 0959   BP: 124/80   BP Location: Left arm   Patient Position: Chair   Pulse: 71   Weight: 82.7 kg (182 lb 6.4 oz)   Height: 1.575 m (5' 2\")    See OB flowsheet    Leopolds: not cephalic. Possibly head in LLQ.     Assessment/Plan     Encounter Diagnoses   Name Primary?     High-risk pregnancy, elderly multigravida, unspecified trimester Yes     History of pre-eclampsia in prior pregnancy, currently pregnant in third trimester      H/O fetal anomaly in prior pregnancy, currently pregnant, third trimester      Elevated early 1hour- passed early 3 hour and 3 hour at EOB      Uterine leiomyoma, unspecified location      Malposition of fetus, single or unspecified fetus      Orders Placed This Encounter   Procedures     US OB >14 Weeks Limited wo Fetal Measurement     No orders of the defined types were placed in this encounter.      PHQ-9 SCORE 2017 2019 9/15/2021   PHQ-9 Total Score - - -   PHQ-9 Total Score 0 0 0     PHQ 2017 2019 9/15/2021   PHQ-9 Total Score 0 0 0   Q9: Thoughts of better off dead/self-harm past 2 weeks Not at all Not at all Not at all       Birth preferences reviewed: C/S scheduled for 21  Labor signs discussed.   Reinforced daily fetal movement counts.  Reviewed why/how to contact provider if headache/visual changes/RUQ or epigastric pain, decreased fetal movement, vaginal bleeding, leakage of fluid.      Return for visit in 1 week  Ultrasound for fetal position recommended. Tried scheduling today, " but none available. Pt to schedule at .   Call prn if questions or concerns.     RERE ChauM

## 2021-11-10 NOTE — LETTER
"11/10/2021       RE: Zaina Tan  [address]  [facility] [address]     Dear Colleague,    Thank you for referring your patient, Zaina Tan, to the [facility] [place] at [facility]. Please see a copy of my visit note below.    Subjective:     36 year old  at 37w4d presents for a routine prenatal appointment with 4 year old son, Kanu.   No vaginal bleeding,  leakage of fluid, or change in vaginal discharge.  No contractions.  Good fetal movement.       No HA, visual changes, RUQ or epigastric pain.     Patient concerns: Feeling well overall.   - Some lower pelvic discomfort and occasional sharp pain, especially with position changes. Not too bothered by this.     Objective:  Vitals:    11/10/21 0959   BP: 124/80   BP Location: Left arm   Patient Position: Chair   Pulse: 71   Weight: 82.7 kg (182 lb 6.4 oz)   Height: 1.575 m (5' 2\")    See OB flowsheet    Leopolds: not cephalic. Possibly head in LLQ.     Assessment/Plan     Encounter Diagnoses   Name Primary?     High-risk pregnancy, elderly multigravida, unspecified trimester Yes     History of pre-eclampsia in prior pregnancy, currently pregnant in third trimester      H/O fetal anomaly in prior pregnancy, currently pregnant, third trimester      Elevated early 1hour- passed early 3 hour and 3 hour at EOB      Uterine leiomyoma, unspecified location      Malposition of fetus, single or unspecified fetus      Orders Placed This Encounter   Procedures     US OB >14 Weeks Limited wo Fetal Measurement     No orders of the defined types were placed in this encounter.      PHQ-9 SCORE 2017 2019 9/15/2021   PHQ-9 Total Score - - -   PHQ-9 Total Score 0 0 0     PHQ 2017 2019 9/15/2021   PHQ-9 Total Score 0 0 0   Q9: Thoughts of better off dead/self-harm past 2 weeks Not at all Not at all Not at all       Birth preferences reviewed: C/S scheduled for " 11/23/21  Labor signs discussed.   Reinforced daily fetal movement counts.  Reviewed why/how to contact provider if headache/visual changes/RUQ or epigastric pain, decreased fetal movement, vaginal bleeding, leakage of fluid.      Return for visit in 1 week  Ultrasound for fetal position recommended. Tried scheduling today, but none available. Pt to schedule at .   Call prn if questions or concerns.     RERE Chau CNM

## 2021-11-15 ENCOUNTER — ANCILLARY PROCEDURE (OUTPATIENT)
Dept: ULTRASOUND IMAGING | Facility: CLINIC | Age: 36
End: 2021-11-15
Attending: MIDWIFE
Payer: COMMERCIAL

## 2021-11-15 PROCEDURE — 76815 OB US LIMITED FETUS(S): CPT | Mod: 26 | Performed by: OBSTETRICS & GYNECOLOGY

## 2021-11-15 PROCEDURE — 76815 OB US LIMITED FETUS(S): CPT

## 2021-11-18 ENCOUNTER — OFFICE VISIT (OUTPATIENT)
Dept: OBGYN | Facility: CLINIC | Age: 36
End: 2021-11-18
Attending: MIDWIFE
Payer: COMMERCIAL

## 2021-11-18 VITALS
WEIGHT: 181 LBS | DIASTOLIC BLOOD PRESSURE: 60 MMHG | BODY MASS INDEX: 33.11 KG/M2 | SYSTOLIC BLOOD PRESSURE: 111 MMHG | HEART RATE: 84 BPM

## 2021-11-18 DIAGNOSIS — O09.293 HISTORY OF PRE-ECLAMPSIA IN PRIOR PREGNANCY, CURRENTLY PREGNANT IN THIRD TRIMESTER: ICD-10-CM

## 2021-11-18 DIAGNOSIS — O09.529 HIGH-RISK PREGNANCY, ELDERLY MULTIGRAVIDA, UNSPECIFIED TRIMESTER: Primary | ICD-10-CM

## 2021-11-18 DIAGNOSIS — O32.9XX0 MALPOSITION OF FETUS, SINGLE OR UNSPECIFIED FETUS: ICD-10-CM

## 2021-11-18 PROCEDURE — G0463 HOSPITAL OUTPT CLINIC VISIT: HCPCS

## 2021-11-18 PROCEDURE — 99207 PR PRENATAL VISIT: CPT | Mod: GC | Performed by: OBSTETRICS & GYNECOLOGY

## 2021-11-18 ASSESSMENT — PAIN SCALES - GENERAL: PAINLEVEL: NO PAIN (0)

## 2021-11-18 NOTE — PROGRESS NOTES
[facility]  [facility]  Return OB Visit     SUBJECTIVE   Overall feeling well. Has been experiencing some velvet corral contractions/tight. Also been feeling more back strain with long periods of standing.  Denies vaginal bleeding, vaginal discharge, LOF, contractions, or pre-eclampsia signs or symptoms.  Reports good fetal movement.     Pregnancy notable for  - Hx of PreE  - Hx of CS x 1, planning repeat  - Fibroid Uterus (fundal, left lateral)  - Transverse fetal presentation  - breakthrough COVID in pregnancy (21)      OBJECTIVE   LMP 2021   Weight gain:     Gen: Well-appearing, NAD    Fundal Height:  39  FHR: 140  Fetal presentation: transverse with head to maternal left by leopolds    See OB Flowsheet for additional information     ASSESSMENT & PLAN   36 year old  at 38w5d LMP confirmed with 9w0d US, ZEKE.    # PNC:   - Routine labs reviewed and discussed with patient.   - Prenatal labs, Rh positive, antibody negative, Rubella immune, failed early GCT, passed early 3 hour GTT and 28 week GTT  - GBS: negative  - Genetics: Normal NT, Low risk NIPT, declined AFP  - Imaging: Level II sub optimal views, resolved on repeat imaging. Fetal Echo wnl.    - Most recent 11/15/21 OB US: Transverse presentation (head to maternal left),  Anterior placenta.   - Immunizations: agrees to flu shot-left before getting-please offer post partum, s/p COVIDx2 D#1 21; D#2 3/6/21(Moderna) - would like to schedule for booster, 9/15/21 Tdap    # Delivery Planning  - RLTCS scheduled 21  - Pediatrician: Dr. Vu - [facility].  - Feeding: planning on breast feeding  - Contraception: Natural family planning (considering OCPs to discuss at 6 week visit).     # Hx of PreE   - asymptomatic today  - reviewed pertinent s/s for pre E    # Transverse Fetal Presentation  - transverse with head to maternal left on Leopold's today.  - discussed with  patient that pending fetal presentation day of scheduled CS, there may be a possibility of CS needing to be performed via classical uterine incision to allow for room to deliver baby. Reviewed risks/benefits, and what this may indicate for future pregnancy/delivery timing should she desire to have another child. She verbalized understanding of this.     # Breakthrough COVID in pregnancy  - positive tests 9/27/21  - asymptomatic today. COVID test scheduled for Saturday ahead of CS.      Return precautions discussed. Staffed with Dr. Georgia Bailey DO, MS  OBGYN Resident, PGY1  [facility]  11/18/2021 9:12 AM    Appreciate Dr. Bailey's note above, patient also seen and examined by me. I agree with the note above.   Neyda Ho MD

## 2021-11-18 NOTE — NURSING NOTE
Chief Complaint   Patient presents with     Prenatal Care     ZEKE 38 weeks and 5 days   Ania Justin LPN

## 2021-11-18 NOTE — LETTER
2021       RE: Zaina Tan  [address]  [place] [place] [address]     Dear Colleague,    Thank you for referring your patient, Zaina Tan, to the [facility] [place] at [facility]. Please see a copy of my visit note below.    [facility]  [facility]  Return OB Visit     SUBJECTIVE   Overall feeling well. Has been experiencing some velvet corral contractions/tight. Also been feeling more back strain with long periods of standing.  Denies vaginal bleeding, vaginal discharge, LOF, contractions, or pre-eclampsia signs or symptoms.  Reports good fetal movement.     Pregnancy notable for  - Hx of PreE  - Hx of CS x 1, planning repeat  - Fibroid Uterus (fundal, left lateral)  - Transverse fetal presentation  - breakthrough COVID in pregnancy (21)      OBJECTIVE   LMP 2021   Weight gain:     Gen: Well-appearing, NAD    Fundal Height:  39  FHR: 140  Fetal presentation: transverse with head to maternal left by leopolds    See OB Flowsheet for additional information     ASSESSMENT & PLAN   36 year old  at 38w5d LMP confirmed with 9w0d US, ZEKE.    # PNC:   - Routine labs reviewed and discussed with patient.   - Prenatal labs, Rh positive, antibody negative, Rubella immune, failed early GCT, passed early 3 hour GTT and 28 week GTT  - GBS: negative  - Genetics: Normal NT, Low risk NIPT, declined AFP  - Imaging: Level II sub optimal views, resolved on repeat imaging. Fetal Echo wnl.    - Most recent 11/15/21 OB US: Transverse presentation (head to maternal left),  Anterior placenta.   - Immunizations: agrees to flu shot-left before getting-please offer post partum, s/p COVIDx2 D#1 21; D#2 3/6/21(Moderna) - would like to schedule for booster, 9/15/21 Tdap    # Delivery Planning  - RLTCS scheduled 21  - Pediatrician: Dr. Vu - [facility].  - Feeding: planning on breast feeding  - Contraception: Natural family planning (considering OCPs to discuss at 6 week visit).     # Hx of PreE   - asymptomatic today  - reviewed pertinent s/s for pre E    # Transverse Fetal Presentation  - transverse with head to maternal left on Leopold's today.  - discussed with patient that pending fetal presentation day of scheduled CS, there may be a possibility of CS needing to be performed via classical uterine incision to allow for room to deliver baby. Reviewed risks/benefits, and what this may indicate for future pregnancy/delivery timing should she desire to have another child. She verbalized understanding of this.     # Breakthrough COVID in pregnancy  - positive tests 9/27/21  - asymptomatic today. COVID test scheduled for Saturday ahead of CS.      Return precautions discussed. Staffed with Dr. Georgia Bailey DO, MS  OBGYN Resident, PGY1  [facility]  11/18/2021 9:12 AM    Appreciate Dr. Bailey's note above, patient also seen and examined by me. I agree with the note above.   Neyda Ho MD

## 2021-11-20 ENCOUNTER — LAB (OUTPATIENT)
Dept: URGENT CARE | Facility: URGENT CARE | Age: 36
End: 2021-11-20
Attending: OBSTETRICS & GYNECOLOGY
Payer: COMMERCIAL

## 2021-11-20 DIAGNOSIS — Z11.59 ENCOUNTER FOR SCREENING FOR OTHER VIRAL DISEASES: ICD-10-CM

## 2021-11-20 PROCEDURE — U0003 INFECTIOUS AGENT DETECTION BY NUCLEIC ACID (DNA OR RNA); SEVERE ACUTE RESPIRATORY SYNDROME CORONAVIRUS 2 (SARS-COV-2) (CORONAVIRUS DISEASE [COVID-19]), AMPLIFIED PROBE TECHNIQUE, MAKING USE OF HIGH THROUGHPUT TECHNOLOGIES AS DESCRIBED BY CMS-2020-01-R: HCPCS

## 2021-11-20 PROCEDURE — U0005 INFEC AGEN DETEC AMPLI PROBE: HCPCS

## 2021-11-21 LAB — SARS-COV-2 RNA RESP QL NAA+PROBE: NEGATIVE

## 2021-11-22 ENCOUNTER — ANESTHESIA EVENT (OUTPATIENT)
Dept: OBGYN | Facility: CLINIC | Age: 36
End: 2021-11-22
Payer: COMMERCIAL

## 2021-11-23 ENCOUNTER — ANESTHESIA (OUTPATIENT)
Dept: OBGYN | Facility: CLINIC | Age: 36
End: 2021-11-23
Payer: COMMERCIAL

## 2021-11-23 ENCOUNTER — HOSPITAL ENCOUNTER (INPATIENT)
Facility: CLINIC | Age: 36
LOS: 3 days | Discharge: HOME | End: 2021-11-26
Attending: OBSTETRICS & GYNECOLOGY | Admitting: OBSTETRICS & GYNECOLOGY
Payer: COMMERCIAL

## 2021-11-23 DIAGNOSIS — Z98.891 S/P C-SECTION: Primary | ICD-10-CM

## 2021-11-23 DIAGNOSIS — Z98.891 HISTORY OF CESAREAN DELIVERY: ICD-10-CM

## 2021-11-23 LAB
ABO/RH(D): NORMAL
ANTIBODY SCREEN: NEGATIVE
APTT PPP: 29 SECONDS (ref 22–38)
BASOPHILS # BLD AUTO: 0 10E3/UL (ref 0–0.2)
BASOPHILS NFR BLD AUTO: 1 %
EOSINOPHIL # BLD AUTO: 0 10E3/UL (ref 0–0.7)
EOSINOPHIL NFR BLD AUTO: 1 %
ERYTHROCYTE [DISTWIDTH] IN BLOOD BY AUTOMATED COUNT: 13.6 % (ref 10–15)
ERYTHROCYTE [DISTWIDTH] IN BLOOD BY AUTOMATED COUNT: 13.7 % (ref 10–15)
FIBRINOGEN PPP-MCNC: 325 MG/DL (ref 170–490)
HCT VFR BLD AUTO: 34.2 % (ref 35–47)
HCT VFR BLD AUTO: 39.1 % (ref 35–47)
HGB BLD-MCNC: 11.2 G/DL (ref 11.7–15.7)
HGB BLD-MCNC: 13.1 G/DL (ref 11.7–15.7)
IMM GRANULOCYTES # BLD: 0.1 10E3/UL
IMM GRANULOCYTES NFR BLD: 1 %
INR PPP: 0.99 (ref 0.86–1.14)
LYMPHOCYTES # BLD AUTO: 1.9 10E3/UL (ref 0.8–5.3)
LYMPHOCYTES NFR BLD AUTO: 38 %
MCH RBC QN AUTO: 28.7 PG (ref 26.5–33)
MCH RBC QN AUTO: 29 PG (ref 26.5–33)
MCHC RBC AUTO-ENTMCNC: 32.7 G/DL (ref 31.5–36.5)
MCHC RBC AUTO-ENTMCNC: 33.5 G/DL (ref 31.5–36.5)
MCV RBC AUTO: 87 FL (ref 78–100)
MCV RBC AUTO: 88 FL (ref 78–100)
MONOCYTES # BLD AUTO: 0.4 10E3/UL (ref 0–1.3)
MONOCYTES NFR BLD AUTO: 9 %
NEUTROPHILS # BLD AUTO: 2.5 10E3/UL (ref 1.6–8.3)
NEUTROPHILS NFR BLD AUTO: 50 %
NRBC # BLD AUTO: 0 10E3/UL
NRBC BLD AUTO-RTO: 0 /100
PLATELET # BLD AUTO: 163 10E3/UL (ref 150–450)
PLATELET # BLD AUTO: 182 10E3/UL (ref 150–450)
RBC # BLD AUTO: 3.9 10E6/UL (ref 3.8–5.2)
RBC # BLD AUTO: 4.51 10E6/UL (ref 3.8–5.2)
SPECIMEN EXPIRATION DATE: NORMAL
T PALLIDUM AB SER QL: NONREACTIVE
WBC # BLD AUTO: 12 10E3/UL (ref 4–11)
WBC # BLD AUTO: 5 10E3/UL (ref 4–11)

## 2021-11-23 PROCEDURE — 258N000003 HC RX IP 258 OP 636

## 2021-11-23 PROCEDURE — 250N000013 HC RX MED GY IP 250 OP 250 PS 637: Performed by: OBSTETRICS & GYNECOLOGY

## 2021-11-23 PROCEDURE — 710N000010 HC RECOVERY PHASE 1, LEVEL 2, PER MIN: Performed by: OBSTETRICS & GYNECOLOGY

## 2021-11-23 PROCEDURE — 999N000141 HC STATISTIC PRE-PROCEDURE NURSING ASSESSMENT: Performed by: OBSTETRICS & GYNECOLOGY

## 2021-11-23 PROCEDURE — C9290 INJ, BUPIVACAINE LIPOSOME: HCPCS | Performed by: ANESTHESIOLOGY

## 2021-11-23 PROCEDURE — 86900 BLOOD TYPING SEROLOGIC ABO: CPT | Performed by: OBSTETRICS & GYNECOLOGY

## 2021-11-23 PROCEDURE — 250N000009 HC RX 250: Performed by: OBSTETRICS & GYNECOLOGY

## 2021-11-23 PROCEDURE — 360N000076 HC SURGERY LEVEL 3, PER MIN: Performed by: OBSTETRICS & GYNECOLOGY

## 2021-11-23 PROCEDURE — 85384 FIBRINOGEN ACTIVITY: CPT | Performed by: STUDENT IN AN ORGANIZED HEALTH CARE EDUCATION/TRAINING PROGRAM

## 2021-11-23 PROCEDURE — 250N000011 HC RX IP 250 OP 636: Performed by: OBSTETRICS & GYNECOLOGY

## 2021-11-23 PROCEDURE — 370N000017 HC ANESTHESIA TECHNICAL FEE, PER MIN: Performed by: OBSTETRICS & GYNECOLOGY

## 2021-11-23 PROCEDURE — 85610 PROTHROMBIN TIME: CPT | Performed by: STUDENT IN AN ORGANIZED HEALTH CARE EDUCATION/TRAINING PROGRAM

## 2021-11-23 PROCEDURE — 85027 COMPLETE CBC AUTOMATED: CPT | Performed by: STUDENT IN AN ORGANIZED HEALTH CARE EDUCATION/TRAINING PROGRAM

## 2021-11-23 PROCEDURE — 272N000001 HC OR GENERAL SUPPLY STERILE: Performed by: OBSTETRICS & GYNECOLOGY

## 2021-11-23 PROCEDURE — 250N000013 HC RX MED GY IP 250 OP 250 PS 637: Performed by: STUDENT IN AN ORGANIZED HEALTH CARE EDUCATION/TRAINING PROGRAM

## 2021-11-23 PROCEDURE — 85025 COMPLETE CBC W/AUTO DIFF WBC: CPT | Performed by: OBSTETRICS & GYNECOLOGY

## 2021-11-23 PROCEDURE — 120N000002 HC R&B MED SURG/OB UMMC

## 2021-11-23 PROCEDURE — 36415 COLL VENOUS BLD VENIPUNCTURE: CPT | Performed by: STUDENT IN AN ORGANIZED HEALTH CARE EDUCATION/TRAINING PROGRAM

## 2021-11-23 PROCEDURE — 271N000001 HC OR GENERAL SUPPLY NON-STERILE: Performed by: OBSTETRICS & GYNECOLOGY

## 2021-11-23 PROCEDURE — 59510 CESAREAN DELIVERY: CPT | Mod: GC | Performed by: OBSTETRICS & GYNECOLOGY

## 2021-11-23 PROCEDURE — 250N000011 HC RX IP 250 OP 636: Performed by: ANESTHESIOLOGY

## 2021-11-23 PROCEDURE — 85730 THROMBOPLASTIN TIME PARTIAL: CPT | Performed by: STUDENT IN AN ORGANIZED HEALTH CARE EDUCATION/TRAINING PROGRAM

## 2021-11-23 PROCEDURE — 250N000011 HC RX IP 250 OP 636

## 2021-11-23 PROCEDURE — 258N000003 HC RX IP 258 OP 636: Performed by: OBSTETRICS & GYNECOLOGY

## 2021-11-23 PROCEDURE — 86780 TREPONEMA PALLIDUM: CPT | Performed by: OBSTETRICS & GYNECOLOGY

## 2021-11-23 PROCEDURE — 250N000009 HC RX 250

## 2021-11-23 RX ORDER — METOCLOPRAMIDE HYDROCHLORIDE 5 MG/ML
10 INJECTION INTRAMUSCULAR; INTRAVENOUS EVERY 6 HOURS PRN
Status: DISCONTINUED | OUTPATIENT
Start: 2021-11-23 | End: 2021-11-26 | Stop reason: HOSPADM

## 2021-11-23 RX ORDER — PROCHLORPERAZINE MALEATE 10 MG
10 TABLET ORAL EVERY 6 HOURS PRN
Status: DISCONTINUED | OUTPATIENT
Start: 2021-11-23 | End: 2021-11-26 | Stop reason: HOSPADM

## 2021-11-23 RX ORDER — TRANEXAMIC ACID 10 MG/ML
1 INJECTION, SOLUTION INTRAVENOUS EVERY 30 MIN PRN
Status: DISCONTINUED | OUTPATIENT
Start: 2021-11-23 | End: 2021-11-23

## 2021-11-23 RX ORDER — MISOPROSTOL 200 UG/1
400 TABLET ORAL
Status: DISCONTINUED | OUTPATIENT
Start: 2021-11-23 | End: 2021-11-26 | Stop reason: HOSPADM

## 2021-11-23 RX ORDER — OXYCODONE HYDROCHLORIDE 5 MG/1
5 TABLET ORAL EVERY 4 HOURS PRN
Status: DISCONTINUED | OUTPATIENT
Start: 2021-11-23 | End: 2021-11-23

## 2021-11-23 RX ORDER — FLUMAZENIL 0.1 MG/ML
0.2 INJECTION, SOLUTION INTRAVENOUS
Status: CANCELLED | OUTPATIENT
Start: 2021-11-23

## 2021-11-23 RX ORDER — NALOXONE HYDROCHLORIDE 0.4 MG/ML
0.4 INJECTION, SOLUTION INTRAMUSCULAR; INTRAVENOUS; SUBCUTANEOUS
Status: DISCONTINUED | OUTPATIENT
Start: 2021-11-23 | End: 2021-11-26 | Stop reason: HOSPADM

## 2021-11-23 RX ORDER — FENTANYL CITRATE 50 UG/ML
25-50 INJECTION, SOLUTION INTRAMUSCULAR; INTRAVENOUS
Status: CANCELLED | OUTPATIENT
Start: 2021-11-23

## 2021-11-23 RX ORDER — ONDANSETRON 4 MG/1
4 TABLET, ORALLY DISINTEGRATING ORAL EVERY 6 HOURS PRN
Status: DISCONTINUED | OUTPATIENT
Start: 2021-11-23 | End: 2021-11-26 | Stop reason: HOSPADM

## 2021-11-23 RX ORDER — MORPHINE SULFATE 1 MG/ML
INJECTION, SOLUTION EPIDURAL; INTRATHECAL; INTRAVENOUS
Status: COMPLETED | OUTPATIENT
Start: 2021-11-23 | End: 2021-11-23

## 2021-11-23 RX ORDER — CARBOPROST TROMETHAMINE 250 UG/ML
250 INJECTION, SOLUTION INTRAMUSCULAR
Status: DISCONTINUED | OUTPATIENT
Start: 2021-11-23 | End: 2021-11-23

## 2021-11-23 RX ORDER — ACETAMINOPHEN 325 MG/1
975 TABLET ORAL EVERY 6 HOURS
Status: DISCONTINUED | OUTPATIENT
Start: 2021-11-23 | End: 2021-11-26 | Stop reason: HOSPADM

## 2021-11-23 RX ORDER — AMOXICILLIN 250 MG
1 CAPSULE ORAL 2 TIMES DAILY
Status: DISCONTINUED | OUTPATIENT
Start: 2021-11-23 | End: 2021-11-26 | Stop reason: HOSPADM

## 2021-11-23 RX ORDER — CEFAZOLIN SODIUM 2 G/100ML
2 INJECTION, SOLUTION INTRAVENOUS SEE ADMIN INSTRUCTIONS
Status: DISCONTINUED | OUTPATIENT
Start: 2021-11-23 | End: 2021-11-23

## 2021-11-23 RX ORDER — MORPHINE SULFATE 1 MG/ML
INJECTION, SOLUTION EPIDURAL; INTRATHECAL; INTRAVENOUS PRN
Status: DISCONTINUED | OUTPATIENT
Start: 2021-11-23 | End: 2021-11-23

## 2021-11-23 RX ORDER — FENTANYL CITRATE 50 UG/ML
INJECTION, SOLUTION INTRAMUSCULAR; INTRAVENOUS PRN
Status: DISCONTINUED | OUTPATIENT
Start: 2021-11-23 | End: 2021-11-23

## 2021-11-23 RX ORDER — LIDOCAINE 40 MG/G
CREAM TOPICAL
Status: DISCONTINUED | OUTPATIENT
Start: 2021-11-23 | End: 2021-11-26 | Stop reason: HOSPADM

## 2021-11-23 RX ORDER — HYDROCORTISONE 2.5 %
CREAM (GRAM) TOPICAL 3 TIMES DAILY PRN
Status: DISCONTINUED | OUTPATIENT
Start: 2021-11-23 | End: 2021-11-26 | Stop reason: HOSPADM

## 2021-11-23 RX ORDER — OXYTOCIN/0.9 % SODIUM CHLORIDE 30/500 ML
340 PLASTIC BAG, INJECTION (ML) INTRAVENOUS CONTINUOUS PRN
Status: COMPLETED | OUTPATIENT
Start: 2021-11-23 | End: 2021-11-23

## 2021-11-23 RX ORDER — OXYCODONE HYDROCHLORIDE 5 MG/1
5 TABLET ORAL EVERY 4 HOURS PRN
Status: DISCONTINUED | OUTPATIENT
Start: 2021-11-23 | End: 2021-11-26 | Stop reason: HOSPADM

## 2021-11-23 RX ORDER — CITRIC ACID/SODIUM CITRATE 334-500MG
30 SOLUTION, ORAL ORAL
Status: COMPLETED | OUTPATIENT
Start: 2021-11-23 | End: 2021-11-23

## 2021-11-23 RX ORDER — BUPIVACAINE HYDROCHLORIDE 2.5 MG/ML
INJECTION, SOLUTION EPIDURAL; INFILTRATION; INTRACAUDAL
Status: COMPLETED | OUTPATIENT
Start: 2021-11-23 | End: 2021-11-23

## 2021-11-23 RX ORDER — CEFAZOLIN SODIUM 2 G/100ML
2 INJECTION, SOLUTION INTRAVENOUS
Status: COMPLETED | OUTPATIENT
Start: 2021-11-23 | End: 2021-11-23

## 2021-11-23 RX ORDER — NALOXONE HYDROCHLORIDE 0.4 MG/ML
0.2 INJECTION, SOLUTION INTRAMUSCULAR; INTRAVENOUS; SUBCUTANEOUS
Status: DISCONTINUED | OUTPATIENT
Start: 2021-11-23 | End: 2021-11-23

## 2021-11-23 RX ORDER — PROCHLORPERAZINE 25 MG
25 SUPPOSITORY, RECTAL RECTAL EVERY 12 HOURS PRN
Status: DISCONTINUED | OUTPATIENT
Start: 2021-11-23 | End: 2021-11-26 | Stop reason: HOSPADM

## 2021-11-23 RX ORDER — ONDANSETRON 2 MG/ML
INJECTION INTRAMUSCULAR; INTRAVENOUS PRN
Status: DISCONTINUED | OUTPATIENT
Start: 2021-11-23 | End: 2021-11-23

## 2021-11-23 RX ORDER — NALOXONE HYDROCHLORIDE 0.4 MG/ML
0.2 INJECTION, SOLUTION INTRAMUSCULAR; INTRAVENOUS; SUBCUTANEOUS
Status: DISCONTINUED | OUTPATIENT
Start: 2021-11-23 | End: 2021-11-26 | Stop reason: HOSPADM

## 2021-11-23 RX ORDER — DIPHENHYDRAMINE HYDROCHLORIDE 50 MG/ML
25 INJECTION INTRAMUSCULAR; INTRAVENOUS EVERY 6 HOURS PRN
Status: DISCONTINUED | OUTPATIENT
Start: 2021-11-23 | End: 2021-11-26 | Stop reason: HOSPADM

## 2021-11-23 RX ORDER — NALOXONE HYDROCHLORIDE 0.4 MG/ML
0.4 INJECTION, SOLUTION INTRAMUSCULAR; INTRAVENOUS; SUBCUTANEOUS
Status: DISCONTINUED | OUTPATIENT
Start: 2021-11-23 | End: 2021-11-23

## 2021-11-23 RX ORDER — ONDANSETRON 2 MG/ML
4 INJECTION INTRAMUSCULAR; INTRAVENOUS EVERY 30 MIN PRN
Status: CANCELLED | OUTPATIENT
Start: 2021-11-23

## 2021-11-23 RX ORDER — OXYTOCIN 10 [USP'U]/ML
10 INJECTION, SOLUTION INTRAMUSCULAR; INTRAVENOUS
Status: DISCONTINUED | OUTPATIENT
Start: 2021-11-23 | End: 2021-11-26 | Stop reason: HOSPADM

## 2021-11-23 RX ORDER — BUPIVACAINE HYDROCHLORIDE 7.5 MG/ML
INJECTION, SOLUTION INTRASPINAL
Status: COMPLETED | OUTPATIENT
Start: 2021-11-23 | End: 2021-11-23

## 2021-11-23 RX ORDER — SIMETHICONE 80 MG
80 TABLET,CHEWABLE ORAL 4 TIMES DAILY PRN
Status: DISCONTINUED | OUTPATIENT
Start: 2021-11-23 | End: 2021-11-26 | Stop reason: HOSPADM

## 2021-11-23 RX ORDER — BISACODYL 10 MG
10 SUPPOSITORY, RECTAL RECTAL DAILY PRN
Status: DISCONTINUED | OUTPATIENT
Start: 2021-11-25 | End: 2021-11-25

## 2021-11-23 RX ORDER — ONDANSETRON 2 MG/ML
4 INJECTION INTRAMUSCULAR; INTRAVENOUS EVERY 6 HOURS PRN
Status: DISCONTINUED | OUTPATIENT
Start: 2021-11-23 | End: 2021-11-26 | Stop reason: HOSPADM

## 2021-11-23 RX ORDER — MISOPROSTOL 200 UG/1
400 TABLET ORAL
Status: DISCONTINUED | OUTPATIENT
Start: 2021-11-23 | End: 2021-11-23

## 2021-11-23 RX ORDER — MISOPROSTOL 200 UG/1
800 TABLET ORAL
Status: DISCONTINUED | OUTPATIENT
Start: 2021-11-23 | End: 2021-11-23

## 2021-11-23 RX ORDER — OXYTOCIN/0.9 % SODIUM CHLORIDE 30/500 ML
340 PLASTIC BAG, INJECTION (ML) INTRAVENOUS CONTINUOUS PRN
Status: DISCONTINUED | OUTPATIENT
Start: 2021-11-23 | End: 2021-11-26 | Stop reason: HOSPADM

## 2021-11-23 RX ORDER — MODIFIED LANOLIN
OINTMENT (GRAM) TOPICAL
Status: DISCONTINUED | OUTPATIENT
Start: 2021-11-23 | End: 2021-11-26 | Stop reason: HOSPADM

## 2021-11-23 RX ORDER — AMOXICILLIN 250 MG
2 CAPSULE ORAL 2 TIMES DAILY
Status: DISCONTINUED | OUTPATIENT
Start: 2021-11-23 | End: 2021-11-26 | Stop reason: HOSPADM

## 2021-11-23 RX ORDER — NALBUPHINE HYDROCHLORIDE 10 MG/ML
2.5-5 INJECTION, SOLUTION INTRAMUSCULAR; INTRAVENOUS; SUBCUTANEOUS EVERY 6 HOURS PRN
Status: DISCONTINUED | OUTPATIENT
Start: 2021-11-23 | End: 2021-11-23

## 2021-11-23 RX ORDER — METHYLERGONOVINE MALEATE 0.2 MG/ML
200 INJECTION INTRAVENOUS
Status: DISCONTINUED | OUTPATIENT
Start: 2021-11-23 | End: 2021-11-26 | Stop reason: HOSPADM

## 2021-11-23 RX ORDER — EPHEDRINE SULFATE 50 MG/ML
INJECTION, SOLUTION INTRAMUSCULAR; INTRAVENOUS; SUBCUTANEOUS PRN
Status: DISCONTINUED | OUTPATIENT
Start: 2021-11-23 | End: 2021-11-23

## 2021-11-23 RX ORDER — BUPIVACAINE HYDROCHLORIDE 7.5 MG/ML
INJECTION, SOLUTION INTRASPINAL PRN
Status: DISCONTINUED | OUTPATIENT
Start: 2021-11-23 | End: 2021-11-23

## 2021-11-23 RX ORDER — SODIUM CHLORIDE, SODIUM LACTATE, POTASSIUM CHLORIDE, CALCIUM CHLORIDE 600; 310; 30; 20 MG/100ML; MG/100ML; MG/100ML; MG/100ML
INJECTION, SOLUTION INTRAVENOUS CONTINUOUS
Status: CANCELLED | OUTPATIENT
Start: 2021-11-23

## 2021-11-23 RX ORDER — MISOPROSTOL 200 UG/1
800 TABLET ORAL
Status: DISCONTINUED | OUTPATIENT
Start: 2021-11-23 | End: 2021-11-26 | Stop reason: HOSPADM

## 2021-11-23 RX ORDER — LIDOCAINE 40 MG/G
CREAM TOPICAL
Status: DISCONTINUED | OUTPATIENT
Start: 2021-11-23 | End: 2021-11-23

## 2021-11-23 RX ORDER — DIPHENHYDRAMINE HCL 25 MG
25 CAPSULE ORAL EVERY 6 HOURS PRN
Status: DISCONTINUED | OUTPATIENT
Start: 2021-11-23 | End: 2021-11-26 | Stop reason: HOSPADM

## 2021-11-23 RX ORDER — FENTANYL CITRATE-0.9 % NACL/PF 10 MCG/ML
100 PLASTIC BAG, INJECTION (ML) INTRAVENOUS EVERY 5 MIN PRN
Status: DISCONTINUED | OUTPATIENT
Start: 2021-11-23 | End: 2021-11-23

## 2021-11-23 RX ORDER — CARBOPROST TROMETHAMINE 250 UG/ML
250 INJECTION, SOLUTION INTRAMUSCULAR
Status: DISCONTINUED | OUTPATIENT
Start: 2021-11-23 | End: 2021-11-26 | Stop reason: HOSPADM

## 2021-11-23 RX ORDER — FENTANYL CITRATE-0.9 % NACL/PF 10 MCG/ML
PLASTIC BAG, INJECTION (ML) INTRAVENOUS CONTINUOUS PRN
Status: DISCONTINUED | OUTPATIENT
Start: 2021-11-23 | End: 2021-11-23

## 2021-11-23 RX ORDER — FENTANYL CITRATE 50 UG/ML
INJECTION, SOLUTION INTRAMUSCULAR; INTRAVENOUS
Status: COMPLETED | OUTPATIENT
Start: 2021-11-23 | End: 2021-11-23

## 2021-11-23 RX ORDER — OXYTOCIN 10 [USP'U]/ML
10 INJECTION, SOLUTION INTRAMUSCULAR; INTRAVENOUS
Status: DISCONTINUED | OUTPATIENT
Start: 2021-11-23 | End: 2021-11-23

## 2021-11-23 RX ORDER — METHYLERGONOVINE MALEATE 0.2 MG/ML
200 INJECTION INTRAVENOUS
Status: DISCONTINUED | OUTPATIENT
Start: 2021-11-23 | End: 2021-11-23

## 2021-11-23 RX ORDER — TRANEXAMIC ACID 10 MG/ML
1 INJECTION, SOLUTION INTRAVENOUS EVERY 30 MIN PRN
Status: DISCONTINUED | OUTPATIENT
Start: 2021-11-23 | End: 2021-11-26 | Stop reason: HOSPADM

## 2021-11-23 RX ORDER — KETOROLAC TROMETHAMINE 30 MG/ML
30 INJECTION, SOLUTION INTRAMUSCULAR; INTRAVENOUS EVERY 6 HOURS
Status: CANCELLED | OUTPATIENT
Start: 2021-11-23 | End: 2021-11-24

## 2021-11-23 RX ORDER — METOCLOPRAMIDE 10 MG/1
10 TABLET ORAL EVERY 6 HOURS PRN
Status: DISCONTINUED | OUTPATIENT
Start: 2021-11-23 | End: 2021-11-26 | Stop reason: HOSPADM

## 2021-11-23 RX ORDER — SODIUM CHLORIDE, SODIUM LACTATE, POTASSIUM CHLORIDE, CALCIUM CHLORIDE 600; 310; 30; 20 MG/100ML; MG/100ML; MG/100ML; MG/100ML
INJECTION, SOLUTION INTRAVENOUS
Status: COMPLETED
Start: 2021-11-23 | End: 2021-11-23

## 2021-11-23 RX ORDER — IBUPROFEN 800 MG/1
800 TABLET, FILM COATED ORAL EVERY 6 HOURS
Status: DISCONTINUED | OUTPATIENT
Start: 2021-11-24 | End: 2021-11-26 | Stop reason: HOSPADM

## 2021-11-23 RX ORDER — ACETAMINOPHEN 325 MG/1
975 TABLET ORAL ONCE
Status: COMPLETED | OUTPATIENT
Start: 2021-11-23 | End: 2021-11-23

## 2021-11-23 RX ORDER — DEXTROSE, SODIUM CHLORIDE, SODIUM LACTATE, POTASSIUM CHLORIDE, AND CALCIUM CHLORIDE 5; .6; .31; .03; .02 G/100ML; G/100ML; G/100ML; G/100ML; G/100ML
INJECTION, SOLUTION INTRAVENOUS CONTINUOUS
Status: DISCONTINUED | OUTPATIENT
Start: 2021-11-23 | End: 2021-11-26 | Stop reason: HOSPADM

## 2021-11-23 RX ORDER — SODIUM CHLORIDE, SODIUM LACTATE, POTASSIUM CHLORIDE, CALCIUM CHLORIDE 600; 310; 30; 20 MG/100ML; MG/100ML; MG/100ML; MG/100ML
INJECTION, SOLUTION INTRAVENOUS CONTINUOUS
Status: DISCONTINUED | OUTPATIENT
Start: 2021-11-23 | End: 2021-11-23

## 2021-11-23 RX ORDER — HYDROMORPHONE HCL IN WATER/PF 6 MG/30 ML
0.2 PATIENT CONTROLLED ANALGESIA SYRINGE INTRAVENOUS EVERY 5 MIN PRN
Status: CANCELLED | OUTPATIENT
Start: 2021-11-23

## 2021-11-23 RX ORDER — ONDANSETRON 4 MG/1
4 TABLET, ORALLY DISINTEGRATING ORAL EVERY 30 MIN PRN
Status: CANCELLED | OUTPATIENT
Start: 2021-11-23

## 2021-11-23 RX ORDER — OXYTOCIN/0.9 % SODIUM CHLORIDE 30/500 ML
100-340 PLASTIC BAG, INJECTION (ML) INTRAVENOUS CONTINUOUS PRN
Status: DISCONTINUED | OUTPATIENT
Start: 2021-11-23 | End: 2021-11-23

## 2021-11-23 RX ADMIN — SODIUM CHLORIDE, POTASSIUM CHLORIDE, SODIUM LACTATE AND CALCIUM CHLORIDE: 600; 310; 30; 20 INJECTION, SOLUTION INTRAVENOUS at 10:45

## 2021-11-23 RX ADMIN — BUPIVACAINE HYDROCHLORIDE IN DEXTROSE 1.6 ML: 7.5 INJECTION, SOLUTION SUBARACHNOID at 11:11

## 2021-11-23 RX ADMIN — ACETAMINOPHEN 975 MG: 325 TABLET, FILM COATED ORAL at 18:01

## 2021-11-23 RX ADMIN — Medication 2 G: at 11:13

## 2021-11-23 RX ADMIN — PHENYLEPHRINE HYDROCHLORIDE 100 MCG: 10 INJECTION INTRAVENOUS at 11:22

## 2021-11-23 RX ADMIN — SODIUM CITRATE AND CITRIC ACID MONOHYDRATE 30 ML: 500; 334 SOLUTION ORAL at 10:41

## 2021-11-23 RX ADMIN — PHENYLEPHRINE HYDROCHLORIDE 100 MCG: 10 INJECTION INTRAVENOUS at 11:11

## 2021-11-23 RX ADMIN — FENTANYL CITRATE 10 MCG: 50 INJECTION, SOLUTION INTRAMUSCULAR; INTRAVENOUS at 11:11

## 2021-11-23 RX ADMIN — Medication 10 MG: at 11:12

## 2021-11-23 RX ADMIN — BUPIVACAINE HYDROCHLORIDE 20 ML: 2.5 INJECTION, SOLUTION EPIDURAL; INFILTRATION; INTRACAUDAL at 13:07

## 2021-11-23 RX ADMIN — MORPHINE SULFATE 0.15 MG: 1 INJECTION EPIDURAL; INTRATHECAL; INTRAVENOUS at 11:11

## 2021-11-23 RX ADMIN — Medication 50 MCG/MIN: at 11:00

## 2021-11-23 RX ADMIN — SODIUM CHLORIDE, POTASSIUM CHLORIDE, SODIUM LACTATE AND CALCIUM CHLORIDE 250 ML: 600; 310; 30; 20 INJECTION, SOLUTION INTRAVENOUS at 17:47

## 2021-11-23 RX ADMIN — PHENYLEPHRINE HYDROCHLORIDE 200 MCG: 10 INJECTION INTRAVENOUS at 11:10

## 2021-11-23 RX ADMIN — ACETAMINOPHEN 975 MG: 325 TABLET, FILM COATED ORAL at 09:45

## 2021-11-23 RX ADMIN — PHENYLEPHRINE HYDROCHLORIDE 200 MCG: 10 INJECTION INTRAVENOUS at 12:23

## 2021-11-23 RX ADMIN — OXYTOCIN-SODIUM CHLORIDE 0.9% IV SOLN 30 UNIT/500ML 300 ML/HR: 30-0.9/5 SOLUTION at 11:42

## 2021-11-23 RX ADMIN — TRANEXAMIC ACID 1 G: 10 INJECTION, SOLUTION INTRAVENOUS at 12:10

## 2021-11-23 RX ADMIN — MISOPROSTOL 800 MCG: 200 TABLET ORAL at 17:35

## 2021-11-23 RX ADMIN — ONDANSETRON 4 MG: 2 INJECTION INTRAMUSCULAR; INTRAVENOUS at 11:41

## 2021-11-23 RX ADMIN — BUPIVACAINE 20 ML: 13.3 INJECTION, SUSPENSION, LIPOSOMAL INFILTRATION at 13:07

## 2021-11-23 NOTE — PROVIDER NOTIFICATION
11/23/21 1406   Provider Notification   Provider Name/Title G2   Method of Notification Electronic Page   Request Evaluate-Remote   Notification Reason Vital Signs Change     Updated provider that last two BPs have been 154/90 and 141/99. No other sx.

## 2021-11-23 NOTE — H&P
History and Physical   2021  Zaina Tan  [id number]      HPI: Zaina Tan is a 36 year old  at 39w3d by LMP c/w 9w0d US who presents for a scheduled c/s.    She denies vaginal bleeding or loss of fluid and is feeling normal fetal movement.     ROS: No headaches, vision changes, nausea, vomiting, fevers, chills, chest pain, SOB, abdominal pain    Her pregnancy is complicated by:  - hx of c/s x1 for arrest of dilation  - hx of pre-E   - AMA  - failed GCT, passed GTT  - hx of fetal hypoplastic left heart    OBHX:   OB History    Para Term  AB Living   4 2 2 0 1 2   SAB IAB Ectopic Multiple Live Births   0 0 0 0 2      # Outcome Date GA Lbr Alireza/2nd Weight Sex Delivery Anes PTL Lv   4 Current            3 Term 17 39w3d  4.03 kg (8 lb 14.2 oz) M CS-LTranv EPI  PREETI      Complications: Preeclampsia/Hypertension, Failure to Progress in First Stage      Name: HANNAH TAN      Apgar1: 1  Apgar5: 7   2 Term 12/01/15 39w5d 11:55 / 01:02 3.43 kg (7 lb 9 oz) F Vag-Spont Local, EPI  PREETI      Apgar1: 4  Apgar5: 8   1 AB 14 23w0d             Birth Comments: 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 PreE without severe features with . Denies PPD       MedicalHX:   Past Medical History:   Diagnosis Date     Vitamin D deficiency 2017: Vitamin D= 25, MyChart message sent - supplementation [id number] IU/day 17- Vit D 48     Vitreous syneresis      No asthma    SurgicalHX:   Past Surgical History:   Procedure Laterality Date      SECTION N/A 2017    Procedure:  SECTION;;  Surgeon: Shellie Soni MD;  Location:  L+D     D & C      fetal hypoplastic leftheart syndrome       Medications:   No current facility-administered medications on file prior to encounter.  aspirin (ASA) 81 MG EC tablet, Take 1 tablet (81 mg) by mouth daily  Prenatal Vit-Fe Fumarate-FA (PRENATAL  MULTIVITAMIN W/IRON) 27-0.8 MG tablet, Take 1 tablet by mouth daily        Allergies:  No Known Allergies    FamilyHX:  Family History   Problem Relation Age of Onset     Hypertension Mother         had before pregnancy     Heart Disease Other         hypoplastic left heart. TAB 23 wks      Diabetes No family hx of      Cerebrovascular Disease No family hx of      Coronary Artery Disease No family hx of      Osteoporosis No family hx of      Anxiety Disorder No family hx of      Depression No family hx of      Denies bleeding or clotting disorders in the family    SocialHX:   Denies drinking, smoking, drug use in pregnancy    ROS: 10-point ROS negative except as indicated in HPI.    Physical Exam:  Vitals:    21 0900   BP: 133/78   Resp: 16   Temp: 98  F (36.7  C)   TempSrc: Oral     General: alert, oriented female, resting in bed in NAD  CV: regular rate and rhythm  Lungs: clear bilaterally, no crackles or wheezes  Abdomen: soft, gravid, non-tender  Extremities: bilateral lower extremities non-tender with no edema    Cephalic by BSUS    FHT: baseline 135, moderate variability, +accelerations, -decelerations  [place]: 0-2 contractions in 10 mins    Prenatal Labs:      Lab Results   Component Value Date    ABO B 2021    RH Pos 2021    AS Neg 2021    HEPBANG Nonreactive 2021    CHPCRT Negative 2021    GCPCRT Negative 2021    TREPAB Negative 2017    HGB 13.1 2021       GBS Status:   Lab Results   Component Value Date    GBS Negative 2017       Lab Results   Component Value Date    PAP NIL 2021         Assessment: 36 year old  at 39w3d by LMP c/w 9w US, here for scheduled c/s    Plan:    # C/s  - Admit to labor and delivery  - CBC, type and screen, RPR ordered. COVID neg    # hx of pre-E  - taking baby ASA  - no signs or symptoms of pre-E this pregnancy  - BP wnl this pregnancy    # FWB/PNC  - reactive and reassuring FHT  - Rh pos, rubella immune,  infectious disease labs wnl, failed GCT, passed GTT, GBS neg  - Genetics: Normal NT, Low risk NIPT, declined AFP  - Immunizations: will offer flu shot post partum, s/p COVIDx2 D#1 2/6/21; D#2 3/6/21(Moderna), s/p Tdap    Staffed with Dr. Nae Reveles MD  OB/GYN PGY-3  11/23/21 9:57 AM    Staff MD Note    I appreciate the note by Dr. Reveles.  Any necessary changes have been made by me.  I saw and evaluated the patient and agree with the findings and plan of care as documented in the note.    Shellie Soni MD

## 2021-11-23 NOTE — ANESTHESIA CARE TRANSFER NOTE
Patient: Zaina Tan    Procedure: Procedure(s):  REPEAT  SECTION       Diagnosis: History of  delivery [Z98.891]  Diagnosis Additional Information: No value filed.    Anesthesia Type:   Spinal     Note:    Oropharynx: spontaneously breathing  Level of Consciousness: awake  Oxygen Supplementation: room air    Independent Airway: airway patency satisfactory and stable  Dentition: dentition unchanged  Vital Signs Stable: post-procedure vital signs reviewed and stable  Report to RN Given: handoff report given  Patient transferred to: PACU    Handoff Report: Identifed the Patient, Identified the Reponsible Provider, Reviewed the pertinent medical history, Discussed the surgical course, Reviewed Intra-OP anesthesia mangement and issues during anesthesia, Set expectations for post-procedure period and Allowed opportunity for questions and acknowledgement of understanding      Vitals:  Vitals Value Taken Time   BP     Temp     Pulse     Resp     SpO2         Electronically Signed By: Froilan Guerra Junior, MD  2021  1:10 PM

## 2021-11-23 NOTE — PLAN OF CARE
Data: Zaina Tan transferred to [id number] via cart at 1510. Baby transferred via parent's arms.  Action: Receiving unit notified of transfer: Yes. Patient and family notified of room change. Report given to Martina at 1445. Belongings sent to receiving unit. Accompanied by Registered Nurse. Oriented patient to surroundings. Call light within reach. ID bands double-checked with receiving RN.  Response: Patient tolerated transfer and is stable.

## 2021-11-23 NOTE — ANESTHESIA PREPROCEDURE EVALUATION
Anesthesia Pre-Procedure Evaluation    Patient: Zaina Tan   MRN: [id number] : 1985        Preoperative Diagnosis: History of  delivery [Z98.891]    Procedure : Procedure(s):  REPEAT  SECTION          Past Medical History:   Diagnosis Date     Vitamin D deficiency 2017: Vitamin D= 25, [employer] message sent - supplementation 3802-4878 IU/day 17- Vit D 48     Vitreous syneresis       Past Surgical History:   Procedure Laterality Date      SECTION N/A 2017    Procedure:  SECTION;;  Surgeon: Shellie Soni MD;  Location: [facility] L+D     D & C      fetal hypoplastic leftheart syndrome      No Known Allergies   Social History     Tobacco Use     Smoking status: Never Smoker     Smokeless tobacco: Never Used   Substance Use Topics     Alcohol use: No      Wt Readings from Last 1 Encounters:   21 82.1 kg (181 lb)        Anesthesia Evaluation   Pt has had prior anesthetic. Type: Regional.        ROS/MED HX  ENT/Pulmonary:  - neg pulmonary ROS     Neurologic: Comment: Bilateral carpal tunnel syndrome - neg neurologic ROS     Cardiovascular:  - neg cardiovascular ROS     METS/Exercise Tolerance:     Hematologic:  - neg hematologic  ROS     Musculoskeletal:  - neg musculoskeletal ROS     GI/Hepatic:  - neg GI/hepatic ROS     Renal/Genitourinary:  - neg Renal ROS     Endo:  - neg endo ROS     Psychiatric/Substance Use:  - neg psychiatric ROS     Infectious Disease:  - neg infectious disease ROS     Malignancy:  - neg malignancy ROS     Other: Comment:  at 38w5d LMP confirmed with 9w0d US.  Hx of PreE ( asymptomatic today).  Breakthrough COVID in pregnancy:  - positive tests 21  - asymptomatic today           Physical Exam    Airway        Mallampati: I   TM distance: > 3 FB   Neck ROM: full   Mouth opening: > 3 cm    Respiratory Devices and Support         Dental  no notable dental history         Cardiovascular          Rhythm and rate:  regular and normal     Pulmonary           breath sounds clear to auscultation           OUTSIDE LABS:  CBC:   Lab Results   Component Value Date    WBC 6.4 09/03/2021    WBC 9.1 04/19/2021    HGB 11.7 09/03/2021    HGB 13.6 04/19/2021    HCT 34.9 (L) 09/03/2021    HCT 40.8 04/19/2021     09/03/2021     04/19/2021     BMP:   Lab Results   Component Value Date     12/09/2020     09/23/2020    POTASSIUM 3.6 12/09/2020    POTASSIUM 3.4 09/23/2020    CHLORIDE 106 12/09/2020    CHLORIDE 106 09/23/2020    CO2 29 12/09/2020    CO2 28 09/23/2020    BUN 11 12/09/2020    BUN 9 09/23/2020    CR 0.61 04/19/2021    CR 1.02 12/09/2020     (H) 12/09/2020    GLC 93 09/23/2020     COAGS: No results found for: PTT, INR, FIBR  POC:   Lab Results   Component Value Date    BGM 86 12/02/2015    HCG Negative 09/23/2020    HCGS Negative 12/09/2020     HEPATIC:   Lab Results   Component Value Date    ALBUMIN 4.1 09/23/2020    PROTTOTAL 8.5 09/23/2020    ALT 18 04/19/2021    AST 7 04/19/2021    ALKPHOS 105 09/23/2020    BILITOTAL 0.3 09/23/2020     OTHER:   Lab Results   Component Value Date    A1C 5.4 01/30/2017    EROS 8.8 12/09/2020    TSH 0.85 04/19/2021       Anesthesia Plan    ASA Status:  2   NPO Status:  ELEVATED Aspiration Risk/Unknown    Anesthesia Type: Spinal.   Induction: N/a.   Maintenance: N/A.        Consents    Anesthesia Plan(s) and associated risks, benefits, and realistic alternatives discussed. Questions answered and patient/representative(s) expressed understanding.    - Discussed:     - Discussed with:  Patient      - Extended Intubation/Ventilatory Support Discussed: No.      - Patient is DNR/DNI Status: No    Use of blood products discussed: Yes.     - Discussed with: Patient.     - Consented: consented to blood products            Reason for refusal: other.     Postoperative Care    Pain management: IV analgesics, Multi-modal analgesia.   PONV prophylaxis: Ondansetron (or other 5HT-3)      Comments:           H&P reviewed: Unable to attach H&P to encounter due to EHR limitations. H&P Update: appropriate H&P reviewed, patient examined. No interval changes since H&P (within 30 days).         Froilan Guerra Junior, MD

## 2021-11-23 NOTE — PROGRESS NOTES
Patient arrived to [facility] unit via zoom cart at 1515,with belongings, accompanied by no one, with infant in arms. Received report from Sonam and checked bands. Unit and room orientation started. Call light given; no concerns present at this time. Continue with plan of care.

## 2021-11-23 NOTE — ANESTHESIA PROCEDURE NOTES
Intrathecal injection Procedure Note    Pre-Procedure   Staff -        Anesthesiologist:  Dana Nance MD       Resident/Fellow: Froilan Gauthier Jr., MD       Performed By: resident       Location: OB       Pre-Anesthestic Checklist: patient identified, IV checked, risks and benefits discussed, informed consent, monitors and equipment checked, pre-op evaluation, at physician/surgeon's request and post-op pain management  Timeout:       Correct Patient: Yes        Correct Procedure: Yes        Correct Site: Yes        Correct Position: Yes   Procedure Documentation  Procedure: intrathecal injection       Patient Position: sitting       Skin prep: Chloraprep       Insertion Site: L4-5. (midline approach).       Needle Gauge: 22.        Spinal Needle Type: Pencan       Introducer used      # of attempts: 2 and  # of redirects:     Assessment/Narrative         Paresthesias: No.       Sensory Level: T4       CSF fluid: clear.    Medication(s) Administered   0.75% Hyperbaric Bupivacaine (Intrathecal), 1.6 mL  Fentanyl PF (Intrathecal), 10 mcg  Morphine PF 1 mg/mL (Intrathecal), 0.15 mg

## 2021-11-23 NOTE — PROVIDER NOTIFICATION
11/23/21 1432   Provider Notification   Provider Name/Title G2   Method of Notification Electronic Page   Request Evaluate-Remote   Notification Reason Medication Request     Requesting order for second bag of pitocin.

## 2021-11-23 NOTE — OP NOTE
[facility]  Full Operative Progress Note     Surgery Date:  2021    Surgeon:  Shellie Soni MD    Assistants:  Josué Reveles MD PGY-3      Pre-op Diagnosis:    - Intrauterine pregnancy at 39w3d  - history of  section x1  - obesity     Post-op Diagnosis:    - Same   - Liveborn female infant     Procedure: Repeat low-transverse  section with double layer uterine closure via pfannenstiel incision    Anesthesia: Spinal  QBL: 1316 ml   IVF: 1000 mL crystalloid  UOP: 200 mL clear urine at the end of the case  Drains: Infante Catheter   Specimens: Routine cord blood, cord segment  Complications: None apparent    Indications:   Zaina Tan is a 36 year old  at 39w3d admitted for a scheduled  section. She was counseled on trial of labor after  section vs a scheduled  section. She decided to proceed with surgery. The risks, benefits, and alternatives of  section were discussed with the patient including bleeding, infection, injury to surrounding structures (nerves, blood vessels, uterus, cervix, tubes, ovaries, bladder, bowel, rarely baby), and she agreed to proceed. Written consent obtained.     Findings:   Clear amniotic fluid  Liveborn female infant in cephalic presentation. Apgars pending. Weight 4260g.  Moderate to severe rectofascial adhesions. Thick intraperitoneal adhesions from bladder peritoneum to anterior uterine wall. Unable to fully release bladder peritoneum from anterior uterine wall especially on the left side. Uterus rotated to where right adnexa were close to midline. Unable to rotate uterus back due to adhesions. If patient requires a repeat  or pelvic surgery, recommend a vertical midline skin incision. Right ovary and fallopian tube partially visualized and grossly looked normal. Unable to visualize left adnexa. Anterior fibroid in the lower uterine segment noted. Bleeding on bladder serosa and vesicouterine  peritoneum noted, controlled with electrosurgery and suture.     Procedure Details:   The patient was brought to the OR, where adequate spinal anesthesia was administered.  She was placed in the dorsal supine position with a slight leftward tilt. She was prepped and draped in the usual sterile fashion. A surgical time out was performed. A pfannenstiel skin incision was made with the scalpel, and carried down to the underlying fascia with sharp and blunt dissection. The fascia was incised in the midline, and the incision was extended laterally with the Ballard scissors. The superior aspect of the fascia was grasped with the Kocher clamps and dissected off of the underlying rectus muscles with blunt and sharp dissection. Attention was then turned to the inferior aspect of the fascia, which was similarly dissected off of the underlying rectus muscles. The rectus muscles were  in the midline. A thick band of peritoneum was noted in the midline. Care was taken to slowly dissect through this as we suspected that this was the bladder tacked up to the anterior uterine wall. The above findings were noted. The uterus rotated with the right adnexa close to the midline. Care was taken to create a transverse hysterotomy avoiding the right broad ligament. Incision was extended with digital pressure towards the left side of the uterus to avoid extension in to the right broad ligament. The infant was noted to be in the position, and was delivered atraumatically. The shoulders delivered easily. No nuchal cord was noted. The cord was doubly clamped and cut after 60 seconds, and the infant was handed off to the awaiting nursery staff. A segment of cord was cut and collected. The placenta was delivered with gentle traction on the umbilical cord and uterine massage. The uterus was cleared of all clots and debris. Uterine tone was noted to be firm with pitocin given through the running IV and uterine massage.  The hysterotomy was  closed with a running locked suture of 0 Vicryl.  The hysterotomy was then imbricated (incorporating an area of bleeding on the right side of the hysterotomy) using an 0 Vicryl suture. The hysterotomy was noted to be hemostatic. The bladder serosa was noted to be bleeding. Areas of bleeding were controlled with electrosurgery and 3-0 Vicryl suture. The hysterotomy was examined several times to ensure hemostasis. However, given several little areas of bleeding, TXA was administered. The fascia and rectus muscles were examined and areas of oozing were controlled with electrocautery. The fascia was closed with a running 0 Vicryl suture. The subcutaneous tissue was irrigated and areas of oozing were controlled with electrocautery. The subcutaneous tissue was less than 2 cm in thickness, however, multiple areas of bleeding and dead space was noted. Therefore it was closed with 3-0 Vicryl. The skin was closed with 4-0 Monocryl and covered with a sterile dressing.    All sponge, needle, and instrument counts were correct. The patient tolerated the procedure well, and was transferred to recovery in stable condition. Dr. Soni was present and scrubbed for the procedure.     Josué Reveles MD  OB/GYN PGY-3  11/23/21 1:43 PM    Staff MD Note  I was present and scrubbed for the entire procedure noted above.  I agree with the description above and any necessary changes have been made by me.  Shellie Soni MD

## 2021-11-23 NOTE — PROVIDER NOTIFICATION
11/23/21 1727   Lochia 1-3 Days WDL   Lochia 1-3 Days WDL WDL   Lochia Color rubra   Lochia Amount heavy (saturating pad/hr)   # of Pads Previous Hr 1   Lochia Odor none     Paged G3 for increased bleeding (large gush) and patient's abdomen appearing distended.  G3 at bedside to examine patient.  Given 800 mcg rectal Miso.  LR bolus infusing, labs drawn.

## 2021-11-23 NOTE — ANESTHESIA POSTPROCEDURE EVALUATION
Patient: Zaina Tan    Procedure: Procedure(s):  REPEAT  SECTION       Diagnosis:History of  delivery [Z98.891]  Diagnosis Additional Information: No value filed.    Anesthesia Type:  Spinal    Note:  Disposition: Inpatient   Postop Pain Control: Uneventful            Sign Out: Well controlled pain   PONV: No   Neuro/Psych: Uneventful            Sign Out: Acceptable/Baseline neuro status   Airway/Respiratory: Uneventful            Sign Out: Acceptable/Baseline resp. status   CV/Hemodynamics: Uneventful            Sign Out: Acceptable CV status; No obvious hypovolemia; No obvious fluid overload   Other NRE: NONE   DID A NON-ROUTINE EVENT OCCUR? No           Last vitals:  Vitals Value Taken Time   /95 21 1445   Temp 36.5  C (97.7  F) 21 1445   Pulse 71 21 1445   Resp 16 21 1445   SpO2 98 % 21 1446   Vitals shown include unvalidated device data.    Electronically Signed By: Dana Nance MD  2021  3:53 PM

## 2021-11-23 NOTE — H&P (VIEW-ONLY)
History and Physical   2021  Zaina Tan  [id number]      HPI: Zaina Tan is a 36 year old  at 39w3d by LMP c/w 9w0d US who presents for a scheduled c/s.    She denies vaginal bleeding or loss of fluid and is feeling normal fetal movement.     ROS: No headaches, vision changes, nausea, vomiting, fevers, chills, chest pain, SOB, abdominal pain    Her pregnancy is complicated by:  - hx of c/s x1 for arrest of dilation  - hx of pre-E   - AMA  - failed GCT, passed GTT  - hx of fetal hypoplastic left heart    OBHX:   OB History    Para Term  AB Living   4 2 2 0 1 2   SAB IAB Ectopic Multiple Live Births   0 0 0 0 2      # Outcome Date GA Lbr Alireza/2nd Weight Sex Delivery Anes PTL Lv   4 Current            3 Term 17 39w3d  4.03 kg (8 lb 14.2 oz) M CS-LTranv EPI  PREETI      Complications: Preeclampsia/Hypertension, Failure to Progress in First Stage      Name: HANNAH TAN      Apgar1: 1  Apgar5: 7   2 Term 12/01/15 39w5d 11:55 / 01:02 3.43 kg (7 lb 9 oz) F Vag-Spont Local, EPI  PREETI      Apgar1: 4  Apgar5: 8   1 AB 14 23w0d             Birth Comments: 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 PreE without severe features with . Denies PPD       MedicalHX:   Past Medical History:   Diagnosis Date     Vitamin D deficiency 2017: Vitamin D= 25, MyChart message sent - supplementation 4622-3564 IU/day 17- Vit D 48     Vitreous syneresis      No asthma    SurgicalHX:   Past Surgical History:   Procedure Laterality Date      SECTION N/A 2017    Procedure:  SECTION;;  Surgeon: Shellie Soni MD;  Location:  L+D     D & C      fetal hypoplastic leftheart syndrome       Medications:   No current facility-administered medications on file prior to encounter.  aspirin (ASA) 81 MG EC tablet, Take 1 tablet (81 mg) by mouth daily  Prenatal Vit-Fe Fumarate-FA (PRENATAL  MULTIVITAMIN W/IRON) 27-0.8 MG tablet, Take 1 tablet by mouth daily        Allergies:  No Known Allergies    FamilyHX:  Family History   Problem Relation Age of Onset     Hypertension Mother         had before pregnancy     Heart Disease Other         hypoplastic left heart. TAB 23 wks      Diabetes No family hx of      Cerebrovascular Disease No family hx of      Coronary Artery Disease No family hx of      Osteoporosis No family hx of      Anxiety Disorder No family hx of      Depression No family hx of      Denies bleeding or clotting disorders in the family    SocialHX:   Denies drinking, smoking, drug use in pregnancy    ROS: 10-point ROS negative except as indicated in HPI.    Physical Exam:  Vitals:    21 0900   BP: 133/78   Resp: 16   Temp: 98  F (36.7  C)   TempSrc: Oral     General: alert, oriented female, resting in bed in NAD  CV: regular rate and rhythm  Lungs: clear bilaterally, no crackles or wheezes  Abdomen: soft, gravid, non-tender  Extremities: bilateral lower extremities non-tender with no edema    Cephalic by BSUS    FHT: baseline 135, moderate variability, +accelerations, -decelerations  [place]: 0-2 contractions in 10 mins    Prenatal Labs:      Lab Results   Component Value Date    ABO B 2021    RH Pos 2021    AS Neg 2021    HEPBANG Nonreactive 2021    CHPCRT Negative 2021    GCPCRT Negative 2021    TREPAB Negative 2017    HGB 13.1 2021       GBS Status:   Lab Results   Component Value Date    GBS Negative 2017       Lab Results   Component Value Date    PAP NIL 2021         Assessment: 36 year old  at 39w3d by LMP c/w 9w US, here for scheduled c/s    Plan:    # C/s  - Admit to labor and delivery  - CBC, type and screen, RPR ordered. COVID neg    # hx of pre-E  - taking baby ASA  - no signs or symptoms of pre-E this pregnancy  - BP wnl this pregnancy    # FWB/PNC  - reactive and reassuring FHT  - Rh pos, rubella immune,  infectious disease labs wnl, failed GCT, passed GTT, GBS neg  - Genetics: Normal NT, Low risk NIPT, declined AFP  - Immunizations: will offer flu shot post partum, s/p COVIDx2 D#1 2/6/21; D#2 3/6/21(Moderna), s/p Tdap    Staffed with Dr. Nae Reveles MD  OB/GYN PGY-3  11/23/21 9:57 AM    Staff MD Note    I appreciate the note by Dr. Reveles.  Any necessary changes have been made by me.  I saw and evaluated the patient and agree with the findings and plan of care as documented in the note.    Shellie Soni MD

## 2021-11-23 NOTE — ANESTHESIA PROCEDURE NOTES
TAP Procedure Note    Pre-Procedure   Staff -        Anesthesiologist:  Dana Nance MD       Resident/Fellow: Froilan Gauthier Jr., MD       Performed By: resident       Location: OB       Pre-Anesthestic Checklist: patient identified, IV checked, site marked, risks and benefits discussed, informed consent, monitors and equipment checked, pre-op evaluation, at physician/surgeon's request and post-op pain management  Timeout:       Correct Patient: Yes        Correct Procedure: Yes        Correct Site: Yes        Correct Position: Yes        Correct Laterality: Yes        Site Marked: Yes  Procedure Documentation  Procedure: TAP       Laterality: bilateral       Patient Position: supine       Skin prep: Chloraprep       Insertion Site: T11-12.       Needle Gauge: 21.        Needle Length (Inches): 1        Ultrasound guided       1. Ultrasound was used to identify targeted nerve, plexus, vascular marker, or fascial plane and place a needle adjacent to it in real-time.       2. Ultrasound was used to visualize the spread of anesthetic in close proximity to the above referenced structure.    Assessment/Narrative         The placement was negative for: blood aspirated, painful injection and site bleeding       Paresthesias: No.     Bolus given via needle..        Secured via.        Insertion/Infusion Method: Single Shot    Medication(s) Administered   Bupivacaine 0.25% PF (Infiltration), 20 mL  Bupivacaine liposome (Exparel) 1.3% LA inj susp (Infiltration), 20 mL

## 2021-11-23 NOTE — PROVIDER NOTIFICATION
11/23/21 0930   Provider Notification   Provider Name/Title Abdirizak (G3)   Method of Notification In Department   Request Evaluate in Person   Notification Reason Patient Arrived     Notified Dr. Reveles of pt's arrival

## 2021-11-23 NOTE — PLAN OF CARE
Data: Term female infant born at 1140 via c/s. Pt to OB PACU at 1316 via cart. PIV infusing without complications, sena with clear urine to gravity, pt denies any pain, denies any nausea and vomiting.  Interventions: IV to pump, monitors and alarms on, SCD on.  Response: stable.  Plan: Patient instructed to notify RN for pain or nausea, routine post op cares, initiate breastfeeding as soon as patient  able.

## 2021-11-23 NOTE — DISCHARGE SUMMARY
DELIVERY DISCHARGE SUMMARY    Zaina Tan  : 1985  MRN: [id number]    Admit date: 2021  Discharge date: 21    Admit Dx:   - 36 year old  at 39w3d  - hx of c/s x1 for arrest of dilation  - hx of pre-E   - AMA  - failed GCT, passed GTT  - hx of fetal hypoplastic left heart in previous pregnancy    Discharge Dx:  - Same as above, s/p procedure below  - acute blood loss anemia secondary to procedural blood loss, asymptomatic  - extensive pelvic adhesions  - postpartum hemorrhage  - gHTN    Procedures:  - Repeat low transverse  section with double layer closure via Pfannenstiel incision    Consults:  Anesthesia  Lactation    Hospital course:  Indications:   Zaina Tan is a 36 year old  at 39w3d admitted for a scheduled  section. She was counseled on trial of labor after  section vs a scheduled  section. She decided to proceed with surgery. The risks, benefits, and alternatives of  section were discussed with the patient including bleeding, infection, injury to surrounding structures (nerves, blood vessels, uterus, cervix, tubes, ovaries, bladder, bowel, rarely baby), and she agreed to proceed. Written consent obtained.     Operative Findings:  1. Clear amniotic fluid  2. Liveborn female infant in cephalic presentation. Apgars pending. Weight 4260g.  3. Moderate to severe rectofascial adhesions. Thick intraperitoneal adhesions from bladder peritoneum to anterior uterine wall. Unable to fully release bladder peritoneum from anterior uterine wall especially on the left side. Uterus rotated to where right adnexa were close to midline. Unable to rotate uterus back due to adhesions. If patient requires a repeat  or pelvic surgery, recommend a vertical midline skin incision. Right ovary and fallopian tube partially visualized and grossly looked normal. Unable to visualize left adnexa. Anterior fibroid in the lower uterine segment  noted. Bleeding on bladder serosa and vesicouterine peritoneum noted, controlled with electrosurgery and suture.     EBL from the delivery was 1314mL. Please see her  Section Operative Note for full details regarding her delivery.    Her postoperative course was uncomplicated. Her postpartum hgb was stable at 8.5. On POD#3, she was meeting all of her postpartum goals and deemed stable for discharge. She was voiding without difficulty, tolerating a regular diet without nausea and vomiting, her pain was well controlled on oral pain medicines and her lochia was appropriate. Her Rh status was positive and Rhogam was not indicated.      HGB  Recent Labs   Lab 21  1542 21  0746 21  2358 21  1719   HGB 8.5* 8.6* 9.7* 11.2*       Discharge Medications:     Review of your medicines      START taking      Dose / Directions   acetaminophen 325 MG tablet  Commonly known as: TYLENOL      Dose: 650 mg  Take 2 tablets (650 mg) by mouth every 6 hours as needed for mild pain Start after Delivery.  Quantity: 100 tablet  Refills: 0     ferrous sulfate 325 (65 Fe) MG tablet  Commonly known as: FEROSUL      Dose: 325 mg  Take 1 tablet (325 mg) by mouth daily (with breakfast)  Quantity: 100 tablet  Refills: 0     ibuprofen 600 MG tablet  Commonly known as: ADVIL/MOTRIN      Dose: 600 mg  Take 1 tablet (600 mg) by mouth every 6 hours as needed for moderate pain Start after delivery  Quantity: 60 tablet  Refills: 0     oxyCODONE 5 MG tablet  Commonly known as: ROXICODONE      Dose: 5 mg  Take 1 tablet (5 mg) by mouth every 4 hours as needed for moderate to severe pain  Quantity: 6 tablet  Refills: 0     senna-docusate 8.6-50 MG tablet  Commonly known as: SENOKOT-S/PERICOLACE      Dose: 1 tablet  Take 1 tablet by mouth daily Start after delivery.  Quantity: 100 tablet  Refills: 0        CONTINUE these medicines which have NOT CHANGED      Dose / Directions   prenatal multivitamin w/iron 27-0.8 MG tablet       Dose: 1 tablet  Take 1 tablet by mouth daily  Refills: 0        STOP taking    aspirin 81 MG EC tablet  Commonly known as: ASA              Where to get your medicines      These medications were sent to [facility] [place], [place] - [address]  [address] [facility], [facility] [address]    Phone: [phone]     acetaminophen 325 MG tablet    ferrous sulfate 325 (65 Fe) MG tablet    ibuprofen 600 MG tablet    oxyCODONE 5 MG tablet    senna-docusate 8.6-50 MG tablet       Discharge/Disposition:  Zaina Tan was discharged to home in stable condition with the following instructions/medications:  1) Call for temperature > 100.4, bright red vaginal bleeding >1 pad an hour x 2 hours, foul smelling vaginal discharge, pain not controlled by usual oral pain meds, persistent nausea and vomiting not controlled on medications, drainage or redness from incision site  2) She desired natural family planning for contraception, discussed pregnancy spacing of 18 months.   3) For feeding she decided to breastfeed.  4) She was instructed to follow-up with her primary OB in 6 weeks for a routine postpartum visit and in 1 week for a BP check.  5) Discharge activity:  No heavy lifting >15 lbs or strenuous activity for 6 weeks, pelvic rest for 6 weeks, no driving or operating machinery while on narcotics.    Joce Sen MD  OB/GYN PGY-2  11/26/2021 10:08 AM    Staff MD Note    I evaluated the patient on the day of discharge.  We reviewed discharge instructions.  Patient stable for discharge.    Shellie Soni MD

## 2021-11-24 LAB
ALBUMIN SERPL-MCNC: 1.9 G/DL (ref 3.4–5)
ALP SERPL-CCNC: 114 U/L (ref 40–150)
ALT SERPL W P-5'-P-CCNC: 22 U/L (ref 0–50)
ANION GAP SERPL CALCULATED.3IONS-SCNC: 5 MMOL/L (ref 3–14)
AST SERPL W P-5'-P-CCNC: 33 U/L (ref 0–45)
BILIRUB SERPL-MCNC: 0.3 MG/DL (ref 0.2–1.3)
BUN SERPL-MCNC: 7 MG/DL (ref 7–30)
CALCIUM SERPL-MCNC: 8.9 MG/DL (ref 8.5–10.1)
CHLORIDE BLD-SCNC: 106 MMOL/L (ref 94–109)
CO2 SERPL-SCNC: 24 MMOL/L (ref 20–32)
CREAT SERPL-MCNC: 0.62 MG/DL (ref 0.52–1.04)
ERYTHROCYTE [DISTWIDTH] IN BLOOD BY AUTOMATED COUNT: 13.7 % (ref 10–15)
ERYTHROCYTE [DISTWIDTH] IN BLOOD BY AUTOMATED COUNT: 13.8 % (ref 10–15)
ERYTHROCYTE [DISTWIDTH] IN BLOOD BY AUTOMATED COUNT: 14 % (ref 10–15)
GFR SERPL CREATININE-BSD FRML MDRD: >90 ML/MIN/1.73M2
GLUCOSE BLD-MCNC: 74 MG/DL (ref 70–99)
HCT VFR BLD AUTO: 25.4 % (ref 35–47)
HCT VFR BLD AUTO: 25.5 % (ref 35–47)
HCT VFR BLD AUTO: 30 % (ref 35–47)
HGB BLD-MCNC: 8.5 G/DL (ref 11.7–15.7)
HGB BLD-MCNC: 8.6 G/DL (ref 11.7–15.7)
HGB BLD-MCNC: 9.7 G/DL (ref 11.7–15.7)
HOLD SPECIMEN: NORMAL
MCH RBC QN AUTO: 29 PG (ref 26.5–33)
MCH RBC QN AUTO: 29.1 PG (ref 26.5–33)
MCH RBC QN AUTO: 29.3 PG (ref 26.5–33)
MCHC RBC AUTO-ENTMCNC: 32.3 G/DL (ref 31.5–36.5)
MCHC RBC AUTO-ENTMCNC: 33.5 G/DL (ref 31.5–36.5)
MCHC RBC AUTO-ENTMCNC: 33.7 G/DL (ref 31.5–36.5)
MCV RBC AUTO: 86 FL (ref 78–100)
MCV RBC AUTO: 88 FL (ref 78–100)
MCV RBC AUTO: 90 FL (ref 78–100)
PLATELET # BLD AUTO: 132 10E3/UL (ref 150–450)
PLATELET # BLD AUTO: 139 10E3/UL (ref 150–450)
PLATELET # BLD AUTO: 149 10E3/UL (ref 150–450)
POTASSIUM BLD-SCNC: 4.1 MMOL/L (ref 3.4–5.3)
PROT SERPL-MCNC: 5.6 G/DL (ref 6.8–8.8)
RBC # BLD AUTO: 2.9 10E6/UL (ref 3.8–5.2)
RBC # BLD AUTO: 2.96 10E6/UL (ref 3.8–5.2)
RBC # BLD AUTO: 3.34 10E6/UL (ref 3.8–5.2)
SODIUM SERPL-SCNC: 135 MMOL/L (ref 133–144)
WBC # BLD AUTO: 10 10E3/UL (ref 4–11)
WBC # BLD AUTO: 11.7 10E3/UL (ref 4–11)
WBC # BLD AUTO: 9.3 10E3/UL (ref 4–11)

## 2021-11-24 PROCEDURE — 36415 COLL VENOUS BLD VENIPUNCTURE: CPT | Performed by: OBSTETRICS & GYNECOLOGY

## 2021-11-24 PROCEDURE — 85027 COMPLETE CBC AUTOMATED: CPT | Performed by: OBSTETRICS & GYNECOLOGY

## 2021-11-24 PROCEDURE — 82040 ASSAY OF SERUM ALBUMIN: CPT | Performed by: STUDENT IN AN ORGANIZED HEALTH CARE EDUCATION/TRAINING PROGRAM

## 2021-11-24 PROCEDURE — 120N000002 HC R&B MED SURG/OB UMMC

## 2021-11-24 PROCEDURE — 36415 COLL VENOUS BLD VENIPUNCTURE: CPT | Performed by: STUDENT IN AN ORGANIZED HEALTH CARE EDUCATION/TRAINING PROGRAM

## 2021-11-24 PROCEDURE — 250N000013 HC RX MED GY IP 250 OP 250 PS 637: Performed by: STUDENT IN AN ORGANIZED HEALTH CARE EDUCATION/TRAINING PROGRAM

## 2021-11-24 PROCEDURE — 85027 COMPLETE CBC AUTOMATED: CPT | Performed by: STUDENT IN AN ORGANIZED HEALTH CARE EDUCATION/TRAINING PROGRAM

## 2021-11-24 RX ADMIN — ACETAMINOPHEN 975 MG: 325 TABLET, FILM COATED ORAL at 19:47

## 2021-11-24 RX ADMIN — IBUPROFEN 800 MG: 800 TABLET, FILM COATED ORAL at 17:59

## 2021-11-24 RX ADMIN — ACETAMINOPHEN 975 MG: 325 TABLET, FILM COATED ORAL at 06:43

## 2021-11-24 RX ADMIN — SIMETHICONE 80 MG: 80 TABLET, CHEWABLE ORAL at 19:47

## 2021-11-24 RX ADMIN — SIMETHICONE 80 MG: 80 TABLET, CHEWABLE ORAL at 09:25

## 2021-11-24 RX ADMIN — ACETAMINOPHEN 975 MG: 325 TABLET, FILM COATED ORAL at 00:01

## 2021-11-24 RX ADMIN — SENNOSIDES AND DOCUSATE SODIUM 2 TABLET: 8.6; 5 TABLET ORAL at 08:30

## 2021-11-24 RX ADMIN — IBUPROFEN 800 MG: 800 TABLET, FILM COATED ORAL at 23:49

## 2021-11-24 RX ADMIN — OXYCODONE HYDROCHLORIDE 5 MG: 5 TABLET ORAL at 12:04

## 2021-11-24 RX ADMIN — ACETAMINOPHEN 975 MG: 325 TABLET, FILM COATED ORAL at 12:42

## 2021-11-24 RX ADMIN — SENNOSIDES AND DOCUSATE SODIUM 2 TABLET: 8.6; 5 TABLET ORAL at 20:04

## 2021-11-24 NOTE — PROVIDER NOTIFICATION
11/24/21 0957   Provider Notification   Provider Name/Title G2   Method of Notification Electronic Page   Request Evaluate-Remote   Notification Reason Other  (discontinue sena)

## 2021-11-24 NOTE — PLAN OF CARE
VSS. Pain managed with tylenol. Oxycodone given x1 for breakthrough pain. Up independently. Voided x1 post sena. Positive bonding observed with .

## 2021-11-24 NOTE — PROGRESS NOTES
Postpartum Progress Note  Zaina [facility]  [id number]    Subjective:   Doing well this morning. Pain is well controlled on oral medications. She is ambulating without dizziness. She was dizzy last night prior to sleeping, but is not dizzy after sleeping.  Tolerating PO intake without nausea or vomiting. Lochia is similar to a period. Voiding spontaneously. Unsure if she is passing flatus. No headache, vision changes, CP, SOB, RUQ pain.    Objective:  BP (!) 143/68   Pulse 88   Temp 98.2  F (36.8  C) (Oral)   Resp 18   LMP 2021   SpO2 100%   Breastfeeding Unknown     General: NAD, comfortable  Heart: Regular rate, extremities warm and well-perfused  Lungs: Breathing comfortably, on room air  Abdomen: Soft, non-tender, non-distended; fundus firm below umbilicus  Incision: pressure dressing is clean/dry/intact  Extremities: trace edema in BLE    Assessment/Plan: 36 year old  who is POD#1 s/p RLTCS. Pregnancy was notable for gHTN Currently stable and doing well. Meeting early post op goals.    # gHTN  - HELLP labs pending this AM  - BP have been normal to low mild range.      # Postpartum  - Continue routine post-operative cares.     - Heme: PPH of about 1300. Hgb overnight was 9.7. repeat hgb this AM. Continue to monitor for s/s of anemia. Repeat labs prn. will discharge home with PO iron  - Pain: Continue scheduled tylenol, and prn oxycodone. If hgb stable this morning, then will restart NSAIDs.   - GI: scheduled and prn bowel regimen, scheduled simethicone, prn antiemetics senna, miralax, simethicone, and anti-emetics.  - : s/p sena, voiding spontaneously   - Baby:  Stable  - Contraception: Not discussed.  - Rh pos, Rubella immune,  - PPx: Encourage ambulation, IS, SCDs while in bed    Dispo: Discharge to home POD#2-3, when meeting postpartum goals    Joce Sen MD  OB/GYN PGY-2  2021 8:20 AM    Staff MD Note    I appreciate the note by Dr. Sen.  Any necessary changes have been made  by me.  I saw and evaluated the patient and agree with the findings and plan of care as documented in the note.    Shellie Soni MD

## 2021-11-24 NOTE — PLAN OF CARE
Data: BP mildly elevated, no pre-e symptoms. Tachycardic at times, HR 110s.  Complains of lightheadedness while lying in bed - hgb 11.2.  Had large gush of blood and G3 was called urgently to bedside as patient's abdomen also looked distended.  800mcg of rectal miso given, lochia has been WNL since 1800.  Patient eating and drinking normally. Infante patent and per Dr Soni is to stay in overnight. No apparent signs of infection. Incision healing well, covered with a pressure dressing. Patient performing self cares and is able to care for infant.  Action: Patient medicated during the shift for pain and cramping. See MAR. Patient reassessed within 1 hour after each medication and pain was improved - patient stated she was comfortable. Patient education done about postpartum cares and room/unit orientation. See flow record.  Response: Positive attachment behaviors observed with infant. Support persons none present.  went home to be with other kids.  Plan: Anticipate discharge on POD 2-3.

## 2021-11-24 NOTE — PLAN OF CARE
Data: Vital signs within normal limits. Postpartum checks within normal limits - see flow record. Patient eating and drinking normally. Patient has sena cathater to empty bladder and has been up ambulating to and from bathroom. No apparent signs of infection. Incision healing well. Patient performing self cares and is able to care for infant with some assistance from RN.  Patient is breastfeeding on demand independently.  Action: Patient medicated during the shift for pain and cramping. See MAR. Patient reassessed within 1 hour after each medication and pain was improved - patient stated she was comfortable. Patient education done about post partum hemorrhage signs and symptoms. See flow record.  Response: Positive attachment behaviors observed with infant. Support persons was not present, as he was with other children at home.   Plan: Anticipate discharge.

## 2021-11-24 NOTE — PLAN OF CARE
Patient VSS and WNL  Patient is breastfeeding infant bilateral breasts.  Patient has had 2 gushes of blood and knows to call RN to bedside if it happens.  Patient reports dizziness, provider made aware last page and ordered labs for midnight.  Patient is eating some (1/2of plate) and drinking (some juice, encouraged to drink more water too).  LR still running as well.  Patient denies pain but states is tired and dizziness.  Continue plan of care.

## 2021-11-24 NOTE — PROVIDER NOTIFICATION
FYI - another 100ml Gush just now in 0812.  Patient still feels dizzy after eating, working on water intake as well.  Thanks     11/23/21 2010   Provider Notification   Provider Name/Title g2 triston   Method of Notification Electronic Page   Request Evaluate-Remote   Notification Reason Status Update

## 2021-11-25 PROCEDURE — 120N000002 HC R&B MED SURG/OB UMMC

## 2021-11-25 PROCEDURE — 250N000013 HC RX MED GY IP 250 OP 250 PS 637: Performed by: STUDENT IN AN ORGANIZED HEALTH CARE EDUCATION/TRAINING PROGRAM

## 2021-11-25 RX ORDER — AMOXICILLIN 250 MG
1 CAPSULE ORAL DAILY
Qty: 100 TABLET | Refills: 0 | Status: SHIPPED | OUTPATIENT
Start: 2021-11-25 | End: 2021-12-22

## 2021-11-25 RX ORDER — POLYETHYLENE GLYCOL 3350 17 G/17G
17 POWDER, FOR SOLUTION ORAL 2 TIMES DAILY
Status: DISCONTINUED | OUTPATIENT
Start: 2021-11-25 | End: 2021-11-26 | Stop reason: HOSPADM

## 2021-11-25 RX ORDER — BISACODYL 10 MG
10 SUPPOSITORY, RECTAL RECTAL DAILY
Status: DISCONTINUED | OUTPATIENT
Start: 2021-11-25 | End: 2021-11-26 | Stop reason: HOSPADM

## 2021-11-25 RX ORDER — ACETAMINOPHEN 325 MG/1
650 TABLET ORAL EVERY 6 HOURS PRN
Qty: 100 TABLET | Refills: 0 | Status: SHIPPED | OUTPATIENT
Start: 2021-11-25 | End: 2022-01-06

## 2021-11-25 RX ORDER — IBUPROFEN 600 MG/1
600 TABLET, FILM COATED ORAL EVERY 6 HOURS PRN
Qty: 60 TABLET | Refills: 0 | Status: SHIPPED | OUTPATIENT
Start: 2021-11-25 | End: 2021-12-16

## 2021-11-25 RX ORDER — FERROUS SULFATE 325(65) MG
325 TABLET ORAL
Qty: 100 TABLET | Refills: 0 | Status: SHIPPED | OUTPATIENT
Start: 2021-11-25 | End: 2022-01-06

## 2021-11-25 RX ADMIN — SIMETHICONE 80 MG: 80 TABLET, CHEWABLE ORAL at 12:16

## 2021-11-25 RX ADMIN — IBUPROFEN 800 MG: 800 TABLET, FILM COATED ORAL at 12:16

## 2021-11-25 RX ADMIN — SENNOSIDES AND DOCUSATE SODIUM 2 TABLET: 8.6; 5 TABLET ORAL at 08:59

## 2021-11-25 RX ADMIN — BISACODYL 10 MG: 10 SUPPOSITORY RECTAL at 12:21

## 2021-11-25 RX ADMIN — OXYCODONE HYDROCHLORIDE 5 MG: 5 TABLET ORAL at 01:18

## 2021-11-25 RX ADMIN — IBUPROFEN 800 MG: 800 TABLET, FILM COATED ORAL at 18:52

## 2021-11-25 RX ADMIN — ACETAMINOPHEN 975 MG: 325 TABLET, FILM COATED ORAL at 21:06

## 2021-11-25 RX ADMIN — SIMETHICONE 80 MG: 80 TABLET, CHEWABLE ORAL at 04:09

## 2021-11-25 RX ADMIN — ACETAMINOPHEN 975 MG: 325 TABLET, FILM COATED ORAL at 14:48

## 2021-11-25 RX ADMIN — IBUPROFEN 800 MG: 800 TABLET, FILM COATED ORAL at 06:27

## 2021-11-25 RX ADMIN — POLYETHYLENE GLYCOL 3350 17 G: 17 POWDER, FOR SOLUTION ORAL at 08:59

## 2021-11-25 RX ADMIN — ACETAMINOPHEN 975 MG: 325 TABLET, FILM COATED ORAL at 01:35

## 2021-11-25 RX ADMIN — ACETAMINOPHEN 975 MG: 325 TABLET, FILM COATED ORAL at 08:59

## 2021-11-25 NOTE — PROGRESS NOTES
YUNIOR Courtesy Visit    Saw Zaina in bed breastfeeding baby Tosin. She reports that both are doing well. Tosin has been latching efficiently, but wanting to feed often. Discussed cluster feeding, breast milk physiology, and normalcy of not feeling full breasts yet. Offered encouragement and congratulations.     RERE Chau CNM

## 2021-11-25 NOTE — PLAN OF CARE
VSS, Except x1 mild range BP Pain managed with ibuprofen and tylenol. Supp given, BM x1. Positive bonding observed with .

## 2021-11-25 NOTE — PROGRESS NOTES
Postpartum Progress Note  DOS: 21  Zaina Tan  [id number]    POD#2 s/p RLTCS    Subjective:   Doing well this morning. Pain is well controlled on oral medications. She is ambulating without dizziness. Tolerating PO intake without nausea or vomiting. Lochia is similar to a period. Voiding spontaneously. Passing flatus but belly feels very full. No headache, vision changes, CP, SOB, RUQ pain.    Objective:  Patient Vitals for the past 24 hrs:   BP Temp Temp src Pulse Resp   21 2349 135/81 98.2  F (36.8  C) Oral 91 18   21 1630 129/66 97.7  F (36.5  C) Oral 81 16   21 0858 126/61 98.7  F (37.1  C) Oral 80 16   General: NAD, comfortable  Heart: Regular rate, extremities warm and well-perfused  Lungs: Breathing comfortably, on room air  Abdomen: Soft, non-tender, moderately distended; fundus firm below umbilicus  Incision: pressure dressing is clean/dry/intact  Extremities: trace edema in BLE    Assessment/Plan: 36 year old  who is POD#2 s/p RLTCS. Pregnancy was notable for gHTN. Currently stable and doing well    gHTN  - HELLP labs o/w nl w/ exception of plt in 130s (132>139)  - serial BP monitoring  - strict Is and Os, daily weights     Postpartum  - Continue routine post-operative cares.     - Heme: PPH of about 1300. Hgb overnight was 9.7. repeat hgb this AM. Continue to monitor for s/s of anemia. Repeat labs prn. will discharge home with PO iron  - Pain: Continue scheduled tylenol, ibuprofen, and prn oxycodone.  - GI: will increase scheduled and prn bowel regimen, scheduled simethicone, prn antiemetics senna, miralax, simethicone, and anti-emetics.  - : s/p sena, voiding spontaneously   - Baby:  Stable  - Contraception: Not discussed.  - Rh pos, Rubella immune,  - PPx: Encourage ambulation, IS, SCDs while in bed    Dispo: Discharge to home POD#2-3, when meeting postpartum goals    Celia Rae MD  OB/GYN PGY-4  21 7:03 AM

## 2021-11-25 NOTE — PLAN OF CARE
VSS. Postpartum assessments WNL with exception of abdominal distention. Bowel sounds present, but hypoactive. Stated had not been able to pass gas. Patient using warm packs, stool softners, simethicone and ambulating on unit, now passing gas, but still distended. Pain managed with tylenol, ibuprofen and one dose oxycodone for breakthrough pain. Patient performing self and baby cares independently. Continue to monitor response to bowel interventions. Continue present cares

## 2021-11-25 NOTE — PROVIDER NOTIFICATION
11/25/21 0914   Provider Notification   Provider Name/Title Dr. Barcenas   Method of Notification Electronic Page   Request Evaluate-Remote   Notification Reason Vital Signs Change  (/81)   HA, /81  Thanks, Sarah [id number]

## 2021-11-25 NOTE — PROVIDER NOTIFICATION
11/25/21 1256   Provider Notification   Provider Name/Title Dr. Barcenas   Method of Notification Electronic Page   Request Evaluate-Remote   Notification Reason Status Update  (BP, burning with urination)   HA, burning with urination, /83  Thanks, Sarah [id number]    Page returned, encourage PO fluids.

## 2021-11-25 NOTE — PLAN OF CARE
VSS and postpartum assessments WDL.  Up ad zheng with steady gait and independent with cares.  Bonding well with infant, Rahat.  Breastfeeding on cue independently.  Pain managed with tylenol and ibuprofen.  PIV in right. FOB was present, but when to care for the other children.  Will continue with postpartum cares and education per plan of care.

## 2021-11-26 ENCOUNTER — TELEPHONE (OUTPATIENT)
Dept: OBGYN | Facility: CLINIC | Age: 36
End: 2021-11-26
Payer: COMMERCIAL

## 2021-11-26 VITALS
SYSTOLIC BLOOD PRESSURE: 136 MMHG | TEMPERATURE: 98.8 F | HEART RATE: 104 BPM | DIASTOLIC BLOOD PRESSURE: 82 MMHG | RESPIRATION RATE: 18 BRPM | OXYGEN SATURATION: 100 % | WEIGHT: 169.4 LBS | BODY MASS INDEX: 30.98 KG/M2

## 2021-11-26 PROCEDURE — G0008 ADMIN INFLUENZA VIRUS VAC: HCPCS | Performed by: STUDENT IN AN ORGANIZED HEALTH CARE EDUCATION/TRAINING PROGRAM

## 2021-11-26 PROCEDURE — 250N000011 HC RX IP 250 OP 636: Performed by: STUDENT IN AN ORGANIZED HEALTH CARE EDUCATION/TRAINING PROGRAM

## 2021-11-26 PROCEDURE — 250N000013 HC RX MED GY IP 250 OP 250 PS 637: Performed by: STUDENT IN AN ORGANIZED HEALTH CARE EDUCATION/TRAINING PROGRAM

## 2021-11-26 PROCEDURE — 90686 IIV4 VACC NO PRSV 0.5 ML IM: CPT | Performed by: STUDENT IN AN ORGANIZED HEALTH CARE EDUCATION/TRAINING PROGRAM

## 2021-11-26 RX ORDER — OXYCODONE HYDROCHLORIDE 5 MG/1
5 TABLET ORAL EVERY 4 HOURS PRN
Qty: 6 TABLET | Refills: 0 | Status: SHIPPED | OUTPATIENT
Start: 2021-11-26 | End: 2021-12-02

## 2021-11-26 RX ADMIN — INFLUENZA A VIRUS A/VICTORIA/2570/2019 IVR-215 (H1N1) ANTIGEN (FORMALDEHYDE INACTIVATED), INFLUENZA A VIRUS A/TASMANIA/503/2020 IVR-221 (H3N2) ANTIGEN (FORMALDEHYDE INACTIVATED), INFLUENZA B VIRUS B/PHUKET/3073/2013 ANTIGEN (FORMALDEHYDE INACTIVATED), AND INFLUENZA B VIRUS B/WASHINGTON/02/2019 ANTIGEN (FORMALDEHYDE INACTIVATED) 0.5 ML: 15; 15; 15; 15 INJECTION, SUSPENSION INTRAMUSCULAR at 10:54

## 2021-11-26 RX ADMIN — POLYETHYLENE GLYCOL 3350 17 G: 17 POWDER, FOR SOLUTION ORAL at 08:18

## 2021-11-26 RX ADMIN — OXYCODONE HYDROCHLORIDE 5 MG: 5 TABLET ORAL at 04:16

## 2021-11-26 RX ADMIN — ACETAMINOPHEN 975 MG: 325 TABLET, FILM COATED ORAL at 09:39

## 2021-11-26 RX ADMIN — ACETAMINOPHEN 975 MG: 325 TABLET, FILM COATED ORAL at 03:10

## 2021-11-26 RX ADMIN — BISACODYL 10 MG: 10 SUPPOSITORY RECTAL at 08:19

## 2021-11-26 RX ADMIN — IBUPROFEN 800 MG: 800 TABLET, FILM COATED ORAL at 08:18

## 2021-11-26 RX ADMIN — IBUPROFEN 800 MG: 800 TABLET, FILM COATED ORAL at 01:37

## 2021-11-26 RX ADMIN — SENNOSIDES AND DOCUSATE SODIUM 2 TABLET: 8.6; 5 TABLET ORAL at 08:17

## 2021-11-26 NOTE — TELEPHONE ENCOUNTER
----- Message from Shellie Soni MD sent at 11/26/2021 10:08 AM CST -----  Regarding: Appointments  Hi there,    Can someone please call the patient above and help her get the following visits scheduled:    1) BP check in 3-5 days with RN  2) Mood/incision/BP check with Resident or Staff MD in 2 weeks  3) Postpartum visit in 6 weeks with Nae    Thanks, Dr. Soni

## 2021-11-26 NOTE — TELEPHONE ENCOUNTER
Patient was called.  Left her a message for her to call back.  Made all 3 appt. For her.   Please let her know the dates and times and/or reschedule them if they do not work for her.     Thank you     Catracho

## 2021-11-26 NOTE — TELEPHONE ENCOUNTER
Called patient again to let her know the time and Dates for her 3 Post Partum appointments she needed.     Thank you     Catracho

## 2021-11-26 NOTE — PROGRESS NOTES
Postpartum Progress Note  DOS: 21  Zaina Tan  [id number]    POD#3 s/p RLTCS    Subjective:   Doing well this morning. Pain is well controlled on oral medications. Some pain with ambulation but it is manageable. She is ambulating without dizziness. Tolerating PO intake without nausea or vomiting. Lochia is similar to a period. Voiding spontaneously. Passing flatus and had a BM last night. No headache, vision changes, CP, SOB, RUQ pain.    Objective:  Patient Vitals for the past 24 hrs:   BP Temp Temp src Pulse Resp Weight   21 0200 133/77 97.7  F (36.5  C) Oral 75 18 --   21 1645 132/87 98.2  F (36.8  C) Oral 82 16 --   21 1215 124/83 -- -- 85 -- 76.8 kg (169 lb 6.4 oz)   21 0845 (!) 141/81 98.4  F (36.9  C) Oral 86 16 --     General: NAD, comfortable  Heart: Regular rate, extremities warm and well-perfused  Lungs: Breathing comfortably, on room air  Abdomen: Soft, non-tender, moderately distended and tympanic; fundus firm at the umbilicus  Incision: pressure dressing is clean/dry/intact  Extremities: trace edema in BLE    Assessment/Plan: 36 year old  who is POD#3 s/p RLTCS. Pregnancy was notable for gHTN. Currently stable and doing well    gHTN  - HELLP labs o/w nl w/ exception of plt in 130s (132>139)  - serial BP monitoring  - strict Is and Os, daily weights     Postpartum  - Continue routine post-operative cares.     - Heme: PPH of about 1300. Hgb stable at 8.5. Continue to monitor for s/s of anemia. Repeat labs prn. will discharge home with PO iron  - Pain: Continue scheduled tylenol, ibuprofen, and prn oxycodone.  - GI: will increase scheduled and prn bowel regimen, scheduled simethicone, prn antiemetics senna, miralax, simethicone, and anti-emetics.  - : s/p sena, voiding spontaneously   - Baby:  Stable  - Contraception: Natural family planning  - Rh pos, Rubella immune,  - PPx: Encourage ambulation, IS, SCDs while in bed    Dispo: Discharge to home today    Joce  MD Mckinley  OB/GYN PGY-2  11/26/2021 2:20 AM    Staff MD Note    I appreciate the note by Dr. Sen.  Any necessary changes have been made by me.  I saw and evaluated the patient and agree with the findings and plan of care as documented in the note.    Shellie Soni MD

## 2021-11-26 NOTE — TELEPHONE ENCOUNTER
[facility] Call Center    Phone Message    May a detailed message be left on voicemail: yes     Reason for Call: Other: Zaina called returning a call to discuss scheduling for Post partum care. Please reach out to Zaina to discuss. Thank you!     Action Taken: Message routed to:  Other: s     Travel Screening: Not Applicable

## 2021-11-26 NOTE — DISCHARGE INSTRUCTIONS
Discharge Instructions for  Section ()  You had a  section, or . During the , your baby was delivered through an incision in your stomach and uterus. Full recovery after a  can take time. It s important to take care of yourself -- for your own sake and because your new baby needs you. Here are some guidelines to follow at home.  Incision care  Here's how to take care of your incision:    Shower as needed. Pat your incision dry.    Watch your incision for signs of infection, like more redness or drainage.    Hold a pillow against the incision when you laugh or cough and when you get up from a lying or sitting position.    Remember, it can take as long as 6 weeks for your incision to heal.  Activity  Here are some suggestions:    Don t try to take care of anyone other than your baby and yourself.    Remember, the more active you are, the more likely you are to have an increase in your bleeding.    Get lots of rest. Take naps in the afternoon.    Increase your activities bit by bit.    Plan your activities so that you don t have to go up or down stairs more than needed.    Do postsurgical deep breathing and coughing exercises. Ask your healthcare provider for instructions.    Don t lift anything heavier than your baby until your healthcare provider tells you it s OK.    Don t drive until your healthcare provider says it s OK.    Don t have sexual intercourse until after you ve had a checkup with your healthcare provider and you have decided on a birth control method.    Allow others to do things for you. Don't hesitate to ask for help.  Follow-up  Make a follow-up appointment as directed by our staff.  When to call your healthcare provider  Call your healthcare provider right away if you have any of these:    Fever of 100.4 F (38 C) or higher    Redness, pain, or drainage at your incision site    Bleeding that requires a new sanitary pad every hour    Severe pain in  the abdomen    Pain or urgency with urination    Foul odor from vaginal discharge    Trouble urinating or emptying your bladder    No bowel movement within 1 week after the birth of your baby    Swollen, red, painful area in the leg    Appearance of rash or hives    Sore, red, painful area on the breasts that may come with flu-like symptoms    Feelings of anxiety, panic, and/or depression  [place] last reviewed this educational content on 2/1/2018 2000-2021 [employer]. All rights reserved. This information is not intended as a substitute for professional medical care. Always follow your healthcare professional's instructions.

## 2021-11-26 NOTE — PLAN OF CARE
Patients vitals have been stable. Postpartum assessment WDL. Declines headache, dizziness, and blurred vision. States that she is voiding without difficulty. Was able to have a bowel movement this evening. Is taking ibuprofen and tylenol for pain, knows she has oxycodone available if needed. Fundus is firm with scant amount of bleeding. Breastfeeding baby and pumping to help increase her milk supply as well as to help supplement baby. Will continue to monitor for adequate pain control and assist patient as needed.

## 2021-11-26 NOTE — PLAN OF CARE
Patient doing well today. Discharge postponed today due to baby weight loss. Patient states she is doing well and is feeling ready to go home. Postpartum checks WNL, VSS. Patient is able to care for self and baby. Breastfeeding and pumping independently. Discharge instructions and medications reviewed, no questions offered. Patient discharged to home with baby and .

## 2021-11-26 NOTE — PLAN OF CARE
Temp: 97.7  F (36.5  C) Temp src: Oral BP: 133/77 Pulse: 75   Resp: 18      '    PT is up ad zheng, voiding spontaneously, tolerating a regular diet. Pain managed with tylenol, ibuprofen, and oxycodone. Abdominal binder for comfort.   PT reporting pins and needle sensation in the umbilical area. Will continue to monitor. PT has history of uterine fibroid in left lateral region of abdomen. Nurse conducted fundal check with second RN and found umbilicus at U-1 and midline with uterine fibroid palpable and firm in LUQ. Bleeding is WDL.  PT is breastfeeding baby with minimal assistance. Educated and supported with deeper latching. Mom supplementing baby with human donor milk due to weight loss and educated on importance of pumping for supply and since providing donor milk.  Plan of care and discharge goals reviewed with mom.

## 2021-11-30 ENCOUNTER — ALLIED HEALTH/NURSE VISIT (OUTPATIENT)
Dept: OBGYN | Facility: CLINIC | Age: 36
End: 2021-11-30
Attending: OBSTETRICS & GYNECOLOGY
Payer: COMMERCIAL

## 2021-11-30 VITALS — SYSTOLIC BLOOD PRESSURE: 140 MMHG | HEART RATE: 83 BPM | DIASTOLIC BLOOD PRESSURE: 88 MMHG

## 2021-11-30 DIAGNOSIS — O09.293 HISTORY OF PRE-ECLAMPSIA IN PRIOR PREGNANCY, CURRENTLY PREGNANT IN THIRD TRIMESTER: Primary | ICD-10-CM

## 2021-11-30 DIAGNOSIS — N39.0 URINARY TRACT INFECTION: ICD-10-CM

## 2021-11-30 PROCEDURE — 81001 URINALYSIS AUTO W/SCOPE: CPT | Performed by: OBSTETRICS & GYNECOLOGY

## 2021-11-30 RX ORDER — NITROFURANTOIN 25; 75 MG/1; MG/1
100 CAPSULE ORAL 2 TIMES DAILY
Qty: 10 CAPSULE | Refills: 0 | Status: SHIPPED | OUTPATIENT
Start: 2021-11-30 | End: 2021-12-22

## 2021-11-30 NOTE — NURSING NOTE
Discussed with pt s/s of PreE.  Pt denies visions changes, HA or right sided flank pain. This writer assessed BLE edema +1. Pt having UTI symptoms.  Discussed with Dr Mills.  Pt was prescribed Macrobid per protocol and UA/UC.      Pt plans to get a BP cuff.  This writer recommended pt taking BP once daily.  Pt verbalized understanding.  Pt has follow up next week.       Discussed BF with pt.  Pt denies skin break down but tender nipples.   Pt denies bleeding from breasts.  Pt verbalizes adequate milk supply.  Pt verbalized that baby is pooping and peeing adequately.  Discussed possible formula if needed, asking how much?  This writer is unsure of quantity.  This writer stated that no need to supplement baby if pooping and peeing ok.  This writer discussed discussing with the MD at baby's appt.  Pt verbalized understanding.  This writer discussed making sure to bring baby to the breast.  It will help to alleviate discomfort in back.  Discussed the use of a Haake or a breast cup to catch leaking milk supply.  Pt verbalized understanding.

## 2021-12-02 ENCOUNTER — LAB (OUTPATIENT)
Dept: LAB | Facility: CLINIC | Age: 36
End: 2021-12-02
Payer: COMMERCIAL

## 2021-12-02 ENCOUNTER — TELEPHONE (OUTPATIENT)
Dept: OBGYN | Facility: CLINIC | Age: 36
End: 2021-12-02
Payer: COMMERCIAL

## 2021-12-02 ENCOUNTER — ALLIED HEALTH/NURSE VISIT (OUTPATIENT)
Dept: OBGYN | Facility: CLINIC | Age: 36
End: 2021-12-02
Payer: COMMERCIAL

## 2021-12-02 VITALS — SYSTOLIC BLOOD PRESSURE: 148 MMHG | DIASTOLIC BLOOD PRESSURE: 91 MMHG | HEART RATE: 83 BPM

## 2021-12-02 DIAGNOSIS — R39.9 UTI SYMPTOMS: ICD-10-CM

## 2021-12-02 DIAGNOSIS — Z98.891 S/P C-SECTION: ICD-10-CM

## 2021-12-02 DIAGNOSIS — O09.293 HISTORY OF PRE-ECLAMPSIA IN PRIOR PREGNANCY, CURRENTLY PREGNANT IN THIRD TRIMESTER: ICD-10-CM

## 2021-12-02 DIAGNOSIS — O09.293 HISTORY OF PRE-ECLAMPSIA IN PRIOR PREGNANCY, CURRENTLY PREGNANT IN THIRD TRIMESTER: Primary | ICD-10-CM

## 2021-12-02 LAB
ALT SERPL W P-5'-P-CCNC: 31 U/L (ref 0–50)
AST SERPL W P-5'-P-CCNC: 24 U/L (ref 0–45)
CREAT SERPL-MCNC: 0.64 MG/DL (ref 0.52–1.04)
CREAT UR-MCNC: 40 MG/DL
ERYTHROCYTE [DISTWIDTH] IN BLOOD BY AUTOMATED COUNT: 14.7 % (ref 10–15)
GFR SERPL CREATININE-BSD FRML MDRD: >90 ML/MIN/1.73M2
HCT VFR BLD AUTO: 31.1 % (ref 35–47)
HGB BLD-MCNC: 9.8 G/DL (ref 11.7–15.7)
MCH RBC QN AUTO: 28.5 PG (ref 26.5–33)
MCHC RBC AUTO-ENTMCNC: 31.5 G/DL (ref 31.5–36.5)
MCV RBC AUTO: 90 FL (ref 78–100)
PLATELET # BLD AUTO: 385 10E3/UL (ref 150–450)
PROT UR-MCNC: 0.19 G/L
PROT/CREAT 24H UR: 0.48 G/G CR (ref 0–0.2)
RBC # BLD AUTO: 3.44 10E6/UL (ref 3.8–5.2)
WBC # BLD AUTO: 7.1 10E3/UL (ref 4–11)

## 2021-12-02 PROCEDURE — 84156 ASSAY OF PROTEIN URINE: CPT

## 2021-12-02 PROCEDURE — G0463 HOSPITAL OUTPT CLINIC VISIT: HCPCS

## 2021-12-02 PROCEDURE — 85027 COMPLETE CBC AUTOMATED: CPT

## 2021-12-02 PROCEDURE — 84460 ALANINE AMINO (ALT) (SGPT): CPT

## 2021-12-02 PROCEDURE — 82565 ASSAY OF CREATININE: CPT

## 2021-12-02 PROCEDURE — 99024 POSTOP FOLLOW-UP VISIT: CPT | Performed by: OBSTETRICS & GYNECOLOGY

## 2021-12-02 PROCEDURE — 87086 URINE CULTURE/COLONY COUNT: CPT

## 2021-12-02 PROCEDURE — 36415 COLL VENOUS BLD VENIPUNCTURE: CPT

## 2021-12-02 PROCEDURE — 84450 TRANSFERASE (AST) (SGOT): CPT

## 2021-12-02 RX ORDER — LABETALOL 200 MG/1
200 TABLET, FILM COATED ORAL 2 TIMES DAILY
Qty: 60 TABLET | Refills: 1 | Status: SHIPPED | OUTPATIENT
Start: 2021-12-02 | End: 2022-01-06

## 2021-12-02 RX ORDER — PHENAZOPYRIDINE HYDROCHLORIDE 200 MG/1
200 TABLET, FILM COATED ORAL 3 TIMES DAILY PRN
Qty: 30 TABLET | Refills: 1 | Status: SHIPPED | OUTPATIENT
Start: 2021-12-02 | End: 2021-12-22

## 2021-12-02 RX ORDER — OXYCODONE HYDROCHLORIDE 5 MG/1
5 TABLET ORAL EVERY 4 HOURS PRN
Qty: 6 TABLET | Refills: 0 | Status: ON HOLD | OUTPATIENT
Start: 2021-12-02 | End: 2021-12-24

## 2021-12-02 NOTE — NURSING NOTE
Chief Complaint   Patient presents with     Hypertension     Pt presents to clinic with complaints of post-op pain, symptoms of UTI, and HTN. Pt presented to lab prior to appointment. Average BP in clinic 151/89. Denies HA, RUQ pain. Trace BLE edema noted. Reports urethral pain/buring with urination and abdominal pain with urination. DTRs +1-2. Incision healing well. Steri strips intact.

## 2021-12-02 NOTE — PROGRESS NOTES
35 yo  s/p low segment transverse c section for arrest of dilation on 21 at 39+3 weeks.   Presents today due elevated BPs at home. Denies HA, nausea, emesis, RUQ pain. Notes ongoing bladder spasm despite nearly finishing the abx for presumed UTI.     Patient Active Problem List   Diagnosis     History of gestational diabetes mellitus (GDM) in prior pregnancy, currently pregnant     H/O fetal anomaly in prior pregnancy, currently pregnant, third trimester     Uterine leiomyoma     High-risk pregnancy, elderly multigravida, unspecified trimester     History of  delivery- plans repeat     Elevated early 1hour- passed early 3 hour and 3 hour at EOB     History of fetal abnormality in previous pregnancy, currently pregnant     History of pre-eclampsia in prior pregnancy, currently pregnant in third trimester     S/P      Past Medical History:   Diagnosis Date     Vitamin D deficiency 2017: Vitamin D= 25, [employer] message sent - supplementation [id number] IU/day 17- Vit D 48     Vitreous syneresis      BP (!) 148/91   Pulse 83   LMP 2021   Appears well  Abdomen soft NT ND  Incision well healing-- attempted to remove steristrips but slightly uncomfortable for patient so she will soak them in baby oil and then remove them.   LE 1+ edema bilaterally  DTRs wnl, no clonus    Labs reviewed    A/P:  PP HTN class 2: start labetalol 200mg BID and see primary MD  Rx sent pyridium and oxycodone.   RTC one week BP check.   Primary care visit for PP HTN and ongoing cares    Sarah Edge MD

## 2021-12-02 NOTE — TELEPHONE ENCOUNTER
Message received from patient stating her BP is 150s. No further details left.    Pt is 9 days postpartum s/p  section 21. Gestational hypertension diagnosis this pregnancy, hx of preeclampsia. Not currently on antihypertensives. Last HELLP labs drawn in hospital  and were WNL. Had nurse visit at [facility] 21, BP 140s/80s in clinic. Pt has appointment scheduled with provider 12/10/21 for BP/mood check.     Called and spoke with patient. She reports she has been checking her BP at home daily. States yesterday was 153/92, today she checked it twice and was 143/90 and 163/94. Pt denies HA, RUQ pain, states she still has swelling but it is improving since delivery. Reports ongoing dysuria. States severe pain/burning with urination. Was started on Macrobid 21. States incisional pain has increased as well, states she was so painful she could not lift  yesterday. Is taking ibu/Tylenol regularly.     Scheduled appointment with Dr. Wright today, 21 at 1pm. Reviewed with FELIPA Coelho for labs received.  Instructed patient to go to lab prior to have blood drawn and to leave urine sample as well. Pt plans to go to lab one hour prior to appointment time. Pt denied further questions/concerns. Verbalized understanding and agreement with plan of care.

## 2021-12-03 LAB — BACTERIA UR CULT: NORMAL

## 2021-12-06 ENCOUNTER — HOSPITAL ENCOUNTER (EMERGENCY)
Facility: CLINIC | Age: 36
Discharge: HOME | End: 2021-12-06
Attending: PHYSICIAN ASSISTANT | Admitting: PHYSICIAN ASSISTANT
Payer: COMMERCIAL

## 2021-12-06 ENCOUNTER — PREP FOR PROCEDURE (OUTPATIENT)
Dept: OBGYN | Facility: CLINIC | Age: 36
End: 2021-12-06

## 2021-12-06 ENCOUNTER — APPOINTMENT (OUTPATIENT)
Dept: CT IMAGING | Facility: CLINIC | Age: 36
End: 2021-12-06
Attending: PHYSICIAN ASSISTANT
Payer: COMMERCIAL

## 2021-12-06 ENCOUNTER — TELEPHONE (OUTPATIENT)
Dept: OBGYN | Facility: CLINIC | Age: 36
End: 2021-12-06
Payer: COMMERCIAL

## 2021-12-06 VITALS
BODY MASS INDEX: 30.98 KG/M2 | TEMPERATURE: 97.4 F | OXYGEN SATURATION: 100 % | HEIGHT: 62 IN | HEART RATE: 61 BPM | SYSTOLIC BLOOD PRESSURE: 123 MMHG | DIASTOLIC BLOOD PRESSURE: 77 MMHG | RESPIRATION RATE: 20 BRPM

## 2021-12-06 DIAGNOSIS — T14.8XXA HEMATOMA: Primary | ICD-10-CM

## 2021-12-06 DIAGNOSIS — G89.18 ACUTE POST-OPERATIVE PAIN: ICD-10-CM

## 2021-12-06 LAB
ALBUMIN SERPL-MCNC: 2.8 G/DL (ref 3.4–5)
ALBUMIN UR-MCNC: 10 MG/DL
ALP SERPL-CCNC: 140 U/L (ref 40–150)
ALT SERPL W P-5'-P-CCNC: 24 U/L (ref 0–50)
ANION GAP SERPL CALCULATED.3IONS-SCNC: 3 MMOL/L (ref 3–14)
APPEARANCE UR: CLEAR
AST SERPL W P-5'-P-CCNC: 16 U/L (ref 0–45)
BASOPHILS # BLD AUTO: 0 10E3/UL (ref 0–0.2)
BASOPHILS NFR BLD AUTO: 0 %
BILIRUB DIRECT SERPL-MCNC: <0.1 MG/DL (ref 0–0.2)
BILIRUB SERPL-MCNC: 0.3 MG/DL (ref 0.2–1.3)
BILIRUB UR QL STRIP: ABNORMAL
BUN SERPL-MCNC: 13 MG/DL (ref 7–30)
CALCIUM SERPL-MCNC: 9.1 MG/DL (ref 8.5–10.1)
CHLORIDE BLD-SCNC: 110 MMOL/L (ref 94–109)
CLUE CELLS: PRESENT
CO2 SERPL-SCNC: 28 MMOL/L (ref 20–32)
COLOR UR AUTO: ABNORMAL
CREAT SERPL-MCNC: 0.74 MG/DL (ref 0.52–1.04)
EOSINOPHIL # BLD AUTO: 0.1 10E3/UL (ref 0–0.7)
EOSINOPHIL NFR BLD AUTO: 2 %
ERYTHROCYTE [DISTWIDTH] IN BLOOD BY AUTOMATED COUNT: 15.1 % (ref 10–15)
GFR SERPL CREATININE-BSD FRML MDRD: >90 ML/MIN/1.73M2
GLUCOSE BLD-MCNC: 86 MG/DL (ref 70–99)
GLUCOSE UR STRIP-MCNC: NEGATIVE MG/DL
HCT VFR BLD AUTO: 29.5 % (ref 35–47)
HGB BLD-MCNC: 8.8 G/DL (ref 11.7–15.7)
HGB UR QL STRIP: ABNORMAL
IMM GRANULOCYTES # BLD: 0 10E3/UL
IMM GRANULOCYTES NFR BLD: 1 %
KETONES UR STRIP-MCNC: NEGATIVE MG/DL
LACTATE SERPL-SCNC: 0.7 MMOL/L (ref 0.7–2)
LEUKOCYTE ESTERASE UR QL STRIP: NEGATIVE
LIPASE SERPL-CCNC: 234 U/L (ref 73–393)
LYMPHOCYTES # BLD AUTO: 1.7 10E3/UL (ref 0.8–5.3)
LYMPHOCYTES NFR BLD AUTO: 25 %
MCH RBC QN AUTO: 27.7 PG (ref 26.5–33)
MCHC RBC AUTO-ENTMCNC: 29.8 G/DL (ref 31.5–36.5)
MCV RBC AUTO: 93 FL (ref 78–100)
MONOCYTES # BLD AUTO: 0.4 10E3/UL (ref 0–1.3)
MONOCYTES NFR BLD AUTO: 5 %
MUCOUS THREADS #/AREA URNS LPF: PRESENT /LPF
NEUTROPHILS # BLD AUTO: 4.6 10E3/UL (ref 1.6–8.3)
NEUTROPHILS NFR BLD AUTO: 67 %
NITRATE UR QL: POSITIVE
NRBC # BLD AUTO: 0 10E3/UL
NRBC BLD AUTO-RTO: 0 /100
PH UR STRIP: 6 [PH] (ref 5–7)
PLATELET # BLD AUTO: 389 10E3/UL (ref 150–450)
POTASSIUM BLD-SCNC: 3.9 MMOL/L (ref 3.4–5.3)
PROT SERPL-MCNC: 7.2 G/DL (ref 6.8–8.8)
RBC # BLD AUTO: 3.18 10E6/UL (ref 3.8–5.2)
RBC URINE: 1 /HPF
SODIUM SERPL-SCNC: 141 MMOL/L (ref 133–144)
SP GR UR STRIP: 1.02 (ref 1–1.03)
SQUAMOUS EPITHELIAL: 2 /HPF
TRICHOMONAS, WET PREP: ABNORMAL
TROPONIN I SERPL HS-MCNC: 7 NG/L
UROBILINOGEN UR STRIP-MCNC: 4 MG/DL
WBC # BLD AUTO: 6.9 10E3/UL (ref 4–11)
WBC URINE: 8 /HPF
WBC'S/HIGH POWER FIELD, WET PREP: ABNORMAL
YEAST, WET PREP: ABNORMAL

## 2021-12-06 PROCEDURE — 80053 COMPREHEN METABOLIC PANEL: CPT | Performed by: PHYSICIAN ASSISTANT

## 2021-12-06 PROCEDURE — 80048 BASIC METABOLIC PNL TOTAL CA: CPT | Performed by: EMERGENCY MEDICINE

## 2021-12-06 PROCEDURE — 81003 URINALYSIS AUTO W/O SCOPE: CPT | Performed by: EMERGENCY MEDICINE

## 2021-12-06 PROCEDURE — 84155 ASSAY OF PROTEIN SERUM: CPT | Performed by: EMERGENCY MEDICINE

## 2021-12-06 PROCEDURE — 36415 COLL VENOUS BLD VENIPUNCTURE: CPT | Performed by: PHYSICIAN ASSISTANT

## 2021-12-06 PROCEDURE — 82247 BILIRUBIN TOTAL: CPT | Performed by: EMERGENCY MEDICINE

## 2021-12-06 PROCEDURE — 87210 SMEAR WET MOUNT SALINE/INK: CPT | Performed by: PHYSICIAN ASSISTANT

## 2021-12-06 PROCEDURE — 36415 COLL VENOUS BLD VENIPUNCTURE: CPT | Performed by: EMERGENCY MEDICINE

## 2021-12-06 PROCEDURE — 85025 COMPLETE CBC W/AUTO DIFF WBC: CPT | Performed by: PHYSICIAN ASSISTANT

## 2021-12-06 PROCEDURE — 87491 CHLMYD TRACH DNA AMP PROBE: CPT | Performed by: PHYSICIAN ASSISTANT

## 2021-12-06 PROCEDURE — 87591 N.GONORRHOEAE DNA AMP PROB: CPT | Performed by: PHYSICIAN ASSISTANT

## 2021-12-06 PROCEDURE — 250N000009 HC RX 250: Performed by: PHYSICIAN ASSISTANT

## 2021-12-06 PROCEDURE — 85025 COMPLETE CBC W/AUTO DIFF WBC: CPT | Performed by: EMERGENCY MEDICINE

## 2021-12-06 PROCEDURE — 250N000011 HC RX IP 250 OP 636: Performed by: PHYSICIAN ASSISTANT

## 2021-12-06 PROCEDURE — 83690 ASSAY OF LIPASE: CPT | Performed by: PHYSICIAN ASSISTANT

## 2021-12-06 PROCEDURE — 82248 BILIRUBIN DIRECT: CPT | Performed by: PHYSICIAN ASSISTANT

## 2021-12-06 PROCEDURE — 84484 ASSAY OF TROPONIN QUANT: CPT | Performed by: PHYSICIAN ASSISTANT

## 2021-12-06 PROCEDURE — 81001 URINALYSIS AUTO W/SCOPE: CPT | Performed by: PHYSICIAN ASSISTANT

## 2021-12-06 PROCEDURE — 87086 URINE CULTURE/COLONY COUNT: CPT | Performed by: PHYSICIAN ASSISTANT

## 2021-12-06 PROCEDURE — 74177 CT ABD & PELVIS W/CONTRAST: CPT

## 2021-12-06 PROCEDURE — 99285 EMERGENCY DEPT VISIT HI MDM: CPT | Mod: 25

## 2021-12-06 PROCEDURE — 84484 ASSAY OF TROPONIN QUANT: CPT | Performed by: EMERGENCY MEDICINE

## 2021-12-06 PROCEDURE — 83690 ASSAY OF LIPASE: CPT | Performed by: EMERGENCY MEDICINE

## 2021-12-06 PROCEDURE — 83605 ASSAY OF LACTIC ACID: CPT | Performed by: PHYSICIAN ASSISTANT

## 2021-12-06 RX ORDER — DOXYCYCLINE 100 MG/10ML
100 INJECTION, POWDER, LYOPHILIZED, FOR SOLUTION INTRAVENOUS
Status: DISCONTINUED | OUTPATIENT
Start: 2021-12-06 | End: 2021-12-22

## 2021-12-06 RX ORDER — IOPAMIDOL 755 MG/ML
85 INJECTION, SOLUTION INTRAVASCULAR ONCE
Status: COMPLETED | OUTPATIENT
Start: 2021-12-06 | End: 2021-12-06

## 2021-12-06 RX ADMIN — SODIUM CHLORIDE 64 ML: 900 INJECTION INTRAVENOUS at 20:44

## 2021-12-06 RX ADMIN — IOPAMIDOL 85 ML: 755 INJECTION, SOLUTION INTRAVENOUS at 20:40

## 2021-12-06 ASSESSMENT — ENCOUNTER SYMPTOMS
VOMITING: 0
FREQUENCY: 0
DIARRHEA: 0
CHILLS: 1
DYSURIA: 1
SHORTNESS OF BREATH: 0
HEMATURIA: 1
NAUSEA: 0
COUGH: 0
ABDOMINAL PAIN: 1
FEVER: 0

## 2021-12-06 NOTE — TELEPHONE ENCOUNTER
----- Message from Steph Beltran RN sent at 2021 10:53 AM CST -----  Regarding: pain with urination  Pt is still having severe pain when urinating.  Pt feels a pain at the navel when she feels like she has to urinate. Pt is 2 weeks PP via .

## 2021-12-06 NOTE — TELEPHONE ENCOUNTER
Called and discussed recommendation.  Zaina agrees to ED visit    Lala Helm MD Faragher, Cheri, RN  Caller: Unspecified (Today, 11:42 AM)  She needs an evaluation in the ED if she is in that much pain.     If she declines, she can come in for an urgent MD visit and repeat urine culture.     JR

## 2021-12-06 NOTE — ED TRIAGE NOTES
[facility]  ED Arrival Note    Arrives through triage. ABC's intact. A &O X4. . Pt arrives with c/o lower abdominal pain and UTI symptoms. Had a  2 weeks ago. Has seen her OB x2 for Urinary symptoms, but was sent to the ED for further work up. Patient denies vaginal bleeding or contractions. However, she endorses small amounts of blood in the urine.       Visitors during triage: None      Triage Interventions: IV and labs and UA    Ambulatory: Yes    Meets Stroke Criteria?: No    Meets Trauma Criteria?: No    Shock Index: <0.8, for provider reference    Directed to: Triage Lobby

## 2021-12-06 NOTE — TELEPHONE ENCOUNTER
"Zaina reports that she has burning in her urethra that lasts the whole time she is urinating.  It is worse at the end of urinary stream, and she is \"screaming with pain\".  She states that she has pain inside from her abdomen downward, and she feels that this is related to the urethral pain.    She reports that she is well hydrated.    She notes blood with urination, but is still having vaginal bleeding postpartum, so states that this may be from the vagina.     She was prescribed pyridium on 12/2, but this is not helping.  UC on 12/2 was negative.    Routed to MD for plan  "

## 2021-12-06 NOTE — TELEPHONE ENCOUNTER
She needs an evaluation in the ED if she is in that much pain.     If she declines, she can come in for an urgent MD visit and repeat urine culture.

## 2021-12-07 ENCOUNTER — TELEPHONE (OUTPATIENT)
Dept: OBGYN | Facility: CLINIC | Age: 36
End: 2021-12-07

## 2021-12-07 ENCOUNTER — LAB (OUTPATIENT)
Dept: URGENT CARE | Facility: URGENT CARE | Age: 36
End: 2021-12-07
Payer: COMMERCIAL

## 2021-12-07 ENCOUNTER — HOSPITAL ENCOUNTER (OUTPATIENT)
Facility: CLINIC | Age: 36
End: 2021-12-07
Attending: OBSTETRICS & GYNECOLOGY | Admitting: OBSTETRICS & GYNECOLOGY
Payer: COMMERCIAL

## 2021-12-07 DIAGNOSIS — Z20.822 ENCOUNTER FOR LABORATORY TESTING FOR COVID-19 VIRUS: ICD-10-CM

## 2021-12-07 DIAGNOSIS — T14.8XXA HEMATOMA: Primary | ICD-10-CM

## 2021-12-07 DIAGNOSIS — Z20.822 ENCOUNTER FOR LABORATORY TESTING FOR COVID-19 VIRUS: Primary | ICD-10-CM

## 2021-12-07 LAB
BACTERIA UR CULT: NORMAL
C TRACH DNA SPEC QL NAA+PROBE: NEGATIVE
N GONORRHOEA DNA SPEC QL NAA+PROBE: NEGATIVE
SARS-COV-2 RNA RESP QL NAA+PROBE: NEGATIVE

## 2021-12-07 PROCEDURE — U0003 INFECTIOUS AGENT DETECTION BY NUCLEIC ACID (DNA OR RNA); SEVERE ACUTE RESPIRATORY SYNDROME CORONAVIRUS 2 (SARS-COV-2) (CORONAVIRUS DISEASE [COVID-19]), AMPLIFIED PROBE TECHNIQUE, MAKING USE OF HIGH THROUGHPUT TECHNOLOGIES AS DESCRIBED BY CMS-2020-01-R: HCPCS

## 2021-12-07 PROCEDURE — U0005 INFEC AGEN DETEC AMPLI PROBE: HCPCS

## 2021-12-07 NOTE — LETTER
December 7, 2021    Zaina Tan   [address]   [place] [facility] [address]       Dear Zaina Tan,    Thank you for choosing [facility] for your surgical procedure.  You will enter the hospital as an outpatient, or same-day surgery patient, which means that you will enter the hospital the day of your surgery and leave that same day.    You procedure is scheduled on:    Date: 12/8/21    Time: 11:45am    Please arrive at: 9:45am    Procedure: DILATION AND CURETTAGE, UTERUS, USING SUCTION WITH ULTRASOUND GUIDANCE    MD: Shellie Soni MD    Go to:  The main hospital entrance ([facility] entrance) is located on [address] ([address] [facility] [address])  There is signage in the lobby directing you to check in on 3rd floor of the [facility].       parking is available (no charge to park your car, regular parking rates do apply). You may also self park your car in the Green garage - the entrance is located on [address].     Please keep your ticket with you as your ticket will be validated to give you a reduced rate for the parking garage.     NOTE: The times noted above may change.  A nurse from the hospital pre-admission department will call to confirm your procedure.  He or she will answer any questions you have about the procedure and will provide you with instructions for taking medications the day of the procedure.  If you have not received a call, or if you have more questions please call us one working day before your procedure.  Call pre-admission department at [phone].  If you need to cancel or reschedule your surgery please call [phone].    GETTING READY FOR SURGERY    *If you get a fever, cold, or rash please call the [facility] Triage Nurses at [phone] - we may need to postpone your procedure.    TEN DAYS BEFORE SURGERY    [place] with the hospital 10 days before your procedure date.     Have your  insurance card ready.     To register online, go to [email or web address].     You may also call the hospital Pre Registration Department at [phone].    THE DAY BEFORE SURGERY    For a same-day procedure, you must arrange for an adult to take you home from the hospital. You cannot drive, take a cab, or ride a bus home by yourself.  An adult must stay with you for the first 24 hours after your procedure.    If you smoke - quit or at least cut down.    Stop drinking alcohol (liquor, beer, and wine) at least 24 hours before your procedure.    Shower or bathe the night before and the morning of your procedure.  Use an antiseptic surgical soap such as Hibiclens, Scrub Care or Exidine. You can find it at your local pharmacy..  If your doctor does not give you a special soap, buy Hibiclens or Darcy-Star at the drug store.  You can also ask your pharmacist to recommend an antiseptic alternative soap.    Do not put on lotion, powder, perfume, deodorant, or make-up after bathing.    Remove all nail polish.    THE DAY OF SURGERY    Have nothing to eat or drink eight hours before your procedure.     You may drink clear liquids (water) up until two hours before your procedure.    Nothing to eat or drink within two hours of your procedure (after 9:45am).    Take prescription medicines as instructed by the hospital preadmissions nursing staff.    Alternatively you should follow any directives you receive from the MD doing your procedure.     Do not take insulin or oral diabetes medicine on the day of the surgery.     Take off jewelry, including rings and body piercings.    Leave valuables at home.    Bring these items to the hospital:  1. Insurance cards.  2. Forms your doctor asked you to bring.  3. Health care directive or living will, if you have one.    If you are under 18, a parent or legal guardian must come with you to the hospital.      Sincerely,      [facility]    If you are  hard of hearing, please let us know. We provide many free services including sign language and oral interpreters, TTYs, telephone amplifiers, note takers, and written materials.

## 2021-12-07 NOTE — PROGRESS NOTES
Diagnosis: Postop hematometra  Plan: D&C with ultrasound guidance (no radiology tech needed)    Surgery planning:  -- Surgeon(s): Terri or avail  -- OR time: 60 minutes  -- Preop Exam: day of surgery  -- PAC appt: no  -- Follow up appt: PPV    Pre-op orders placed, case request today.  MD Maximiliano

## 2021-12-07 NOTE — DISCHARGE INSTRUCTIONS
EXPLANATION: The provider has evaluated your post- pelvic pain.  Your uterus is distended and still has blood products within it.  It is possible you would benefit from a D&C procedure.  This will need to be discussed with your OB/GYN within the next few days.  You also have a hematoma near your uterus that will need to be monitored.     HOME CARE:  Rest  Push clear fluids  Motrin and/or Tylenol as needed for discomfort.  These medications are available over the counter, use as directed on the label    RETURN to the EMERGENCY DEPARTMENT if you develop any of the following:  Fever  Worsening pain  Heavy vaginal bleeding (soaking more than 3 pads an hour for more than 3 hours) especially if this bleeding makes your feel dizzy or faint or gives your chest pain or trouble breathing  Vomiting  Dizziness or fainting  Chest pain or trouble breathing    The examination and treatment you have received in the Emergency Department has been rendered on an emergency basis only and not intended to be a substitute for complete ongoing medical care.

## 2021-12-07 NOTE — ED PROVIDER NOTES
History   Chief Complaint:  UTI     The history is provided by the patient.      Zaina Tan is a 36 year old female  with history of uterine leiomyoma who presents s/p low segment transverse  (21) with UTI symptoms. Patient reports that she has been experiencing extreme dysuria and urethral pain since her  on 21. States that she also is noting lower abdominal pain, chills and hematuria. These symptoms have also been since her . Denies fever, cough, shortness of breath, diarrhea, nausea, vomiting, frequency or urgency. Patient adds that she is having residual slight vaginal bleeding, but no abnormal discharge.     Of note, patient was seen on 21 at her OB clinic where she had a urine culture taken (which was negative) and also was prescribed pyridium. States that the pyridium is not improving her symptoms. In addition, following her , she was found to be hypertensive multiple times for which she was put on Normodyne for.     Patient has not been sexually active since her  and her child is well and healthy.     Review of Systems   Constitutional: Positive for chills. Negative for fever.   Respiratory: Negative for cough and shortness of breath.    Cardiovascular: Negative for chest pain.   Gastrointestinal: Positive for abdominal pain. Negative for diarrhea, nausea and vomiting.   Genitourinary: Positive for dysuria, hematuria and vaginal bleeding. Negative for frequency, urgency and vaginal discharge.   All other systems reviewed and are negative.    Allergies:  No Known Allergies    Medications:  Ferosul  Normodyne  Macrobid - , did not work, not taking  Roxicodone  Pyridium  Senokot-S    Past Medical History:     Gestational diabetes  Uterine leiomyoma  Pre-eclampsia    Past Surgical History:     x2    Family History:    Mother - hypertension    Social History:  Presents alone    Follow[facility]  [facility]    Physical Exam     Patient Vitals for the past 24 hrs:   BP Temp Temp src Pulse Resp SpO2 Height   12/06/21 2033 123/77 -- -- 61 20 100 % --   12/06/21 2030 123/77 -- -- 59 -- -- --   12/06/21 1720 130/67 97.4  F (36.3  C) Temporal 75 18 100 % 1.575 m (5' 2\")       Physical Exam  Vitals and nursing note reviewed.   Constitutional:       General: She is not in acute distress.     Appearance: Normal appearance. She is not ill-appearing, toxic-appearing or diaphoretic.   HENT:      Head: Normocephalic.      Right Ear: External ear normal.      Left Ear: External ear normal.      Mouth/Throat:      Comments: Mask in place   Eyes:      Conjunctiva/sclera: Conjunctivae normal.   Cardiovascular:      Rate and Rhythm: Normal rate and regular rhythm.      Pulses: Normal pulses.      Heart sounds: Normal heart sounds.   Pulmonary:      Effort: Pulmonary effort is normal. No respiratory distress.      Breath sounds: Normal breath sounds.   Abdominal:      General: There is no distension.      Palpations: Abdomen is soft.      Tenderness: There is abdominal tenderness. There is no right CVA tenderness, left CVA tenderness, guarding or rebound.      Comments: No swelling/erythema or induration to C section scar or dehis.      TTP to lower abd inferior to umbilicus and superior to C section scar.    Genitourinary:     Comments: Female ED tech present as chaperone:  Lochia present, no odor  No CMT  No overt uterine tenderness   Musculoskeletal:         General: Normal range of motion.      Cervical back: Normal range of motion and neck supple. No rigidity.   Skin:     General: Skin is warm.      Capillary Refill: Capillary refill takes less than 2 seconds.   Neurological:      General: No focal deficit present.      Mental Status: She is alert.   Psychiatric:         Mood and Affect: Mood normal.         Behavior: Behavior normal.         Thought Content: Thought content normal.         Judgment: Judgment " normal.       Emergency Department Course   Imaging:  CT Abdomen Pelvis w Contrast   Preliminary Result   IMPRESSION:    1.  Postpartum uterus that is diffusely edematous. Prominent lobulated dense fluid along the undersurface of the uterus and right ovary at the anterior pelvis consistent with focal hematoma measuring approximately 10.9 cm. Diffuse distended endometrium    with complex fluid suggesting blood products. A few other hypodense nodules in the myometrium suggesting small post partum blood products.   2.  Small intramuscular hematoma at the right inferior rectus at the pelvic level that is 2.6 cm.        Report per radiology    Laboratory:  Labs Ordered and Resulted from Time of ED Arrival to Time of ED Departure   BASIC METABOLIC PANEL - Abnormal       Result Value    Sodium 141      Potassium 3.9      Chloride 110 (*)     Carbon Dioxide (CO2) 28      Anion Gap 3      Urea Nitrogen 13      Creatinine 0.74      Calcium 9.1      Glucose 86      GFR Estimate >90     HEPATIC FUNCTION PANEL - Abnormal    Bilirubin Total 0.3      Bilirubin Direct <0.1      Protein Total 7.2      Albumin 2.8 (*)     Alkaline Phosphatase 140      AST 16      ALT 24     ROUTINE UA WITH MICROSCOPIC REFLEX TO CULTURE - Abnormal    Color Urine Orange (*)     Appearance Urine Clear      Glucose Urine Negative      Bilirubin Urine Moderate (*)     Ketones Urine Negative      Specific Gravity Urine 1.020      Blood Urine Moderate (*)     pH Urine 6.0      Protein Albumin Urine 10  (*)     Urobilinogen Urine 4.0 (*)     Nitrite Urine Positive (*)     Leukocyte Esterase Urine Negative      Mucus Urine Present (*)     RBC Urine 1      WBC Urine 8 (*)     Squamous Epithelials Urine 2 (*)    CBC WITH PLATELETS AND DIFFERENTIAL - Abnormal    WBC Count 6.9      RBC Count 3.18 (*)     Hemoglobin 8.8 (*)     Hematocrit 29.5 (*)     MCV 93      MCH 27.7      MCHC 29.8 (*)     RDW 15.1 (*)     Platelet Count 389      % Neutrophils 67      %  Lymphocytes 25      % Monocytes 5      % Eosinophils 2      % Basophils 0      % Immature Granulocytes 1      NRBCs per 100 WBC 0      Absolute Neutrophils 4.6      Absolute Lymphocytes 1.7      Absolute Monocytes 0.4      Absolute Eosinophils 0.1      Absolute Basophils 0.0      Absolute Immature Granulocytes 0.0      Absolute NRBCs 0.0     WET PREPARATION - Abnormal    Trichomonas Absent      Yeast Absent      Clue Cells Present (*)     WBCs/high power field 4+ (*)    LIPASE - Normal    Lipase 234     TROPONIN I - Normal    Troponin I High Sensitivity 7     LACTIC ACID WHOLE BLOOD - Normal    Lactic Acid 0.7     URINE CULTURE   CHLAMYDIA TRACHOMATIS PCR   NEISSERIA GONORRHOEAE PCR        Procedures  None    Emergency Department Course:  Reviewed:  I reviewed nursing notes, vitals, past medical history and Care Everywhere    Assessments:   I obtained history and examined the patient as noted above.       I performed a chaperoned pelvic exam with VAUGHN Cruz.       Patient to CT.      CT noted and on call OB for her clinic was paged.   I rechecked the patient and explained findings and F/U plan.    Consults:   I spoke with lab regarding the patient and orders to put in.    I consulted Dr. Murray, of [facility], regarding follow up of the patient.     Disposition:  The patient was discharged to home.     Impression & Plan   Medical Decision Making:  Pleasant 35 y/o  female who is 2 weeks post-partum from C section presents for persistent lower abd pain and dysuria.  Called GYN clinic and directed in.  Was seen in the office recently, UA Cx neg.  Was on macrobid but stopped due to neg Cx.  Taking pyridium, does not seem to be helping.      On exam, not distressed and abd without peritoneal signs.  Vitals also reassuring as are initial labs.  Pelvic exam with lochia, no odor and not overtly tender over uterus or C section scar.  Is tender more superior but  inferior to umbilicus.  Will check CT to further eval symptoms as consider abscessed appy or other complication of recent surgery.  This is felt less likely endometritis with non-tender uterus although still considered.  Lactate added as well.     Update: Reassuring labs, anemia present but appears stable from prior values.  CT noted, discussed at length with on-call OB/GYN for pts group.  Suspect pt may benefit from D&C but not needed emergently, clinic will contact pt tomorrow to arrange F/U.  Hematoma felt stable for discharge as no signs of active bleeding/blush comment on CT, stable vitals and H/H appears stable.  Clue cells present, initially planned to treat although on-call GYN comfortable with not, but with pt breast feeding will hold and defer to OB/GYN team.  Not suspected this is endometritis picture or that ABX therapy for endometritis indicated at this juncture. Pt educated on S/S that should prompt ED re-eval (See D/C sheet).  Questions answered. Verbalized understanding. Comfortable with plan and appreciative.     Diagnosis:    ICD-10-CM    1. Acute post-operative pain  G89.18        Discharge Medications:  New Prescriptions    No medications on file       Scribe Disclosure:  Shania SPENCE, am serving as a scribe at 7:24 PM on 12/6/2021 to document services personally performed by Dee Betancourt PA-C based on my observations and the provider's statements to me.            Dee Betancourt PA-C  12/06/21 [id number]

## 2021-12-07 NOTE — TELEPHONE ENCOUNTER
Left message for patient to call back and discuss surgery scheduled for tomorrow 12/8/21 and prep information.    Yin Johnson  Clinical Services Assistant    Type of surgery: DILATION AND CURETTAGE, UTERUS, USING SUCTION with ultrasound guidance  [facility] of surgery: [facility]  Date and time of surgery: 12/8/21 at 11:45am  Surgeon: Shellie Soni MD  Pre-Op Appt Date: NA  Post-Op Appt Date: NA   Packet sent out: Not Applicable  Pre-cert/Authorization completed:  Yes  Date: 12/7/21

## 2021-12-07 NOTE — TELEPHONE ENCOUNTER
Spoke with patient about surgery scheduled for tomorrow 12/8/21. Patient is aware of date, time and location. Patient requested a [email or web address] message to be sent with pre-op information as patient could not stay on the phone.  has sent information via letter, will send mychart message as well.    Yin Johnson  Clinical Services Assistant

## 2021-12-08 ENCOUNTER — ANESTHESIA (OUTPATIENT)
Dept: SURGERY | Facility: CLINIC | Age: 36
End: 2021-12-08
Payer: COMMERCIAL

## 2021-12-08 ENCOUNTER — ANESTHESIA EVENT (OUTPATIENT)
Dept: SURGERY | Facility: CLINIC | Age: 36
End: 2021-12-08
Payer: COMMERCIAL

## 2021-12-08 DIAGNOSIS — B96.89 BACTERIAL VAGINOSIS: ICD-10-CM

## 2021-12-08 DIAGNOSIS — S30.1XXA ABDOMINAL HEMATOMA: Primary | ICD-10-CM

## 2021-12-08 DIAGNOSIS — N76.0 BACTERIAL VAGINOSIS: ICD-10-CM

## 2021-12-08 DIAGNOSIS — Z98.891 S/P CESAREAN SECTION: ICD-10-CM

## 2021-12-08 RX ORDER — OXYCODONE HYDROCHLORIDE 5 MG/1
5 TABLET ORAL EVERY 4 HOURS PRN
Qty: 42 TABLET | Refills: 0 | Status: ON HOLD | OUTPATIENT
Start: 2021-12-08 | End: 2021-12-22

## 2021-12-08 RX ORDER — METRONIDAZOLE 500 MG/1
500 TABLET ORAL 2 TIMES DAILY
Qty: 14 TABLET | Refills: 0 | Status: SHIPPED | OUTPATIENT
Start: 2021-12-08 | End: 2021-12-16

## 2021-12-08 NOTE — RESULT ENCOUNTER NOTE
Final urine culture report is negative.  Adult Negative Urine culture parameters per protocol: Any # Urogenital single or mixed organism, <10,000 col/ml single organism (cath/midstream), and > 3 organisms (No susceptibilities performed).  [facility] Emergency Dept discharge antibiotic prescribed (If applicable): None  Treatment recommendations per [facility] ED Lab Result Urine Culture protocol.

## 2021-12-09 ENCOUNTER — ANCILLARY PROCEDURE (OUTPATIENT)
Dept: ULTRASOUND IMAGING | Facility: CLINIC | Age: 36
End: 2021-12-09
Attending: OBSTETRICS & GYNECOLOGY
Payer: COMMERCIAL

## 2021-12-09 ENCOUNTER — TELEPHONE (OUTPATIENT)
Dept: OBGYN | Facility: CLINIC | Age: 36
End: 2021-12-09

## 2021-12-09 DIAGNOSIS — Z11.59 ENCOUNTER FOR SCREENING FOR OTHER VIRAL DISEASES: ICD-10-CM

## 2021-12-09 PROCEDURE — 76856 US EXAM PELVIC COMPLETE: CPT

## 2021-12-09 PROCEDURE — 76856 US EXAM PELVIC COMPLETE: CPT | Mod: 26 | Performed by: OBSTETRICS & GYNECOLOGY

## 2021-12-09 NOTE — TELEPHONE ENCOUNTER
[facility] Call Center    Phone Message    May a detailed message be left on voicemail: yes     Reason for Call: Other: Zaina calling about procedure on 12/14. She is wondering what time should she arrive and is there anything she needs to do prior to the procedure. Please call Zaina or a message in [employer]My[employer] is ok.       Action Taken: Message routed to:  [facility]    Travel Screening: Not Applicable

## 2021-12-10 ENCOUNTER — PREP FOR PROCEDURE (OUTPATIENT)
Dept: OBGYN | Facility: CLINIC | Age: 36
End: 2021-12-10

## 2021-12-10 RX ORDER — ACETAMINOPHEN 325 MG/1
975 TABLET ORAL ONCE
Status: CANCELLED | OUTPATIENT
Start: 2021-12-10 | End: 2021-12-10

## 2021-12-10 RX ORDER — DOXYCYCLINE 100 MG/1
100 CAPSULE ORAL ONCE
Status: CANCELLED | OUTPATIENT
Start: 2021-12-10 | End: 2021-12-10

## 2021-12-11 ENCOUNTER — LAB (OUTPATIENT)
Dept: URGENT CARE | Facility: URGENT CARE | Age: 36
End: 2021-12-11
Attending: OBSTETRICS & GYNECOLOGY
Payer: COMMERCIAL

## 2021-12-11 DIAGNOSIS — Z11.59 ENCOUNTER FOR SCREENING FOR OTHER VIRAL DISEASES: ICD-10-CM

## 2021-12-11 PROCEDURE — U0003 INFECTIOUS AGENT DETECTION BY NUCLEIC ACID (DNA OR RNA); SEVERE ACUTE RESPIRATORY SYNDROME CORONAVIRUS 2 (SARS-COV-2) (CORONAVIRUS DISEASE [COVID-19]), AMPLIFIED PROBE TECHNIQUE, MAKING USE OF HIGH THROUGHPUT TECHNOLOGIES AS DESCRIBED BY CMS-2020-01-R: HCPCS

## 2021-12-11 PROCEDURE — U0005 INFEC AGEN DETEC AMPLI PROBE: HCPCS

## 2021-12-12 LAB — SARS-COV-2 RNA RESP QL NAA+PROBE: NEGATIVE

## 2021-12-14 ENCOUNTER — NURSE TRIAGE (OUTPATIENT)
Dept: NURSING | Facility: CLINIC | Age: 36
End: 2021-12-14

## 2021-12-14 ENCOUNTER — HOSPITAL ENCOUNTER (OUTPATIENT)
Facility: CLINIC | Age: 36
Discharge: HOME | End: 2021-12-14
Attending: OBSTETRICS & GYNECOLOGY | Admitting: OBSTETRICS & GYNECOLOGY
Payer: COMMERCIAL

## 2021-12-14 ENCOUNTER — HOSPITAL ENCOUNTER (OUTPATIENT)
Dept: ULTRASOUND IMAGING | Facility: CLINIC | Age: 36
End: 2021-12-14
Attending: OBSTETRICS & GYNECOLOGY | Admitting: OBSTETRICS & GYNECOLOGY
Payer: COMMERCIAL

## 2021-12-14 VITALS
HEART RATE: 73 BPM | HEIGHT: 62 IN | OXYGEN SATURATION: 99 % | TEMPERATURE: 98.6 F | SYSTOLIC BLOOD PRESSURE: 127 MMHG | DIASTOLIC BLOOD PRESSURE: 73 MMHG | RESPIRATION RATE: 12 BRPM | BODY MASS INDEX: 28.07 KG/M2 | WEIGHT: 152.56 LBS

## 2021-12-14 DIAGNOSIS — Z98.890 STATUS POST HYSTEROSCOPY: Primary | ICD-10-CM

## 2021-12-14 DIAGNOSIS — T14.8XXA HEMATOMA: ICD-10-CM

## 2021-12-14 LAB
ABO/RH(D): NORMAL
ALBUMIN UR-MCNC: 10 MG/DL
ANTIBODY SCREEN: NEGATIVE
APPEARANCE UR: CLEAR
BILIRUB UR QL STRIP: NEGATIVE
COLOR UR AUTO: YELLOW
GLUCOSE BLDC GLUCOMTR-MCNC: 65 MG/DL (ref 70–99)
GLUCOSE UR STRIP-MCNC: NEGATIVE MG/DL
HGB BLD-MCNC: 11.5 G/DL (ref 11.7–15.7)
HGB UR QL STRIP: NEGATIVE
KETONES UR STRIP-MCNC: 10 MG/DL
LEUKOCYTE ESTERASE UR QL STRIP: NEGATIVE
MUCOUS THREADS #/AREA URNS LPF: PRESENT /LPF
NITRATE UR QL: NEGATIVE
PH UR STRIP: 6.5 [PH] (ref 5–7)
RBC URINE: 1 /HPF
SP GR UR STRIP: 1.02 (ref 1–1.03)
SPECIMEN EXPIRATION DATE: NORMAL
SQUAMOUS EPITHELIAL: <1 /HPF
UROBILINOGEN UR STRIP-MCNC: 2 MG/DL
WBC URINE: 1 /HPF

## 2021-12-14 PROCEDURE — 999N000063 US INTRAOPERATIVE: Mod: TC

## 2021-12-14 PROCEDURE — 81001 URINALYSIS AUTO W/SCOPE: CPT | Performed by: OBSTETRICS & GYNECOLOGY

## 2021-12-14 PROCEDURE — 250N000013 HC RX MED GY IP 250 OP 250 PS 637: Performed by: OBSTETRICS & GYNECOLOGY

## 2021-12-14 PROCEDURE — 710N000012 HC RECOVERY PHASE 2, PER MINUTE: Performed by: OBSTETRICS & GYNECOLOGY

## 2021-12-14 PROCEDURE — 88305 TISSUE EXAM BY PATHOLOGIST: CPT | Mod: 26 | Performed by: PATHOLOGY

## 2021-12-14 PROCEDURE — 250N000009 HC RX 250: Performed by: OBSTETRICS & GYNECOLOGY

## 2021-12-14 PROCEDURE — 360N000075 HC SURGERY LEVEL 2, PER MIN: Performed by: OBSTETRICS & GYNECOLOGY

## 2021-12-14 PROCEDURE — 59160 D & C AFTER DELIVERY: CPT | Mod: GC | Performed by: OBSTETRICS & GYNECOLOGY

## 2021-12-14 PROCEDURE — 999N000141 HC STATISTIC PRE-PROCEDURE NURSING ASSESSMENT: Performed by: OBSTETRICS & GYNECOLOGY

## 2021-12-14 PROCEDURE — 82962 GLUCOSE BLOOD TEST: CPT

## 2021-12-14 PROCEDURE — 370N000017 HC ANESTHESIA TECHNICAL FEE, PER MIN: Performed by: OBSTETRICS & GYNECOLOGY

## 2021-12-14 PROCEDURE — 86850 RBC ANTIBODY SCREEN: CPT | Performed by: OBSTETRICS & GYNECOLOGY

## 2021-12-14 PROCEDURE — 85018 HEMOGLOBIN: CPT | Performed by: OBSTETRICS & GYNECOLOGY

## 2021-12-14 PROCEDURE — 258N000003 HC RX IP 258 OP 636: Performed by: NURSE ANESTHETIST, CERTIFIED REGISTERED

## 2021-12-14 PROCEDURE — 250N000009 HC RX 250: Performed by: NURSE ANESTHETIST, CERTIFIED REGISTERED

## 2021-12-14 PROCEDURE — 250N000011 HC RX IP 250 OP 636: Performed by: NURSE ANESTHETIST, CERTIFIED REGISTERED

## 2021-12-14 PROCEDURE — 76998 US GUIDE INTRAOP: CPT | Mod: 26 | Performed by: OBSTETRICS & GYNECOLOGY

## 2021-12-14 PROCEDURE — 36415 COLL VENOUS BLD VENIPUNCTURE: CPT | Performed by: OBSTETRICS & GYNECOLOGY

## 2021-12-14 PROCEDURE — 272N000001 HC OR GENERAL SUPPLY STERILE: Performed by: OBSTETRICS & GYNECOLOGY

## 2021-12-14 PROCEDURE — 88305 TISSUE EXAM BY PATHOLOGIST: CPT | Mod: TC | Performed by: OBSTETRICS & GYNECOLOGY

## 2021-12-14 PROCEDURE — 87086 URINE CULTURE/COLONY COUNT: CPT | Performed by: OBSTETRICS & GYNECOLOGY

## 2021-12-14 RX ORDER — ACETAMINOPHEN 325 MG/1
975 TABLET ORAL ONCE
Status: DISCONTINUED | OUTPATIENT
Start: 2021-12-14 | End: 2021-12-14 | Stop reason: HOSPADM

## 2021-12-14 RX ORDER — FENTANYL CITRATE 50 UG/ML
25 INJECTION, SOLUTION INTRAMUSCULAR; INTRAVENOUS EVERY 5 MIN PRN
Status: CANCELLED | OUTPATIENT
Start: 2021-12-14

## 2021-12-14 RX ORDER — OXYCODONE HYDROCHLORIDE 5 MG/1
5 TABLET ORAL EVERY 4 HOURS PRN
Status: CANCELLED | OUTPATIENT
Start: 2021-12-14

## 2021-12-14 RX ORDER — DOXYCYCLINE 100 MG/1
100 CAPSULE ORAL ONCE
Status: COMPLETED | OUTPATIENT
Start: 2021-12-14 | End: 2021-12-14

## 2021-12-14 RX ORDER — SODIUM CHLORIDE, SODIUM LACTATE, POTASSIUM CHLORIDE, CALCIUM CHLORIDE 600; 310; 30; 20 MG/100ML; MG/100ML; MG/100ML; MG/100ML
INJECTION, SOLUTION INTRAVENOUS CONTINUOUS
Status: DISCONTINUED | OUTPATIENT
Start: 2021-12-14 | End: 2021-12-14 | Stop reason: HOSPADM

## 2021-12-14 RX ORDER — HYDROMORPHONE HCL IN WATER/PF 6 MG/30 ML
0.2 PATIENT CONTROLLED ANALGESIA SYRINGE INTRAVENOUS EVERY 5 MIN PRN
Status: CANCELLED | OUTPATIENT
Start: 2021-12-14

## 2021-12-14 RX ORDER — ONDANSETRON 4 MG/1
4 TABLET, ORALLY DISINTEGRATING ORAL EVERY 30 MIN PRN
Status: CANCELLED | OUTPATIENT
Start: 2021-12-14

## 2021-12-14 RX ORDER — ONDANSETRON 2 MG/ML
INJECTION INTRAMUSCULAR; INTRAVENOUS PRN
Status: DISCONTINUED | OUTPATIENT
Start: 2021-12-14 | End: 2021-12-14

## 2021-12-14 RX ORDER — SODIUM CHLORIDE, SODIUM LACTATE, POTASSIUM CHLORIDE, CALCIUM CHLORIDE 600; 310; 30; 20 MG/100ML; MG/100ML; MG/100ML; MG/100ML
INJECTION, SOLUTION INTRAVENOUS CONTINUOUS
Status: CANCELLED | OUTPATIENT
Start: 2021-12-14

## 2021-12-14 RX ORDER — LIDOCAINE 40 MG/G
CREAM TOPICAL
Status: DISCONTINUED | OUTPATIENT
Start: 2021-12-14 | End: 2021-12-14 | Stop reason: HOSPADM

## 2021-12-14 RX ORDER — PROPOFOL 10 MG/ML
INJECTION, EMULSION INTRAVENOUS CONTINUOUS PRN
Status: DISCONTINUED | OUTPATIENT
Start: 2021-12-14 | End: 2021-12-14

## 2021-12-14 RX ORDER — IBUPROFEN 200 MG
800 TABLET ORAL ONCE
Status: DISCONTINUED | OUTPATIENT
Start: 2021-12-14 | End: 2021-12-14 | Stop reason: HOSPADM

## 2021-12-14 RX ORDER — MEPERIDINE HYDROCHLORIDE 25 MG/ML
12.5 INJECTION INTRAMUSCULAR; INTRAVENOUS; SUBCUTANEOUS
Status: CANCELLED | OUTPATIENT
Start: 2021-12-14

## 2021-12-14 RX ORDER — LIDOCAINE HYDROCHLORIDE 10 MG/ML
INJECTION, SOLUTION INFILTRATION; PERINEURAL PRN
Status: DISCONTINUED | OUTPATIENT
Start: 2021-12-14 | End: 2021-12-14 | Stop reason: HOSPADM

## 2021-12-14 RX ORDER — DEXAMETHASONE SODIUM PHOSPHATE 4 MG/ML
INJECTION, SOLUTION INTRA-ARTICULAR; INTRALESIONAL; INTRAMUSCULAR; INTRAVENOUS; SOFT TISSUE PRN
Status: DISCONTINUED | OUTPATIENT
Start: 2021-12-14 | End: 2021-12-14

## 2021-12-14 RX ORDER — SODIUM CHLORIDE, SODIUM LACTATE, POTASSIUM CHLORIDE, CALCIUM CHLORIDE 600; 310; 30; 20 MG/100ML; MG/100ML; MG/100ML; MG/100ML
INJECTION, SOLUTION INTRAVENOUS CONTINUOUS PRN
Status: DISCONTINUED | OUTPATIENT
Start: 2021-12-14 | End: 2021-12-14

## 2021-12-14 RX ORDER — KETOROLAC TROMETHAMINE 30 MG/ML
INJECTION, SOLUTION INTRAMUSCULAR; INTRAVENOUS PRN
Status: DISCONTINUED | OUTPATIENT
Start: 2021-12-14 | End: 2021-12-14

## 2021-12-14 RX ORDER — FENTANYL CITRATE 50 UG/ML
25 INJECTION, SOLUTION INTRAMUSCULAR; INTRAVENOUS
Status: CANCELLED | OUTPATIENT
Start: 2021-12-14

## 2021-12-14 RX ORDER — IBUPROFEN 800 MG/1
800 TABLET, FILM COATED ORAL EVERY 6 HOURS PRN
Qty: 30 TABLET | Refills: 0 | COMMUNITY
Start: 2021-12-14 | End: 2023-09-08

## 2021-12-14 RX ORDER — ACETAMINOPHEN 325 MG/1
975 TABLET ORAL EVERY 6 HOURS PRN
Qty: 50 TABLET | Refills: 0 | COMMUNITY
Start: 2021-12-14 | End: 2021-12-16

## 2021-12-14 RX ORDER — ACETAMINOPHEN 325 MG/1
975 TABLET ORAL ONCE
Status: COMPLETED | OUTPATIENT
Start: 2021-12-14 | End: 2021-12-14

## 2021-12-14 RX ORDER — PROPOFOL 10 MG/ML
INJECTION, EMULSION INTRAVENOUS PRN
Status: DISCONTINUED | OUTPATIENT
Start: 2021-12-14 | End: 2021-12-14

## 2021-12-14 RX ORDER — LIDOCAINE HYDROCHLORIDE 20 MG/ML
INJECTION, SOLUTION INFILTRATION; PERINEURAL PRN
Status: DISCONTINUED | OUTPATIENT
Start: 2021-12-14 | End: 2021-12-14

## 2021-12-14 RX ORDER — ONDANSETRON 2 MG/ML
4 INJECTION INTRAMUSCULAR; INTRAVENOUS EVERY 30 MIN PRN
Status: CANCELLED | OUTPATIENT
Start: 2021-12-14

## 2021-12-14 RX ADMIN — SODIUM CHLORIDE, POTASSIUM CHLORIDE, SODIUM LACTATE AND CALCIUM CHLORIDE: 600; 310; 30; 20 INJECTION, SOLUTION INTRAVENOUS at 12:42

## 2021-12-14 RX ADMIN — DEXAMETHASONE SODIUM PHOSPHATE 4 MG: 4 INJECTION, SOLUTION INTRAMUSCULAR; INTRAVENOUS at 12:59

## 2021-12-14 RX ADMIN — ACETAMINOPHEN 975 MG: 325 TABLET, FILM COATED ORAL at 12:20

## 2021-12-14 RX ADMIN — LIDOCAINE HYDROCHLORIDE 60 MG: 20 INJECTION, SOLUTION INFILTRATION; PERINEURAL at 12:47

## 2021-12-14 RX ADMIN — MIDAZOLAM 2 MG: 1 INJECTION INTRAMUSCULAR; INTRAVENOUS at 12:46

## 2021-12-14 RX ADMIN — PROPOFOL 100 MCG/KG/MIN: 10 INJECTION, EMULSION INTRAVENOUS at 12:47

## 2021-12-14 RX ADMIN — ONDANSETRON 4 MG: 2 INJECTION INTRAMUSCULAR; INTRAVENOUS at 13:16

## 2021-12-14 RX ADMIN — DOXYCYCLINE 100 MG: 100 CAPSULE ORAL at 12:20

## 2021-12-14 RX ADMIN — PROPOFOL 50 MG: 10 INJECTION, EMULSION INTRAVENOUS at 12:47

## 2021-12-14 RX ADMIN — KETOROLAC TROMETHAMINE 30 MG: 30 INJECTION, SOLUTION INTRAMUSCULAR at 13:19

## 2021-12-14 ASSESSMENT — MIFFLIN-ST. JEOR: SCORE: 1330.25

## 2021-12-14 NOTE — ANESTHESIA POSTPROCEDURE EVALUATION
Patient: Zaina Tan    Procedure: Procedure(s):  DILATION AND CURETTAGE, UTERUS, USING SUCTION with ultrasound guidance       Diagnosis:Hematoma [T14.8XXA]  Diagnosis Additional Information: No value filed.    Anesthesia Type:  MAC    Note:  Disposition: Outpatient   Postop Pain Control: Uneventful            Sign Out: Well controlled pain   PONV: No   Neuro/Psych:    Airway/Respiratory: Uneventful            Sign Out: Acceptable/Baseline resp. status   CV/Hemodynamics: Uneventful            Sign Out: Acceptable CV status; No obvious hypovolemia; No obvious fluid overload   Other NRE:    DID A NON-ROUTINE EVENT OCCUR?            Last vitals:  Vitals Value Taken Time   BP 93/78 12/14/21 1345   Temp 36.9  C (98.4  F) 12/14/21 1324   Pulse 73 12/14/21 1345   Resp 12 12/14/21 1324   SpO2 99 % 12/14/21 1354   Vitals shown include unvalidated device data.    Electronically Signed By: Pratibha Ramires MD  December 14, 2021  1:56 PM

## 2021-12-14 NOTE — DISCHARGE INSTRUCTIONS
Same-Day Surgery   Adult Discharge Orders & Instructions     For 24 hours after surgery:  1. Get plenty of rest.  A responsible adult must stay with you for at least 24 hours after you leave the hospital.   2. Pain medication can slow your reflexes. Do not drive or use heavy equipment.  If you have weakness or tingling, don't drive or use heavy equipment until this feeling goes away.  3. Mixing alcohol and pain medication can cause dizziness and slow your breathing. It can even be fatal. Do not drink alcohol while taking pain medication.  4. Avoid strenuous or risky activities.  Ask for help when climbing stairs.   5. You may feel lightheaded.  If so, sit for a few minutes before standing.  Have someone help you get up.   6. If you have nausea (feel sick to your stomach), drink only clear liquids such as apple juice, ginger ale, broth or 7-Up.  Rest may also help.  Be sure to drink enough fluids.  Move to a regular diet as you feel able. Take pain medications with a small amount of solid food, such as toast or crackers, to avoid nausea.   7. A slight fever is normal. Call the doctor if your fever is over 100 F (37.7 C) (taken under the tongue) or lasts longer than 24 hours.  8. You may have a dry mouth, muscle aches, trouble sleeping or a sore throat.  These symptoms should go away after 24 hours.  9. Do not make important or legal decisions.   Pain Management:      1. Take pain medication (if prescribed) for pain as directed by your physician.        2. WARNING: If the pain medication you have been prescribed contains Tylenol  (acetaminophen), DO NOT take additional doses of Tylenol (acetaminophen).     Call your doctor for any of the followin.  Signs of infection (fever, growing tenderness at the surgery site, severe pain, a large amount of drainage or bleeding, foul-smelling drainage, redness, swelling).    2.  It has been over 8 to 10 hours since surgery and you are still not able to urinate (pee).    3.   Headache for over 24 hours.    4.  Numbness, tingling or weakness the day after surgery (if you had spinal anesthesia).  To contact a doctor, call ______Dr. Neyda Ho at [facility] [phone]___ww:      [phone] and ask for the Resident On Call for:          _OBGYN (answered 24 hours a day)      Emergency Department:  [facility]: [phone]  [place] [facility]: [phone]               Rev. 10/2014

## 2021-12-14 NOTE — ANESTHESIA PREPROCEDURE EVALUATION
Anesthesia Pre-Procedure Evaluation    Patient: Zaina Tan   MRN: [id number] : 1985        Preoperative Diagnosis: Hematoma [T14.8XXA]    Procedure : Procedure(s):  DILATION AND CURETTAGE, UTERUS, USING SUCTION with ultrasound guidance          Past Medical History:   Diagnosis Date     Vitamin D deficiency 2017: Vitamin D= 25, Pulse Therapeuticst message sent - supplementation 7415-9901 IU/day 17- Vit D 48     Vitreous syneresis       Past Surgical History:   Procedure Laterality Date      SECTION N/A 2017    Procedure:  SECTION;;  Surgeon: Shellie Soni MD;  Location: [facility]      SECTION N/A 2021    Procedure: REPEAT  SECTION;  Surgeon: Shellie Soni MD;  Location: [facility]     D & C      fetal hypoplastic leftheart syndrome      No Known Allergies   Social History     Tobacco Use     Smoking status: Never Smoker     Smokeless tobacco: Never Used   Substance Use Topics     Alcohol use: No      Wt Readings from Last 1 Encounters:   21 69.2 kg (152 lb 8.9 oz)        Anesthesia Evaluation   Pt has had prior anesthetic. Type: Regional.        ROS/MED HX  ENT/Pulmonary:  - neg pulmonary ROS     Neurologic:  - neg neurologic ROS     Cardiovascular:  - neg cardiovascular ROS     METS/Exercise Tolerance:     Hematologic:  - neg hematologic  ROS     Musculoskeletal:  - neg musculoskeletal ROS     GI/Hepatic:  - neg GI/hepatic ROS     Renal/Genitourinary:  - neg Renal ROS     Endo:  - neg endo ROS     Psychiatric/Substance Use:  - neg psychiatric ROS     Infectious Disease:  - neg infectious disease ROS     Malignancy:       Other:            Physical Exam    Airway        Mallampati: II   TM distance: > 3 FB   Neck ROM: full   Mouth opening: > 3 cm    Respiratory Devices and Support         Dental  no notable dental history         Cardiovascular   cardiovascular exam normal          Pulmonary                   OUTSIDE LABS:  CBC:   Lab  Results   Component Value Date    WBC 6.9 12/06/2021    WBC 7.1 12/02/2021    HGB 11.5 (L) 12/14/2021    HGB 8.8 (L) 12/06/2021    HCT 29.5 (L) 12/06/2021    HCT 31.1 (L) 12/02/2021     12/06/2021     12/02/2021     BMP:   Lab Results   Component Value Date     12/06/2021     11/24/2021    POTASSIUM 3.9 12/06/2021    POTASSIUM 4.1 11/24/2021    CHLORIDE 110 (H) 12/06/2021    CHLORIDE 106 11/24/2021    CO2 28 12/06/2021    CO2 24 11/24/2021    BUN 13 12/06/2021    BUN 7 11/24/2021    CR 0.74 12/06/2021    CR 0.64 12/02/2021    GLC 65 (L) 12/14/2021    GLC 86 12/06/2021     COAGS:   Lab Results   Component Value Date    PTT 29 11/23/2021    INR 0.99 11/23/2021    FIBR 325 11/23/2021     POC:   Lab Results   Component Value Date    BGM 86 12/02/2015    HCG Negative 09/23/2020    HCGS Negative 12/09/2020     HEPATIC:   Lab Results   Component Value Date    ALBUMIN 2.8 (L) 12/06/2021    PROTTOTAL 7.2 12/06/2021    ALT 24 12/06/2021    AST 16 12/06/2021    ALKPHOS 140 12/06/2021    BILITOTAL 0.3 12/06/2021     OTHER:   Lab Results   Component Value Date    LACT 0.7 12/06/2021    A1C 5.4 01/30/2017    EROS 9.1 12/06/2021    LIPASE 234 12/06/2021    TSH 0.85 04/19/2021       Anesthesia Plan    ASA Status:  2   NPO Status:  NPO Appropriate    Anesthesia Type: MAC.   Induction: Intravenous.           Consents            Postoperative Care    Pain management: IV analgesics.        Comments:                Pratibha Ramires MD

## 2021-12-14 NOTE — ANESTHESIA CARE TRANSFER NOTE
Patient: aZina Tan    Procedure: Procedure(s):  DILATION AND CURETTAGE, UTERUS, USING SUCTION with ultrasound guidance       Diagnosis: Hematoma [T14.8XXA]  Diagnosis Additional Information: No value filed.    Anesthesia Type:   MAC     Note:    Oropharynx: oropharynx clear of all foreign objects  Level of Consciousness: drowsy  Oxygen Supplementation: face mask    Independent Airway: airway patency satisfactory and stable  Dentition: dentition unchanged  Vital Signs Stable: post-procedure vital signs reviewed and stable  Report to RN Given: handoff report given  Patient transferred to: Phase II    Handoff Report: Identifed the Patient, Identified the Reponsible Provider, Reviewed the pertinent medical history, Discussed the surgical course, Reviewed Intra-OP anesthesia mangement and issues during anesthesia, Set expectations for post-procedure period and Allowed opportunity for questions and acknowledgement of understanding      Vitals:  Vitals Value Taken Time   BP     Temp     Pulse     Resp     SpO2         Electronically Signed By: RERE Wooten CRNA  December 14, 2021  1:26 PM

## 2021-12-14 NOTE — OP NOTE
[facility]  Operative Note    Patient: Zaina Tan  : 1985  MRN: [id number]    Date of Service: 2021    Pre-operative diagnosis:  1. Possible retained products of conception  2. Recent  section (21)     Post-operative diagnosis:  1. Same as above, now after stated procedure    Procedure:   1. Dilation and curettage suction suction and ultrasound guidance   2. Intraoperative urine analysis    Surgeon: Neyda Ho MD  Assistants: Ela Aguilar MD; Cesia Weller MD MPH    Anesthesia: Local with MAC    EBL: 5 mL  Urine: approx 150 mL clear yellow drained with straight cath at end of case  Fluids: 400 cystalloid    Specimens: Retained products of conception, urine specimen  Complications: none    Findings: EUA revealed clitorimegaly to 2cm and with a uterine fundus palpated at 3 cm below umbilicus, possibly displaced by bladder hematoma noted on ultrasound. Otherwise normal appearing cervix, no appreciated adnexal masses.     Indications: Zaina Tan is a 36 year old female who is postpartum from a  section on 2021 who initially presented to the emergency department with dysuria and lower abdominal pain. Imaging at that time demonstrated a bladder flap hematoma as well as possible retained products of conception within the uterus. UA was positive for nitrites but had a negative urine culture. A suction D&C under Ultrasound guidance was recommended at that time, to which the patient agreed. Risks, benefits, and alternatives to the procedure were discussed. The patient's questions were answered, understanding confirmed, and the patient signed written informed consent.    Technique: The patient was taken to the operating room where she was placed in the dorsal lithotomy position with feet in yellow fin stirrups. The patient was placed under MAC anesthesia. An exam under anesthesia was performed. The patient was prepped and draped in the usual sterile fashion. A speculum was  placed in the vagina and the cervix visualized. A single-toothed tenaculum was placed on the anterior lip of the cervix at 12 o'clock. A paracervical block was performed. The cervical os was carefully dilated with ultrasound guidance to 7 mm with sequential dilators. A suction device was used to empty the uterus with small return of tissue. A gentle pass with sharp curettage was then used for a final pass. Ultrasound was used throughout the whole procedure with excellent visualization. Final ultrasound demonstrated an empty uterine cavity. The curettings were sent to pathology. The tenaculum was removed from the cervix and good hemostasis after application of silver nitrate was noted. The speculum was removed from the vagina. Her bladder was then drained using a straight catheter yielding approximately 150 mL of clear yellow urine. The urine was sent to lab for a urinalysis.     Instrument counts were correct x2. Dr. Ho was present and scrubbed for the entire procedure. The patient was awoken in the OR and transferred to the PACU in stable condition.    Cesia Weller MD  12/14/2021 12:34 PM     I was present and scrubbed throughout the procedure,  I agree with the note above  Neyda Ho MD

## 2021-12-14 NOTE — BRIEF OP NOTE
[facility]    Brief Operative Note    Pre-operative diagnosis: Retained products of conception, Bladder Hematoma [T14.8XXA]  Post-operative diagnosis Same as pre-operative diagnosis    Procedure: Procedure(s):  DILATION AND CURETTAGE, UTERUS, USING SUCTION with ultrasound guidance  Surgeon: Surgeon(s) and Role:     * Neyda Ho MD - Primary   Assistants: MD Cesia Caicedo MD  Anesthesia: MAC   Estimated Blood Loss: 5 mL  IVF: 400 mL   UOP: approx 150 mL drained at end of case through straight catheter    Drains: None  Specimens:   ID Type Source Tests Collected by Time Destination   1 : Retained products of conception  Products of Conception Uterus SURGICAL PATHOLOGY EXAM Neyda Ho MD 12/14/2021  1:10 PM    A :  Urine Urine, Catheter URINE CULTURE, ROUTINE UA WITH MICROSCOPIC Neyda Ho MD 12/14/2021  1:15 PM      Findings:   Fundus vs bladder hematoma palpated approx 3 cm below umbilicus, clitorimegaly with the clitoris and clitoral barrera approx 2 cm. Suction and sharp curettage revealed moderate return of tissue.   Complications: None.  Implants: * No implants in log *

## 2021-12-15 LAB
PATH REPORT.COMMENTS IMP SPEC: NORMAL
PATH REPORT.COMMENTS IMP SPEC: NORMAL
PATH REPORT.FINAL DX SPEC: NORMAL
PATH REPORT.GROSS SPEC: NORMAL
PATH REPORT.MICROSCOPIC SPEC OTHER STN: NORMAL
PATH REPORT.RELEVANT HX SPEC: NORMAL
PHOTO IMAGE: NORMAL

## 2021-12-16 ENCOUNTER — OFFICE VISIT (OUTPATIENT)
Dept: OBGYN | Facility: CLINIC | Age: 36
End: 2021-12-16
Attending: ADVANCED PRACTICE MIDWIFE
Payer: COMMERCIAL

## 2021-12-16 VITALS
HEART RATE: 54 BPM | WEIGHT: 152.2 LBS | DIASTOLIC BLOOD PRESSURE: 65 MMHG | HEIGHT: 62 IN | BODY MASS INDEX: 28.01 KG/M2 | SYSTOLIC BLOOD PRESSURE: 98 MMHG

## 2021-12-16 DIAGNOSIS — Z98.891 S/P C-SECTION: ICD-10-CM

## 2021-12-16 LAB — BACTERIA UR CULT: NO GROWTH

## 2021-12-16 PROCEDURE — 99207 PR POST PARTUM EXAM: CPT | Performed by: ADVANCED PRACTICE MIDWIFE

## 2021-12-16 PROCEDURE — G0463 HOSPITAL OUTPT CLINIC VISIT: HCPCS

## 2021-12-16 ASSESSMENT — ANXIETY QUESTIONNAIRES
7. FEELING AFRAID AS IF SOMETHING AWFUL MIGHT HAPPEN: MORE THAN HALF THE DAYS
GAD7 TOTAL SCORE: 5
5. BEING SO RESTLESS THAT IT IS HARD TO SIT STILL: NOT AT ALL
1. FEELING NERVOUS, ANXIOUS, OR ON EDGE: NOT AT ALL
6. BECOMING EASILY ANNOYED OR IRRITABLE: NOT AT ALL
2. NOT BEING ABLE TO STOP OR CONTROL WORRYING: SEVERAL DAYS
3. WORRYING TOO MUCH ABOUT DIFFERENT THINGS: MORE THAN HALF THE DAYS

## 2021-12-16 ASSESSMENT — PAIN SCALES - GENERAL: PAINLEVEL: MODERATE PAIN (4)

## 2021-12-16 ASSESSMENT — PATIENT HEALTH QUESTIONNAIRE - PHQ9
SUM OF ALL RESPONSES TO PHQ QUESTIONS 1-9: 5
5. POOR APPETITE OR OVEREATING: NOT AT ALL

## 2021-12-16 ASSESSMENT — MIFFLIN-ST. JEOR: SCORE: 1328.75

## 2021-12-16 NOTE — PROGRESS NOTES
"SUBJECTIVE:   Zaina Tan is here for her 2-week postpartum checkup.     PHQ-9 score: 5. No previous history of postpartum mood disorders.  Hx of Abuse:  No    Delivery Date: 2021.    Delivering provider:  Shellie Soni MD.    Type of delivery:  Repeat .     Delivery complications: postpartum pain and seen in the ED 21 required a D&C on 21  Infant gender:  girl, weight 8 pounds 4 oz. But there was some uncertainty at the hospital because it's possible the initial weight entry was erroneous given 1 pound drop in 24 hours after birth. Original entry was 9 pounds 6 ounces.   Feeding Method:  .  Complications reported with feeding:  none, infant thriving .    Bleeding:  Light.  Duration:  Ongoing since birth and now with D&C on 21 some continued bleeding.  Menses resumed:  No  Bowel/Urinary problems:  Yes  Some continued pain with urination that has improved slightly since D&C    Labetolol currently at 200 mg twice a day. BP in normal range and consult with MD recommended decrease to 100 mg BID for 1 week and then recheck.     Contraception Planned:  Unsure at this time  She  has not had intercourse since delivery..      No longer taking oxycodone. Able to manage pain with tylenol and motrin post D&C.     O:   BP 98/65   Pulse 54   Ht 1.575 m (5' 2.01\")   Wt 69 kg (152 lb 3.2 oz)   LMP 2021   BMI 27.83 kg/m       LTCS scar healing well. No drainage, no redness. No significant pain.    Pain is well improved since D&C.     A: 2 week PP check    P: Return to clinic in 1 week for BP check. Continue at home BP checks. If BP stay low, she can contact the office and we may consider discontinuing BP meds  Reviewed mood disorders postpartum and provided information if symptoms arise.  Continue to monitor pain with urination. Given that it is improving, watchful waiting is reasonable and agreeable to the patient.  Birth control options discussed. May consider cycle tracking but " also discussed IUD, Nexplanon as good long term options.

## 2021-12-16 NOTE — PATIENT INSTRUCTIONS
Reduce the dose of the blood pressure medication (labetolol) to 100 mg twice a day. Please cut the current 200 mg tablets in half and take 1 half in the morning and 1 half in the evening.

## 2021-12-16 NOTE — LETTER
"2021       RE: Zaina Tan  [address]  [place] [address]     Dear Colleague,    Thank you for referring your patient, Zaina Tan, to the [facility] [place] at [facility]. Please see a copy of my visit note below.    SUBJECTIVE:   Zaian Tan is here for her 2-week postpartum checkup.     PHQ-9 score: 5. No previous history of postpartum mood disorders.  Hx of Abuse:  No    Delivery Date: 2021.    Delivering provider:  Shellie Soni MD.    Type of delivery:  Repeat .     Delivery complications: postpartum pain and seen in the ED 21 required a D&C on 21  Infant gender:  girl, weight 8 pounds 4 oz. But there was some uncertainty at the hospital because it's possible the initial weight entry was erroneous given 1 pound drop in 24 hours after birth. Original entry was 9 pounds 6 ounces.   Feeding Method:  .  Complications reported with feeding:  none, infant thriving .    Bleeding:  Light.  Duration:  Ongoing since birth and now with D&C on 21 some continued bleeding.  Menses resumed:  No  Bowel/Urinary problems:  Yes  Some continued pain with urination that has improved slightly since D&C    Labetolol currently at 200 mg twice a day. BP in normal range and consult with MD recommended decrease to 100 mg BID for 1 week and then recheck.     Contraception Planned:  Unsure at this time  She  has not had intercourse since delivery..      No longer taking oxycodone. Able to manage pain with tylenol and motrin post D&C.     O:   BP 98/65   Pulse 54   Ht 1.575 m (5' 2.01\")   Wt 69 kg (152 lb 3.2 oz)   LMP 2021   BMI 27.83 kg/m       LTCS scar healing well. No drainage, no redness. No significant pain.    Pain is well improved since D&C.     A: 2 week PP check    P: Return to clinic in 1 week for BP check. Continue at home BP checks. If BP stay low, she can contact the office " and we may consider discontinuing BP meds  Reviewed mood disorders postpartum and provided information if symptoms arise.  Continue to monitor pain with urination. Given that it is improving, watchful waiting is reasonable and agreeable to the patient.  Birth control options discussed. May consider cycle tracking but also discussed IUD, Nexplanon as good long term options.    Again, thank you for allowing me to participate in the care of your patient.      Sincerely,    Era Dan CNM

## 2021-12-17 ASSESSMENT — ANXIETY QUESTIONNAIRES: GAD7 TOTAL SCORE: 5

## 2021-12-21 ENCOUNTER — TELEPHONE (OUTPATIENT)
Dept: OBGYN | Facility: CLINIC | Age: 36
End: 2021-12-21
Payer: COMMERCIAL

## 2021-12-22 ENCOUNTER — HOSPITAL ENCOUNTER (INPATIENT)
Facility: CLINIC | Age: 36
LOS: 2 days | Discharge: HOME | End: 2021-12-24
Attending: EMERGENCY MEDICINE | Admitting: OBSTETRICS & GYNECOLOGY
Payer: COMMERCIAL

## 2021-12-22 ENCOUNTER — APPOINTMENT (OUTPATIENT)
Dept: CT IMAGING | Facility: CLINIC | Age: 36
End: 2021-12-22
Attending: EMERGENCY MEDICINE
Payer: COMMERCIAL

## 2021-12-22 ENCOUNTER — APPOINTMENT (OUTPATIENT)
Dept: INTERVENTIONAL RADIOLOGY/VASCULAR | Facility: CLINIC | Age: 36
End: 2021-12-22
Attending: PHYSICIAN ASSISTANT
Payer: COMMERCIAL

## 2021-12-22 DIAGNOSIS — Z20.822 CONTACT WITH AND (SUSPECTED) EXPOSURE TO COVID-19: ICD-10-CM

## 2021-12-22 DIAGNOSIS — Z98.891 S/P C-SECTION: Primary | ICD-10-CM

## 2021-12-22 DIAGNOSIS — R10.30 LOWER ABDOMINAL PAIN: ICD-10-CM

## 2021-12-22 DIAGNOSIS — G89.18 POSTOPERATIVE PAIN: ICD-10-CM
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
## 2021-12-22 LAB
ALBUMIN SERPL-MCNC: 3.3 G/DL (ref 3.4–5)
ALBUMIN UR-MCNC: NEGATIVE MG/DL
ALP SERPL-CCNC: 149 U/L (ref 40–150)
ALT SERPL W P-5'-P-CCNC: 21 U/L (ref 0–50)
ANION GAP SERPL CALCULATED.3IONS-SCNC: 5 MMOL/L (ref 3–14)
APPEARANCE UR: CLEAR
APTT PPP: 29 SECONDS (ref 22–38)
AST SERPL W P-5'-P-CCNC: 20 U/L (ref 0–45)
BASOPHILS # BLD AUTO: 0 10E3/UL (ref 0–0.2)
BASOPHILS NFR BLD AUTO: 1 %
BILIRUB SERPL-MCNC: 0.3 MG/DL (ref 0.2–1.3)
BILIRUB UR QL STRIP: NEGATIVE
BUN SERPL-MCNC: 14 MG/DL (ref 7–30)
CALCIUM SERPL-MCNC: 8.8 MG/DL (ref 8.5–10.1)
CHLORIDE BLD-SCNC: 108 MMOL/L (ref 94–109)
CO2 SERPL-SCNC: 25 MMOL/L (ref 20–32)
COLOR UR AUTO: ABNORMAL
CREAT SERPL-MCNC: 0.71 MG/DL (ref 0.52–1.04)
EOSINOPHIL # BLD AUTO: 0.2 10E3/UL (ref 0–0.7)
EOSINOPHIL NFR BLD AUTO: 4 %
ERYTHROCYTE [DISTWIDTH] IN BLOOD BY AUTOMATED COUNT: 13.3 % (ref 10–15)
FIBRINOGEN PPP-MCNC: 314 MG/DL (ref 170–490)
GFR SERPL CREATININE-BSD FRML MDRD: >90 ML/MIN/1.73M2
GLUCOSE BLD-MCNC: 96 MG/DL (ref 70–99)
GLUCOSE UR STRIP-MCNC: NEGATIVE MG/DL
GRAM STAIN RESULT: NORMAL
GRAM STAIN RESULT: NORMAL
HCT VFR BLD AUTO: 35.2 % (ref 35–47)
HGB BLD-MCNC: 11 G/DL (ref 11.7–15.7)
HGB UR QL STRIP: ABNORMAL
IMM GRANULOCYTES # BLD: 0 10E3/UL
IMM GRANULOCYTES NFR BLD: 0 %
INR PPP: 1.03 (ref 0.86–1.14)
KETONES UR STRIP-MCNC: NEGATIVE MG/DL
LEUKOCYTE ESTERASE UR QL STRIP: ABNORMAL
LYMPHOCYTES # BLD AUTO: 1.3 10E3/UL (ref 0.8–5.3)
LYMPHOCYTES NFR BLD AUTO: 32 %
MCH RBC QN AUTO: 27.8 PG (ref 26.5–33)
MCHC RBC AUTO-ENTMCNC: 31.3 G/DL (ref 31.5–36.5)
MCV RBC AUTO: 89 FL (ref 78–100)
MONOCYTES # BLD AUTO: 0.5 10E3/UL (ref 0–1.3)
MONOCYTES NFR BLD AUTO: 12 %
NEUTROPHILS # BLD AUTO: 2 10E3/UL (ref 1.6–8.3)
NEUTROPHILS NFR BLD AUTO: 51 %
NITRATE UR QL: NEGATIVE
NRBC # BLD AUTO: 0 10E3/UL
NRBC BLD AUTO-RTO: 0 /100
PH UR STRIP: 6 [PH] (ref 5–7)
PLATELET # BLD AUTO: 263 10E3/UL (ref 150–450)
POTASSIUM BLD-SCNC: 3.4 MMOL/L (ref 3.4–5.3)
PROT SERPL-MCNC: 7.7 G/DL (ref 6.8–8.8)
RBC # BLD AUTO: 3.96 10E6/UL (ref 3.8–5.2)
RBC URINE: 1 /HPF
SARS-COV-2 RNA RESP QL NAA+PROBE: NEGATIVE
SODIUM SERPL-SCNC: 138 MMOL/L (ref 133–144)
SP GR UR STRIP: 1.01 (ref 1–1.03)
SQUAMOUS EPITHELIAL: <1 /HPF
UROBILINOGEN UR STRIP-MCNC: NORMAL MG/DL
WBC # BLD AUTO: 4 10E3/UL (ref 4–11)
WBC URINE: 31 /HPF

## 2021-12-22 PROCEDURE — 250N000011 HC RX IP 250 OP 636: Performed by: EMERGENCY MEDICINE

## 2021-12-22 PROCEDURE — 250N000009 HC RX 250: Performed by: RADIOLOGY

## 2021-12-22 PROCEDURE — 81001 URINALYSIS AUTO W/SCOPE: CPT | Performed by: EMERGENCY MEDICINE

## 2021-12-22 PROCEDURE — 85025 COMPLETE CBC W/AUTO DIFF WBC: CPT | Performed by: EMERGENCY MEDICINE

## 2021-12-22 PROCEDURE — 272N000500 HC NEEDLE CR2

## 2021-12-22 PROCEDURE — 49406 IMAGE CATH FLUID PERI/RETRO: CPT

## 2021-12-22 PROCEDURE — 85730 THROMBOPLASTIN TIME PARTIAL: CPT | Performed by: STUDENT IN AN ORGANIZED HEALTH CARE EDUCATION/TRAINING PROGRAM

## 2021-12-22 PROCEDURE — 99285 EMERGENCY DEPT VISIT HI MDM: CPT | Mod: 25 | Performed by: EMERGENCY MEDICINE

## 2021-12-22 PROCEDURE — C9803 HOPD COVID-19 SPEC COLLECT: HCPCS | Performed by: EMERGENCY MEDICINE

## 2021-12-22 PROCEDURE — 258N000003 HC RX IP 258 OP 636: Performed by: STUDENT IN AN ORGANIZED HEALTH CARE EDUCATION/TRAINING PROGRAM

## 2021-12-22 PROCEDURE — 250N000013 HC RX MED GY IP 250 OP 250 PS 637: Performed by: STUDENT IN AN ORGANIZED HEALTH CARE EDUCATION/TRAINING PROGRAM

## 2021-12-22 PROCEDURE — 99152 MOD SED SAME PHYS/QHP 5/>YRS: CPT | Performed by: RADIOLOGY

## 2021-12-22 PROCEDURE — 250N000013 HC RX MED GY IP 250 OP 250 PS 637: Performed by: EMERGENCY MEDICINE

## 2021-12-22 PROCEDURE — 87075 CULTR BACTERIA EXCEPT BLOOD: CPT | Performed by: RADIOLOGY

## 2021-12-22 PROCEDURE — C1729 CATH, DRAINAGE: HCPCS

## 2021-12-22 PROCEDURE — 99152 MOD SED SAME PHYS/QHP 5/>YRS: CPT

## 2021-12-22 PROCEDURE — 87086 URINE CULTURE/COLONY COUNT: CPT

## 2021-12-22 PROCEDURE — 85384 FIBRINOGEN ACTIVITY: CPT | Performed by: STUDENT IN AN ORGANIZED HEALTH CARE EDUCATION/TRAINING PROGRAM

## 2021-12-22 PROCEDURE — 82565 ASSAY OF CREATININE: CPT | Performed by: EMERGENCY MEDICINE

## 2021-12-22 PROCEDURE — G0378 HOSPITAL OBSERVATION PER HR: HCPCS

## 2021-12-22 PROCEDURE — 258N000003 HC RX IP 258 OP 636: Performed by: FAMILY MEDICINE

## 2021-12-22 PROCEDURE — 36415 COLL VENOUS BLD VENIPUNCTURE: CPT | Performed by: STUDENT IN AN ORGANIZED HEALTH CARE EDUCATION/TRAINING PROGRAM

## 2021-12-22 PROCEDURE — 36415 COLL VENOUS BLD VENIPUNCTURE: CPT | Performed by: EMERGENCY MEDICINE

## 2021-12-22 PROCEDURE — 87205 SMEAR GRAM STAIN: CPT | Performed by: RADIOLOGY

## 2021-12-22 PROCEDURE — 250N000009 HC RX 250: Performed by: EMERGENCY MEDICINE

## 2021-12-22 PROCEDURE — 85610 PROTHROMBIN TIME: CPT | Performed by: STUDENT IN AN ORGANIZED HEALTH CARE EDUCATION/TRAINING PROGRAM

## 2021-12-22 PROCEDURE — 120N000002 HC R&B MED SURG/OB UMMC

## 2021-12-22 PROCEDURE — 74177 CT ABD & PELVIS W/CONTRAST: CPT

## 2021-12-22 PROCEDURE — 49406 IMAGE CATH FLUID PERI/RETRO: CPT | Performed by: RADIOLOGY

## 2021-12-22 PROCEDURE — 250N000011 HC RX IP 250 OP 636: Performed by: RADIOLOGY

## 2021-12-22 PROCEDURE — 96360 HYDRATION IV INFUSION INIT: CPT | Mod: 59 | Performed by: EMERGENCY MEDICINE

## 2021-12-22 PROCEDURE — U0003 INFECTIOUS AGENT DETECTION BY NUCLEIC ACID (DNA OR RNA); SEVERE ACUTE RESPIRATORY SYNDROME CORONAVIRUS 2 (SARS-COV-2) (CORONAVIRUS DISEASE [COVID-19]), AMPLIFIED PROBE TECHNIQUE, MAKING USE OF HIGH THROUGHPUT TECHNOLOGIES AS DESCRIBED BY CMS-2020-01-R: HCPCS | Performed by: EMERGENCY MEDICINE

## 2021-12-22 PROCEDURE — 0U9930Z DRAINAGE OF UTERUS WITH DRAINAGE DEVICE, PERCUTANEOUS APPROACH: ICD-10-PCS | Performed by: RADIOLOGY

## 2021-12-22 PROCEDURE — 87070 CULTURE OTHR SPECIMN AEROBIC: CPT | Performed by: RADIOLOGY

## 2021-12-22 PROCEDURE — C1769 GUIDE WIRE: HCPCS

## 2021-12-22 PROCEDURE — 99285 EMERGENCY DEPT VISIT HI MDM: CPT | Performed by: EMERGENCY MEDICINE

## 2021-12-22 RX ORDER — OXYBUTYNIN CHLORIDE 5 MG/1
5 TABLET ORAL 3 TIMES DAILY PRN
Status: DISCONTINUED | OUTPATIENT
Start: 2021-12-22 | End: 2021-12-24 | Stop reason: HOSPADM

## 2021-12-22 RX ORDER — NALOXONE HYDROCHLORIDE 0.4 MG/ML
0.4 INJECTION, SOLUTION INTRAMUSCULAR; INTRAVENOUS; SUBCUTANEOUS
Status: DISCONTINUED | OUTPATIENT
Start: 2021-12-22 | End: 2021-12-24 | Stop reason: HOSPADM

## 2021-12-22 RX ORDER — ONDANSETRON 4 MG/1
4 TABLET, ORALLY DISINTEGRATING ORAL EVERY 6 HOURS PRN
Status: DISCONTINUED | OUTPATIENT
Start: 2021-12-22 | End: 2021-12-24 | Stop reason: HOSPADM

## 2021-12-22 RX ORDER — FLUMAZENIL 0.1 MG/ML
0.2 INJECTION, SOLUTION INTRAVENOUS
Status: DISCONTINUED | OUTPATIENT
Start: 2021-12-22 | End: 2021-12-22

## 2021-12-22 RX ORDER — AMOXICILLIN 250 MG
1 CAPSULE ORAL 2 TIMES DAILY PRN
Status: DISCONTINUED | OUTPATIENT
Start: 2021-12-22 | End: 2021-12-24 | Stop reason: HOSPADM

## 2021-12-22 RX ORDER — FENTANYL CITRATE 50 UG/ML
25-50 INJECTION, SOLUTION INTRAMUSCULAR; INTRAVENOUS EVERY 5 MIN PRN
Status: DISCONTINUED | OUTPATIENT
Start: 2021-12-22 | End: 2021-12-22

## 2021-12-22 RX ORDER — ONDANSETRON 2 MG/ML
4 INJECTION INTRAMUSCULAR; INTRAVENOUS EVERY 6 HOURS PRN
Status: DISCONTINUED | OUTPATIENT
Start: 2021-12-22 | End: 2021-12-24 | Stop reason: HOSPADM

## 2021-12-22 RX ORDER — NALOXONE HYDROCHLORIDE 0.4 MG/ML
0.4 INJECTION, SOLUTION INTRAMUSCULAR; INTRAVENOUS; SUBCUTANEOUS
Status: DISCONTINUED | OUTPATIENT
Start: 2021-12-22 | End: 2021-12-22

## 2021-12-22 RX ORDER — OXYCODONE HYDROCHLORIDE 5 MG/1
5 TABLET ORAL EVERY 4 HOURS PRN
Status: DISCONTINUED | OUTPATIENT
Start: 2021-12-22 | End: 2021-12-24 | Stop reason: HOSPADM

## 2021-12-22 RX ORDER — CEPHALEXIN 500 MG/1
500 CAPSULE ORAL EVERY 6 HOURS
Status: DISCONTINUED | OUTPATIENT
Start: 2021-12-22 | End: 2021-12-24 | Stop reason: HOSPADM

## 2021-12-22 RX ORDER — NALOXONE HYDROCHLORIDE 0.4 MG/ML
0.2 INJECTION, SOLUTION INTRAMUSCULAR; INTRAVENOUS; SUBCUTANEOUS
Status: DISCONTINUED | OUTPATIENT
Start: 2021-12-22 | End: 2021-12-24 | Stop reason: HOSPADM

## 2021-12-22 RX ORDER — IODIXANOL 320 MG/ML
1-150 INJECTION, SOLUTION INTRAVASCULAR ONCE
Status: DISCONTINUED | OUTPATIENT
Start: 2021-12-22 | End: 2021-12-22 | Stop reason: CLARIF

## 2021-12-22 RX ORDER — NALOXONE HYDROCHLORIDE 0.4 MG/ML
0.2 INJECTION, SOLUTION INTRAMUSCULAR; INTRAVENOUS; SUBCUTANEOUS
Status: DISCONTINUED | OUTPATIENT
Start: 2021-12-22 | End: 2021-12-22

## 2021-12-22 RX ORDER — ACETAMINOPHEN 325 MG/1
975 TABLET ORAL EVERY 8 HOURS
Status: DISCONTINUED | OUTPATIENT
Start: 2021-12-22 | End: 2021-12-24 | Stop reason: HOSPADM

## 2021-12-22 RX ORDER — IOPAMIDOL 755 MG/ML
100 INJECTION, SOLUTION INTRAVASCULAR ONCE
Status: COMPLETED | OUTPATIENT
Start: 2021-12-22 | End: 2021-12-22

## 2021-12-22 RX ORDER — SODIUM CHLORIDE 9 MG/ML
INJECTION, SOLUTION INTRAVENOUS CONTINUOUS
Status: DISCONTINUED | OUTPATIENT
Start: 2021-12-22 | End: 2021-12-24 | Stop reason: HOSPADM

## 2021-12-22 RX ORDER — SODIUM CHLORIDE, SODIUM LACTATE, POTASSIUM CHLORIDE, CALCIUM CHLORIDE 600; 310; 30; 20 MG/100ML; MG/100ML; MG/100ML; MG/100ML
INJECTION, SOLUTION INTRAVENOUS CONTINUOUS
Status: DISCONTINUED | OUTPATIENT
Start: 2021-12-22 | End: 2021-12-24 | Stop reason: HOSPADM

## 2021-12-22 RX ORDER — AMOXICILLIN 250 MG
2 CAPSULE ORAL 2 TIMES DAILY PRN
Status: DISCONTINUED | OUTPATIENT
Start: 2021-12-22 | End: 2021-12-24 | Stop reason: HOSPADM

## 2021-12-22 RX ORDER — LABETALOL 100 MG/1
100 TABLET, FILM COATED ORAL 2 TIMES DAILY
Status: DISCONTINUED | OUTPATIENT
Start: 2021-12-22 | End: 2021-12-24 | Stop reason: HOSPADM

## 2021-12-22 RX ORDER — KETOROLAC TROMETHAMINE 30 MG/ML
30 INJECTION, SOLUTION INTRAMUSCULAR; INTRAVENOUS EVERY 6 HOURS PRN
Status: DISPENSED | OUTPATIENT
Start: 2021-12-22 | End: 2021-12-23

## 2021-12-22 RX ORDER — IOPAMIDOL 612 MG/ML
1-15 INJECTION, SOLUTION INTRATHECAL ONCE
Status: DISCONTINUED | OUTPATIENT
Start: 2021-12-22 | End: 2021-12-22 | Stop reason: CLARIF

## 2021-12-22 RX ADMIN — SODIUM CHLORIDE, POTASSIUM CHLORIDE, SODIUM LACTATE AND CALCIUM CHLORIDE: 600; 310; 30; 20 INJECTION, SOLUTION INTRAVENOUS at 16:05

## 2021-12-22 RX ADMIN — SODIUM CHLORIDE: 9 INJECTION, SOLUTION INTRAVENOUS at 08:21

## 2021-12-22 RX ADMIN — FENTANYL CITRATE 25 MCG: 50 INJECTION, SOLUTION INTRAMUSCULAR; INTRAVENOUS at 16:48

## 2021-12-22 RX ADMIN — MIDAZOLAM HYDROCHLORIDE 1 MG: 1 INJECTION, SOLUTION INTRAMUSCULAR; INTRAVENOUS at 16:31

## 2021-12-22 RX ADMIN — ACETAMINOPHEN 975 MG: 325 TABLET, FILM COATED ORAL at 18:28

## 2021-12-22 RX ADMIN — ACETAMINOPHEN 975 MG: 325 TABLET, FILM COATED ORAL at 11:00

## 2021-12-22 RX ADMIN — SODIUM CHLORIDE 58 ML: 9 INJECTION, SOLUTION INTRAVENOUS at 03:17

## 2021-12-22 RX ADMIN — MIDAZOLAM HYDROCHLORIDE 0.5 MG: 1 INJECTION, SOLUTION INTRAMUSCULAR; INTRAVENOUS at 16:41

## 2021-12-22 RX ADMIN — MIDAZOLAM HYDROCHLORIDE 1 MG: 1 INJECTION, SOLUTION INTRAMUSCULAR; INTRAVENOUS at 17:00

## 2021-12-22 RX ADMIN — CEPHALEXIN 500 MG: 500 CAPSULE ORAL at 11:00

## 2021-12-22 RX ADMIN — FENTANYL CITRATE 50 MCG: 50 INJECTION, SOLUTION INTRAMUSCULAR; INTRAVENOUS at 16:31

## 2021-12-22 RX ADMIN — CEPHALEXIN 500 MG: 500 CAPSULE ORAL at 18:28

## 2021-12-22 RX ADMIN — FENTANYL CITRATE 25 MCG: 50 INJECTION, SOLUTION INTRAMUSCULAR; INTRAVENOUS at 16:41

## 2021-12-22 RX ADMIN — LIDOCAINE HYDROCHLORIDE 15 ML: 10 INJECTION, SOLUTION EPIDURAL; INFILTRATION; INTRACAUDAL; PERINEURAL at 17:01

## 2021-12-22 RX ADMIN — IOPAMIDOL 74 ML: 755 INJECTION, SOLUTION INTRAVENOUS at 03:14

## 2021-12-22 RX ADMIN — MIDAZOLAM HYDROCHLORIDE 0.5 MG: 1 INJECTION, SOLUTION INTRAMUSCULAR; INTRAVENOUS at 16:48

## 2021-12-22 RX ADMIN — ALTEPLASE 2 MG: 2.2 INJECTION, POWDER, LYOPHILIZED, FOR SOLUTION INTRAVENOUS at 20:40

## 2021-12-22 RX ADMIN — CEPHALEXIN 500 MG: 500 CAPSULE ORAL at 05:37

## 2021-12-22 RX ADMIN — FENTANYL CITRATE 50 MCG: 50 INJECTION, SOLUTION INTRAMUSCULAR; INTRAVENOUS at 16:58

## 2021-12-22 ASSESSMENT — ACTIVITIES OF DAILY LIVING (ADL)
ADLS_ACUITY_SCORE: 4

## 2021-12-22 ASSESSMENT — MIFFLIN-ST. JEOR: SCORE: 1318.65

## 2021-12-22 NOTE — PROGRESS NOTES
"Brief Gyn Progress Note    S: Patient is doing ok. Does not have much pain while lying still. Pain is worse with moving around. Remains predominantly midline and low. No pain near incision.     O:   /54   Pulse 62   Temp 97.5  F (36.4  C) (Oral)   Resp 16   Ht 1.575 m (5' 2\")   Wt 68 kg (150 lb)   LMP 2021   SpO2 100%   BMI 27.44 kg/m    Exam:   Gen: resting in bed, overall comfortable  Abd: pfannenstiel incision appears well healed. Ttp in low midline. No fundal ttp.    A/P:   Zaina Tan is a 36 year old , HD#1 admitted with ongoing pain likely from known bladder flap hematoma. We discussed treatment options including additional medications, placement of IR drain if able, and replacement of sena catheter.  Patient would prefer to avoid replacement of sena catheter and she felt this may have contributed to her symptoms. She is willing if alternative treatment options do not work.   Plan:   - pain control: start oxybutynin 5 mg TID prn. Transition to XL when needed dose has been established. Contine tylenol, toradol, oxycodone.  - Consulted IR re: possible drainage of hematoma. Follow up timing.   - Urology curbsided re: bladder spasms refractory to pyridium. See Note   - NPO for possible procedure, ordered coags, COVID   - Urine culture ordered per Urology recommendations.     Patient seen and discussed with Dr. Doshi.     Swati Ha MD  Obstetrics and Gynecology, PGY-3  21 2:14 PM    Appreciate note by Dr. Ha. Patient has been seen and examined by me separate from the resident, agree with above note.     Sivan Doshi MD  2:27 PM      "

## 2021-12-22 NOTE — PHARMACY-ADMISSION MEDICATION HISTORY
Admission Medication History Completed by Pharmacy    See [facility] Admission Navigator for allergy information, preferred outpatient pharmacy, prior to admission medications and immunization status.     Medication History Sources:     Patient    Fill history    Changes made to PTA medication list (reason):    Added: None    Deleted: Prenatal, Oxycodone-duplicate order    Changed: Ibuprofen 800 mg-->600 mg    Additional Information:    None    Prior to Admission medications    Medication Sig Last Dose Taking? Auth Provider   acetaminophen (TYLENOL) 325 MG tablet Take 2 tablets (650 mg) by mouth every 6 hours as needed for mild pain Start after Delivery. 12/22/2021 at Unknown time Yes Neyda Benito MD   ferrous sulfate (FEROSUL) 325 (65 Fe) MG tablet Take 1 tablet (325 mg) by mouth daily (with breakfast) 12/22/2021 at Unknown time Yes Neyda Benito MD   ibuprofen (ADVIL/MOTRIN) 800 MG tablet Take 1 tablet (800 mg) by mouth every 6 hours as needed for other (mild and/or inflammatory pain)  Patient taking differently: Take 600 mg by mouth every 6 hours as needed for other (mild and/or inflammatory pain)  12/22/2021 at Unknown time Yes Cesia Clarke MD   labetalol (NORMODYNE) 200 MG tablet Take 1 tablet (200 mg) by mouth 2 times daily  Patient taking differently: Take 100 mg by mouth 2 times daily  12/22/2021 at Unknown time Yes Sarah Edge MD   oxyCODONE (ROXICODONE) 5 MG tablet Take 1 tablet (5 mg) by mouth every 4 hours as needed for moderate to severe pain 12/22/2021 at Unknown time Yes Sarah Edge MD       Date completed: 12/22/21    Medication history completed by: Marky Jalloh [facility]

## 2021-12-22 NOTE — ED NOTES
[facility]   ED Nurse to Floor Handoff     Zaina Tan is a 36 year old female who speaks English and lives with family members,  in a home  They arrived in the ED by car from home    ED Chief Complaint: Abdominal Pain (severe abd pain and dysuria)    ED Dx;   Final diagnoses:   Postoperative pain         Needed?: No    Allergies: No Known Allergies.  Past Medical Hx:   Past Medical History:   Diagnosis Date     Vitamin D deficiency 1/31/2017 1/30/17: Vitamin D= 25, [employer] message sent - supplementation 2022-6494 IU/day 6/12/17- Vit D 48     Vitreous syneresis       Baseline Mental status: WDL  Current Mental Status changes: at basesline    Infection present or suspected this encounter: no  Sepsis suspected: No  Isolation type: No active isolations  Patient tested for COVID 19 prior to admission: YES     Activity level - Baseline/Home:  Independent  Activity Level - Current:   Independent    Bariatric equipment needed?: No    In the ED these meds were given:   Medications   cephALEXin (KEFLEX) capsule 500 mg (500 mg Oral Given 12/22/21 0537)   iopamidol (ISOVUE-370) solution 100 mL (74 mLs Intravenous Given 12/22/21 0314)   sodium chloride 0.9 % bag 100mL (58 mLs Intravenous Given 12/22/21 0317)       Drips running?  No    Home pump  No    Current LDAs  Peripheral IV 12/22/21 Right Upper forearm (Active)   Site Assessment WDL 12/22/21 0150   Line Status Saline locked 12/22/21 0150   Number of days: 0       Incision/Surgical Site 11/23/21 Lower Abdomen (Active)   Number of days: 29       Labs results:   Labs Ordered and Resulted from Time of ED Arrival to Time of ED Departure   COMPREHENSIVE METABOLIC PANEL - Abnormal       Result Value    Sodium 138      Potassium 3.4      Chloride 108      Carbon Dioxide (CO2) 25      Anion Gap 5      Urea Nitrogen 14      Creatinine 0.71      Calcium 8.8      Glucose 96      Alkaline Phosphatase 149      AST 20      ALT  21      Protein Total 7.7      Albumin 3.3 (*)     Bilirubin Total 0.3      GFR Estimate >90     ROUTINE UA WITH MICROSCOPIC - Abnormal    Color Urine Light Yellow      Appearance Urine Clear      Glucose Urine Negative      Bilirubin Urine Negative      Ketones Urine Negative      Specific Gravity Urine 1.007      Blood Urine Small (*)     pH Urine 6.0      Protein Albumin Urine Negative      Urobilinogen Urine Normal      Nitrite Urine Negative      Leukocyte Esterase Urine Small (*)     RBC Urine 1      WBC Urine 31 (*)     Squamous Epithelials Urine <1     CBC WITH PLATELETS AND DIFFERENTIAL - Abnormal    WBC Count 4.0      RBC Count 3.96      Hemoglobin 11.0 (*)     Hematocrit 35.2      MCV 89      MCH 27.8      MCHC 31.3 (*)     RDW 13.3      Platelet Count 263      % Neutrophils 51      % Lymphocytes 32      % Monocytes 12      % Eosinophils 4      % Basophils 1      % Immature Granulocytes 0      NRBCs per 100 WBC 0      Absolute Neutrophils 2.0      Absolute Lymphocytes 1.3      Absolute Monocytes 0.5      Absolute Eosinophils 0.2      Absolute Basophils 0.0      Absolute Immature Granulocytes 0.0      Absolute NRBCs 0.0     COVID-19 VIRUS (CORONAVIRUS) BY PCR - Normal    SARS CoV2 PCR Negative         Imaging Studies:   Recent Results (from the past 24 hour(s))   CT Abdomen Pelvis w Contrast    Narrative    EXAM: CT ABDOMEN PELVIS W CONTRAST  LOCATION: [facility]  DATE/TIME: 2021 2:53 AM    INDICATION: Pelvic pain after .  COMPARISON: None.  TECHNIQUE: CT scan of the abdomen and pelvis was performed following injection of IV contrast. Multiplanar reformats were obtained. Dose reduction techniques were used.  CONTRAST: 74mL isovue-370    FINDINGS:   LOWER CHEST: Lung bases clear.    HEPATOBILIARY: Normal.    PANCREAS: Normal.    SPLEEN: Normal.    ADRENAL GLANDS: Normal.    KIDNEYS/BLADDER: Normal.    BOWEL: Normal caliber. Appendix not  "seen.    LYMPH NODES: Normal.    VASCULATURE: Unremarkable.    PELVIC ORGANS: Prominent uterus consistent with recent postpartum status. Hyperdense, complex collection between the bladder and uterus 7.3 x 4.7 cm sagittal image 53.    MUSCULOSKELETAL: Ventral soft tissue edema within the pelvis consistent with recent . Small fat-containing umbilical hernia.      Impression    IMPRESSION:   1.  Hyperdense, complex fluid collection between the bladder and uterus suggestive of bladder flap hematoma. No visible extravasation.       Recent vital signs:   /85   Pulse 74   Temp 98.3  F (36.8  C) (Oral)   Resp 16   Ht 1.575 m (5' 2\")   Wt 68 kg (150 lb)   LMP 2021   SpO2 100%   BMI 27.44 kg/m      [place] Coma Scale Score: 15 (21 0519)       Cardiac Rhythm: Other  Pt needs tele? No  Skin/wound Issues: None    Code Status: Full Code    Pain control: fair    Nausea control: pt had none    Abnormal labs/tests/findings requiring intervention: none    Family present during ED course? Yes   Family Comments/Social Situation comments: none    Tasks needing completion: None    Jen Julien, RN  [phone] [facility]      "

## 2021-12-22 NOTE — ED TRIAGE NOTES
Pt comes in for worsening lower abd pain, pain with urination. 1 week s/p D&C. 1 month post-partum.

## 2021-12-22 NOTE — PLAN OF CARE
"/50 (BP Location: Left arm)   Pulse 60   Temp 97.1  F (36.2  C) (Oral)   Resp 16   Ht 1.575 m (5' 2\")   Wt 68 kg (150 lb)   LMP 02/20/2021   SpO2 100%   BMI 27.44 kg/m      VSS on RA. A+Ox4. Voiding spont, no longer complaining of pain with urination. SBA for help with IV pole. Pain managed with rest and scheduled tylenol, denying other interventions at this time. Complained of left breast tenderness, hot packs applied, pumping encouraged. Right PIV, infusing. NPO except ice chips and meds. Patient scheduled for hematoma drainage with IR this afternoon. Patient currently breastfeeding. Patient educated on need to discard breast milk 24 hours after CT with contrast. Patient able to have infant with family member in the hospital to continue breastfeeding per ANS.               "

## 2021-12-22 NOTE — ED NOTES
Sign out Provider: Marie      Sign out Plan: Patient evaluated on the earlier shift, seen by OB/GYN, is being admitted due to postoperative abdominal pain, bladder flap hematoma, and UTI.      Reassessment: No events on this shift.  Awaiting bed placement.      Disposition: Admit to inpatient service.  Awaiting bed placement.       Dann Newman MD  12/22/21 0776

## 2021-12-22 NOTE — ED NOTES
"Pt A/O x 4, ambulates independently. Pain \" is okay when I am sleeping, it's when I wake up the pain is sharp\".  Pt kept NPO for possibility to drain blood through IR.  Pt's sister BS very supportive and is helping pt with her infant.  "

## 2021-12-22 NOTE — DISCHARGE SUMMARY
Discharge Summary    Zaina Tan MRN# [id number]   YOB: 1985 Age: 36 year old     Date of Admission:  12/22/2021  Date of Discharge:  12/24/2021  Admitting Physician:  Sivan Doshi MD  Discharge Physician:  Sivan Doshi MD   Discharging Service:  Gynecology     Primary Provider: Megan Henry          Admission Diagnoses:   - Postoperative status  - Bladder flap hematoma  - Gestational hypertension  - Urinary tractinfection          Discharge Diagnosis:     Bladder flap hematoma, s/p drain placement             Discharge Disposition:     Discharged to home           Procedures:   - IR drain placement           Medications Prior to Admission:     Facility-Administered Medications Prior to Admission   Medication Dose Route Frequency Provider Last Rate Last Admin     [DISCONTINUED] doxycycline (VIBRAMYCIN) 100 mg vial to attach to  mL bag  100 mg Intravenous Pre-Op/Pre-procedure x 1 dose Vidhya Murray MD         Medications Prior to Admission   Medication Sig Dispense Refill Last Dose     acetaminophen (TYLENOL) 325 MG tablet Take 2 tablets (650 mg) by mouth every 6 hours as needed for mild pain Start after Delivery. 100 tablet 0 12/22/2021 at Unknown time     ferrous sulfate (FEROSUL) 325 (65 Fe) MG tablet Take 1 tablet (325 mg) by mouth daily (with breakfast) 100 tablet 0 12/22/2021 at Unknown time     ibuprofen (ADVIL/MOTRIN) 800 MG tablet Take 1 tablet (800 mg) by mouth every 6 hours as needed for other (mild and/or inflammatory pain) (Patient taking differently: Take 600 mg by mouth every 6 hours as needed for other (mild and/or inflammatory pain) ) 30 tablet 0 12/22/2021 at Unknown time     labetalol (NORMODYNE) 200 MG tablet Take 1 tablet (200 mg) by mouth 2 times daily (Patient taking differently: Take 100 mg by mouth 2 times daily ) 60 tablet 1 12/22/2021 at Unknown time     oxyCODONE (ROXICODONE) 5 MG tablet Take 1 tablet (5 mg) by mouth every 4 hours  as needed for moderate to severe pain 6 tablet 0 2021 at Unknown time             Discharge Medications:     Current Discharge Medication List      CONTINUE these medications which have NOT CHANGED    Details   acetaminophen (TYLENOL) 325 MG tablet Take 2 tablets (650 mg) by mouth every 6 hours as needed for mild pain Start after Delivery.  Qty: 100 tablet, Refills: 0    Associated Diagnoses: S/P       ferrous sulfate (FEROSUL) 325 (65 Fe) MG tablet Take 1 tablet (325 mg) by mouth daily (with breakfast)  Qty: 100 tablet, Refills: 0    Associated Diagnoses: S/P       ibuprofen (ADVIL/MOTRIN) 800 MG tablet Take 1 tablet (800 mg) by mouth every 6 hours as needed for other (mild and/or inflammatory pain)  Qty: 30 tablet, Refills: 0    Associated Diagnoses: Status post hysteroscopy      labetalol (NORMODYNE) 200 MG tablet Take 1 tablet (200 mg) by mouth 2 times daily  Qty: 60 tablet, Refills: 1    Associated Diagnoses: S/P       oxyCODONE (ROXICODONE) 5 MG tablet Take 1 tablet (5 mg) by mouth every 4 hours as needed for moderate to severe pain  Qty: 6 tablet, Refills: 0    Associated Diagnoses: S/P                    Consultations:     Interventional Radiology  Urology             Brief History of Illness:   Zaina Tan is a 36 year old  female seen at the request of the emergency department for ongoing bladder spasms, suprapubic pain in the setting of known bladder flap hematoma after  section, which is decreasing in size. Also found to have UTI on evaluation today. DDX for her bladder spasms and pain includes UTI, irritation from bladder flap hematoma, infected bladder flap hematoma, over-active bladder, interstitial cystitis. Considering her normal WBC count and VSS, infection of the hematoma is unlikely. Irritation from hematoma could be contributing to pain.            Hospital Course:     The patient was admitted for pain control. She was made NPO.  Interventional radiology was consulted and on HD#1 a drain was placed. Urology was consulted and recommended medication for bladder spasm. She had drain flushed regularly with TPA and underwent repeat imaging on HD#3 which showed small decrease in size. Patient had improvement of her symptoms and requested discharge. On hD#3 sh was discharged to home following drain care teaching. She is planned to follow up for drain removal in clinic.              Discharge Instructions and Follow-Up:     Discharge diet: Regular   Discharge activity: Activity as tolerated   Discharge follow-up: Follow up with S clinic in 3 days for drain removal.       INSTRUCTIONS    Call Surgeon if you have:  Temperature greater than 100.4  Persistent nausea and vomiting  Severe uncontrolled pain  Redness, tenderness, or signs of infection (pain, swelling, redness, odor or green/yellow discharge around the site)  Difficulty breathing, headache or visual disturbances  Hives  Persistent dizziness or light-headedness  Extreme fatigue  Any other questions or concerns you may have after discharge  In an emergency, call 911 or go to an Emergency Department at a nearby hospital    WOUND CARE INSTRUCTIONS: Keep a dry clean dressing on the wound if there is drainage. The initial bandage may be removed after 24 hours. Once the wound has quit draining you may leave it open to air. If clothing rubs against the wound or causes irritation and the wound is not draining you may cover it with a dry dressing during the daytime. Try to keep the wound dry and avoid ointments on the wound unless directed to do so. If the wound becomes bright red and painful or starts to drain infected material that is not clear, please contact your physician immediately.    1. You may shower 48 hrs after surgery  2. No soaking in the tub    DIET: There are no dietary restrictions. You may eat any foods that you can tolerate. It is a good idea to eat a high fiber diet and take in  plenty of fluids to prevent constipation. If you do become constipated you may want to take a mild laxative or take ducolax tablets on a daily basis until your bowel habits are regular. Constipation can be very uncomfortable, along with straining, after recent surgery.    ACTIVITY: You are encouraged to cough and deep breath or use your incentive spirometer if you were given one, every 15-30 minutes when awake. This will help prevent respiratory complications and low grade fevers post-operatively if you had a general anesthetic. You may want to hug a pillow when coughing and sneezing to add additional support to the surgical area, if you had abdominal or chest surgery, which will decrease pain during these times.    1. No heavy lifting >20lbs or strenuous exercise for six-eight weeks. No exercise in which you are using core muscles (yoga, pilates, swimming, weight lifting)  2. You may walk as much as you wish. You are encouraged to increase your activity each day after surgery. Stairs are okay.  3. Nothing per vagina for eight weeks. No tampons, no intercourse, no douching. You can expect some light vaginal spotting and discharge for up to six weeks. If bleeding becomes heavy, please contact the office.    MEDICATIONS: Try to take narcotic medications and anti-inflammatory medications, such as tylenol, ibuprofen, naprosyn, etc., with food. This will minimize stomach upset from the medication. Should you develop nausea and vomiting from the pain medication, or develop a rash, please discontinue the medication and contact your physician. You should not drive, make important decisions, or operate machinery when taking narcotic pain medication.    OTHER: Patients are often constipated after general anesthesia and surgery. The patient should continue to take stool softeners (for example, Senokot-S) for the next six weeks and consume adequate amounts of water. If the patient remains constipated or unable to pass stool,  please try one or all of the following measures:    1. Milk of Magnesia 30cc twice a day as needed by mouth  2. Metamucil 2 tablespoons in 12 ounces of fluid  3. Dulcolax oral or suppositories  4. Prunes or prune juice  5. Miralax daily    QUESTIONS: Please feel free to call your physician or the hospital  if you have any questions, and they will be glad to assist you.      Sivan Doshi MD

## 2021-12-22 NOTE — H&P
Gynecology Consult Note     Zaina Tan MRN# [id number]   YOB: 1985 Age: 36 year old      Date of consult: 2021      Assessment and Plan:   Zaina Tan is a 36 year old  female seen at the request of the emergency department for ongoing bladder spasms, suprapubic pain in the setting of known bladder flap hematoma after  section, which is decreasing in size. Also found to have UTI on evaluation today. DDX for her bladder spasms and pain includes UTI, irritation from bladder flap hematoma, infected bladder flap hematoma, over-active bladder, interstitial cystitis. Considering her normal WBC count and VSS, infection of the hematoma is unlikely. Irritation from hematoma could be contributing to pain.     Recommendations:   - will admit for obs, pain control, start tylenol, toradol, oxycodone  - consult IR re: possible drainage of hematoma  - possible consult urology re: bladder spasms refractory to pyridium  - NPO for possible procedure, ordered coags, COVID     Patient discussed with Dr. Helm    Thank you for this consult.  Please do not hesitate to contact us with concerns or questions.       Yuli Henry MD MSc  OBGYN Resident, PGY3  2021, 5:26 AM           HPI:   Patient Summary:  Zaina Tan is a 36 year old  female seen at the request of the emergency department. She presented to the ED on 2021 for ongoing episodes of severe suprapubic pain. She had repeat  section on , which was notable for severe rectofasical adhesions with bleeding and friability of bladder serosa. Anterior fibroid in lower uterine segment also noted.     From operative note for CS:    Moderate to severe rectofascial adhesions. Thick intraperitoneal adhesions from bladder peritoneum to anterior uterine wall. Unable to fully release bladder peritoneum from anterior uterine wall especially on the left side. Uterus rotated to where right adnexa were close to  midline. Unable to rotate uterus back due to adhesions. If patient requires a repeat  or pelvic surgery, recommend a vertical midline skin incision. Right ovary and fallopian tube partially visualized and grossly looked normal. Unable to visualize left adnexa. Anterior fibroid in the lower uterine segment noted. Bleeding on bladder serosa and vesicouterine peritoneum noted, controlled with electrosurgery and suture.    Since her  section she presented for UTI symptoms, started macrobid course on . Had ongoing bladder spasms after finishing antibiotic course, repeat Ucx neg, started on pyridium w/ minimal improvement. Presented to ED on  for dysuria, pelvic US noted to have focal hematoma 11cm of anterior pelvis, also possible retained products. Underwent D&C on  for retained products. Intraop urine sample collected via straight cath which was negative. Since that time her dysuria and abdominal pain have been persistent, worst when she is starting to urinate or finishing urinating.     Otherwise, she denies fevers but does report some episodes of feeling cold in the night.          Past Medical History:     Past Medical History:   Diagnosis Date     Vitamin D deficiency 2017: Vitamin D= 25, fos4X message sent - supplementation [id number] IU/day 17- Vit D 48     Vitreous syneresis              Past Surgical History:     Past Surgical History:   Procedure Laterality Date      SECTION N/A 2017    Procedure:  SECTION;;  Surgeon: Shellie Soni MD;  [facility]: [facility]      SECTION N/A 2021    Procedure: REPEAT  SECTION;  Surgeon: Shellie Soni MD;  Location: [facility]     D & C      fetal hypoplastic leftheart syndrome     DILATION AND CURETTAGE SUCTION N/A 2021    Procedure: DILATION AND CURETTAGE, UTERUS, USING SUCTION with ultrasound guidance;  Surgeon: Neyda Ho MD;  Location:  OR             Social  History:     Social History     Tobacco Use     Smoking status: Never Smoker     Smokeless tobacco: Never Used   Substance Use Topics     Alcohol use: No             Family History:     Family History   Problem Relation Age of Onset     Hypertension Mother         had before pregnancy     Heart Disease Other         hypoplastic left heart. TAB 23 wks      Diabetes No family hx of      Cerebrovascular Disease No family hx of      Coronary Artery Disease No family hx of      Osteoporosis No family hx of      Anxiety Disorder No family hx of      Depression No family hx of              Immunizations:     Immunization History   Administered Date(s) Administered     COVID-19,PF,Moderna 02/06/2021, 03/06/2021     Influenza Vaccine IM > 6 months Valent IIV4 (Alfuria,Fluzone) 10/12/2015, 11/26/2021     TDAP Vaccine (Boostrix) 09/28/2015, 06/12/2017     Tdap (Adacel,Boostrix) 09/15/2021            Allergies:   No Known Allergies          Medications:     Current Facility-Administered Medications:      cephALEXin (KEFLEX) capsule 500 mg, 500 mg, Oral, Q6H, Era Salazar MD, 500 mg at 12/22/21 0537    Current Outpatient Medications:      acetaminophen (TYLENOL) 325 MG tablet, Take 2 tablets (650 mg) by mouth every 6 hours as needed for mild pain Start after Delivery., Disp: 100 tablet, Rfl: 0     ferrous sulfate (FEROSUL) 325 (65 Fe) MG tablet, Take 1 tablet (325 mg) by mouth daily (with breakfast), Disp: 100 tablet, Rfl: 0     ibuprofen (ADVIL/MOTRIN) 800 MG tablet, Take 1 tablet (800 mg) by mouth every 6 hours as needed for other (mild and/or inflammatory pain), Disp: 30 tablet, Rfl: 0     labetalol (NORMODYNE) 200 MG tablet, Take 1 tablet (200 mg) by mouth 2 times daily (Patient taking differently: Take 100 mg by mouth 2 times daily ), Disp: 60 tablet, Rfl: 1     oxyCODONE (ROXICODONE) 5 MG tablet, Take 1 tablet (5 mg) by mouth every 4 hours as needed for pain or moderate to severe pain, Disp: 42 tablet, Rfl: 0      oxyCODONE (ROXICODONE) 5 MG tablet, Take 1 tablet (5 mg) by mouth every 4 hours as needed for moderate to severe pain, Disp: 6 tablet, Rfl: 0     Prenatal Vit-Fe Fumarate-FA (PRENATAL MULTIVITAMIN W/IRON) 27-0.8 MG tablet, Take 1 tablet by mouth daily, Disp: , Rfl:             Review of Systems & Physical Exam:   Gen: +fatigue, +chills, denies weight loss or gain  CV: denies chest pain, orthopnea, PND, LE edema, palpitations, syncope   Pulm: denies SOB (w/ or w/o exertion), cough, wheezing, snoring  GI: denies reflux, abdominal pain, dysphagia, odynophagia, N/V, distention, constipation, diarrhea, hematochezia  : +dysuria, +urgency, + frequency denies hematuria, excessive nocturia, incontinence  Heme: denies fevers, night sweats, weight loss, bruising  GYN: +vaginal bleeding, + pelvic pain, denies abnormal vaginal discharge, pelvic vulvar itching  Neuro: denies weakness, numbness, dizziness, balance problems, headaches  Endo: denies polyuria, polydypsia, fatigue, weight loss/gain, hair loss/growth  MSK: denies joint pain, muscle aches, back pain    The Review of Systems is negative other than noted in the HPI      Patient Vitals for the past 4 hrs:   BP Temp Temp src Pulse Resp SpO2   12/22/21 0519 126/85 98.3  F (36.8  C) Oral 74 16 100 %   12/22/21 0500 no documentation no documentation no documentation no documentation no documentation 100 %   12/22/21 0430 no documentation no documentation no documentation no documentation no documentation 100 %   12/22/21 0400 no documentation no documentation no documentation no documentation no documentation 100 %   12/22/21 0330 no documentation no documentation no documentation no documentation no documentation 100 %     Gen: resting comfortably in bed  CV: RR, well perfused  Pulm: breathing comfortably on room air  Abd: tender in suprapubic area, RLQ and LLQ, non-distended, +BS  Pelvis: deferred  Extremities: non-tender, no erythema; trace edema         Data:     Results  for orders placed or performed during the hospital encounter of 21   CT Abdomen Pelvis w Contrast     Status: None    Narrative    EXAM: CT ABDOMEN PELVIS W CONTRAST  LOCATION: [facility]  DATE/TIME: 2021 2:53 AM    INDICATION: Pelvic pain after .  COMPARISON: None.  TECHNIQUE: CT scan of the abdomen and pelvis was performed following injection of IV contrast. Multiplanar reformats were obtained. Dose reduction techniques were used.  CONTRAST: 74mL isovue-370    FINDINGS:   LOWER CHEST: Lung bases clear.    HEPATOBILIARY: Normal.    PANCREAS: Normal.    SPLEEN: Normal.    ADRENAL GLANDS: Normal.    KIDNEYS/BLADDER: Normal.    BOWEL: Normal caliber. Appendix not seen.    LYMPH NODES: Normal.    VASCULATURE: Unremarkable.    PELVIC ORGANS: Prominent uterus consistent with recent postpartum status. Hyperdense, complex collection between the bladder and uterus 7.3 x 4.7 cm sagittal image 53.    MUSCULOSKELETAL: Ventral soft tissue edema within the pelvis consistent with recent . Small fat-containing umbilical hernia.      Impression    IMPRESSION:   1.  Hyperdense, complex fluid collection between the bladder and uterus suggestive of bladder flap hematoma. No visible extravasation.   Comprehensive metabolic panel     Status: Abnormal   Result Value Ref Range    Sodium 138 133 - 144 mmol/L    Potassium 3.4 3.4 - 5.3 mmol/L    Chloride 108 94 - 109 mmol/L    Carbon Dioxide (CO2) 25 20 - 32 mmol/L    Anion Gap 5 3 - 14 mmol/L    Urea Nitrogen 14 7 - 30 mg/dL    Creatinine 0.71 0.52 - 1.04 mg/dL    Calcium 8.8 8.5 - 10.1 mg/dL    Glucose 96 70 - 99 mg/dL    Alkaline Phosphatase 149 40 - 150 U/L    AST 20 0 - 45 U/L    ALT 21 0 - 50 U/L    Protein Total 7.7 6.8 - 8.8 g/dL    Albumin 3.3 (L) 3.4 - 5.0 g/dL    Bilirubin Total 0.3 0.2 - 1.3 mg/dL    GFR Estimate >90 >60 mL/min/1.73m2   UA with Microscopic     Status: Abnormal   Result Value Ref Range     Color Urine Light Yellow Colorless, Straw, Light Yellow, Yellow    Appearance Urine Clear Clear    Glucose Urine Negative Negative mg/dL    Bilirubin Urine Negative Negative    Ketones Urine Negative Negative mg/dL    Specific Gravity Urine 1.007 1.003 - 1.035    Blood Urine Small (A) Negative    pH Urine 6.0 5.0 - 7.0    Protein Albumin Urine Negative Negative mg/dL    Urobilinogen Urine Normal Normal, 2.0 mg/dL    Nitrite Urine Negative Negative    Leukocyte Esterase Urine Small (A) Negative    RBC Urine 1 <=2 /HPF    WBC Urine 31 (H) <=5 /HPF    Squamous Epithelials Urine <1 <=1 /HPF   CBC with platelets and differential     Status: Abnormal   Result Value Ref Range    WBC Count 4.0 4.0 - 11.0 10e3/uL    RBC Count 3.96 3.80 - 5.20 10e6/uL    Hemoglobin 11.0 (L) 11.7 - 15.7 g/dL    Hematocrit 35.2 35.0 - 47.0 %    MCV 89 78 - 100 fL    MCH 27.8 26.5 - 33.0 pg    MCHC 31.3 (L) 31.5 - 36.5 g/dL    RDW 13.3 10.0 - 15.0 %    Platelet Count 263 150 - 450 10e3/uL    % Neutrophils 51 %    % Lymphocytes 32 %    % Monocytes 12 %    % Eosinophils 4 %    % Basophils 1 %    % Immature Granulocytes 0 %    NRBCs per 100 WBC 0 <1 /100    Absolute Neutrophils 2.0 1.6 - 8.3 10e3/uL    Absolute Lymphocytes 1.3 0.8 - 5.3 10e3/uL    Absolute Monocytes 0.5 0.0 - 1.3 10e3/uL    Absolute Eosinophils 0.2 0.0 - 0.7 10e3/uL    Absolute Basophils 0.0 0.0 - 0.2 10e3/uL    Absolute Immature Granulocytes 0.0 <=0.4 10e3/uL    Absolute NRBCs 0.0 10e3/uL   CBC with platelets differential     Status: Abnormal    Narrative    The following orders were created for panel order CBC with platelets differential.  Procedure                               Abnormality         Status                     ---------                               -----------         ------                     CBC with platelets and d...[[id number]]  Abnormal            Final result                 Please view results for these tests on the individual orders.

## 2021-12-22 NOTE — PROGRESS NOTES
BRIEF UROLOGY NOTE    Zaina Tan is a 36 year-old female, 1 month post-partum ( delivery) who presented to the ER early this AM with pelvic pain, bladder spasms, and dysuria. On CT she was found to have a large hematoma with mass effect on the bladder. She is currently admitted to the Ob/Gyn service for symptom control and IR consult for possible drainage.    Urology was asked for medication recommendations for severe bladder spasms- likely a result of this pelvic hematoma. UA on admission demonstrated small LE and 31 wbc/hpf. She was started on Keflex for possible UTI.    After discussion with the inpatient pharmacist regarding medication safety in the breastfeeding patient, we would recommend the following:  - Detrol LA, 4 mg daily. This is an anticholinergic medication and may reduce breastmilk production, however there is no known risk to the infant.   - Pyridium 100 tid, use with caution when breastfeeding infants < 1 month due to risk of methemoglobinemia  - Stool softeners, as constipation can contribute to bladder spasms and anticholinergic medications are likely to cause constipation.    Could consider  - Myrbetriq 25 mg daily while in-patient. This mediation has low bioavailability in breastmilk, but the effects to the infant are unknown. Of note, this medication can be prescribed while in-patient, but insurance will not cover this as a discharge medication as the patient would have needed to trial an anticholinergic first.     Also, please ensure a urine culture is sent from this admission. It is often difficult to distinguish a true UTI from bladder inflammation on UA as both can show LE and WBCs.     Lastly, if it is decided the hematoma will not be drained and her bladder spasms remain severe, it may be reasonable to trial a sena catheter for bladder rest. Hematomas often take several weeks to resorb. She could discharge with a catheter which could then be removed in clinic in 1 week in  order to keep her bladder decompressed and hopefully provide some temporary relief.     Please do not hesitate to contact our team for additional recommendations.    Marilin Oliva MD  PGY-4 Urology  Pager [phone]

## 2021-12-22 NOTE — SEDATION DOCUMENTATION
Patient Name: Zaina Tan  Medical Record Number: [id number]  Today's Date: 12/22/2021    Procedure: abscess drain placement  Proceduralist: Dr Mello  Pathology present: n/a    Procedure Start: 1645  Procedure end: 1705  Sedation medications administered: 150mcg fentanyl and 3mg versed     Report given to: Liv GREENFIELD  : none    Other Notes: Pt arrived to IR room 1 from . Consent reviewed. Pt denies any questions or concerns regarding procedure. Pt positioned supine and monitored per protocol. Pt tolerated procedure without any noted complications. Pt transferred back to .

## 2021-12-22 NOTE — ED PROVIDER NOTES
ED Provider Note  [facility]      History     Chief Complaint   Patient presents with     Abdominal Pain     severe abd pain and dysuria     The history is provided by the patient and medical records.     Zaina Tan is a 36 year old female who is postpartum/postop from a  on 2021, presents to the ED with ongoing severe paroxysmal pain in the suprapubic region. She states she has been having paroxysms of severe pain ever since the , they are not getting any better, and are just ongoing and severe. She states she is getting them approximately four times an hour, they last for few minutes at a time. She denies any fever, cough, shortness of breath, vomiting, diarrhea. She also reports she had dysuria since the surgery. She states that it is pretty bad at this point. She states her lochia is minimal at this point, just light pink discharge. In reviewing the chart it does appear that she had a D&C on  for possible retained products of conception, it does not seem as though things have improved since then. She reports she is nursing.     This part of the medical record was transcribed by Rosita Ascencio, Medical Scribe, from a dictation done by Era Salazar MD.     Past Medical History  Past Medical History:   Diagnosis Date     Vitamin D deficiency 2017: Vitamin D= 25, MyChart message sent - supplementation 5999-1519 IU/day 17- Vit D 48     Vitreous syneresis      Past Surgical History:   Procedure Laterality Date      SECTION N/A 2017    Procedure:  SECTION;;  Surgeon: Shellie Soni MD;  [facility]: [facility]      SECTION N/A 2021    Procedure: REPEAT  SECTION;  Surgeon: Shellie Soni MD;  [facility]: [facility]     D & C      fetal hypoplastic leftheart syndrome     DILATION AND CURETTAGE SUCTION N/A 2021    Procedure: DILATION AND CURETTAGE, UTERUS, USING SUCTION with ultrasound  "guidance;  Surgeon: Neyda Ho MD;  Location: [facility]     acetaminophen (TYLENOL) 325 MG tablet  ferrous sulfate (FEROSUL) 325 (65 Fe) MG tablet  ibuprofen (ADVIL/MOTRIN) 800 MG tablet  labetalol (NORMODYNE) 200 MG tablet  oxyCODONE (ROXICODONE) 5 MG tablet  oxyCODONE (ROXICODONE) 5 MG tablet  Prenatal Vit-Fe Fumarate-FA (PRENATAL MULTIVITAMIN W/IRON) 27-0.8 MG tablet      No Known Allergies  Family History  Family History   Problem Relation Age of Onset     Hypertension Mother         had before pregnancy     Heart Disease Other         hypoplastic left heart. TAB 23 wks      Diabetes No family hx of      Cerebrovascular Disease No family hx of      Coronary Artery Disease No family hx of      Osteoporosis No family hx of      Anxiety Disorder No family hx of      Depression No family hx of      Social History   Social History     Tobacco Use     Smoking status: Never Smoker     Smokeless tobacco: Never Used   Substance Use Topics     Alcohol use: No     Drug use: No      Past medical history, past surgical history, medications, allergies, family history, and social history were reviewed with the patient. No additional pertinent items.       Review of Systems  A complete review of systems was performed with pertinent positives and negatives noted in the HPI, and all other systems negative.    Physical Exam   BP: 121/67  Pulse: 77  Temp: 98.5  F (36.9  C)  Resp: 18  Height: 157.5 cm (5' 2\")  Weight: 68 kg (150 lb)  SpO2: 100 %  Physical Exam  Constitutional:       General: She is not in acute distress.     Appearance: She is not diaphoretic.   HENT:      Head: Atraumatic.   Eyes:      General: No scleral icterus.  Cardiovascular:      Heart sounds: Normal heart sounds.   Pulmonary:      Effort: No respiratory distress.      Breath sounds: Normal breath sounds.   Abdominal:      Palpations: Abdomen is soft.      Tenderness: There is no abdominal tenderness.       Musculoskeletal:         General: No tenderness. "   Skin:     General: Skin is warm.      Findings: No rash.       ED Course      Procedures                     Results for orders placed or performed during the hospital encounter of 21   CT Abdomen Pelvis w Contrast     Status: None    Narrative    EXAM: CT ABDOMEN PELVIS W CONTRAST  LOCATION: [facility]  DATE/TIME: 2021 2:53 AM    INDICATION: Pelvic pain after .  COMPARISON: None.  TECHNIQUE: CT scan of the abdomen and pelvis was performed following injection of IV contrast. Multiplanar reformats were obtained. Dose reduction techniques were used.  CONTRAST: 74mL isovue-370    FINDINGS:   LOWER CHEST: Lung bases clear.    HEPATOBILIARY: Normal.    PANCREAS: Normal.    SPLEEN: Normal.    ADRENAL GLANDS: Normal.    KIDNEYS/BLADDER: Normal.    BOWEL: Normal caliber. Appendix not seen.    LYMPH NODES: Normal.    VASCULATURE: Unremarkable.    PELVIC ORGANS: Prominent uterus consistent with recent postpartum status. Hyperdense, complex collection between the bladder and uterus 7.3 x 4.7 cm sagittal image 53.    MUSCULOSKELETAL: Ventral soft tissue edema within the pelvis consistent with recent . Small fat-containing umbilical hernia.      Impression    IMPRESSION:   1.  Hyperdense, complex fluid collection between the bladder and uterus suggestive of bladder flap hematoma. No visible extravasation.   Comprehensive metabolic panel     Status: Abnormal   Result Value Ref Range    Sodium 138 133 - 144 mmol/L    Potassium 3.4 3.4 - 5.3 mmol/L    Chloride 108 94 - 109 mmol/L    Carbon Dioxide (CO2) 25 20 - 32 mmol/L    Anion Gap 5 3 - 14 mmol/L    Urea Nitrogen 14 7 - 30 mg/dL    Creatinine 0.71 0.52 - 1.04 mg/dL    Calcium 8.8 8.5 - 10.1 mg/dL    Glucose 96 70 - 99 mg/dL    Alkaline Phosphatase 149 40 - 150 U/L    AST 20 0 - 45 U/L    ALT 21 0 - 50 U/L    Protein Total 7.7 6.8 - 8.8 g/dL    Albumin 3.3 (L) 3.4 - 5.0 g/dL    Bilirubin Total 0.3 0.2 -  1.3 mg/dL    GFR Estimate >90 >60 mL/min/1.73m2   UA with Microscopic     Status: Abnormal   Result Value Ref Range    Color Urine Light Yellow Colorless, Straw, Light Yellow, Yellow    Appearance Urine Clear Clear    Glucose Urine Negative Negative mg/dL    Bilirubin Urine Negative Negative    Ketones Urine Negative Negative mg/dL    Specific Gravity Urine 1.007 1.003 - 1.035    Blood Urine Small (A) Negative    pH Urine 6.0 5.0 - 7.0    Protein Albumin Urine Negative Negative mg/dL    Urobilinogen Urine Normal Normal, 2.0 mg/dL    Nitrite Urine Negative Negative    Leukocyte Esterase Urine Small (A) Negative    RBC Urine 1 <=2 /HPF    WBC Urine 31 (H) <=5 /HPF    Squamous Epithelials Urine <1 <=1 /HPF   CBC with platelets and differential     Status: Abnormal   Result Value Ref Range    WBC Count 4.0 4.0 - 11.0 10e3/uL    RBC Count 3.96 3.80 - 5.20 10e6/uL    Hemoglobin 11.0 (L) 11.7 - 15.7 g/dL    Hematocrit 35.2 35.0 - 47.0 %    MCV 89 78 - 100 fL    MCH 27.8 26.5 - 33.0 pg    MCHC 31.3 (L) 31.5 - 36.5 g/dL    RDW 13.3 10.0 - 15.0 %    Platelet Count 263 150 - 450 10e3/uL    % Neutrophils 51 %    % Lymphocytes 32 %    % Monocytes 12 %    % Eosinophils 4 %    % Basophils 1 %    % Immature Granulocytes 0 %    NRBCs per 100 WBC 0 <1 /100    Absolute Neutrophils 2.0 1.6 - 8.3 10e3/uL    Absolute Lymphocytes 1.3 0.8 - 5.3 10e3/uL    Absolute Monocytes 0.5 0.0 - 1.3 10e3/uL    Absolute Eosinophils 0.2 0.0 - 0.7 10e3/uL    Absolute Basophils 0.0 0.0 - 0.2 10e3/uL    Absolute Immature Granulocytes 0.0 <=0.4 10e3/uL    Absolute NRBCs 0.0 10e3/uL   CBC with platelets differential     Status: Abnormal    Narrative    The following orders were created for panel order CBC with platelets differential.  Procedure                               Abnormality         Status                     ---------                               -----------         ------                     CBC with platelets and d...[[id number]]  Abnormal             Final result                 Please view results for these tests on the individual orders.     Medications   cephALEXin (KEFLEX) capsule 500 mg (has no administration in time range)   iopamidol (ISOVUE-370) solution 100 mL (74 mLs Intravenous Given 12/22/21 0314)   sodium chloride 0.9 % bag 100mL (58 mLs Intravenous Given 12/22/21 0317)        Assessments & Plan (with Medical Decision Making)   Suspicious for infection.  However, I do not know that this is truly the etiology of her symptoms, as she has had negative UA/UC's in her postpartum period, though has had this pain ongoing since then.  I did speak with the OB resident who asked that a CT her abdomen, which showed a hyperdense, complex fluid collection between the bladder and the uterus suggestive of a bladder flap hematoma.  No visible extravasation.  She did have this known previously.  However, per OB its unusual that this would not have resolved on its own by this time.  They will admit the patient to observation for further management.  I did inform the patient that it could be a consideration to pump her breastmilk and dispose of it for the next 24 hours after her IV contrast.    Dictation Disclaimer: Some of this Note has been completed with voice-recognition dictation software. Although errors are generally corrected real-time, there is the potential for a rare error to be present in the completed chart.      I have reviewed the nursing notes. I have reviewed the findings, diagnosis, plan and need for follow up with the patient.    New Prescriptions    No medications on file       Final diagnoses:   Postoperative pain       --  Era Salazar  [facility] EMERGENCY DEPARTMENT  12/22/2021     Era Salazar MD  12/22/21 0665

## 2021-12-22 NOTE — PROCEDURES
[facility]    Procedure: IR Procedure Note    Date/Time: 12/22/2021 5:25 PM  Performed by: Donna Mello MD  Authorized by: Donna Mello MD       UNIVERSAL PROTOCOL   Site Marked: NA  Prior Images Obtained and Reviewed:  Yes  Required items: Required blood products, implants, devices and special equipment available    Patient identity confirmed:  Verbally with patient, arm band, provided demographic data and hospital-assigned identification number  Patient was reevaluated immediately before administering moderate or deep sedation or anesthesia  Confirmation Checklist:  Patient's identity using two indicators, relevant allergies, procedure was appropriate and matched the consent or emergent situation and correct equipment/implants were available  Time out: Immediately prior to the procedure a time out was called    Universal Protocol: the Joint Commission Universal Protocol was followed    Preparation: Patient was prepped and draped in usual sterile fashion       ANESTHESIA    Anesthesia: Local infiltration  Local Anesthetic:  Lidocaine 1% without epinephrine    See dictated procedure note for full details.  Findings: Versed 3 mg  Fentanyl 150 micrograms  Sed time 20 minutes    Specimens: none    Complications: None    Condition: Stable    Plan: Drain to gravity  Intracavitary alteplase BID. First dose ordered by IR, subsequent doses need to be ordered by primary team  Recommend repeat US in 2-3 days to assess hematoma size. If size significantly reduced, can consider removal pending patient symptoms  Long term drain placement more than 4-5 days is not recommended due to infection risk      PROCEDURE  Describe Procedure: 12 Fr skater  Patient Tolerance:  Patient tolerated the procedure well with no immediate complications  Length of time physician/provider present for 1:1 monitoring during sedation: 20

## 2021-12-22 NOTE — PRE-PROCEDURE
GENERAL PRE-PROCEDURE:   Procedure:  Pelvic drain placement  Date/Time:  12/22/2021 4:21 PM    Verbal consent obtained?: Yes    Written consent obtained?: Yes    Risks and benefits: Risks, benefits and alternatives were discussed    Consent given by:  Patient  Patient states understanding of procedure being performed: Yes    Patient's understanding of procedure matches consent: Yes    Procedure consent matches procedure scheduled: Yes    Expected level of sedation:  Moderate  Appropriately NPO:  Yes  Mallampati  :  Grade 1- soft palate, uvula, tonsillar pillars, and posterior pharyngeal wall visible  Lungs:  Lungs clear with good breath sounds bilaterally  Heart:  Normal heart sounds and rate  History & Physical reviewed:  History and physical reviewed and no updates needed  Statement of review:  I have reviewed the lab findings, diagnostic data, medications, and the plan for sedation

## 2021-12-22 NOTE — PROGRESS NOTES
"Gynecology Progress Note  2021  24 hour events:   - Admitted  - S/p IR, Urology consults  - S/p drain with IR    Subjective: Pain well controlled. Ambulating without dizziness. Tolerating PO without nausea or vomiting.    Misses her baby, eager to go home  Objective:   /50 (BP Location: Left arm, Patient Position: Supine)   Pulse 73   Temp 97.1  F (36.2  C) (Oral)   Resp 16   Ht 1.575 m (5' 2\")   Wt 68 kg (150 lb)   LMP 2021   SpO2 100%   BMI 27.44 kg/m      General:  in NAD  Abdomen: well healed incision, drain in place (per RN not putting much out- confirmed with IR they are doing flushing appropriately)  Extremities: nontender,  edema, SCDs in place      Assessment: Zaina Tan is a 36 year old , HD#2 admitted with ongoing pain likely from known bladder flap hematoma and associated bladder spasm. Now POD#1 s/p drain placement. Admission workup additionally notable for UTI.    Active Problem list:  Bladder flap hematoma  Bladder spasm  UTI  Gestational hypertension  Postoperative state    Plan:    Bladder flap hematoma  Bladder spasm   - Drain output as above. Continue TPA per IR recommendations  - Continue Oxybutynin 5 mg TID prn in addition to toradol, tylenol, oxycodone prn  - Consider sena replacement if no improvement in symptoms    Repeat ultrasound tomorrow   Providers to call [phone] to secure early am (8ish) ultrasound, drain teaching at 10 am (if still in place) and discharge asap.    UTI  - Follow up urine culture  - Continue Keflex    Gestational hypertension  - Monitor BP  - Continue labetalol 100 mg BID. Increase as needed (BP > 150/100)    Postoperative/ postpartum state  - No acute issues  - Pain control as above  - Prn bowel regimen  - Pumping for baby    Swati Ha MD    I have seen and examined the patient with the resident. I have reviewed, edited, and agree with the note.     Sarah Edge MD            "

## 2021-12-22 NOTE — CONSULTS
IR was consulted to evaluate patient for drain management of a hematoma pressing on the patient urinary bladder. Patient is 4 weeks post procedure from a bladder flap procedure following  section. That procedure was complicated by a bladder flap hematoma. Patients hematoma has gotten marginally smaller over the last 4 weeks. Patient is symptomatic including pelvic pain and bladder spasm.     IR has reviewed the case and agrees that a short drain placement course with TPA flushes to dissolve the hematoma would be prudent. Plan for drain placement for 3-4 day course to minimize the risk of infection. Initial TPA flush order will be placed by IR - subsequent orders should be managed by the inpatient team.     Patient is on IR schedule 2021 for an image guided pelvic drain placement.   Labs WNL for procedure.    Orders for NPO, scrubs and antibiotics have been entered.   Medications to be held include: none  Consent will be done prior to procedure.      Please contact IR charge at [phone] for estimated time of procedure.     Case discussed with the requesting OB/Gyn team and IR staff Dr. Mello.    Pedro Delgado PA-C  Interventional Radiology  Phone: [phone]  Pager: [phone]

## 2021-12-22 NOTE — TELEPHONE ENCOUNTER
Feverish, pain in lower abdomen. When she urinates, there is significant pain. This pain has been since delivery. Had a D&C after delivery, but every evening at this time, she feels feverish and has abdominal pain. VB is like a period. Breastfeeding is going well. No nausea, but not eating, feels cold. +diarrhea/loose stools x 2d. No sick exposures.     Explained that night sweats are common after having a baby, but fevers are not. In the context of her recent treatment for retained products and , I have concern for infection - endometritis or UTI/pyelo. She is agreeable to come to the [facility] ER for evaluation.    Lala Helm MD, MSCI    [facility]/OBGYN

## 2021-12-23 LAB — BACTERIA UR CULT: NORMAL

## 2021-12-23 PROCEDURE — 258N000003 HC RX IP 258 OP 636: Performed by: STUDENT IN AN ORGANIZED HEALTH CARE EDUCATION/TRAINING PROGRAM

## 2021-12-23 PROCEDURE — 250N000011 HC RX IP 250 OP 636: Performed by: RADIOLOGY

## 2021-12-23 PROCEDURE — 250N000011 HC RX IP 250 OP 636: Performed by: STUDENT IN AN ORGANIZED HEALTH CARE EDUCATION/TRAINING PROGRAM

## 2021-12-23 PROCEDURE — 250N000013 HC RX MED GY IP 250 OP 250 PS 637: Performed by: STUDENT IN AN ORGANIZED HEALTH CARE EDUCATION/TRAINING PROGRAM

## 2021-12-23 PROCEDURE — 120N000002 HC R&B MED SURG/OB UMMC

## 2021-12-23 RX ADMIN — OXYCODONE HYDROCHLORIDE 5 MG: 5 TABLET ORAL at 17:58

## 2021-12-23 RX ADMIN — CEPHALEXIN 500 MG: 500 CAPSULE ORAL at 00:46

## 2021-12-23 RX ADMIN — DOCUSATE SODIUM AND SENNOSIDES 1 TABLET: 8.6; 5 TABLET ORAL at 07:10

## 2021-12-23 RX ADMIN — CEPHALEXIN 500 MG: 500 CAPSULE ORAL at 07:12

## 2021-12-23 RX ADMIN — ACETAMINOPHEN 975 MG: 325 TABLET, FILM COATED ORAL at 09:51

## 2021-12-23 RX ADMIN — KETOROLAC TROMETHAMINE 30 MG: 30 INJECTION, SOLUTION INTRAMUSCULAR; INTRAVENOUS at 07:10

## 2021-12-23 RX ADMIN — SODIUM CHLORIDE, POTASSIUM CHLORIDE, SODIUM LACTATE AND CALCIUM CHLORIDE: 600; 310; 30; 20 INJECTION, SOLUTION INTRAVENOUS at 12:55

## 2021-12-23 RX ADMIN — OXYCODONE HYDROCHLORIDE 5 MG: 5 TABLET ORAL at 07:12

## 2021-12-23 RX ADMIN — ACETAMINOPHEN 975 MG: 325 TABLET, FILM COATED ORAL at 02:15

## 2021-12-23 RX ADMIN — ALTEPLASE 2 MG: 2.2 INJECTION, POWDER, LYOPHILIZED, FOR SOLUTION INTRAVENOUS at 08:34

## 2021-12-23 RX ADMIN — CEPHALEXIN 500 MG: 500 CAPSULE ORAL at 12:05

## 2021-12-23 RX ADMIN — CEPHALEXIN 500 MG: 500 CAPSULE ORAL at 17:59

## 2021-12-23 RX ADMIN — ALTEPLASE 2 MG: 2.2 INJECTION, POWDER, LYOPHILIZED, FOR SOLUTION INTRAVENOUS at 22:13

## 2021-12-23 RX ADMIN — ACETAMINOPHEN 975 MG: 325 TABLET, FILM COATED ORAL at 17:59

## 2021-12-23 ASSESSMENT — ACTIVITIES OF DAILY LIVING (ADL)
ADLS_ACUITY_SCORE: 4
ADLS_ACUITY_SCORE: 4
ADLS_ACUITY_SCORE: 6
ADLS_ACUITY_SCORE: 4
ADLS_ACUITY_SCORE: 6
ADLS_ACUITY_SCORE: 6
ADLS_ACUITY_SCORE: 4
ADLS_ACUITY_SCORE: 6
ADLS_ACUITY_SCORE: 4
ADLS_ACUITY_SCORE: 6

## 2021-12-23 NOTE — PLAN OF CARE
A/O x 4. Mild abd pain. Drain irrigated with 10 ml saline x 1. Tylenol given for pain. Pt is pumping  and dumping breast milk during this shift. Keflex given as ordered. Call light is in reach cont to assess.

## 2021-12-23 NOTE — PLAN OF CARE
"/50 (BP Location: Left arm, Patient Position: Supine)   Pulse 73   Temp 97.1  F (36.2  C) (Oral)   Resp 16   Ht 1.575 m (5' 2\")   Wt 68 kg (150 lb)   LMP 02/20/2021   SpO2 100%   BMI 27.44 kg/m      A+Ox4, VSS on RA. Complains of abdominal discomfort at drain site but noting decreased generalized lower abdominal pain. Denies pain with urination. LMB: 12/20/21, passing gas. Patient encouraged to try to have a bowel movement.  Patient no longer needing to discard breast milk. Breast milk fridge obtained in patient room.   IR contacted x2 for clarification on drainage TPA and irrigation orders. ALBINO Vasquez, discussed irrigation plan with writer. Irrigate drain and cavity per eMAR. No need for additional irrigation. TPA may take a while to begin dissolving clot and lack of drainage is not concerning at this time.  Patient drainage education scheduled for 12/24/21, morning.                      "

## 2021-12-23 NOTE — PROGRESS NOTES
Care Management Follow Up    Length of Stay (days): 1    Expected Discharge Date: 12/25/2021     Concerns to be Addressed: Drain      Anticipated Discharge Disposition:  Home     Additional Information:  Paged MD to discuss discharge plan for patient. No call back. Spoke with RN and Patient to see if they had any information regarding discharge. Patient said she spoke with the MD this afternoon and her understanding is the drain will be taken out on 12/24 and she will be discharging to home. Patient would not require any home care for drain cares if that is the case. RNCC will attempt to contact MD and confirm plan.     Chapis Liang RN, BSN  Care Coordinator, [facility] Orth[facility]  Phone [phone]  Pager [phone]

## 2021-12-23 NOTE — PLAN OF CARE
"/65 (BP Location: Left arm)   Pulse 57   Temp 97.1  F (36.2  C) (Oral)   Resp 14   Ht 1.575 m (5' 2\")   Wt 68 kg (150 lb)   LMP 02/20/2021   SpO2 98%   BMI 27.44 kg/m    Pt went down for IR procedure around 1630 to have bladder hematoma drained and drain placed. Drowsy after procedure, but now more alert. On RA, denies chest pain or SOB. Steady gait, SBA to help w/ IV pole. R PIV - infusing LR @ 100 mL/hr. Pelvic drain irrigated w/ Alteplase per MAR. Dark red drainage. Pt reports more pain w/ movement, but when lying still, denies pain. No PRN pain meds given. Pt on PO anbx for possible UTI. Denied pain/burning w/ urination all shift until tonight when she got up to bathroom. Small amt of pinkish vaginal discharge, unchanged. Pt is pumping in room. Tolerating regular diet.   Call light in reach, able to make needs known. Monitor drain output and meet observation goals.     OBS GOALS:  -diagnostic tests and consults completed and resulted - NOT MET  -vital signs normal or at patient baseline - MET  "

## 2021-12-24 ENCOUNTER — APPOINTMENT (OUTPATIENT)
Dept: EDUCATION SERVICES | Facility: CLINIC | Age: 36
End: 2021-12-24
Attending: RADIOLOGY
Payer: COMMERCIAL

## 2021-12-24 ENCOUNTER — APPOINTMENT (OUTPATIENT)
Dept: ULTRASOUND IMAGING | Facility: CLINIC | Age: 36
End: 2021-12-24
Payer: COMMERCIAL

## 2021-12-24 VITALS
WEIGHT: 150 LBS | RESPIRATION RATE: 16 BRPM | DIASTOLIC BLOOD PRESSURE: 74 MMHG | OXYGEN SATURATION: 100 % | BODY MASS INDEX: 27.6 KG/M2 | SYSTOLIC BLOOD PRESSURE: 123 MMHG | HEIGHT: 62 IN | TEMPERATURE: 97.3 F | HEART RATE: 65 BPM

## 2021-12-24 PROCEDURE — 76705 ECHO EXAM OF ABDOMEN: CPT | Mod: 26 | Performed by: RADIOLOGY

## 2021-12-24 PROCEDURE — 250N000011 HC RX IP 250 OP 636: Performed by: RADIOLOGY

## 2021-12-24 PROCEDURE — 258N000003 HC RX IP 258 OP 636: Performed by: STUDENT IN AN ORGANIZED HEALTH CARE EDUCATION/TRAINING PROGRAM

## 2021-12-24 PROCEDURE — 250N000013 HC RX MED GY IP 250 OP 250 PS 637: Performed by: STUDENT IN AN ORGANIZED HEALTH CARE EDUCATION/TRAINING PROGRAM

## 2021-12-24 PROCEDURE — 76705 ECHO EXAM OF ABDOMEN: CPT

## 2021-12-24 RX ORDER — IBUPROFEN 600 MG/1
600 TABLET, FILM COATED ORAL EVERY 6 HOURS PRN
Qty: 40 TABLET | Refills: 0 | Status: SHIPPED | OUTPATIENT
Start: 2021-12-24 | End: 2021-12-24

## 2021-12-24 RX ORDER — OXYBUTYNIN CHLORIDE 5 MG/1
5 TABLET ORAL 3 TIMES DAILY PRN
Qty: 15 TABLET | Refills: 0 | Status: SHIPPED | OUTPATIENT
Start: 2021-12-24 | End: 2022-01-06

## 2021-12-24 RX ORDER — OXYCODONE HYDROCHLORIDE 5 MG/1
5 TABLET ORAL EVERY 4 HOURS PRN
Qty: 6 TABLET | Refills: 0 | Status: SHIPPED | OUTPATIENT
Start: 2021-12-24 | End: 2023-09-08

## 2021-12-24 RX ORDER — ACETAMINOPHEN 500 MG
500-1000 TABLET ORAL EVERY 6 HOURS PRN
Qty: 60 TABLET | Refills: 0 | Status: SHIPPED | OUTPATIENT
Start: 2021-12-24 | End: 2021-12-24

## 2021-12-24 RX ORDER — ACETAMINOPHEN 500 MG
500-1000 TABLET ORAL EVERY 6 HOURS PRN
Qty: 60 TABLET | Refills: 0 | Status: SHIPPED | OUTPATIENT
Start: 2021-12-24 | End: 2023-09-08

## 2021-12-24 RX ORDER — IBUPROFEN 600 MG/1
600 TABLET, FILM COATED ORAL EVERY 6 HOURS PRN
Qty: 40 TABLET | Refills: 0 | Status: SHIPPED | OUTPATIENT
Start: 2021-12-24 | End: 2023-09-08

## 2021-12-24 RX ADMIN — CEPHALEXIN 500 MG: 500 CAPSULE ORAL at 05:45

## 2021-12-24 RX ADMIN — OXYCODONE HYDROCHLORIDE 5 MG: 5 TABLET ORAL at 05:46

## 2021-12-24 RX ADMIN — CEPHALEXIN 500 MG: 500 CAPSULE ORAL at 13:21

## 2021-12-24 RX ADMIN — ALTEPLASE 2 MG: 2.2 INJECTION, POWDER, LYOPHILIZED, FOR SOLUTION INTRAVENOUS at 09:56

## 2021-12-24 RX ADMIN — ACETAMINOPHEN 975 MG: 325 TABLET, FILM COATED ORAL at 09:07

## 2021-12-24 RX ADMIN — OXYCODONE HYDROCHLORIDE 5 MG: 5 TABLET ORAL at 13:25

## 2021-12-24 RX ADMIN — CEPHALEXIN 500 MG: 500 CAPSULE ORAL at 00:57

## 2021-12-24 RX ADMIN — ACETAMINOPHEN 975 MG: 325 TABLET, FILM COATED ORAL at 01:09

## 2021-12-24 RX ADMIN — SODIUM CHLORIDE, POTASSIUM CHLORIDE, SODIUM LACTATE AND CALCIUM CHLORIDE: 600; 310; 30; 20 INJECTION, SOLUTION INTRAVENOUS at 01:14

## 2021-12-24 ASSESSMENT — ACTIVITIES OF DAILY LIVING (ADL)
ADLS_ACUITY_SCORE: 6

## 2021-12-24 NOTE — PLAN OF CARE
Assumed care for patient at 1500. Patient discharge orders placed at this time. Patient discharged home with drain in place and medications going to home [facility]'s. Patient discharged with family member and infant child at 1615.    Dressing change to dressing on drain completed and IV removed prior to discharge.    Dressing supplies and drain care instructions sent with patient as well as demonstration of drain cares. Patient had no further questions or needs at discharge.

## 2021-12-24 NOTE — PLAN OF CARE
"/79   Pulse 64   Temp 97.6  F (36.4  C) (Oral)   Resp 16   Ht 1.575 m (5' 2\")   Wt 68 kg (150 lb)   LMP 2021   SpO2 100%   BMI 27.44 kg/m  .     Neuro/CMS: Pt alert and verbal. Oriented x 4. Denies N/T.    Respiratory: LS clear. Pt on RA. Denies SOB. Denies chest pain. No cough observed.    Gastro/Genito: Normo active bowel sounds. Last known BM before admission on 21. Voids spontaneously without difficulty.    Activity: Independent. OOB to chair and restroom.    Skin/Dressings: Skin intact ex .    Lines/Drains/Airways: R PIV infusing LR this AM. Saline locked at 1430.    Pain: Pain rated 3/10 in lower abd - managed with scheduled tylenol.    Diet: Regular, tolerating well.    Plan: Continue POC, expected discharge on 21.    Additional Notes: Pt's infant in room with her.    OB/Gyn paged twice regarding discharge orders.  "

## 2021-12-24 NOTE — PROGRESS NOTES
RN relayed that ANS approved sister and baby to come today, 21, to breast feed . Writer verified w/ ANS, and it was approved. RN then relayed that baby was going to stay overnight due to sister having to go to work. Per  policy, pt needed another person to watch - wether that would be friend, relative, etc. Writer contacted SW to see if they had any resources to help patient. Per RN sister was taking work off to care for , and could not miss anymore work, patient had no other support available, as ex-? Had the other kids but lived far away. SW had no resources available, but voiced that it would be in the best interest for  and mother to be together, and to call risk management check with them. Writer relayed information to ANS. ANS verified that it has been approved by ANS & risk management. Patient been given basinet and will check in frequently to lower risk of exposure to mother &  here.

## 2021-12-24 NOTE — PROGRESS NOTES
Care Management Follow Up    Length of Stay (days): 2    Expected Discharge Date: 12/24/2021     Concerns to be Addressed: Discharge planning        Additional Information:  Spoke to OB/Gyn team and patient is anticipated to discharge today. Patient had [facility]) training for drain care in case she needs to discharge with the drain. No care coordination needs anticipated at this time.       BEBETO Hui RNCC  RN Care Coordinator   Office: [phone]   Pager: [phone]  Weekend pager: [phone]

## 2021-12-24 NOTE — PLAN OF CARE
"/51 (BP Location: Left arm)   Pulse 56   Temp 97.6  F (36.4  C) (Oral)   Resp 16   Ht 1.575 m (5' 2\")   Wt 68 kg (150 lb)   LMP 02/20/2021   SpO2 100%   BMI 27.44 kg/m    QA & O x4. On RA, denies chest pain or SOB. Up independently. R PIV - infusing LR @ 100 mL/hr. Pelvic drain irrigated w/ Alteplase per MAR. Dark red drainage. Pt reports more pain w/ movement, but when lying still, denies pain. Oxy given x1. Pt on PO anbx for possible UTI. Denied pain/burning w/ urination. Small amt of pinkish vaginal discharge, unchanged. Pt is pumping and breastfeeding in room, stores breast milk in refrigerator in room. Tolerating regular diet.   Call light in reach, able to make needs known. Monitor drain output and meet observation goals. Ultrasound in AM and determine if pt needs to discharge w/ drain to home. See charge nurse note regarding baby staying w/ pt. Frequent rounding done.     OBS GOALS:  -diagnostic tests and consults completed and resulted - NOT MET  -vital signs normal or at patient baseline - MET  "

## 2021-12-24 NOTE — PROGRESS NOTES
"Gynecology Progress Note  2021    Subjective: Pain well controlled, improving. Ambulating without dizziness. Tolerating PO without nausea or vomiting. Voiding without pain. Wants to go home today.    Objective:   /79   Pulse 64   Temp 97.6  F (36.4  C) (Oral)   Resp 16   Ht 1.575 m (5' 2\")   Wt 68 kg (150 lb)   LMP 2021   SpO2 100%   BMI 27.44 kg/m      General:  in NAD  Abdomen: well healed incision, drain in place with minimal sanguineous output  Extremities: nontender,  edema, SCDs in place    Drain:    output: 24cc    Assessment: Zaina Tan is a 36 year old , HD#3 admitted with ongoing pain likely from known bladder flap hematoma and associated bladder spasm. Now POD#2 s/p drain placement with improvement in pain and decreasing output from drain. Admission workup additionally notable for UTI.    Active Problem list:  Bladder flap hematoma  Bladder spasm  UTI  Gestational hypertension  Postoperative state    Plan:    Bladder flap hematoma  Bladder spasm   - Drain output as above, minimal. Continue TPA per IR recommendations  - Cultures NGTD  - Repeat US today. If hematoma decreasing in size plan to remove drain and discharge. If same size will discharge with drain and repeat US, likely  (need to confirm with IR)  - Drain teaching today at 1000 if discharges with drain  - Continue Oxybutynin 5 mg TID prn in addition to toradol, tylenol, oxycodone prn    UTI  - Follow up urine culture - urogenital andrey  - Discontinue Keflex    Gestational hypertension  - Monitor BP  - Continue labetalol 100 mg BID. Increase as needed (BP > 150/100)    Postoperative/ postpartum state  - No acute issues  - Pain control as above  - Prn bowel regimen  - Pumping for baby    Staffed with Dr. Doshi.    James Martinez MD MPH  OB/Gyn PGY-3  21 10:22 AM    Appreciate note by Dr. Martinez. Patient has been seen and examined by me separate from the resident, agree with above " note. Small decrease in hematoma size on US. Plan to leave drain in place and remove early next week per IR recommendation.     Sivan Doshi MD

## 2021-12-24 NOTE — PLAN OF CARE
"      VS: /51 (BP Location: Left arm)   Pulse 56   Temp 97.6  F (36.4  C) (Oral)   Resp 16   Ht 1.575 m (5' 2\")   Wt 68 kg (150 lb)   LMP 02/20/2021   SpO2 100%   BMI 27.44 kg/m       Output: Last BM was several days ago per pt report  Voids spontaneously w/o difficulty    Lungs: On RA, LS clear and equal bilaterally, denies SOB, chest pain, and cough   Activity: IND in room    Skin: Intact    Pain:   Reported abdominal pain 3-4/10 where drain is, given tylenol once and oxycodone once to manage pain    Neuro/CMS:   A and Ox4, denies N/T and dizziness    Dressing(s):   PIV - CDI  Abd drain - CDI    Diet:   Regular diet    LDA:   RT PIV - infusing 100 mL LR    Equipment:   Call light, IV pole, basinet, fridge    Plan:   Ultrasound in morning and plan to discharge home 12/24, if provider keeps drain in, pt will need education on drain management    Additional Info:   Pt was approved by ANS to have infant stay overnight, fridge is in room so pt can store breast milk        "

## 2021-12-24 NOTE — CONSULTS
Met with patient at the bedside for drain education. Verbally reviewed basic cares/safety of the drainage tube.     Demonstrated how to clean around the insertion site and perform dressing change. Demonstrated how to empty the bag and record the drainage. Showed how to flush the drain with saline and patient demonstrated on herself.     Patient verbalized and demonstrated understanding of the material and was engaged in education.     Literature given: Handwashing and Skin Care, Drainage Tube Home Care Instructions, Flushing Your Drain with Saline.

## 2021-12-26 ENCOUNTER — PATIENT OUTREACH (OUTPATIENT)
Dept: CARE COORDINATION | Facility: CLINIC | Age: 36
End: 2021-12-26
Payer: COMMERCIAL

## 2021-12-26 DIAGNOSIS — Z71.89 OTHER SPECIFIED COUNSELING: ICD-10-CM

## 2021-12-26 NOTE — PROGRESS NOTES
Clinic Care Coordination Contact  [facility]/Voicemail       Clinical Data: Care Coordinator Outreach  Outreach attempted x 1.  Left message on patient's voicemail with call back information and requested return call.  Plan:Care Coordinator will try to reach patient again in 1-2 business days.    Elen Nair, [facility]  [phone]  [facility]

## 2021-12-27 ENCOUNTER — TELEPHONE (OUTPATIENT)
Dept: OBGYN | Facility: CLINIC | Age: 36
End: 2021-12-27
Payer: COMMERCIAL

## 2021-12-27 ENCOUNTER — TEAM CONFERENCE (OUTPATIENT)
Dept: VASCULAR SURGERY | Facility: CLINIC | Age: 36
End: 2021-12-27
Payer: COMMERCIAL

## 2021-12-27 DIAGNOSIS — N94.89 PELVIC HEMATOMA IN FEMALE: Primary | ICD-10-CM

## 2021-12-27 LAB — BACTERIA ABSC ANAEROBE+AEROBE CULT: NO GROWTH

## 2021-12-27 NOTE — TELEPHONE ENCOUNTER
Received call and page from Carmella re: pelvic drain placed in pt on 12/22 by IR    Pt had US completed on 12/24 in which it notes:                                                        Impression: Residual bladder flap hematoma, slightly decreased in size  from the prior.    Carmella GREENFIELD states that pt was going to have drain removal today however no providers available due to unforeseen circumstances.     Will consult with IR to see if we can remove this for pt.   Msg sent to Dr. Mello on urgency of drain removal.     Left a msg with  to see how soon IR can schedule pt for drain assessment and removal.     Awaiting call back from .    Of note: due to timing of this information may not be able to fup with pt until tomorrow.     I did call the pt and informed her that I will be sending more flushes to the [facility] for her to . Also informed her that I am waiting to hear back from our  on how soon I can get her in with IR [facility] also. Apologized for delay but due to timing  may have left for the day.    Explained to her that we will want her to flush drain tid until I can get her on the schedule for drain removal.    Script sent to [facility],all information provided to her to  flushes tomorrow.    Will fup with  again to see how soon we can get her in for drain removal and call her with appt details.     Neyda WISDOM RN, BSN  Interventional Radiology/Vascular  Nurse Coordinator   Phone: [phone]  Fax: [phone]

## 2021-12-27 NOTE — TELEPHONE ENCOUNTER
Received message from OB provider requesting patient be seen for drain removal on 12/27 or 12/28. No providers available for removal of drain today.     Attempts made to reach RNs in IR to discuss option of having drain removed there.    This RN called to patient to discuss that clinic is working to provide time for appointment tomorrow with Dr. Doshi or in IR. Unfortunately, Dr. Doshi will not be available to assist with drain removal tomorrow.    This RN reached out via phone ([phone]) and page ([phone]) to juanita Faye in Interventional Radiology ASAP. Patient is also out of the flushes she was provided on discharge from ED. Last flushed at 5 am today and was instructed to flush q. 8 hours.     Awaiting contact from IR care coordinator.

## 2021-12-27 NOTE — TELEPHONE ENCOUNTER
[facility] Call Center    Phone Message    May a detailed message be left on voicemail: yes     Reason for Call: Other: Zaina called stating that she was supposed to  a drainage bag for excess bleeding since being discharged 12/24. Please call Zaina to discuss.      Action Taken: Other: whs    Travel Screening: Not Applicable

## 2021-12-27 NOTE — TELEPHONE ENCOUNTER
Spoke with Neyda from IR. Plan for IR to reach out to patient to coordinate drain removal. Neyda will be in touch with patient.

## 2021-12-27 NOTE — PROGRESS NOTES
Clinic Care Coordination Contact  [facility]: Post-Discharge Note  SITUATION                                                      Admission:    Admission Date: 21   Reason for Admission: - Postoperative status- Bladder flap hematoma- Gestational hypertension- Urinary tractinfection  Discharge:   Discharge Date: 21  Discharge Diagnosis: - Postoperative status- Bladder flap hematoma- Gestational hypertension- Urinary tractinfection    BACKGROUND                                                      Zaina Tan is a 36 year old  female seen at the request of the emergency department for ongoing bladder spasms, suprapubic pain in the setting of known bladder flap hematoma after  section, which is decreasing in size. Also found to have UTI on evaluation today. DDX for her bladder spasms and pain includes UTI, irritation from bladder flap hematoma, infected bladder flap hematoma, over-active bladder, interstitial cystitis      ASSESSMENT      Enrollment  Primary Care Care Coordination Status: Not a Candidate    Discharge Assessment  How are you doing now that you are home?: doing well  How are your symptoms? (Red Flag symptoms escalate to triage hotline per guidelines): Improved  Do you feel your condition is stable enough to be safe at home until your provider visit?: Yes  Does the patient have their discharge instructions? : Yes  Does the patient have questions regarding their discharge instructions? : No  Were you started on any new medications or were there changes to any of your previous medications? : Yes  Does the patient have all of their medications?: Yes  Do you have questions regarding any of your medications? : No  Do you have all of your needed medical supplies or equipment (DME)?  (i.e. oxygen tank, CPAP, cane, etc.): Yes  Discharge follow-up appointment scheduled within 14 calendar days? : Yes  Discharge Follow Up Appointment Date: 22  Discharge Follow Up Appointment  Scheduled with?: Specialty Care Provider    Post-op ([facility] Only)  If the patient had a surgery or procedure, do they have any questions for a nurse?: No         PLAN                                                      Outpatient Plan:     Follow up with [facility] clinic on Monday 12/27  Appointments on [place] and/or [facility] (with [facility] or  [facility] provider or service). Call [phone] if you haven't heard  regarding these appointments within 7 days of discharge.  [facility]  Address  [facility]  3rd Floor  [address] [place] [address]  Contact Information  For Adult Patients:  General inquiries: [phone] or [phone] (toll-free)  Adult appointments: [phone]  Adult referrals: [phone]  Future Appointments   Date Time Provider Department Center   1/6/2022 11:15 AM Shellie Soni MD [facility]         For any urgent concerns, please contact our 24 hour nurse triage line: [phone] ([phone])         ALFRED Augustine  [phone]  Connected Care Resource [facility]

## 2021-12-28 ENCOUNTER — TELEPHONE (OUTPATIENT)
Dept: VASCULAR SURGERY | Facility: CLINIC | Age: 36
End: 2021-12-28
Payer: COMMERCIAL

## 2021-12-28 ENCOUNTER — LAB (OUTPATIENT)
Dept: LAB | Facility: CLINIC | Age: 36
End: 2021-12-28
Attending: RADIOLOGY
Payer: COMMERCIAL

## 2021-12-28 DIAGNOSIS — Z11.59 ENCOUNTER FOR SCREENING FOR OTHER VIRAL DISEASES: ICD-10-CM

## 2021-12-28 DIAGNOSIS — Z11.59 ENCOUNTER FOR SCREENING FOR OTHER VIRAL DISEASES: Primary | ICD-10-CM

## 2021-12-28 LAB — SARS-COV-2 RNA RESP QL NAA+PROBE: NEGATIVE

## 2021-12-28 PROCEDURE — 99000 SPECIMEN HANDLING OFFICE-LAB: CPT | Performed by: PATHOLOGY

## 2021-12-28 PROCEDURE — U0003 INFECTIOUS AGENT DETECTION BY NUCLEIC ACID (DNA OR RNA); SEVERE ACUTE RESPIRATORY SYNDROME CORONAVIRUS 2 (SARS-COV-2) (CORONAVIRUS DISEASE [COVID-19]), AMPLIFIED PROBE TECHNIQUE, MAKING USE OF HIGH THROUGHPUT TECHNOLOGIES AS DESCRIBED BY CMS-2020-01-R: HCPCS | Mod: 90 | Performed by: PATHOLOGY

## 2021-12-28 PROCEDURE — U0005 INFEC AGEN DETEC AMPLI PROBE: HCPCS | Mod: 90 | Performed by: PATHOLOGY

## 2021-12-28 NOTE — TELEPHONE ENCOUNTER
Called pt on cell phone and also her partner [place].     Informed her that we have her scheduled for covid test at 1045am this morning at the [facility]. appt details and location provided to pt.     Boston will send an Uber for pt to attend the covid testing appt today.      I informed her that She must make this covid testing appt as we have her scheduled for her drain removal tomorrow at 9am, check in to Radiology desk at 8am [facility]. Appt location and details given to her    NO fasting needed as this is done with out sedation.     I informed her that I am hoping that the results showup in time.     I did reschedule all appts at one point, however per pt's request she preferred to keep the original appt.     I also did remind pt that she is to  the NS flushes today and to continually flush her drain until we see her tomorrow for the sinogram.     She verbalized understanding     Neyda WISDOM RN, BSN  Interventional Radiology/Vascular  Nurse Coordinator   Phone: [phone]  Fax: [phone]

## 2021-12-29 ENCOUNTER — HOSPITAL ENCOUNTER (OUTPATIENT)
Dept: INTERVENTIONAL RADIOLOGY/VASCULAR | Facility: CLINIC | Age: 36
Discharge: HOME | End: 2021-12-29
Attending: RADIOLOGY | Admitting: PHYSICIAN ASSISTANT
Payer: COMMERCIAL

## 2021-12-29 ENCOUNTER — HOSPITAL ENCOUNTER (OUTPATIENT)
Facility: CLINIC | Age: 36
Discharge: HOME | End: 2021-12-29
Attending: RADIOLOGY | Admitting: RADIOLOGY
Payer: COMMERCIAL

## 2021-12-29 DIAGNOSIS — N94.89 PELVIC HEMATOMA IN FEMALE: ICD-10-CM

## 2021-12-29 LAB — BACTERIA ABSC ANAEROBE+AEROBE CULT: NORMAL

## 2021-12-29 PROCEDURE — 49424 ASSESS CYST CONTRAST INJECT: CPT | Performed by: PHYSICIAN ASSISTANT

## 2021-12-29 PROCEDURE — 250N000011 HC RX IP 250 OP 636: Performed by: PHYSICIAN ASSISTANT

## 2021-12-29 PROCEDURE — 49424 ASSESS CYST CONTRAST INJECT: CPT

## 2021-12-29 PROCEDURE — 76080 X-RAY EXAM OF FISTULA: CPT | Mod: 26 | Performed by: PHYSICIAN ASSISTANT

## 2021-12-29 RX ORDER — IOPAMIDOL 612 MG/ML
1-15 INJECTION, SOLUTION INTRATHECAL ONCE
Status: COMPLETED | OUTPATIENT
Start: 2021-12-29 | End: 2021-12-29

## 2021-12-29 RX ADMIN — IOPAMIDOL 3 ML: 612 INJECTION, SOLUTION INTRATHECAL at 08:29

## 2021-12-29 NOTE — PROCEDURES
Pelvic drain placed into bladder flap hematoma 12/22. Culture negative and less than 5 mL output per day. Sinogram shows no residual collection. US shows heterogenous collection consistent with hematoma. Drain removed.    Anticipate slow resolution and improvement of pain. Return if pelvic pain recurs or fever develops which would raise concern for infected hematoma.    James Jordan PA-C  Interventional Radiology  [phone]

## 2022-01-04 ENCOUNTER — TELEPHONE (OUTPATIENT)
Dept: OBGYN | Facility: CLINIC | Age: 37
End: 2022-01-04
Payer: COMMERCIAL

## 2022-01-04 NOTE — TELEPHONE ENCOUNTER
Called and spoke with patient. Rescheduled appointment 1/6 at 1115 to 0915 with Dr. Helm due to provider availability. Pt confirmed availability.

## 2022-01-06 ENCOUNTER — OFFICE VISIT (OUTPATIENT)
Dept: OBGYN | Facility: CLINIC | Age: 37
End: 2022-01-06
Attending: OBSTETRICS & GYNECOLOGY
Payer: COMMERCIAL

## 2022-01-06 VITALS
HEIGHT: 62 IN | WEIGHT: 157.3 LBS | SYSTOLIC BLOOD PRESSURE: 111 MMHG | HEART RATE: 58 BPM | BODY MASS INDEX: 28.95 KG/M2 | DIASTOLIC BLOOD PRESSURE: 76 MMHG

## 2022-01-06 DIAGNOSIS — Z98.891 S/P C-SECTION: Primary | ICD-10-CM

## 2022-01-06 DIAGNOSIS — R30.0 DYSURIA: ICD-10-CM

## 2022-01-06 DIAGNOSIS — Z78.9 BREASTFEEDING (INFANT): ICD-10-CM

## 2022-01-06 PROCEDURE — 99212 OFFICE O/P EST SF 10 MIN: CPT | Mod: 24 | Performed by: OBSTETRICS & GYNECOLOGY

## 2022-01-06 PROCEDURE — 87086 URINE CULTURE/COLONY COUNT: CPT | Performed by: OBSTETRICS & GYNECOLOGY

## 2022-01-06 PROCEDURE — 99207 PR POST PARTUM EXAM: CPT | Performed by: OBSTETRICS & GYNECOLOGY

## 2022-01-06 RX ORDER — FERROUS SULFATE 325(65) MG
325 TABLET ORAL
Qty: 100 TABLET | Refills: 0 | Status: SHIPPED | OUTPATIENT
Start: 2022-01-06 | End: 2023-11-10

## 2022-01-06 RX ORDER — ACETAMINOPHEN 325 MG/1
650 TABLET ORAL EVERY 6 HOURS PRN
Qty: 100 TABLET | Refills: 0 | Status: SHIPPED | OUTPATIENT
Start: 2022-01-06 | End: 2023-01-16

## 2022-01-06 RX ORDER — ESTRADIOL 0.1 MG/G
2 CREAM VAGINAL
Qty: 42.5 G | Refills: 0 | Status: SHIPPED | OUTPATIENT
Start: 2022-01-06 | End: 2023-11-10

## 2022-01-06 ASSESSMENT — PATIENT HEALTH QUESTIONNAIRE - PHQ9: 5. POOR APPETITE OR OVEREATING: NOT AT ALL

## 2022-01-06 ASSESSMENT — ANXIETY QUESTIONNAIRES
5. BEING SO RESTLESS THAT IT IS HARD TO SIT STILL: NOT AT ALL
3. WORRYING TOO MUCH ABOUT DIFFERENT THINGS: NEARLY EVERY DAY
2. NOT BEING ABLE TO STOP OR CONTROL WORRYING: NEARLY EVERY DAY
GAD7 TOTAL SCORE: 6
6. BECOMING EASILY ANNOYED OR IRRITABLE: NOT AT ALL
1. FEELING NERVOUS, ANXIOUS, OR ON EDGE: NOT AT ALL
7. FEELING AFRAID AS IF SOMETHING AWFUL MIGHT HAPPEN: NOT AT ALL

## 2022-01-06 ASSESSMENT — MIFFLIN-ST. JEOR: SCORE: 1351.76

## 2022-01-06 NOTE — PROGRESS NOTES
[facility]  Postpartum Visit    SUBJECTIVE  Zaina Tan is a 36 year old  who is 6 weeks after a RLTCS delivery complicated by RPOC treated with hysteroscopy as well as a bladder flap hematoma treated with a drain by IR.    Zaina continues to have burning with urination, especially of the urethra. It did improve but not resolve with treatment during hospitalization. She is not having any vaginal bleeding. Her abdomen is still sore, and she feels she is not yet completely healed. She does not need any oral pain medication. She feels her incision is doing well. No bowel concerns. She is breastfeeding and her baby is doing well (with great weight gain!). Her mood is good.     OB History    Para Term  AB Living   4 3 3 0 1 3   SAB IAB Ectopic Multiple Live Births   0 0 0 0 3      # Outcome Date GA Lbr Alireza/2nd Weight Sex Delivery Anes PTL Lv   4 Term 21 39w3d  4.26 kg (9 lb 6.3 oz) F CS-LTranv   PREETI      Name: BROOKE,FEMALE-ZAINA      Apgar1: 9  Apgar5: 9   3 Term 17 39w3d  4.03 kg (8 lb 14.2 oz) M CS-LTranv EPI  PREETI      Complications: Preeclampsia/Hypertension, Failure to Progress in First Stage      Name: HANNAH TAN      Apgar1: 1  Apgar5: 7   2 Term /15 39w5d 11:55 / 01:02 3.43 kg (7 lb 9 oz) F Vag-Spont Local, EPI  PREETI      Apgar1: 4  Apgar5: 8   1 AB 14 23w0d             Birth Comments: 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 PreE without severe features with . Denies PPD       Past Medical History:   Diagnosis Date     Vitamin D deficiency 2017: Vitamin D= 25, MyChart message sent - supplementation 3270-4876 IU/day 17- Vit D 48     Vitreous syneresis        Past Surgical History:   Procedure Laterality Date      SECTION N/A 2017    Procedure:  SECTION;;  Surgeon: Shellie Soni MD;  [facility]      SECTION N/A 2021     Procedure: REPEAT  SECTION;  Surgeon: Shellie Soni MD;  [facility]     D & C      fetal hypoplastic leftheart syndrome     DILATION AND CURETTAGE SUCTION N/A 2021    Procedure: DILATION AND CURETTAGE, UTERUS, USING SUCTION with ultrasound guidance;  Surgeon: Neyda Ho MD;  [facility]     IR PELVIC TRANSABD/TRANSREC ABSCESS DRAIN  2021     IR SINOGRAM INJECTION DIAGNOSTIC  2021       Current Outpatient Medications   Medication     acetaminophen (TYLENOL) 325 MG tablet     acetaminophen (TYLENOL) 500 MG tablet     ferrous sulfate (FEROSUL) 325 (65 Fe) MG tablet     ibuprofen (ADVIL/MOTRIN) 600 MG tablet     ibuprofen (ADVIL/MOTRIN) 800 MG tablet     labetalol (NORMODYNE) 200 MG tablet     oxybutynin (DITROPAN) 5 MG tablet     oxyCODONE (ROXICODONE) 5 MG tablet     sodium chloride, PF, 0.9% PF flush     No current facility-administered medications for this visit.        No Known Allergies    Family History   Problem Relation Age of Onset     Hypertension Mother         had before pregnancy     Heart Disease Other         hypoplastic left heart. TAB 23 wks      Diabetes No family hx of      Cerebrovascular Disease No family hx of      Coronary Artery Disease No family hx of      Osteoporosis No family hx of      Anxiety Disorder No family hx of      Depression No family hx of        Social History     Socioeconomic History     Marital status: Single     Spouse name: Not on file     Number of children: Not on file     Years of education: Not on file     Highest education level: Not on file   Occupational History     Occupation: [employer]   Tobacco Use     Smoking status: Never Smoker     Smokeless tobacco: Never Used   Substance and Sexual Activity     Alcohol use: No     Drug use: No     Sexual activity: Yes     Partners: Male     Birth control/protection: None   Other Topics Concern     Not on file   Social History Narrative    How much exercise per week? Daily  "walking    How much calcium per day? In vits and foods       How much caffeine per day? 0    How much vitamin D per day? In vits and foods    Do you/your family wear seatbelts?  Yes    Do you/your family use safety helmets? Yes    Do you/your family use sunscreen? No    Do you/your family keep firearms in the home? no    Do you/your family have a smoke detector(s)?     Do you feel safe in your home? Yes    Has anyone ever touched you in an unwanted manner?no feels safe in home.           February 21, 2019 Peyton Bernardo LPN        Reveiwed cmckim pn  4-         Social Determinants of Health     Financial Resource Strain: Not on file   Food Insecurity: Not on file   Transportation Needs: Not on file   Physical Activity: Not on file   Stress: Not on file   Social Connections: Not on file   Intimate Partner Violence: Not At Risk     Fear of Current or Ex-Partner: No     Emotionally Abused: No     Physically Abused: No     Sexually Abused: No   Housing Stability: Not on file       REVIEW OF SYSTEMS  A 10 point review of systems including Constitutional, Eyes, Respiratory, Cardiovascular, Gastroenterology, Genitourinary, Integumentary, Musculoskeletal, and Psychiatric, were all negative, except for pertinent positives noted in the above HPI.    OBJECTIVE  /76 (BP Location: Left arm, Patient Position: Chair)   Pulse 58   Ht 1.575 m (5' 2\")   Wt 71.4 kg (157 lb 4.8 oz)   LMP 02/20/2021   BMI 28.77 kg/m      General: Alert, without distress  HEENT: normocephalic, without obvious abnormality  Breast exam: deferred as she is breastfeeding   Cardiovascular: regular rate and rhythm, no murmurs/rubs/gallops   Lungs: normal work of breathing   Abdomen: soft, non-tender, non-distended, incision well-healed without discharge   Pelvic: deferred today   Extremities: normal    IR Procedure 12/29/21:  Impression: Culture-negative and minimal outputs from drain. Drain  removed after sinogram shows no fistula.     Plan: " Dressing changes as needed. Patient was counseled on reasons to  return including increase in pelvic pain or development of fever most  likely indicating infected hematoma. Anticipate slow resolution of  hematoma and pain otherwise.    US Abdomen 21:  Findings: There is a residual complex collection between the bladder  and uterus which measures 6 x 6.2 x 4.1 cm, previously 8 x 7.8 x 4.9  cm. There are small echogenic foci along the lower uterine segment.  Uterus otherwise demonstrates normal echogenicity and echotexture.                                                    Impression: Residual bladder flap hematoma, slightly decreased in size  from the prior.    ASSESSMENT/PLAN  Zaina Tan is a 36 year old , here for her 6 week postpartum visit. Postpartum course quite complicated, including hysteroscopy for retained products, and a drain for a bladder flap hematoma.     -dysuria: urine culture performed today. Plan for 3 month trial of vaginal estrogen. Return to office in 2 weeks if symptoms have not improved.    -abdominal discomfort: discussed that Zaina had had to do a lot of extra healing, and I am not surprised that she is not yet at 100%. Her abdomen today is benign, her vitals are normal, and she is not requiring oral pain medications, so I am not concerned for additional complications, only a prolonged healing course. She will return to the office if she is not feeling improved within the next 2-3 weeks.    Health Screening:    -last pap smear  NIL/HPV neg. Due .   -flu and Tdap vaccine UTD   -s/p 2 doses of COVID vaccine, eligible for booster     Contraception: Return to fertility reviewed. She plans lactational amenorrhea/natural family planning. Discussed that lactational amenorrhea is reliable up until periods return or 6 months postpartum is achieved. Information on natural family planning given.    She is breastfeeding; a multivitamin was recommended until weaning. She will  continue additional iron supplementation given the hematoma.    Return to office in 1 year for annual if no concerns prior.    Lala Helm MD, MSCI    [facility]/OBGYN

## 2022-01-06 NOTE — LETTER
2022       RE: Zaina Tan  [address]  [place] [facility] [place] [address]     Dear Colleague,    Thank you for referring your patient, Zaina Tan, to the [facility] [place] at [facility]. Please see a copy of my visit note below.    [facility]  Postpartum Visit    SUBJECTIVE  Zaina Tan is a 36 year old  who is 6 weeks after a RLTCS delivery complicated by RPOC treated with hysteroscopy as well as a bladder flap hematoma treated with a drain by IR.    Zaina continues to have burning with urination, especially of the urethra. It did improve but not resolve with treatment during hospitalization. She is not having any vaginal bleeding. Her abdomen is still sore, and she feels she is not yet completely healed. She does not need any oral pain medication. She feels her incision is doing well. No bowel concerns. She is breastfeeding and her baby is doing well (with great weight gain!). Her mood is good.     OB History    Para Term  AB Living   4 3 3 0 1 3   SAB IAB Ectopic Multiple Live Births   0 0 0 0 3      # Outcome Date GA Lbr Alireza/2nd Weight Sex Delivery Anes PTL Lv   4 Term 21 39w3d  4.26 kg (9 lb 6.3 oz) F CS-LTranv   PREETI      Name: BROOKE,FEMALE-ZAINA      Apgar1: 9  Apgar5: 9   3 Term 17 39w3d  4.03 kg (8 lb 14.2 oz) M CS-LTranv EPI  PREETI      Complications: Preeclampsia/Hypertension, Failure to Progress in First Stage      Name: HANNAH TAN      Apgar1: 1  Apgar5: 7   2 Term 12/01/15 39w5d 11:55 / 01:02 3.43 kg (7 lb 9 oz) F Vag-Spont Local, EPI  PREETI      Apgar1: 4  Apgar5: 8   1 AB 14 23w0d             Birth Comments: 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 PreE without severe features with . Denies PPD       Past Medical History:   Diagnosis Date     Vitamin D deficiency 2017: Vitamin D=  25, [employer] message sent - supplementation 4509-5997 IU/day 17- Vit D 48     Vitreous syneresis        Past Surgical History:   Procedure Laterality Date      SECTION N/A 2017    Procedure:  SECTION;;  Surgeon: Shellie Soni MD;  [facility]      SECTION N/A 2021    Procedure: REPEAT  SECTION;  Surgeon: Shellie Soni MD;  [facility]     D & C      fetal hypoplastic leftheart syndrome     DILATION AND CURETTAGE SUCTION N/A 2021    Procedure: DILATION AND CURETTAGE, UTERUS, USING SUCTION with ultrasound guidance;  Surgeon: Neyda Ho MD;  [facility]     IR PELVIC TRANSABD/TRANSREC ABSCESS DRAIN  2021     IR SINOGRAM INJECTION DIAGNOSTIC  2021       Current Outpatient Medications   Medication     acetaminophen (TYLENOL) 325 MG tablet     acetaminophen (TYLENOL) 500 MG tablet     ferrous sulfate (FEROSUL) 325 (65 Fe) MG tablet     ibuprofen (ADVIL/MOTRIN) 600 MG tablet     ibuprofen (ADVIL/MOTRIN) 800 MG tablet     labetalol (NORMODYNE) 200 MG tablet     oxybutynin (DITROPAN) 5 MG tablet     oxyCODONE (ROXICODONE) 5 MG tablet     sodium chloride, PF, 0.9% PF flush     No current facility-administered medications for this visit.        No Known Allergies    Family History   Problem Relation Age of Onset     Hypertension Mother         had before pregnancy     Heart Disease Other         hypoplastic left heart. TAB 23 wks      Diabetes No family hx of      Cerebrovascular Disease No family hx of      Coronary Artery Disease No family hx of      Osteoporosis No family hx of      Anxiety Disorder No family hx of      Depression No family hx of        Social History     Socioeconomic History     Marital status: Single     Spouse name: Not on file     Number of children: Not on file     Years of education: Not on file     Highest education level: Not on file   Occupational History     Occupation: [employer]   Tobacco Use      "Smoking status: Never Smoker     Smokeless tobacco: Never Used   Substance and Sexual Activity     Alcohol use: No     Drug use: No     Sexual activity: Yes     Partners: Male     Birth control/protection: None   Other Topics Concern     Not on file   Social History Narrative    How much exercise per week? Daily walking    How much calcium per day? In vits and foods       How much caffeine per day? 0    How much vitamin D per day? In vits and foods    Do you/your family wear seatbelts?  Yes    Do you/your family use safety helmets? Yes    Do you/your family use sunscreen? No    Do you/your family keep firearms in the home? no    Do you/your family have a smoke detector(s)?     Do you feel safe in your home? Yes    Has anyone ever touched you in an unwanted manner?no feels safe in home.           February 21, 2019 Peyton Bernardo LPN        Reveiwed cmckim pn  4-         Social Determinants of Health     Financial Resource Strain: Not on file   Food Insecurity: Not on file   Transportation Needs: Not on file   Physical Activity: Not on file   Stress: Not on file   Social Connections: Not on file   Intimate Partner Violence: Not At Risk     Fear of Current or Ex-Partner: No     Emotionally Abused: No     Physically Abused: No     Sexually Abused: No   Housing Stability: Not on file       REVIEW OF SYSTEMS  A 10 point review of systems including Constitutional, Eyes, Respiratory, Cardiovascular, Gastroenterology, Genitourinary, Integumentary, Musculoskeletal, and Psychiatric, were all negative, except for pertinent positives noted in the above HPI.    OBJECTIVE  /76 (BP Location: Left arm, Patient Position: Chair)   Pulse 58   Ht 1.575 m (5' 2\")   Wt 71.4 kg (157 lb 4.8 oz)   LMP 02/20/2021   BMI 28.77 kg/m      General: Alert, without distress  HEENT: normocephalic, without obvious abnormality  Breast exam: deferred as she is breastfeeding   Cardiovascular: regular rate and rhythm, no " murmurs/rubs/gallops   Lungs: normal work of breathing   Abdomen: soft, non-tender, non-distended, incision well-healed without discharge   Pelvic: deferred today   Extremities: normal    IR Procedure 21:  Impression: Culture-negative and minimal outputs from drain. Drain  removed after sinogram shows no fistula.     Plan: Dressing changes as needed. Patient was counseled on reasons to  return including increase in pelvic pain or development of fever most  likely indicating infected hematoma. Anticipate slow resolution of  hematoma and pain otherwise.    US Abdomen 21:  Findings: There is a residual complex collection between the bladder  and uterus which measures 6 x 6.2 x 4.1 cm, previously 8 x 7.8 x 4.9  cm. There are small echogenic foci along the lower uterine segment.  Uterus otherwise demonstrates normal echogenicity and echotexture.                                                    Impression: Residual bladder flap hematoma, slightly decreased in size  from the prior.    ASSESSMENT/PLAN  Zaina Tan is a 36 year old , here for her 6 week postpartum visit. Postpartum course quite complicated, including hysteroscopy for retained products, and a drain for a bladder flap hematoma.     -dysuria: urine culture performed today. Plan for 3 month trial of vaginal estrogen. Return to office in 2 weeks if symptoms have not improved.    -abdominal discomfort: discussed that Zaina had had to do a lot of extra healing, and I am not surprised that she is not yet at 100%. Her abdomen today is benign, her vitals are normal, and she is not requiring oral pain medications, so I am not concerned for additional complications, only a prolonged healing course. She will return to the office if she is not feeling improved within the next 2-3 weeks.    Health Screening:    -last pap smear  NIL/HPV neg. Due .   -flu and Tdap vaccine UTD   -s/p 2 doses of COVID vaccine, eligible for  booster     Contraception: Return to fertility reviewed. She plans lactational amenorrhea/natural family planning. Discussed that lactational amenorrhea is reliable up until periods return or 6 months postpartum is achieved. Information on natural family planning given.    She is breastfeeding; a multivitamin was recommended until weaning. She will continue additional iron supplementation given the hematoma.    Return to office in 1 year for annual if no concerns prior.    Lala Helm MD, MSCI    [facility]/OBGYN

## 2022-01-06 NOTE — PATIENT INSTRUCTIONS
Methods of Fertility Awareness    Methods of fertility awareness help you recognize times when you are most likely to become pregnant. The benefits to these methods are that they do not require devices or medication and that you can use them to become pregnant or to avoid pregnancy. When used to avoid pregnancy, between 5 and 25 out of 100 women who use these methods will become pregnant within 1 year.  These methods can become difficult to use if you have irregular periods. If you and your partner do not wish to abstain during your fertile days, you can always use a barrier method to prevent pregnancy if you have sex. It is important to remember that these methods do not prevent sexually transmitted infections.  The Menstrual Cycle  The menstrual cycle is the time from the first day of your period (called day 1) to the first day of your next period. Most women have a menstrual cycle that is about 28 days. The menstrual cycle is caused by the ovary preparing to release an egg, called ovulation. The egg is usually released into the fallopian tube in the middle of the menstrual cycle (around day 14). If sperm are present in the fallopian tube when the egg is released, the egg will become fertilized and you become pregnant. This can happen if you have sex anywhere from 5 days before until 1 day after ovulation. If you do not have sex around the time of ovulation, the egg does not become fertilized and you have another period.  Methods of fertility awareness help you figure out when you are ovulating. You can either avoid having sex or use a barrier method like condoms when you are ovulating to prevent pregnancy or have unprotected sex to become pregnant.  Method 1: Standard Days Method  If your menstrual cycle (the number of days between the first day of one period to the first day of the next period) is between 26 and 32 days, days 8-19 (when the first day of bleeding is counted as day 1) are your most fertile days.  "Avoiding sex during those days or using a barrier method will prevent pregnancy.  Method 2: Body Temperature  For this method, you need a basal body thermometer. Your baseline, or basal body temperature, is the temperature when you first wake up in the morning. It is usually between 96 and 98 degrees. Your basal body temperature increases just a little bit, about   of a degree, after you ovulate. If you take your temperature every morning and write it down on a calendar, you can determine when you have ovulated because of the increase in your temperature. Remember, if you have unprotected sex in the week before through 2 days after you ovulate, you can become pregnant.  Method 3: Cervical Mucus  The cervix is the bottom of the uterus or womb. The cervix makes mucus which changes in how it looks and feels during your menstrual cycle. Just after your period ends, your cervix does not make much mucus. During this time, you are not likely to get pregnant. As the egg gets ready, your cervix produces more mucus. It looks cloudy and feels sticky. Once you start to produce cervical mucus, you are entering your fertile window. Having sex when you have cervical mucus means that you could become pregnant. Just before ovulation, the cervix produces lots of clear, slippery mucus. This signals the time you are most likely to become pregnant if you have sex. After you ovulate, your cervical mucus will become sticky and cloudy again, and then stop. You are less likely to get pregnant if you have sex during this time.  You can check your cervical mucus by wiping the opening of the vagina with tissue before you urinate, or by putting two clean fingers into the vagina and then looking at the mucus.  The easiest way to use cervical mucus to avoid pregnancy is to ask yourself, \"Do I have cervical mucus today? Did I have cervical mucus yesterday?\" If you can answer no to both questions, it is a safe day to have unprotected sex.    More " Good Resources:  http://bedsider.org/methods/fertility_awareness#how_to_tab  https://www.[facility]renthood.org/learn/birth-control/fertility-awareness  http://nfp.[place].[facility]/

## 2022-01-06 NOTE — NURSING NOTE
SUBJECTIVE: Zaina Tan is here for her 6-week postpartum checkup.     PHQ-9 score: 0  Hx of Abuse:  No    Delivery Date: 2021.    Delivering provider:  Shellie Soni MD.    Type of delivery:  .     Delivery complications: None  Infant gender:  girl, weight 9 pounds 6 oz.  Feeding Method:   and Bottlefed.  Complications reported with feeding:  none, infant thriving .    Bleeding:  None.  Duration:  6weeks.  Menses resumed:  No  Bowel/Urinary problems:  Yes     Contraception Planned:  None -- is she planning pregnancy? no  She  has not had intercourse since delivery..

## 2022-01-07 LAB — BACTERIA UR CULT: NO GROWTH

## 2022-01-07 ASSESSMENT — ANXIETY QUESTIONNAIRES: GAD7 TOTAL SCORE: 6

## 2022-01-10 ENCOUNTER — TELEPHONE (OUTPATIENT)
Dept: OBGYN | Facility: CLINIC | Age: 37
End: 2022-01-10
Payer: COMMERCIAL

## 2022-01-10 NOTE — TELEPHONE ENCOUNTER
----- Message from Diane Harris RN sent at 1/10/2022 11:48 AM CST -----  Regarding: medication question  Patient's pharmacy instructed her to avoid taking medication prescribed by Dr. Helm while breastfeeding. Patient experiencing bladder pain.

## 2022-01-10 NOTE — TELEPHONE ENCOUNTER
Zaina was told by pharmacist to avoid the estrace cream while breastfeeding.  Reassured that it is safe to use, and is not systemic estrace, which can decrease milk supply.

## 2022-01-14 ENCOUNTER — LAB REQUISITION (OUTPATIENT)
Dept: LAB | Facility: CLINIC | Age: 37
End: 2022-01-14

## 2022-01-14 PROCEDURE — 86706 HEP B SURFACE ANTIBODY: CPT | Performed by: INTERNAL MEDICINE

## 2022-01-14 PROCEDURE — 86481 TB AG RESPONSE T-CELL SUSP: CPT | Performed by: INTERNAL MEDICINE

## 2022-01-14 PROCEDURE — 87340 HEPATITIS B SURFACE AG IA: CPT | Performed by: INTERNAL MEDICINE

## 2022-01-14 PROCEDURE — 86787 VARICELLA-ZOSTER ANTIBODY: CPT | Performed by: INTERNAL MEDICINE

## 2022-01-16 LAB
GAMMA INTERFERON BACKGROUND BLD IA-ACNC: 1.06 IU/ML
M TB IFN-G BLD-IMP: POSITIVE
M TB IFN-G CD4+ BCKGRND COR BLD-ACNC: 8.94 IU/ML
MITOGEN IGNF BCKGRD COR BLD-ACNC: 8.94 IU/ML
MITOGEN IGNF BCKGRD COR BLD-ACNC: 8.94 IU/ML
QUANTIFERON MITOGEN: 10 IU/ML
QUANTIFERON NIL TUBE: 1.06 IU/ML
QUANTIFERON TB1 TUBE: 10 IU/ML
QUANTIFERON TB2 TUBE: 10

## 2022-01-17 LAB
HBV SURFACE AB SERPL IA-ACNC: >1000 M[IU]/ML
HBV SURFACE AG SERPL QL IA: NONREACTIVE
VZV IGG SER QL IA: 208.1 INDEX
VZV IGG SER QL IA: POSITIVE

## 2022-01-23 ENCOUNTER — HEALTH MAINTENANCE LETTER (OUTPATIENT)
Age: 37
End: 2022-01-23

## 2022-01-28 ENCOUNTER — HOSPITAL ENCOUNTER (OUTPATIENT)
Dept: GENERAL RADIOLOGY | Facility: CLINIC | Age: 37
End: 2022-01-28
Attending: INTERNAL MEDICINE

## 2022-01-28 DIAGNOSIS — R76.11 POSITIVE PPD: ICD-10-CM

## 2022-01-28 PROCEDURE — 99207 XR CHEST 1 VIEW, EMPLOYEE HEALTH: CPT | Performed by: RADIOLOGY

## 2022-01-28 PROCEDURE — 999N000028 XR CHEST 1 VIEW, EMPLOYEE HEALTH

## 2022-02-17 PROBLEM — O21.9 NAUSEA AND VOMITING DURING PREGNANCY PRIOR TO 22 WEEKS GESTATION: Status: RESOLVED | Noted: 2017-01-30 | Resolved: 2017-07-10

## 2022-03-24 NOTE — ED PROVIDER NOTES
"  History     Chief Complaint:  Pharyngitis      HPI   Zaina Tan is a 33 year old female who presents to the emergency department for evaluation of pharyngitis. She states that her sore throat is quite significant on both sides, starting three days ago in the morning. That morning, while brushing her teeth, she blood in the mucous in her throat. She has not seen any blood since then. However, she states that she does feel quite ill--fatigued with itching in her ears. Denies fever and vomiting. She states that there is no change she is pregnant now. Her children are not ill.     Allergies:  No Known Drug Allergies     Medications:    The patient is currently on no regular medications.      Past Medical History:    Vitreous syneresis     Past Surgical History:     section  Dilation and curettage     Family History:    Mother - hypertension     Social History:  The patient was accompanied to the ED by her two children.  Smoking Status: Never  Smokeless Tobacco: Never  Alcohol Use: No   Marital Status:  Single      Review of Systems   Constitutional: Positive for fatigue. Negative for fever.   HENT: Positive for ear pain and sore throat.    Gastrointestinal: Negative for vomiting.   All other systems reviewed and are negative.        Physical Exam     Patient Vitals for the past 24 hrs:   BP Temp Temp src Heart Rate Resp SpO2 Height Weight   19 2254 157/75 98.6  F (37  C) Oral 104 20 97 % 1.575 m (5' 2\") 73.9 kg (163 lb)      Physical Exam  General: Alert, interactive in mild distress  Head:  Scalp is atraumatic  Eyes:  The pupils are equal, round, and reactive to light    EOM's intact    No scleral icterus  ENT:      Nose:  The external nose is normal  Ears:  External ears are normal  Mouth/Throat:  Pharyngeal erythema, mild tonsillar hypertrophy/ no exudate.     Mucus membranes are moist       Neck:  Normal range of motion.      There is no rigidity.    Trachea is in the midline         CV:  Regular " rate and rhythm    No murmur   Resp:  Breath sounds are clear bilaterally    Non-labored, no retractions or accessory muscle use      GI:  Abdomen is soft, no distension, no tenderness.       MS:  Normal strength in all 4 extremities  Skin:  Warm and dry, No rash or lesions noted.  Neuro: Strength 5/5 x4.    Psych:  Awake. Alert.  Normal affect.      Appropriate interactions.  Lymph: Tender anterior cervical lymphadenopathy.     Emergency Department Course     Laboratory:  Laboratory findings were communicated with the patient who voiced understanding of the findings.    Rapid strep screen: negative   Beta strep group A culture: pending      Interventions:  2313 - ibuprofen 300mg PO   2313 - dexamethasone 10mg PO      Emergency Department Course:  Nursing notes and vitals reviewed.  The patient's throat was swabbed and this sample was sent for rapid strep screen, findings above.     2303: I performed an exam of the patient as documented above.     2345: Patient rechecked and updated.      Findings and plan explained to the Patient. Patient discharged home with instructions regarding supportive care, medications, and reasons to return. The importance of close follow-up was reviewed.     I personally reviewed the laboratory results with the Patient and answered all related questions prior to discharge.    Impression & Plan      Medical Decision Making:  Zaina Tan is a 33 year old female who presents for evaluation of a sore throat and clinical evidence of pharyngitis.  The rapid strep test is negative, and formal culture has been set up in the lab. There is no clinical evidence of peritonsillar abscess, retropharyngeal abscess, Lemierre's Syndrome, epiglottis, or Gurjit's angina. The etiology is most likely viral.   I have recommended treatment with analgesics, and we will await formal culture results.  If the culture is positive, an ED physician will call the patient to initiate anti-microbial therapy. Return if  increasing pain, change in voice, neck pain, vomiting, fever, or shortness of breath. Follow-up with primary physician if not improving in 3-5 days. Given well appearance, I would not test further for other etiologies of serious bacterial infections.     Patient has a concurrent URI, making viral etiologies more likely.  If sore throat persists, mono testing indicated.    Diagnosis:    ICD-10-CM   1. Pharyngitis, unspecified etiology J02.9       Disposition:  Discharged to home.     Scribe Disclosure:  Eliana SPENCE, am serving as a scribe at 11:03 PM on 5/25/2019 to document services personally performed by Chin Haddad MD based on my observations and the provider's statements to me.   5/25/2019    EMERGENCY DEPARTMENT       Chin Haddad MD  05/26/19 0010     negative...

## 2022-05-22 ENCOUNTER — HOSPITAL ENCOUNTER (EMERGENCY)
Facility: CLINIC | Age: 37
Discharge: HOME | End: 2022-05-23
Attending: EMERGENCY MEDICINE | Admitting: EMERGENCY MEDICINE
Payer: COMMERCIAL

## 2022-05-22 DIAGNOSIS — R10.2 ACUTE SUPRAPUBIC PAIN: ICD-10-CM

## 2022-05-22 PROCEDURE — 99285 EMERGENCY DEPT VISIT HI MDM: CPT | Mod: 25

## 2022-05-22 PROCEDURE — 96360 HYDRATION IV INFUSION INIT: CPT | Mod: 59

## 2022-05-22 PROCEDURE — 96361 HYDRATE IV INFUSION ADD-ON: CPT

## 2022-05-23 ENCOUNTER — APPOINTMENT (OUTPATIENT)
Dept: CT IMAGING | Facility: CLINIC | Age: 37
End: 2022-05-23
Attending: EMERGENCY MEDICINE
Payer: COMMERCIAL

## 2022-05-23 VITALS
WEIGHT: 145 LBS | DIASTOLIC BLOOD PRESSURE: 70 MMHG | HEIGHT: 62 IN | BODY MASS INDEX: 26.68 KG/M2 | SYSTOLIC BLOOD PRESSURE: 127 MMHG | HEART RATE: 67 BPM | OXYGEN SATURATION: 100 % | RESPIRATION RATE: 18 BRPM | TEMPERATURE: 98.3 F

## 2022-05-23 LAB
ALBUMIN UR-MCNC: NEGATIVE MG/DL
ANION GAP SERPL CALCULATED.3IONS-SCNC: 1 MMOL/L (ref 3–14)
APPEARANCE UR: CLEAR
BASOPHILS # BLD AUTO: 0 10E3/UL (ref 0–0.2)
BASOPHILS NFR BLD AUTO: 0 %
BILIRUB UR QL STRIP: NEGATIVE
BUN SERPL-MCNC: 16 MG/DL (ref 7–30)
CALCIUM SERPL-MCNC: 9 MG/DL (ref 8.5–10.1)
CHLORIDE BLD-SCNC: 106 MMOL/L (ref 94–109)
CO2 SERPL-SCNC: 31 MMOL/L (ref 20–32)
COLOR UR AUTO: ABNORMAL
CREAT SERPL-MCNC: 0.65 MG/DL (ref 0.52–1.04)
EOSINOPHIL # BLD AUTO: 0.1 10E3/UL (ref 0–0.7)
EOSINOPHIL NFR BLD AUTO: 3 %
ERYTHROCYTE [DISTWIDTH] IN BLOOD BY AUTOMATED COUNT: 13 % (ref 10–15)
GFR SERPL CREATININE-BSD FRML MDRD: >90 ML/MIN/1.73M2
GLUCOSE BLD-MCNC: 92 MG/DL (ref 70–99)
GLUCOSE UR STRIP-MCNC: NEGATIVE MG/DL
HCG SERPL QL: NEGATIVE
HCT VFR BLD AUTO: 37.1 % (ref 35–47)
HGB BLD-MCNC: 11.9 G/DL (ref 11.7–15.7)
HGB UR QL STRIP: NEGATIVE
IMM GRANULOCYTES # BLD: 0 10E3/UL
IMM GRANULOCYTES NFR BLD: 0 %
KETONES UR STRIP-MCNC: NEGATIVE MG/DL
LEUKOCYTE ESTERASE UR QL STRIP: NEGATIVE
LYMPHOCYTES # BLD AUTO: 2.7 10E3/UL (ref 0.8–5.3)
LYMPHOCYTES NFR BLD AUTO: 49 %
MCH RBC QN AUTO: 28.1 PG (ref 26.5–33)
MCHC RBC AUTO-ENTMCNC: 32.1 G/DL (ref 31.5–36.5)
MCV RBC AUTO: 88 FL (ref 78–100)
MONOCYTES # BLD AUTO: 0.4 10E3/UL (ref 0–1.3)
MONOCYTES NFR BLD AUTO: 8 %
MUCOUS THREADS #/AREA URNS LPF: PRESENT /LPF
NEUTROPHILS # BLD AUTO: 2.2 10E3/UL (ref 1.6–8.3)
NEUTROPHILS NFR BLD AUTO: 40 %
NITRATE UR QL: NEGATIVE
NRBC # BLD AUTO: 0 10E3/UL
NRBC BLD AUTO-RTO: 0 /100
PH UR STRIP: 6.5 [PH] (ref 5–7)
PLATELET # BLD AUTO: 221 10E3/UL (ref 150–450)
POTASSIUM BLD-SCNC: 4.1 MMOL/L (ref 3.4–5.3)
RBC # BLD AUTO: 4.23 10E6/UL (ref 3.8–5.2)
RBC URINE: 0 /HPF
SODIUM SERPL-SCNC: 138 MMOL/L (ref 133–144)
SP GR UR STRIP: 1.01 (ref 1–1.03)
UROBILINOGEN UR STRIP-MCNC: NORMAL MG/DL
WBC # BLD AUTO: 5.5 10E3/UL (ref 4–11)
WBC URINE: <1 /HPF

## 2022-05-23 PROCEDURE — 85025 COMPLETE CBC W/AUTO DIFF WBC: CPT | Performed by: EMERGENCY MEDICINE

## 2022-05-23 PROCEDURE — 250N000013 HC RX MED GY IP 250 OP 250 PS 637: Performed by: EMERGENCY MEDICINE

## 2022-05-23 PROCEDURE — 36415 COLL VENOUS BLD VENIPUNCTURE: CPT | Performed by: EMERGENCY MEDICINE

## 2022-05-23 PROCEDURE — 74177 CT ABD & PELVIS W/CONTRAST: CPT

## 2022-05-23 PROCEDURE — 84703 CHORIONIC GONADOTROPIN ASSAY: CPT | Performed by: EMERGENCY MEDICINE

## 2022-05-23 PROCEDURE — 250N000011 HC RX IP 250 OP 636: Performed by: EMERGENCY MEDICINE

## 2022-05-23 PROCEDURE — 258N000003 HC RX IP 258 OP 636: Performed by: EMERGENCY MEDICINE

## 2022-05-23 PROCEDURE — 80048 BASIC METABOLIC PNL TOTAL CA: CPT | Performed by: EMERGENCY MEDICINE

## 2022-05-23 PROCEDURE — 250N000009 HC RX 250: Performed by: EMERGENCY MEDICINE

## 2022-05-23 PROCEDURE — 81003 URINALYSIS AUTO W/O SCOPE: CPT | Performed by: EMERGENCY MEDICINE

## 2022-05-23 RX ORDER — LIDOCAINE 4 G/G
2 PATCH TOPICAL ONCE
Status: DISCONTINUED | OUTPATIENT
Start: 2022-05-23 | End: 2022-05-23 | Stop reason: HOSPADM

## 2022-05-23 RX ORDER — IOPAMIDOL 755 MG/ML
73 INJECTION, SOLUTION INTRAVASCULAR ONCE
Status: COMPLETED | OUTPATIENT
Start: 2022-05-23 | End: 2022-05-23

## 2022-05-23 RX ORDER — LIDOCAINE 50 MG/G
2 PATCH TOPICAL EVERY 24 HOURS
Qty: 30 PATCH | Refills: 0 | Status: SHIPPED | OUTPATIENT
Start: 2022-05-23 | End: 2023-11-10

## 2022-05-23 RX ADMIN — IOPAMIDOL 73 ML: 755 INJECTION, SOLUTION INTRAVENOUS at 01:02

## 2022-05-23 RX ADMIN — LIDOCAINE 2 PATCH: 560 PATCH PERCUTANEOUS; TOPICAL; TRANSDERMAL at 02:01

## 2022-05-23 RX ADMIN — SODIUM CHLORIDE 1000 ML: 9 INJECTION, SOLUTION INTRAVENOUS at 00:10

## 2022-05-23 RX ADMIN — SODIUM CHLORIDE 61 ML: 900 INJECTION INTRAVENOUS at 01:02

## 2022-05-23 ASSESSMENT — ENCOUNTER SYMPTOMS
BACK PAIN: 0
VOMITING: 0
FEVER: 0
ABDOMINAL PAIN: 1
DIARRHEA: 0
CHILLS: 0
SHORTNESS OF BREATH: 0
CONSTIPATION: 0

## 2022-05-23 NOTE — ED PROVIDER NOTES
"  History   Chief Complaint:  Postpartum Complications     HPI   Zaina Tan is a 36 year old female with history of uterine fibroids who presents with suprapubic pelvic pain.  The patient reports that she had a  6 months ago with a resulting bladder flap hematoma which required drainage postoperatively and that she has been having intermittent pain in the region of her  scar since that time which suddenly became worsened today she states that the pain is very severe and is located in the region of the  scar.  The pain is worse when she presses on her  scar.  There is no radiation of the pain.  There is no skin discoloration, warmth or swelling.  There is been no recent trauma.  No dysuria urgency or frequency of urination.  No vomiting or diarrhea.  No numbness or tingling or weakness of her legs.  No back pain.  No rectal bleeding.  No hematuria.  She did not take any medicine at home for the pain.    Review of Systems   Constitutional: Negative for chills and fever.   Respiratory: Negative for shortness of breath.    Cardiovascular: Negative for chest pain and leg swelling.   Gastrointestinal: Positive for abdominal pain. Negative for constipation, diarrhea and vomiting.   Musculoskeletal: Negative for back pain.   All other systems reviewed and are negative.        Allergies:  The patient has no known allergies.     Medications:  Ferosul  Estrace     Past Medical History:     Vitamin D deficiency   Vitreous syneresis   Uterine fibroids     Past Surgical History:     section  Dilation and curettage     Family History:    Mother - Hypertension     Social History:  The patient was unaccompanied to the emergency department.    Physical Exam     Patient Vitals for the past 24 hrs:   BP Temp Temp src Pulse Resp SpO2 Height Weight   22 -- -- -- -- -- -- 1.575 m (5' 2\") 65.8 kg (145 lb)   22 128/59 98.3  F (36.8  C) Temporal 67 18 100 % -- -- "     Physical Exam  Nursing note and vitals reviewed.  Constitutional:  Appears well-developed and well-nourished.   HENT:   Head:    Atraumatic.   Mouth/Throat:   Oropharynx is clear and moist. No oropharyngeal exudate.   Eyes:    Pupils are equal, round, and reactive to light.   Neck:    Normal range of motion. Neck supple.      No tracheal deviation present. No thyromegaly present.   Cardiovascular:  Normal rate, regular rhythm, no murmur   Pulmonary/Chest: Breath sounds are clear and equal without wheezes or crackles.  Abdominal:   Soft. Bowel sounds are normal. Exhibits no distension and      no mass.  There is mild tenderness across the suprapubic region in the region of her  scar.  No redness or warmth.  No palpable mass.     There is no rebound and no guarding.   Musculoskeletal:  Exhibits no edema.   Lymphadenopathy:  No cervical adenopathy.   Neurological:   Alert and oriented to person, place, and time.   Skin:    Skin is warm and dry. No rash noted. No pallor.       Emergency Department Course     Imaging:  CT Abdomen Pelvis w Contrast   Final Result   IMPRESSION:    1.  Negative CT examination of the abdomen or pelvis.      Report per radiology    Laboratory:  Labs Ordered and Resulted from Time of ED Arrival to Time of ED Departure   BASIC METABOLIC PANEL - Abnormal       Result Value    Sodium 138      Potassium 4.1      Chloride 106      Carbon Dioxide (CO2) 31      Anion Gap 1 (*)     Urea Nitrogen 16      Creatinine 0.65      Calcium 9.0      Glucose 92      GFR Estimate >90     ROUTINE UA WITH MICROSCOPIC REFLEX TO CULTURE - Abnormal    Color Urine Straw      Appearance Urine Clear      Glucose Urine Negative      Bilirubin Urine Negative      Ketones Urine Negative      Specific Gravity Urine 1.012      Blood Urine Negative      pH Urine 6.5      Protein Albumin Urine Negative      Urobilinogen Urine Normal      Nitrite Urine Negative      Leukocyte Esterase Urine Negative      Mucus  Urine Present (*)     RBC Urine 0      WBC Urine <1     HCG QUALITATIVE PREGNANCY - Normal    hCG Serum Qualitative Negative     CBC WITH PLATELETS AND DIFFERENTIAL    WBC Count 5.5      RBC Count 4.23      Hemoglobin 11.9      Hematocrit 37.1      MCV 88      MCH 28.1      MCHC 32.1      RDW 13.0      Platelet Count 221      % Neutrophils 40      % Lymphocytes 49      % Monocytes 8      % Eosinophils 3      % Basophils 0      % Immature Granulocytes 0      NRBCs per 100 WBC 0      Absolute Neutrophils 2.2      Absolute Lymphocytes 2.7      Absolute Monocytes 0.4      Absolute Eosinophils 0.1      Absolute Basophils 0.0      Absolute Immature Granulocytes 0.0      Absolute NRBCs 0.0          Procedures    Emergency Department Course:     Reviewed:  I reviewed nursing notes, vitals, past medical history and Care Everywhere    Assessments:   I obtained history and examined the patient as noted above.   01:55 I rechecked the patient and explained findings.       Interventions:  Medications   Lidocaine (LIDOCARE) 4 % Patch 2 patch (2 patches Transdermal Patch/Med Applied 22 0201)     And   lidocaine patch in PLACE (has no administration in time range)   0.9% sodium chloride BOLUS (0 mLs Intravenous Stopped 22 0150)   iopamidol (ISOVUE-370) solution 73 mL (73 mLs Intravenous Given 22 0102)   Saline (61 mLs Intravenous Given 22 0102)     Disposition:  The patient was discharged to home.     Impression & Plan       Medical Decision Making:  This patient states she is having severe pain in the region of her  scar and has a past history of postoperative bladder flap hematoma which required drainage.  I performed a CT abdomen pelvis to check for intra-abdominal abscess, appendicitis, diverticulitis or other cause of her pain but CT was normal.  I discussed the incidental finding of the small uterine fibroid with her she has a history of fibroids.  She was prescribed lidocaine patch for pain and  told to take ibuprofen as needed for pain.  She was instructed follow-up with her OB/GYN physician for recheck and discuss her ongoing intermittent pain.  There was no sign of herpes zoster, UTI or other cause of her symptoms.  I felt that this patient may be having some intermittent nerve pain in the region of her  scar related to scar tissue.  There is no sign of bowel obstruction however.    Diagnosis:    ICD-10-CM    1. Acute suprapubic pain  R10.2        Discharge Medications:  New Prescriptions    No medications on file     Scribe Disclosure:  I, Kendra Matthews, am serving as a scribe at 12:16 AM on 2022 to document services personally performed by Diane Baker MD based on my observations and the provider's statements to me.     Diane Baker MD  22 [id number]

## 2022-05-23 NOTE — ED TRIAGE NOTES
Pt had  6 months ago and now is having pain inside the previous incision site and all around the site. Incision site healed, no redness or irritation noted.

## 2022-07-12 NOTE — LETTER
"10/25/2021       RE: Zaina Tan  [address]  [facility] [address]     Dear Colleague,    Thank you for referring your patient, Zaina Tan, to the [facility] [place] at [facility]. Please see a copy of my visit note below.    [facility]  [facility]  Return OB Visit     SUBJECTIVE   Has been feeling increasing lower pelvic pain and pressure. She notes good fetal movement, and has occasional mild contractions.  Denies vaginal bleeding, vaginal discharge, LOF, or pre-eclampsia signs or symptoms.     Pregnancy notable for  - Hx CS, planning a repeat  - Hx PreE without severe features  - Fibroid uterus (fundal, left lateral)  - COVID in pregnancy (21)    OBJECTIVE   /77   Pulse 70   Ht 1.575 m (5' 2\")   Wt 81.4 kg (179 lb 6.4 oz)   LMP 2021   BMI 32.81 kg/m     Weight gain: 12lb 6.4oz  Gen: Well-appearing, NAD     GBS collected  Cervix: Closed/long    Fundal Height:  36cm  FHR: 130    See OB Flowsheet for additional information     ASSESSMENT & PLAN   36 year old  at 35w2d by LMP confirmed with 9w0d US who presents today for return OB visit. She is doing well. Recovered from recent COVID infection.     # PNC:   - Routine labs reviewedt.   - GBS collected today   - Prenatal labs, Rh positive, antibody negative, Rubella immune, failed early GCT, passed early 3 hour GTT and 28week GTT.  - Genetics: Normal NT, Low risk NIPT, declined AFP  - Imaging: Level II suboptimal views of heart and face otherwise wnl. Fetal echo normal   - Immunizations: agrees to flu shot-left before getting-please give next visit, s/p COVIDx2 D#1 21; D#2 3/6/21(Moderna), 9/15/21 Tdap  - Given ERAS packet, and pre-CS instructions today     # Delivery Planning  - Repeat CS scheduled 21  - Pediatrician: Dr. Vu - [facility].  - Feeding: planning " on breast feeding  - Contraception: Natural family planning    # Hx of PreE  - asymptomatic today  - reviewed signs/symptoms for pre-eclampsia. Patient verbalizes understanding of this.     # breakthrough COVID in pregnancy  - positive test 9/27/21 - had loss of smell and chills  - asymptomatic today     Return to clinic in 1 week. Discussed Return Precautions    Staffed with Dr. Kyle Bailey DO, MS  OBGYN Resident, PGY1  [facility]  10/25/2021 10:46 AM     The patient was seen and examined with Dr. Bailey.  I have reviewed and edited the above note.    Merced Wright MD, FACOG       [de-identified] : Pt is a pleasant 13 year old male with right knee pain secondary to stress fracture of the proximal tibial metaphysis. The patient is doing well with conservative management including physical therapy, NSAIDs, and activity modification.  Patient is cleared for all sports related activities and a letter was provided for the patient.  Patient allowed to return to activity and follow up on PRN basis.  \par \par \par

## 2022-09-10 ENCOUNTER — HEALTH MAINTENANCE LETTER (OUTPATIENT)
Age: 37
End: 2022-09-10

## 2022-12-23 ENCOUNTER — TELEPHONE (OUTPATIENT)
Dept: OBGYN | Facility: CLINIC | Age: 37
End: 2022-12-23

## 2022-12-23 NOTE — TELEPHONE ENCOUNTER
"Returned call to patient.  Reports lightheadedness/dizziness when waking up in the morning.  Enough to fall back onto the bed when she stands up.  This has been going on for 3 days.      Also reports that hands feel tingly, like an electric shock or pins and needles, which is worse in the right hand.  Pain extends from tips of fingers up to shoulders at times.  This has been going on for 2 months.  The same type of neuropathic pain occurred after delivery last year, and patient reports being given \"something to wear\" that helps, which she is using.  Could not describe what she wears that helps.  Denies tingling in feet or any other neuro symptoms.     Yesterday at work patient checked blood pressure and it was 107/60.  Patient reports not drinking very much throughout the day.  Encouraged patient to increase fluids.     Scheduled appointment with Dr. Henry 1/2 at 9am.  Discussed that patient should be evaluated in ED if dizziness or tingling worsen, or if tingling spreads to other areas of the body.       "

## 2022-12-23 NOTE — TELEPHONE ENCOUNTER
[facility] Call Center    Phone Message    May a detailed message be left on voicemail: yes     Reason for Call: Symptoms or Concerns     If patient has red-flag symptoms, warm transfer to triage line    Current symptom or concern: Patient states that she is having on going tingly feeling her both of her hands, however right hand is presenting with more tingling. Also she is experiencing lightheaded/dizziness on occasions in the mornings. Denies feeling like shes going to pass out.  No currently experiencing a fever/illness. Otherwise feels fine overall. Would like to speak with nurses. Please reach out asap. Thank you    Symptoms have been present for:  3 day(s)    Has patient previously been seen for this? No        Action Taken: Message routed to:  [facility] S    Travel Screening: Not Applicable

## 2023-01-10 ENCOUNTER — TELEPHONE (OUTPATIENT)
Dept: FAMILY MEDICINE | Facility: CLINIC | Age: 38
End: 2023-01-10

## 2023-01-13 NOTE — PROGRESS NOTES
Assessment & Plan     Paresthesia of both hands  She describes what sounds like carpal tunnel worse in the right hand.  She has not had an EMG.  It is no longer responding to a splint.  We will check other etiologies some blood work.  We will have her see sports medicine perhaps they can do a steroid injection. It does not appear to be vascular.   - EMG  - CBC with Platelets Differential  - Vitamin B12  - Ferritin  - Orthopedic  Referral  - Folate    I will see her back in 4 weeks.  Return in about 4 weeks (around 2023) for Routine Visit.    Megan Henry MD PhD  [facility]    Time note (e5, 40'): The total time (on the date of service) for this service was 40 minutes, including discussion/face-to-face, chart review, interpretation not otherwise reported, documentation, and updating of the computerized record.      Ana Lilia Mahajan is a 37 year old  presenting for the following health issues: tingling of fingers       HPI a 36 yo  female who presents with tingling and numbness of her fingers and hands.  This started in  after gave birth of her so.  Thought it was carpal tunnel and never had EMG -  She was given splints and helped some - wore it for a long time.  Then returned more severe in 2021 after a difficult and complicated delivery -  More in right hand - she is right handed.  Hard to sleep - describes numbness, pins and needles, like an electric shock and now gores up the arm.  She reports most of the symptoms and the third and fourth finger of the right hand.       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  Works casual.      {Review of Systems endorses visual difficulty, and dizziness and  Numbness and tingling. Otherwise the complete ROS:  Constitutional, HEENT, cardiovascular, pulmonary, GI, , musculoskeletal, neuro, skin, endocrine and psych systems are negative, except as otherwise noted.     "  Objective    /68 (BP Location: Left arm, Patient Position: Sitting, Cuff Size: Adult Regular)   Pulse 73   Ht 1.575 m (5' 2\")   Wt 76.2 kg (168 lb)   LMP 12/27/2022 (Approximate)   Breastfeeding Yes   BMI 30.73 kg/m    Body mass index is 30.73 kg/m .  Physical Exam   GENERAL: healthy, alert and no distress  NECK: no adenopathy, no asymmetry, masses, or scars and thyroid normal to palpation  RESP: lungs clear to auscultation - no rales, rhonchi or wheezes  CV: regular rate and rhythm, normal S1 S2, no S3 or S4, no murmur, click or rub, no peripheral edema  MS: She has a brace on her right wrist.  She has a positive Phalen sign on both, negative Tinel sign.  She has 5/5 motor strength in her hands good grasp.  Motor strength of ulnar median and radial nerves are all intact. 2.4 radial pulses bilaterally   SKIN: no suspicious lesions or rashes  NEURO: Normal strength and tone, mentation intact and speech normal  PSYCH: mentation appears normal, affect normal/bright  LYMPH: no cervical, supraclavicular, axillary adenopathy    Labs reviewed     Megan Henry MD, PhD                    "

## 2023-01-16 ENCOUNTER — LAB (OUTPATIENT)
Dept: LAB | Facility: CLINIC | Age: 38
End: 2023-01-16
Attending: FAMILY MEDICINE
Payer: COMMERCIAL

## 2023-01-16 ENCOUNTER — OFFICE VISIT (OUTPATIENT)
Dept: FAMILY MEDICINE | Facility: CLINIC | Age: 38
End: 2023-01-16
Attending: FAMILY MEDICINE
Payer: COMMERCIAL

## 2023-01-16 VITALS
BODY MASS INDEX: 30.91 KG/M2 | HEART RATE: 73 BPM | WEIGHT: 168 LBS | HEIGHT: 62 IN | SYSTOLIC BLOOD PRESSURE: 130 MMHG | DIASTOLIC BLOOD PRESSURE: 68 MMHG

## 2023-01-16 DIAGNOSIS — R20.2 PARESTHESIA OF BOTH HANDS: Primary | ICD-10-CM

## 2023-01-16 DIAGNOSIS — R20.2 PARESTHESIA OF BOTH HANDS: ICD-10-CM

## 2023-01-16 LAB
BASOPHILS # BLD AUTO: 0 10E3/UL (ref 0–0.2)
BASOPHILS NFR BLD AUTO: 0 %
EOSINOPHIL # BLD AUTO: 0.1 10E3/UL (ref 0–0.7)
EOSINOPHIL NFR BLD AUTO: 2 %
ERYTHROCYTE [DISTWIDTH] IN BLOOD BY AUTOMATED COUNT: 12.8 % (ref 10–15)
FERRITIN SERPL-MCNC: 21 NG/ML (ref 12–150)
HCT VFR BLD AUTO: 39 % (ref 35–47)
HGB BLD-MCNC: 12.5 G/DL (ref 11.7–15.7)
IMM GRANULOCYTES # BLD: 0 10E3/UL
IMM GRANULOCYTES NFR BLD: 0 %
LYMPHOCYTES # BLD AUTO: 2 10E3/UL (ref 0.8–5.3)
LYMPHOCYTES NFR BLD AUTO: 39 %
MCH RBC QN AUTO: 27.9 PG (ref 26.5–33)
MCHC RBC AUTO-ENTMCNC: 32.1 G/DL (ref 31.5–36.5)
MCV RBC AUTO: 87 FL (ref 78–100)
MONOCYTES # BLD AUTO: 0.3 10E3/UL (ref 0–1.3)
MONOCYTES NFR BLD AUTO: 5 %
NEUTROPHILS # BLD AUTO: 2.7 10E3/UL (ref 1.6–8.3)
NEUTROPHILS NFR BLD AUTO: 54 %
NRBC # BLD AUTO: 0 10E3/UL
NRBC BLD AUTO-RTO: 0 /100
PLATELET # BLD AUTO: 208 10E3/UL (ref 150–450)
RBC # BLD AUTO: 4.48 10E6/UL (ref 3.8–5.2)
VIT B12 SERPL-MCNC: 1059 PG/ML (ref 232–1245)
WBC # BLD AUTO: 5.2 10E3/UL (ref 4–11)

## 2023-01-16 PROCEDURE — G0463 HOSPITAL OUTPT CLINIC VISIT: HCPCS | Performed by: FAMILY MEDICINE

## 2023-01-16 PROCEDURE — 85025 COMPLETE CBC W/AUTO DIFF WBC: CPT

## 2023-01-16 PROCEDURE — 99215 OFFICE O/P EST HI 40 MIN: CPT | Performed by: FAMILY MEDICINE

## 2023-01-16 PROCEDURE — 36415 COLL VENOUS BLD VENIPUNCTURE: CPT

## 2023-01-16 PROCEDURE — 82728 ASSAY OF FERRITIN: CPT

## 2023-01-16 PROCEDURE — 82607 VITAMIN B-12: CPT

## 2023-01-16 RX ORDER — GABAPENTIN 100 MG/1
CAPSULE ORAL
Qty: 100 CAPSULE | Refills: 1 | Status: SHIPPED | OUTPATIENT
Start: 2023-01-16 | End: 2023-01-16

## 2023-01-16 NOTE — PATIENT INSTRUCTIONS
Blood work today  EMG testing of the median nerve both hands  Keep wearing the braces  Sports medicine consult - they should call you   Sometimes they do steroid injections

## 2023-01-16 NOTE — NURSING NOTE
Chief Complaint   Patient presents with     Numbness     Pt reports having tingling in both hands, wrists, elbows and shoulders since birth of her Son in 2017. Dx with carpal tunnel, then following the 2021 birth of her Daughter it has worsened

## 2023-01-18 ENCOUNTER — TELEPHONE (OUTPATIENT)
Dept: OBGYN | Facility: CLINIC | Age: 38
End: 2023-01-18
Payer: COMMERCIAL

## 2023-01-18 NOTE — TELEPHONE ENCOUNTER
Patient called and stated that she saw Dr. Henry on 1-16-23 and put in a referral to neurology for this patient.    Patient called and stated that she has an appointment, but it is for June 29th and patient wanted me to let Dr. Henry know.     I let the patient know that I will send a message to Dr. Henry and once I hear back I will call her.    Patient verbalized understanding and was in agreement with the plan.    Message routed to Dr. Henry

## 2023-01-19 NOTE — TELEPHONE ENCOUNTER
Sent patient a [employer] message letting her know that Dr. Henry also put in a referral to sports medicine who will be able to see her sooner to help with the tingling in her hands.

## 2023-01-30 ENCOUNTER — OFFICE VISIT (OUTPATIENT)
Dept: ORTHOPEDICS | Facility: CLINIC | Age: 38
End: 2023-01-30
Attending: FAMILY MEDICINE
Payer: COMMERCIAL

## 2023-01-30 ENCOUNTER — APPOINTMENT (OUTPATIENT)
Dept: LAB | Facility: CLINIC | Age: 38
End: 2023-01-30
Payer: COMMERCIAL

## 2023-01-30 VITALS — DIASTOLIC BLOOD PRESSURE: 60 MMHG | SYSTOLIC BLOOD PRESSURE: 112 MMHG

## 2023-01-30 DIAGNOSIS — R20.2 PARESTHESIA OF BOTH HANDS: ICD-10-CM

## 2023-01-30 PROCEDURE — 86431 RHEUMATOID FACTOR QUANT: CPT | Performed by: ORTHOPAEDIC SURGERY

## 2023-01-30 PROCEDURE — 99203 OFFICE O/P NEW LOW 30 MIN: CPT | Performed by: ORTHOPAEDIC SURGERY

## 2023-01-30 PROCEDURE — 86038 ANTINUCLEAR ANTIBODIES: CPT | Performed by: ORTHOPAEDIC SURGERY

## 2023-01-30 PROCEDURE — 86225 DNA ANTIBODY NATIVE: CPT | Performed by: ORTHOPAEDIC SURGERY

## 2023-01-30 PROCEDURE — 86200 CCP ANTIBODY: CPT | Performed by: ORTHOPAEDIC SURGERY

## 2023-01-30 PROCEDURE — 82306 VITAMIN D 25 HYDROXY: CPT | Performed by: ORTHOPAEDIC SURGERY

## 2023-01-30 NOTE — PATIENT INSTRUCTIONS
Thank you for choosing [facility]    Dr. Zhu Locations:    [facility]   [address], [address]  [place], [place] [id number] [place] [id number]   Appointments: [phone] Appointments: [phone]   Fax: [phone] Fax: [phone]       Follow up: ~1 month, after completion of EMG and lab work.  Please call [phone] to schedule your follow up appointment.     Lab orders have been placed evaluating for Hemoglobin A1c, Thyroid, Arthritis and Inflammation. You can go to your [facility] location that has a lab. Please call your [facility] to schedule a lab appointment. [facility] has a walk-in hospital lab for your convenience.     EMG Studies have been ordered for further evaluation of hand numbness and tingling.   [facility]  Address: [address], [place] [address]  Phone: [phone]    For any questions please contact my office, [phone].

## 2023-01-30 NOTE — PROGRESS NOTES
S: Patient is a 37 year old, right hand dominant female seen today in consultation for bilateral hand paresthesias.  They report onset of symptoms chronic symptoms of the right hand, 2017 after the birth of her son. She reports numbness, tingling, and burning of the hand. She was able to utilize use of wrist brace that improved her symptoms. She reports having her second child ~1 year ago, and has noticed severe worsening of her symptoms in both hands. Increased symptoms at nighttime causing difficulties sleeping, she notes frequent position changes, trial of brace. Symptoms also present when driving.  Concern of weakness of her hands, and difficulties with fine motor tasks.     Patient is currently working CNA.            Patient Active Problem List   Diagnosis     History of gestational diabetes mellitus (GDM) in prior pregnancy, currently pregnant     H/O fetal anomaly in prior pregnancy, currently pregnant, third trimester     Uterine leiomyoma     High-risk pregnancy, elderly multigravida, unspecified trimester     History of  delivery- plans repeat     Elevated early 1hour- passed early 3 hour and 3 hour at EOB     History of fetal abnormality in previous pregnancy, currently pregnant     History of pre-eclampsia in prior pregnancy, currently pregnant in third trimester     S/P      Postoperative pain            Past Medical History:   Diagnosis Date     Vitamin D deficiency 2017: Vitamin D= 25, [employer] message sent - supplementation 7324-8679 IU/day 17- Vit D 48     Vitreous syneresis             Past Surgical History:   Procedure Laterality Date      SECTION N/A 2017    Procedure:  SECTION;;  Surgeon: Shellie Soni MD;  [facility]      SECTION N/A 2021    Procedure: REPEAT  SECTION;  Surgeon: Shellie Soni MD;  [facility]     D & C      fetal hypoplastic leftheart syndrome     DILATION AND CURETTAGE  SUCTION N/A 12/14/2021    Procedure: DILATION AND CURETTAGE, UTERUS, USING SUCTION with ultrasound guidance;  Surgeon: Neyda Ho MD;  [facility]     IR PELVIC TRANSABD/TRANSREC ABSCESS DRAIN  12/22/2021     IR SINOGRAM INJECTION DIAGNOSTIC  12/29/2021            Social History     Tobacco Use     Smoking status: Never     Smokeless tobacco: Never   Substance Use Topics     Alcohol use: No            Family History   Problem Relation Age of Onset     Hypertension Mother         had before pregnancy     Heart Disease Other         hypoplastic left heart. TAB 23 wks      Diabetes No family hx of      Cerebrovascular Disease No family hx of      Coronary Artery Disease No family hx of      Osteoporosis No family hx of      Anxiety Disorder No family hx of      Depression No family hx of              No Known Allergies         Current Outpatient Medications   Medication Sig Dispense Refill     acetaminophen (TYLENOL) 500 MG tablet Take 1-2 tablets (500-1,000 mg) by mouth every 6 hours as needed for mild pain (Patient not taking: Reported on 1/6/2022) 60 tablet 0     estradiol (ESTRACE) 0.1 MG/GM vaginal cream Place 2 g vaginally twice a week (Patient not taking: Reported on 1/16/2023) 42.5 g 0     ferrous sulfate (FEROSUL) 325 (65 Fe) MG tablet Take 1 tablet (325 mg) by mouth daily (with breakfast) (Patient not taking: Reported on 1/16/2023) 100 tablet 0     ibuprofen (ADVIL/MOTRIN) 600 MG tablet Take 1 tablet (600 mg) by mouth every 6 hours as needed for moderate pain (Patient not taking: Reported on 1/6/2022) 40 tablet 0     ibuprofen (ADVIL/MOTRIN) 800 MG tablet Take 1 tablet (800 mg) by mouth every 6 hours as needed for other (mild and/or inflammatory pain) (Patient not taking: Reported on 1/6/2022) 30 tablet 0     lidocaine (LIDODERM) 5 % patch Place 2 patches onto the skin every 24 hours To prevent lidocaine toxicity, patient should be patch free for 12 hrs daily. (Patient not taking: Reported on  1/16/2023) 30 patch 0     oxyCODONE (ROXICODONE) 5 MG tablet Take 1 tablet (5 mg) by mouth every 4 hours as needed for moderate to severe pain (Patient not taking: Reported on 1/6/2022) 6 tablet 0     sodium chloride, PF, 0.9% PF flush 10 mLs by Intracatheter route every 8 hours (Patient not taking: Reported on 1/6/2022) 100 mL 0          Review Of Systems  Skin: negative  Eyes: negative  Ears/Nose/Throat: negative  Respiratory: No shortness of breath, dyspnea on exertion, cough, or hemoptysis    O: physical exam:  Thenar and hypothenar masses adequate girth.  Negative egawa each upper extremity.  Adequate radial nerve function.  Negative tinel and phalen but complaints of dysesthesia about index and long fingers.  Negative burton.    Lab:  Update Hgb A1C, Vit D, TSH, inflammatory markers and arthritic profile    Images:  pending         A:  dysesthesia each hand, no history of cervical trauma    P: EMG/NCV each upper extremity, obtain labs  Consider cspine images next visit  Notify If exacerbation symptoms           In addition to the above assessment and plan each active problem on Zaina's problem list was evaluated today. This included the questioning of Zaina for any medication problems. We will continue the current treatment plan for these active problems except as noted.

## 2023-01-30 NOTE — LETTER
2023         RE: Zaina Tan  [id number] [id number]        Dear Colleague,    Thank you for referring your patient, Zaina Tan, to the [facility] [place]. Please see a copy of my visit note below.    S: Patient is a 37 year old, right hand dominant female seen today in consultation for bilateral hand paresthesias.  They report onset of symptoms chronic symptoms of the right hand, 2017 after the birth of her son. She reports numbness, tingling, and burning of the hand. She was able to utilize use of wrist brace that improved her symptoms. She reports having her second child ~1 year ago, and has noticed severe worsening of her symptoms in both hands. Increased symptoms at nighttime causing difficulties sleeping, she notes frequent position changes, trial of brace. Symptoms also present when driving.  Concern of weakness of her hands, and difficulties with fine motor tasks.     Patient is currently working CNA.            Patient Active Problem List   Diagnosis     History of gestational diabetes mellitus (GDM) in prior pregnancy, currently pregnant     H/O fetal anomaly in prior pregnancy, currently pregnant, third trimester     Uterine leiomyoma     High-risk pregnancy, elderly multigravida, unspecified trimester     History of  delivery- plans repeat     Elevated early 1hour- passed early 3 hour and 3 hour at EOB     History of fetal abnormality in previous pregnancy, currently pregnant     History of pre-eclampsia in prior pregnancy, currently pregnant in third trimester     S/P      Postoperative pain            Past Medical History:   Diagnosis Date     Vitamin D deficiency 2017: Vitamin D= 25, [employer] message sent - supplementation 0224-7410 IU/day 17- Vit D 48     Vitreous syneresis             Past Surgical History:   Procedure Laterality Date      SECTION N/A 2017    Procedure:  SECTION;;   Surgeon: Shellie Soni MD;  [facility]      SECTION N/A 2021    Procedure: REPEAT  SECTION;  Surgeon: Shellie Soni MD;  [facility]     D & C      fetal hypoplastic leftheart syndrome     DILATION AND CURETTAGE SUCTION N/A 2021    Procedure: DILATION AND CURETTAGE, UTERUS, USING SUCTION with ultrasound guidance;  Surgeon: Neyda Ho MD;  [facility]     IR PELVIC TRANSABD/TRANSREC ABSCESS DRAIN  2021     IR SINOGRAM INJECTION DIAGNOSTIC  2021            Social History     Tobacco Use     Smoking status: Never     Smokeless tobacco: Never   Substance Use Topics     Alcohol use: No            Family History   Problem Relation Age of Onset     Hypertension Mother         had before pregnancy     Heart Disease Other         hypoplastic left heart. TAB 23 wks      Diabetes No family hx of      Cerebrovascular Disease No family hx of      Coronary Artery Disease No family hx of      Osteoporosis No family hx of      Anxiety Disorder No family hx of      Depression No family hx of              No Known Allergies         Current Outpatient Medications   Medication Sig Dispense Refill     acetaminophen (TYLENOL) 500 MG tablet Take 1-2 tablets (500-1,000 mg) by mouth every 6 hours as needed for mild pain (Patient not taking: Reported on 2022) 60 tablet 0     estradiol (ESTRACE) 0.1 MG/GM vaginal cream Place 2 g vaginally twice a week (Patient not taking: Reported on 2023) 42.5 g 0     ferrous sulfate (FEROSUL) 325 (65 Fe) MG tablet Take 1 tablet (325 mg) by mouth daily (with breakfast) (Patient not taking: Reported on 2023) 100 tablet 0     ibuprofen (ADVIL/MOTRIN) 600 MG tablet Take 1 tablet (600 mg) by mouth every 6 hours as needed for moderate pain (Patient not taking: Reported on 2022) 40 tablet 0     ibuprofen (ADVIL/MOTRIN) 800 MG tablet Take 1 tablet (800 mg) by mouth every 6 hours as needed for other (mild and/or  inflammatory pain) (Patient not taking: Reported on 1/6/2022) 30 tablet 0     lidocaine (LIDODERM) 5 % patch Place 2 patches onto the skin every 24 hours To prevent lidocaine toxicity, patient should be patch free for 12 hrs daily. (Patient not taking: Reported on 1/16/2023) 30 patch 0     oxyCODONE (ROXICODONE) 5 MG tablet Take 1 tablet (5 mg) by mouth every 4 hours as needed for moderate to severe pain (Patient not taking: Reported on 1/6/2022) 6 tablet 0     sodium chloride, PF, 0.9% PF flush 10 mLs by Intracatheter route every 8 hours (Patient not taking: Reported on 1/6/2022) 100 mL 0          Review Of Systems  Skin: negative  Eyes: negative  Ears/Nose/Throat: negative  Respiratory: No shortness of breath, dyspnea on exertion, cough, or hemoptysis    O: physical exam:  Thenar and hypothenar masses adequate girth.  Negative egawa each upper extremity.  Adequate radial nerve function.  Negative tinel and phalen but complaints of dysesthesia about index and long fingers.  Negative burton.    Lab:  Update Hgb A1C, Vit D, TSH, inflammatory markers and arthritic profile    Images:  pending         A:  dysesthesia each hand, no history of cervical trauma    P: EMG/NCV each upper extremity, obtain labs  Consider cspine images next visit  Notify If exacerbation symptoms           In addition to the above assessment and plan each active problem on Zaina's problem list was evaluated today. This included the questioning of Zaina for any medication problems. We will continue the current treatment plan for these active problems except as noted.        Again, thank you for allowing me to participate in the care of your patient.        Sincerely,        GARETH DUFFY MD

## 2023-02-06 ENCOUNTER — TRANSFERRED RECORDS (OUTPATIENT)
Dept: HEALTH INFORMATION MANAGEMENT | Facility: CLINIC | Age: 38
End: 2023-02-06

## 2023-02-07 ENCOUNTER — TELEPHONE (OUTPATIENT)
Dept: ORTHOPEDICS | Facility: CLINIC | Age: 38
End: 2023-02-07
Payer: COMMERCIAL

## 2023-02-07 NOTE — TELEPHONE ENCOUNTER
EMG results received from the [facility] for study completed on 2/6/23. Results placed in provider basket in [facility].     Xin Yoon MSA, ATC  Certified Athletic Trainer

## 2023-02-17 ENCOUNTER — TELEPHONE (OUTPATIENT)
Dept: ORTHOPEDICS | Facility: CLINIC | Age: 38
End: 2023-02-17
Payer: COMMERCIAL

## 2023-02-17 NOTE — TELEPHONE ENCOUNTER
Left VM for patient to schedule follow up with Dr. Zhu to review EMG results. [email or web address] message sent to patient as well.     Xin Yoon MSA, ATC  Certified Athletic Trainer

## 2023-02-17 NOTE — PROGRESS NOTES
"  Assessment & Plan     Paresthesia of both hands  Unclear etiology of numbness and tingling.  Possibly carpal tunnel and EMG is pending.  She does have an appointment to go over this I could not find the report.  Had multiple blood tests which were normal except for mildly elevated sed rate.  We will repeat the sed rate.  - RBC Sedimentation Rate    Vitamin D deficiency  Her vitamin D level is low.  We will have her take 5000 IUs daily for a month and then go to 2000 continuous.  This may help some of the numbness but we will see what the EMG shows.  - cholecalciferol (VITAMIN D3) 125 mcg (5000 units) capsule  Dispense: 30 capsule; Refill: 0          Return in about 3 months (around 5/20/2023).    Megan Henry MD PhD  [facility]    Time note (e3, 20'): The total of my time (on the date of service) for this service was 20 minutes, including discussion/face-to-face, chart review, interpretation not otherwise reported, documentation, and updating of the computerized record.      Ana Lilia Mahajan is a 37 year old presenting for the following health issues:f/u numbness in hands   No chief complaint on file.      HPI 38 yo seen 4 wks ago for paresthesias in both hands.  Had EMG and report not available - She has f/up appt in March.  Multiple blood tests were ordered and normal except for vit D which is low and a sed rate which is high but CRP is normal.   She continues to wear zoie braces.  Hands are still very painful and wake her up at night.      ROS she endorses spots and floaters, numbness and tingling in her hands, difficulty sleeping.  The rest of the complete review of systems is negative other than as mentioned above.        Objective    LMP 12/27/2022 (Approximate)   There is no height or weight on file to calculate BMI.   Ht 1.575 m (5' 2\")   Wt 76.5 kg (168 lb 11.2 oz)   LMP 12/27/2022 (Approximate)   BMI 30.86 kg/m      Physical Exam   GENERAL: healthy, alert and " no distress  MS:splints on both wrists  no gross musculoskeletal defects noted, no edema  NEURO: Normal strength and tone, mentation intact and speech normal  PSYCH: mentation appears normal, affect normal/bright    Labs reviewed     Megan Henry MD, PhD

## 2023-02-17 NOTE — TELEPHONE ENCOUNTER
Staff message sent by Dr. Henry. She believes patient should follow up with sports medicine to review EMG. Advised Dr. Henry I will attempt to contact patient to schedule follow up with Dr. Zhu.     Xin Yoon MSA, ATC  Certified Athletic Trainer

## 2023-02-20 ENCOUNTER — OFFICE VISIT (OUTPATIENT)
Dept: FAMILY MEDICINE | Facility: CLINIC | Age: 38
End: 2023-02-20
Attending: FAMILY MEDICINE
Payer: COMMERCIAL

## 2023-02-20 ENCOUNTER — LAB (OUTPATIENT)
Dept: LAB | Facility: CLINIC | Age: 38
End: 2023-02-20
Attending: FAMILY MEDICINE
Payer: COMMERCIAL

## 2023-02-20 VITALS — WEIGHT: 168.7 LBS | BODY MASS INDEX: 31.04 KG/M2 | HEIGHT: 62 IN

## 2023-02-20 DIAGNOSIS — R20.2 PARESTHESIA OF BOTH HANDS: Primary | ICD-10-CM

## 2023-02-20 DIAGNOSIS — E55.9 VITAMIN D DEFICIENCY: ICD-10-CM

## 2023-02-20 DIAGNOSIS — R20.2 PARESTHESIA OF BOTH HANDS: ICD-10-CM

## 2023-02-20 LAB — ERYTHROCYTE [SEDIMENTATION RATE] IN BLOOD BY WESTERGREN METHOD: 76 MM/HR (ref 0–20)

## 2023-02-20 PROCEDURE — 36415 COLL VENOUS BLD VENIPUNCTURE: CPT

## 2023-02-20 PROCEDURE — 85652 RBC SED RATE AUTOMATED: CPT

## 2023-02-20 PROCEDURE — G0463 HOSPITAL OUTPT CLINIC VISIT: HCPCS | Performed by: FAMILY MEDICINE

## 2023-02-20 PROCEDURE — 99213 OFFICE O/P EST LOW 20 MIN: CPT | Performed by: FAMILY MEDICINE

## 2023-02-20 NOTE — PATIENT INSTRUCTIONS
Thank you for trusting us with your care!     If you need to contact us for questions about:  Symptoms, Scheduling & Medical Questions; Non-urgent (2-3 day response) [place] message, Urgent (needing response today) [phone] (if after 3:30pm next day response)   Prescriptions: Please call your Pharmacy   Billing: [facility] [phone] or [facility]:[phone]    Take vitamin D3 5000IU/day for 1 month and then 2000IU/day continuous   Blood work today

## 2023-03-03 NOTE — PROGRESS NOTES
S: Patient is a 37 year old, right hand dominant female seen today in follow up for bilateral hand pain and numbness.  They were previously evaluated on 2023,  they report paraesthesia in both hands feels the same.  She reports numbness, tingling, and burning of the hand. She was able to utilize use of wrist brace that improved her symptoms. She reports having her second child ~1 year ago, and has noticed severe worsening of her symptoms in both hands. Increased symptoms at nighttime causing difficulties sleeping, she notes frequent position changes, trial of brace. Symptoms also present when driving.  Concern of weakness of her hands, and difficulties with fine motor tasks.   They have tried the following therapies: wrist brace and bilateral UE EMGs at [facility] on 2023, which she is here to review today.      Current pain level: 3/10,      Patient is currently working as CNA           Patient Active Problem List   Diagnosis     History of gestational diabetes mellitus (GDM) in prior pregnancy, currently pregnant     H/O fetal anomaly in prior pregnancy, currently pregnant, third trimester     Uterine leiomyoma     High-risk pregnancy, elderly multigravida, unspecified trimester     History of  delivery- plans repeat     Elevated early 1hour- passed early 3 hour and 3 hour at EOB     History of fetal abnormality in previous pregnancy, currently pregnant     History of pre-eclampsia in prior pregnancy, currently pregnant in third trimester     S/P      Postoperative pain            Past Medical History:   Diagnosis Date     Vitamin D deficiency 2017: Vitamin D= 25, [employer] message sent - supplementation 7948-1332 IU/day 17- Vit D 48     Vitreous syneresis             Past Surgical History:   Procedure Laterality Date      SECTION N/A 2017    Procedure:  SECTION;;  Surgeon: Shellie Soni MD;  [facility]       SECTION N/A 2021    Procedure: REPEAT  SECTION;  Surgeon: Shellie Soni MD;  [facility]     D & C      fetal hypoplastic leftheart syndrome     DILATION AND CURETTAGE SUCTION N/A 2021    Procedure: DILATION AND CURETTAGE, UTERUS, USING SUCTION with ultrasound guidance;  Surgeon: Neyda Ho MD;  [facility]     IR PELVIC TRANSABD/TRANSREC ABSCESS DRAIN  2021     IR SINOGRAM INJECTION DIAGNOSTIC  2021            Social History     Tobacco Use     Smoking status: Never     Smokeless tobacco: Never   Substance Use Topics     Alcohol use: No            Family History   Problem Relation Age of Onset     Hypertension Mother         had before pregnancy     Heart Disease Other         hypoplastic left heart. TAB 23 wks      Diabetes No family hx of      Cerebrovascular Disease No family hx of      Coronary Artery Disease No family hx of      Osteoporosis No family hx of      Anxiety Disorder No family hx of      Depression No family hx of              No Known Allergies         Current Outpatient Medications   Medication Sig Dispense Refill     acetaminophen (TYLENOL) 500 MG tablet Take 1-2 tablets (500-1,000 mg) by mouth every 6 hours as needed for mild pain (Patient not taking: Reported on 2022) 60 tablet 0     cholecalciferol (VITAMIN D3) 125 mcg (5000 units) capsule Take 1 capsule (125 mcg) by mouth daily Take one capsule daily. 30 capsule 0     estradiol (ESTRACE) 0.1 MG/GM vaginal cream Place 2 g vaginally twice a week (Patient not taking: Reported on 2023) 42.5 g 0     ferrous sulfate (FEROSUL) 325 (65 Fe) MG tablet Take 1 tablet (325 mg) by mouth daily (with breakfast) (Patient not taking: Reported on 2023) 100 tablet 0     ibuprofen (ADVIL/MOTRIN) 600 MG tablet Take 1 tablet (600 mg) by mouth every 6 hours as needed for moderate pain (Patient not taking: Reported on 2022) 40 tablet 0     ibuprofen (ADVIL/MOTRIN) 800 MG tablet Take 1  tablet (800 mg) by mouth every 6 hours as needed for other (mild and/or inflammatory pain) (Patient not taking: Reported on 1/6/2022) 30 tablet 0     lidocaine (LIDODERM) 5 % patch Place 2 patches onto the skin every 24 hours To prevent lidocaine toxicity, patient should be patch free for 12 hrs daily. (Patient not taking: Reported on 1/16/2023) 30 patch 0     oxyCODONE (ROXICODONE) 5 MG tablet Take 1 tablet (5 mg) by mouth every 4 hours as needed for moderate to severe pain (Patient not taking: Reported on 1/6/2022) 6 tablet 0     sodium chloride, PF, 0.9% PF flush 10 mLs by Intracatheter route every 8 hours (Patient not taking: Reported on 1/6/2022) 100 mL 0          Review Of Systems  Skin: negative  Eyes: negative  Ears/Nose/Throat: negative  Respiratory: No shortness of breath, dyspnea on exertion, cough, or hemoptysis    O: Physical exam:  + tinel's each wrist at volar wrist crease.  Dysesthesia into long finger each hand.    Lab:  EMG/NCV each upper extremity findings consistent with moderate CTS         A: Moderate CTS both upper extremities    P:  Inject carpal canal each wrist after verbal and written consent, ethyl chloride/ chloroprep.  Continue splints  Follow outcomes  See back 6-12 weeks  Notify if exacerbation symptoms  Discussed operative intervention which will revisit at later date           In addition to the above assessment and plan each active problem on Zaina's problem list was evaluated today. This included the questioning of Zaina for any medication problems. We will continue the current treatment plan for these active problems except as noted.  Hand / Upper Extremity Injection/Arthrocentesis: bilateral carpal tunnel    Date/Time: 3/6/2023 10:56 AM  Performed by: Todd Zhu MD  Authorized by: Todd Zhu MD     Indications:  Pain  Needle Size:  25 G  Guidance: landmark    Condition: carpal tunnel    Laterality:  Bilateral    Site:  Bilateral carpal tunnel  Medications (Right):  40  mg triamcinolone 40 MG/ML; 1 mL bupivacaine 0.5 %; 1 mL lidocaine 1 %  Medications (Left):  40 mg triamcinolone 40 MG/ML; 1 mL bupivacaine 0.5 %; 1 mL lidocaine 1 %  Outcome:  Tolerated well, no immediate complications  Procedure discussed: discussed risks, benefits, and alternatives    Consent Given by:  Patient  Prep: patient was prepped and draped in usual sterile fashion

## 2023-03-06 ENCOUNTER — OFFICE VISIT (OUTPATIENT)
Dept: ORTHOPEDICS | Facility: CLINIC | Age: 38
End: 2023-03-06
Payer: COMMERCIAL

## 2023-03-06 VITALS — WEIGHT: 168 LBS | BODY MASS INDEX: 30.91 KG/M2 | HEIGHT: 62 IN

## 2023-03-06 DIAGNOSIS — G56.03 BILATERAL CARPAL TUNNEL SYNDROME: Primary | ICD-10-CM

## 2023-03-06 PROCEDURE — 2894A PR VOIDCORRECT: CPT | Performed by: ORTHOPAEDIC SURGERY

## 2023-03-06 PROCEDURE — 99212 OFFICE O/P EST SF 10 MIN: CPT | Mod: 25 | Performed by: ORTHOPAEDIC SURGERY

## 2023-03-06 PROCEDURE — 20526 THER INJECTION CARP TUNNEL: CPT | Mod: 50 | Performed by: ORTHOPAEDIC SURGERY

## 2023-03-06 RX ORDER — LIDOCAINE HYDROCHLORIDE 10 MG/ML
1 INJECTION, SOLUTION INFILTRATION; PERINEURAL
Status: DISCONTINUED | OUTPATIENT
Start: 2023-03-06 | End: 2024-09-09

## 2023-03-06 RX ORDER — BUPIVACAINE HYDROCHLORIDE 5 MG/ML
1 INJECTION, SOLUTION PERINEURAL
Status: DISCONTINUED | OUTPATIENT
Start: 2023-03-06 | End: 2024-09-09

## 2023-03-06 RX ORDER — TRIAMCINOLONE ACETONIDE 40 MG/ML
40 INJECTION, SUSPENSION INTRA-ARTICULAR; INTRAMUSCULAR
Status: DISCONTINUED | OUTPATIENT
Start: 2023-03-06 | End: 2024-09-09

## 2023-03-06 RX ADMIN — LIDOCAINE HYDROCHLORIDE 1 ML: 10 INJECTION, SOLUTION INFILTRATION; PERINEURAL at 10:56

## 2023-03-06 RX ADMIN — TRIAMCINOLONE ACETONIDE 40 MG: 40 INJECTION, SUSPENSION INTRA-ARTICULAR; INTRAMUSCULAR at 10:56

## 2023-03-06 RX ADMIN — BUPIVACAINE HYDROCHLORIDE 1 ML: 5 INJECTION, SOLUTION PERINEURAL at 10:56

## 2023-03-06 NOTE — LETTER
3/6/2023         RE: Zaina Tan  [id number] [address]        Dear Colleague,    Thank you for referring your patient, Zaina Tan, to the [facility] [place]. Please see a copy of my visit note below.    S: Patient is a 37 year old, right hand dominant female seen today in follow up for bilateral hand pain and numbness.  They were previously evaluated on 2023,  they report paraesthesia in both hands feels the same.  She reports numbness, tingling, and burning of the hand. She was able to utilize use of wrist brace that improved her symptoms. She reports having her second child ~1 year ago, and has noticed severe worsening of her symptoms in both hands. Increased symptoms at nighttime causing difficulties sleeping, she notes frequent position changes, trial of brace. Symptoms also present when driving.  Concern of weakness of her hands, and difficulties with fine motor tasks.   They have tried the following therapies: wrist brace and bilateral UE EMGs at [facility] on 2023, which she is here to review today.      Current pain level: 3/10,      Patient is currently working as CNA           Patient Active Problem List   Diagnosis     History of gestational diabetes mellitus (GDM) in prior pregnancy, currently pregnant     H/O fetal anomaly in prior pregnancy, currently pregnant, third trimester     Uterine leiomyoma     High-risk pregnancy, elderly multigravida, unspecified trimester     History of  delivery- plans repeat     Elevated early 1hour- passed early 3 hour and 3 hour at EOB     History of fetal abnormality in previous pregnancy, currently pregnant     History of pre-eclampsia in prior pregnancy, currently pregnant in third trimester     S/P      Postoperative pain            Past Medical History:   Diagnosis Date     Vitamin D deficiency 2017: Vitamin D= 25, [employer] message sent -  supplementation [id number] IU/day 17- Vit D 48     Vitreous syneresis             Past Surgical History:   Procedure Laterality Date      SECTION N/A 2017    Procedure:  SECTION;;  Surgeon: Shellie Soni MD;  [facility]: [facility]      SECTION N/A 2021    Procedure: REPEAT  SECTION;  Surgeon: Shellie Soni MD;  Location: [facility]     D & C      fetal hypoplastic leftheart syndrome     DILATION AND CURETTAGE SUCTION N/A 2021    Procedure: DILATION AND CURETTAGE, UTERUS, USING SUCTION with ultrasound guidance;  Surgeon: Neyda Ho MD;  [facility]     IR PELVIC TRANSABD/TRANSREC ABSCESS DRAIN  2021     IR SINOGRAM INJECTION DIAGNOSTIC  2021            Social History     Tobacco Use     Smoking status: Never     Smokeless tobacco: Never   Substance Use Topics     Alcohol use: No            Family History   Problem Relation Age of Onset     Hypertension Mother         had before pregnancy     Heart Disease Other         hypoplastic left heart. TAB 23 wks      Diabetes No family hx of      Cerebrovascular Disease No family hx of      Coronary Artery Disease No family hx of      Osteoporosis No family hx of      Anxiety Disorder No family hx of      Depression No family hx of              No Known Allergies         Current Outpatient Medications   Medication Sig Dispense Refill     acetaminophen (TYLENOL) 500 MG tablet Take 1-2 tablets (500-1,000 mg) by mouth every 6 hours as needed for mild pain (Patient not taking: Reported on 2022) 60 tablet 0     cholecalciferol (VITAMIN D3) 125 mcg (5000 units) capsule Take 1 capsule (125 mcg) by mouth daily Take one capsule daily. 30 capsule 0     estradiol (ESTRACE) 0.1 MG/GM vaginal cream Place 2 g vaginally twice a week (Patient not taking: Reported on 2023) 42.5 g 0     ferrous sulfate (FEROSUL) 325 (65 Fe) MG tablet Take 1 tablet (325 mg) by mouth daily (with breakfast) (Patient not  taking: Reported on 1/16/2023) 100 tablet 0     ibuprofen (ADVIL/MOTRIN) 600 MG tablet Take 1 tablet (600 mg) by mouth every 6 hours as needed for moderate pain (Patient not taking: Reported on 1/6/2022) 40 tablet 0     ibuprofen (ADVIL/MOTRIN) 800 MG tablet Take 1 tablet (800 mg) by mouth every 6 hours as needed for other (mild and/or inflammatory pain) (Patient not taking: Reported on 1/6/2022) 30 tablet 0     lidocaine (LIDODERM) 5 % patch Place 2 patches onto the skin every 24 hours To prevent lidocaine toxicity, patient should be patch free for 12 hrs daily. (Patient not taking: Reported on 1/16/2023) 30 patch 0     oxyCODONE (ROXICODONE) 5 MG tablet Take 1 tablet (5 mg) by mouth every 4 hours as needed for moderate to severe pain (Patient not taking: Reported on 1/6/2022) 6 tablet 0     sodium chloride, PF, 0.9% PF flush 10 mLs by Intracatheter route every 8 hours (Patient not taking: Reported on 1/6/2022) 100 mL 0          Review Of Systems  Skin: negative  Eyes: negative  Ears/Nose/Throat: negative  Respiratory: No shortness of breath, dyspnea on exertion, cough, or hemoptysis    O: Physical exam:  + tinel's each wrist at volar wrist crease.  Dysesthesia into long finger each hand.    Lab:  EMG/NCV each upper extremity findings consistent with moderate CTS         A: Moderate CTS both upper extremities    P:  Inject carpal canal each wrist after verbal and written consent, ethyl chloride/ chloroprep.  Continue splints  Follow outcomes  See back 6-12 weeks  Notify if exacerbation symptoms  Discussed operative intervention which will revisit at later date           In addition to the above assessment and plan each active problem on Zaina's problem list was evaluated today. This included the questioning of Zaina for any medication problems. We will continue the current treatment plan for these active problems except as noted.  Hand / Upper Extremity Injection/Arthrocentesis: bilateral carpal  tunnel    Date/Time: 3/6/2023 10:56 AM  Performed by: Gareth Duffy MD  Authorized by: Gareth Duffy MD     Indications:  Pain  Needle Size:  25 G  Guidance: landmark    Condition: carpal tunnel    Laterality:  Bilateral    Site:  Bilateral carpal tunnel  Medications (Right):  40 mg triamcinolone 40 MG/ML; 1 mL bupivacaine 0.5 %; 1 mL lidocaine 1 %  Medications (Left):  40 mg triamcinolone 40 MG/ML; 1 mL bupivacaine 0.5 %; 1 mL lidocaine 1 %  Outcome:  Tolerated well, no immediate complications  Procedure discussed: discussed risks, benefits, and alternatives    Consent Given by:  Patient  Prep: patient was prepped and draped in usual sterile fashion              Again, thank you for allowing me to participate in the care of your patient.        Sincerely,        GARETH DUFFY MD

## 2023-04-15 ENCOUNTER — OFFICE VISIT (OUTPATIENT)
Dept: URGENT CARE | Facility: URGENT CARE | Age: 38
End: 2023-04-15
Payer: COMMERCIAL

## 2023-04-15 VITALS
RESPIRATION RATE: 18 BRPM | BODY MASS INDEX: 30.91 KG/M2 | WEIGHT: 169 LBS | DIASTOLIC BLOOD PRESSURE: 67 MMHG | SYSTOLIC BLOOD PRESSURE: 101 MMHG | TEMPERATURE: 98.8 F | HEART RATE: 71 BPM | OXYGEN SATURATION: 100 %

## 2023-04-15 DIAGNOSIS — M25.562 CHRONIC PAIN OF LEFT KNEE: ICD-10-CM

## 2023-04-15 DIAGNOSIS — H81.10 BENIGN PAROXYSMAL POSITIONAL VERTIGO, UNSPECIFIED LATERALITY: ICD-10-CM

## 2023-04-15 DIAGNOSIS — G89.29 CHRONIC PAIN OF LEFT KNEE: ICD-10-CM

## 2023-04-15 DIAGNOSIS — R07.9 ACUTE CHEST PAIN: Primary | ICD-10-CM

## 2023-04-15 LAB
ALBUMIN SERPL-MCNC: 3.6 G/DL (ref 3.4–5)
ALBUMIN UR-MCNC: NEGATIVE MG/DL
ALP SERPL-CCNC: 104 U/L (ref 40–150)
ALT SERPL W P-5'-P-CCNC: 18 U/L (ref 0–50)
ANION GAP SERPL CALCULATED.3IONS-SCNC: 2 MMOL/L (ref 3–14)
APPEARANCE UR: CLEAR
AST SERPL W P-5'-P-CCNC: 20 U/L (ref 0–45)
BACTERIA #/AREA URNS HPF: ABNORMAL /HPF
BASOPHILS # BLD AUTO: 0 10E3/UL (ref 0–0.2)
BASOPHILS NFR BLD AUTO: 1 %
BILIRUB SERPL-MCNC: 0.6 MG/DL (ref 0.2–1.3)
BILIRUB UR QL STRIP: NEGATIVE
BUN SERPL-MCNC: 11 MG/DL (ref 7–30)
CALCIUM SERPL-MCNC: 10 MG/DL (ref 8.5–10.1)
CHLORIDE BLD-SCNC: 104 MMOL/L (ref 94–109)
CO2 SERPL-SCNC: 31 MMOL/L (ref 20–32)
COLOR UR AUTO: YELLOW
CREAT SERPL-MCNC: 0.6 MG/DL (ref 0.52–1.04)
EOSINOPHIL # BLD AUTO: 0.1 10E3/UL (ref 0–0.7)
EOSINOPHIL NFR BLD AUTO: 1 %
ERYTHROCYTE [DISTWIDTH] IN BLOOD BY AUTOMATED COUNT: 12.7 % (ref 10–15)
ERYTHROCYTE [SEDIMENTATION RATE] IN BLOOD BY WESTERGREN METHOD: 30 MM/HR (ref 0–20)
GFR SERPL CREATININE-BSD FRML MDRD: >90 ML/MIN/1.73M2
GLUCOSE BLD-MCNC: 90 MG/DL (ref 70–99)
GLUCOSE UR STRIP-MCNC: NEGATIVE MG/DL
HCG UR QL: NEGATIVE
HCT VFR BLD AUTO: 41.3 % (ref 35–47)
HGB BLD-MCNC: 13.6 G/DL (ref 11.7–15.7)
HGB UR QL STRIP: ABNORMAL
IMM GRANULOCYTES # BLD: 0 10E3/UL
IMM GRANULOCYTES NFR BLD: 0 %
KETONES UR STRIP-MCNC: NEGATIVE MG/DL
LEUKOCYTE ESTERASE UR QL STRIP: NEGATIVE
LYMPHOCYTES # BLD AUTO: 2.3 10E3/UL (ref 0.8–5.3)
LYMPHOCYTES NFR BLD AUTO: 39 %
MCH RBC QN AUTO: 28 PG (ref 26.5–33)
MCHC RBC AUTO-ENTMCNC: 32.9 G/DL (ref 31.5–36.5)
MCV RBC AUTO: 85 FL (ref 78–100)
MONOCYTES # BLD AUTO: 0.4 10E3/UL (ref 0–1.3)
MONOCYTES NFR BLD AUTO: 6 %
MUCOUS THREADS #/AREA URNS LPF: PRESENT /LPF
NEUTROPHILS # BLD AUTO: 3.2 10E3/UL (ref 1.6–8.3)
NEUTROPHILS NFR BLD AUTO: 53 %
NITRATE UR QL: NEGATIVE
NRBC # BLD AUTO: 0 10E3/UL
NRBC BLD AUTO-RTO: 0 /100
PH UR STRIP: 6 [PH] (ref 5–7)
PLAT MORPH BLD: ABNORMAL
PLATELET # BLD AUTO: 146 10E3/UL (ref 150–450)
POTASSIUM BLD-SCNC: 3.8 MMOL/L (ref 3.4–5.3)
PROT SERPL-MCNC: 8.1 G/DL (ref 6.8–8.8)
RBC # BLD AUTO: 4.86 10E6/UL (ref 3.8–5.2)
RBC #/AREA URNS AUTO: ABNORMAL /HPF
RBC MORPH BLD: ABNORMAL
SODIUM SERPL-SCNC: 137 MMOL/L (ref 133–144)
SP GR UR STRIP: 1.02 (ref 1–1.03)
SQUAMOUS #/AREA URNS AUTO: ABNORMAL /LPF
TSH SERPL DL<=0.005 MIU/L-ACNC: 1.24 UIU/ML (ref 0.3–4.2)
UROBILINOGEN UR STRIP-ACNC: 0.2 E.U./DL
WBC # BLD AUTO: 6 10E3/UL (ref 4–11)
WBC #/AREA URNS AUTO: ABNORMAL /HPF

## 2023-04-15 PROCEDURE — 93000 ELECTROCARDIOGRAM COMPLETE: CPT | Performed by: PHYSICIAN ASSISTANT

## 2023-04-15 PROCEDURE — 85652 RBC SED RATE AUTOMATED: CPT | Performed by: PHYSICIAN ASSISTANT

## 2023-04-15 PROCEDURE — 80050 GENERAL HEALTH PANEL: CPT | Performed by: PHYSICIAN ASSISTANT

## 2023-04-15 PROCEDURE — 36415 COLL VENOUS BLD VENIPUNCTURE: CPT | Performed by: PHYSICIAN ASSISTANT

## 2023-04-15 PROCEDURE — 99214 OFFICE O/P EST MOD 30 MIN: CPT | Performed by: PHYSICIAN ASSISTANT

## 2023-04-15 PROCEDURE — 81001 URINALYSIS AUTO W/SCOPE: CPT | Performed by: PHYSICIAN ASSISTANT

## 2023-04-15 PROCEDURE — 81025 URINE PREGNANCY TEST: CPT | Performed by: PHYSICIAN ASSISTANT

## 2023-04-15 RX ORDER — MECLIZINE HYDROCHLORIDE 25 MG/1
25 TABLET ORAL 3 TIMES DAILY PRN
Qty: 30 TABLET | Refills: 0 | Status: SHIPPED | OUTPATIENT
Start: 2023-04-15 | End: 2023-11-10

## 2023-04-15 NOTE — PROGRESS NOTES
Assessment & Plan     Acute chest pain    Patient felt like her heart was racing  EKG does not show any arryhthmias    - EKG 12-lead complete w/read - Clinics  - ESR: Erythrocyte sedimentation rate; Future  - ESR: Erythrocyte sedimentation rate    Chronic pain of left knee    Referral to [facility]  ESR is elevated  - Orthopedic  Referral; Future    Benign paroxysmal positional vertigo, unspecified laterality    CBC neg for anemia or elevated WBC  CMP neg for renal or hepatic concerns  UA normal  TSH pending    Benign paroxysmal positional vertigo (BPPV) is a common condition. You feel as if the room is spinning after changing position, moving your head quickly, or even just rolling over in bed.  Vertigo is a false feeling of motion plus disorientation that makes it seem as if the room is spinning. A vertigo attack may cause sudden nausea, vomiting, and heavy sweating. Severe vertigo causes a loss of balance. You may even fall down.  Vertigo is caused by a problem with the inner ear. The inner ear is located behind the middle ear. It is a part of the balance center of the body. It contains small calcium particles within fluid-filled canals (semi-circular canals). These particles can move out of position. This may happen as a result of aging, head injury, or disease of the inner ear. Once that happens, moving your head in certain ways may cause the particles to stimulate the inner ear. This creates the feeling of vertigo.  An episode of vertigo may last seconds, minutes, or hours. Once you are over the first episode of vertigo, it may never return. Sometimes symptoms return off and on for several weeks or longer.  BPPV is treatable. The Epley maneuver is a simple treatment for the common cause of vertigo. Your provider may try to put the calcium particles back in their correct position by having you do a series of head movements.    Trial course of meclizine for vertigo    - CBC with platelets and differential  -  "Comprehensive metabolic panel (BMP + Alb, Alk Phos, ALT, AST, Total. Bili, TP)  - TSH with free T4 reflex  - UA Microscopic with Reflex to Culture  - meclizine (ANTIVERT) 25 MG tablet; Take 1 tablet (25 mg) by mouth 3 times daily as needed for dizziness    Review of external notes as documented elsewhere in note      BMI:   Estimated body mass index is 30.91 kg/m  as calculated from the following:    Height as of 3/6/23: 1.575 m (5' 2\").    Weight as of this encounter: 76.7 kg (169 lb).       At today's visit with Zaina Tan , we discussed results, diagnosis, medications and formulated a plan.  We also discussed red flags for immediate return to clinic/ER, as well as indications for follow up with PCP if not improved in 3 days. Patient understood and agreed to plan. Zaina Tan was discharged with stable vitals and has no further questions.       No follow-ups on file.    Mitch Mnosalve, [facility], PA-C  [facility]    Ana Lilia Mahajan is a 37 year old, presenting for the following health issues:  Dizziness (Gets light headedness first thing in the morning and during the day keeps reoccurring as soon as when patient gets up feels a force pushing her back for the past 2 months. )         View : No data to display.              HPI   Review of Systems   Constitutional, HEENT, cardiovascular, pulmonary, gi and gu systems are negative, except as otherwise noted.      Objective    /67 (BP Location: Left arm, Patient Position: Sitting, Cuff Size: Adult Large)   Pulse 71   Temp 98.8  F (37.1  C) (Tympanic)   Resp 18   Wt 76.7 kg (169 lb)   SpO2 100%   BMI 30.91 kg/m    Body mass index is 30.91 kg/m .  Physical Exam   GENERAL: healthy, alert and no distress  EYES: Eyes grossly normal to inspection, PERRL and conjunctivae and sclerae normal  HENT: ear canals and TM's normal, nose and mouth without ulcers or lesions  NECK: no adenopathy, no asymmetry, masses, or scars and thyroid normal " to palpation  RESP: lungs clear to auscultation - no rales, rhonchi or wheezes  CV: regular rate and rhythm, normal S1 S2, no S3 or S4, no murmur, click or rub, no peripheral edema and peripheral pulses strong  ABDOMEN: soft, nontender, no hepatosplenomegaly, no masses and bowel sounds normal  MS: Positive for left knee effusion and tenderness  SKIN: no suspicious lesions or rashes  NEURO: Normal strength and tone, mentation intact and speech normal  PSYCH: mentation appears normal, affect normal/bright. CN 2-12 grossly intact. Normal finger to nose, normal gait.         Results for orders placed or performed in visit on 04/15/23   UA with Microscopic reflex to Culture     Status: Abnormal    Specimen: Urine, Midstream   Result Value Ref Range    Color Urine Yellow Colorless, Straw, Light Yellow, Yellow    Appearance Urine Clear Clear    Glucose Urine Negative Negative mg/dL    Bilirubin Urine Negative Negative    Ketones Urine Negative Negative mg/dL    Specific Gravity Urine 1.025 1.003 - 1.035    Blood Urine Small (A) Negative    pH Urine 6.0 5.0 - 7.0    Protein Albumin Urine Negative Negative mg/dL    Urobilinogen Urine 0.2 0.2, 1.0 E.U./dL    Nitrite Urine Negative Negative    Leukocyte Esterase Urine Negative Negative   HCG Qual, Urine ([id number])     Status: Normal   Result Value Ref Range    hCG Urine Qualitative Negative Negative   Comprehensive metabolic panel (BMP + Alb, Alk Phos, ALT, AST, Total. Bili, TP)     Status: Abnormal   Result Value Ref Range    Sodium 137 133 - 144 mmol/L    Potassium 3.8 3.4 - 5.3 mmol/L    Chloride 104 94 - 109 mmol/L    Carbon Dioxide (CO2) 31 20 - 32 mmol/L    Anion Gap 2 (L) 3 - 14 mmol/L    Urea Nitrogen 11 7 - 30 mg/dL    Creatinine 0.60 0.52 - 1.04 mg/dL    Calcium 10.0 8.5 - 10.1 mg/dL    Glucose 90 70 - 99 mg/dL    Alkaline Phosphatase 104 40 - 150 U/L    AST 20 0 - 45 U/L    ALT 18 0 - 50 U/L    Protein Total 8.1 6.8 - 8.8 g/dL    Albumin 3.6 3.4 - 5.0 g/dL     Bilirubin Total 0.6 0.2 - 1.3 mg/dL    GFR Estimate >90 >60 mL/min/1.73m2   ESR: Erythrocyte sedimentation rate     Status: Abnormal   Result Value Ref Range    Erythrocyte Sedimentation Rate 30 (H) 0 - 20 mm/hr   UA Microscopic with Reflex to Culture     Status: Abnormal   Result Value Ref Range    Bacteria Urine Few (A) None Seen /HPF    RBC Urine 0-2 0-2 /HPF /HPF    WBC Urine 0-5 0-5 /HPF /HPF    Squamous Epithelials Urine Few (A) None Seen /LPF    Mucus Urine Present (A) None Seen /LPF    Narrative    Urine Culture not indicated   CBC with platelets and differential     Status: None (In process)    Narrative    The following orders were created for panel order CBC with platelets and differential.  Procedure                               Abnormality         Status                     ---------                               -----------         ------                     CBC with platelets and d...[[id number]]                      In process                   Please view results for these tests on the individual orders.

## 2023-04-30 ENCOUNTER — HEALTH MAINTENANCE LETTER (OUTPATIENT)
Age: 38
End: 2023-04-30

## 2023-05-15 ENCOUNTER — OFFICE VISIT (OUTPATIENT)
Dept: ORTHOPEDICS | Facility: CLINIC | Age: 38
End: 2023-05-15
Payer: COMMERCIAL

## 2023-05-15 VITALS — SYSTOLIC BLOOD PRESSURE: 110 MMHG | DIASTOLIC BLOOD PRESSURE: 73 MMHG

## 2023-05-15 DIAGNOSIS — G56.03 BILATERAL CARPAL TUNNEL SYNDROME: Primary | ICD-10-CM

## 2023-05-15 PROCEDURE — 99212 OFFICE O/P EST SF 10 MIN: CPT | Performed by: ORTHOPAEDIC SURGERY

## 2023-05-15 NOTE — PROGRESS NOTES
S:Patient is a 37 year old, female seen today in follow up for bilateral wrist pain.    They were previously evaluated on 23 days out from injury.  Since this visit they report patient states that are doing much better since last visit. She still has some pain at night randomly 2/10 on pain scale .    States 100% better both upper extremities a few days after injections,  Then decreased  To about 80%  Improvement now, comes  And goes.             Patient Active Problem List   Diagnosis     History of gestational diabetes mellitus (GDM) in prior pregnancy, currently pregnant     H/O fetal anomaly in prior pregnancy, currently pregnant, third trimester     Uterine leiomyoma     High-risk pregnancy, elderly multigravida, unspecified trimester     History of  delivery- plans repeat     Elevated early 1hour- passed early 3 hour and 3 hour at EOB     History of fetal abnormality in previous pregnancy, currently pregnant     History of pre-eclampsia in prior pregnancy, currently pregnant in third trimester     S/P      Postoperative pain            Past Medical History:   Diagnosis Date     Vitamin D deficiency 2017: Vitamin D= 25, [employer] message sent - supplementation [id number] IU/day 17- Vit D 48     Vitreous syneresis             Past Surgical History:   Procedure Laterality Date      SECTION N/A 2017    Procedure:  SECTION;;  Surgeon: Shellie Soni MD;  [facility]      SECTION N/A 2021    Procedure: REPEAT  SECTION;  Surgeon: Shellie Soni MD;  [facility]     D & C      fetal hypoplastic leftheart syndrome     DILATION AND CURETTAGE SUCTION N/A 2021    Procedure: DILATION AND CURETTAGE, UTERUS, USING SUCTION with ultrasound guidance;  Surgeon: Neyda Ho MD;  [facility]     IR PELVIC TRANSABD/TRANSREC ABSCESS DRAIN  2021     IR SINOGRAM INJECTION DIAGNOSTIC  2021             Social History     Tobacco Use     Smoking status: Never     Smokeless tobacco: Never   Vaping Use     Vaping status: Not on file   Substance Use Topics     Alcohol use: No            Family History   Problem Relation Age of Onset     Hypertension Mother         had before pregnancy     Heart Disease Other         hypoplastic left heart. TAB 23 wks      Diabetes No family hx of      Cerebrovascular Disease No family hx of      Coronary Artery Disease No family hx of      Osteoporosis No family hx of      Anxiety Disorder No family hx of      Depression No family hx of              No Known Allergies         Current Outpatient Medications   Medication Sig Dispense Refill     acetaminophen (TYLENOL) 500 MG tablet Take 1-2 tablets (500-1,000 mg) by mouth every 6 hours as needed for mild pain (Patient not taking: Reported on 1/6/2022) 60 tablet 0     cholecalciferol (VITAMIN D3) 125 mcg (5000 units) capsule Take 1 capsule (125 mcg) by mouth daily Take one capsule daily. 30 capsule 0     estradiol (ESTRACE) 0.1 MG/GM vaginal cream Place 2 g vaginally twice a week (Patient not taking: Reported on 1/16/2023) 42.5 g 0     ferrous sulfate (FEROSUL) 325 (65 Fe) MG tablet Take 1 tablet (325 mg) by mouth daily (with breakfast) (Patient not taking: Reported on 1/16/2023) 100 tablet 0     ibuprofen (ADVIL/MOTRIN) 600 MG tablet Take 1 tablet (600 mg) by mouth every 6 hours as needed for moderate pain (Patient not taking: Reported on 1/6/2022) 40 tablet 0     ibuprofen (ADVIL/MOTRIN) 800 MG tablet Take 1 tablet (800 mg) by mouth every 6 hours as needed for other (mild and/or inflammatory pain) (Patient not taking: Reported on 1/6/2022) 30 tablet 0     lidocaine (LIDODERM) 5 % patch Place 2 patches onto the skin every 24 hours To prevent lidocaine toxicity, patient should be patch free for 12 hrs daily. (Patient not taking: Reported on 1/16/2023) 30 patch 0     meclizine (ANTIVERT) 25 MG tablet Take 1 tablet (25 mg) by mouth 3  times daily as needed for dizziness 30 tablet 0     oxyCODONE (ROXICODONE) 5 MG tablet Take 1 tablet (5 mg) by mouth every 4 hours as needed for moderate to severe pain (Patient not taking: Reported on 1/6/2022) 6 tablet 0     sodium chloride, PF, 0.9% PF flush 10 mLs by Intracatheter route every 8 hours (Patient not taking: Reported on 1/6/2022) 100 mL 0          Review Of Systems  Skin: negative  Eyes: negative  Ears/Nose/Throat: negative  Respiratory: No shortness of breath, dyspnea on exertion, cough, or hemoptysis    O: Physical Exam:  No thenar wasting.  Adequate wrist and finger motion.    Lab:  Had elevation inflammatory markers but improved now     Low Vit D.    A:  Bilateral CTS mild to medium per electrodiagnostics, improved clinically.  Underlying inflammatory pathology yet to be diagnosed  Possible.    P:  Pt to  Take  D3 5000 international unit(s) daily and recheck when we see patient back in June.  Consider repeat some of the inflammatory markers and arthritic markers again.  Notify if exacerbation symptoms.           In addition to the above assessment and plan each active problem on Zaina's problem list was evaluated today. This included the questioning of Zaina for any medication problems. We will continue the current treatment plan for these active problems except as noted.

## 2023-05-15 NOTE — LETTER
5/15/2023         RE: Zaina Tan  [id number] [id number]        Dear Colleague,    Thank you for referring your patient, Zaina Tan, to the [facility] [place]. Please see a copy of my visit note below.    S:Patient is a 37 year old, female seen today in follow up for bilateral wrist pain.    They were previously evaluated on 23 days out from injury.  Since this visit they report patient states that are doing much better since last visit. She still has some pain at night randomly 2/10 on pain scale .    States 100% better both upper extremities a few days after injections,  Then decreased  To about 80%  Improvement now, comes  And goes.             Patient Active Problem List   Diagnosis     History of gestational diabetes mellitus (GDM) in prior pregnancy, currently pregnant     H/O fetal anomaly in prior pregnancy, currently pregnant, third trimester     Uterine leiomyoma     High-risk pregnancy, elderly multigravida, unspecified trimester     History of  delivery- plans repeat     Elevated early 1hour- passed early 3 hour and 3 hour at EOB     History of fetal abnormality in previous pregnancy, currently pregnant     History of pre-eclampsia in prior pregnancy, currently pregnant in third trimester     S/P      Postoperative pain            Past Medical History:   Diagnosis Date     Vitamin D deficiency 2017: Vitamin D= 25, [employer] message sent - supplementation 6862-4921 IU/day 17- Vit D 48     Vitreous syneresis             Past Surgical History:   Procedure Laterality Date      SECTION N/A 2017    Procedure:  SECTION;;  Surgeon: Shellie Soni MD;  [facility]      SECTION N/A 2021    Procedure: REPEAT  SECTION;  Surgeon: Shellie Soni MD;  [facility] L[facility]     D & C      fetal hypoplastic leftheart syndrome     DILATION AND CURETTAGE SUCTION N/A  12/14/2021    Procedure: DILATION AND CURETTAGE, UTERUS, USING SUCTION with ultrasound guidance;  Surgeon: Neyda Ho MD;  [facility] OR     IR PELVIC TRANSABD/TRANSREC ABSCESS DRAIN  12/22/2021     IR SINOGRAM INJECTION DIAGNOSTIC  12/29/2021            Social History     Tobacco Use     Smoking status: Never     Smokeless tobacco: Never   Vaping Use     Vaping status: Not on file   Substance Use Topics     Alcohol use: No            Family History   Problem Relation Age of Onset     Hypertension Mother         had before pregnancy     Heart Disease Other         hypoplastic left heart. TAB 23 wks      Diabetes No family hx of      Cerebrovascular Disease No family hx of      Coronary Artery Disease No family hx of      Osteoporosis No family hx of      Anxiety Disorder No family hx of      Depression No family hx of              No Known Allergies         Current Outpatient Medications   Medication Sig Dispense Refill     acetaminophen (TYLENOL) 500 MG tablet Take 1-2 tablets (500-1,000 mg) by mouth every 6 hours as needed for mild pain (Patient not taking: Reported on 1/6/2022) 60 tablet 0     cholecalciferol (VITAMIN D3) 125 mcg (5000 units) capsule Take 1 capsule (125 mcg) by mouth daily Take one capsule daily. 30 capsule 0     estradiol (ESTRACE) 0.1 MG/GM vaginal cream Place 2 g vaginally twice a week (Patient not taking: Reported on 1/16/2023) 42.5 g 0     ferrous sulfate (FEROSUL) 325 (65 Fe) MG tablet Take 1 tablet (325 mg) by mouth daily (with breakfast) (Patient not taking: Reported on 1/16/2023) 100 tablet 0     ibuprofen (ADVIL/MOTRIN) 600 MG tablet Take 1 tablet (600 mg) by mouth every 6 hours as needed for moderate pain (Patient not taking: Reported on 1/6/2022) 40 tablet 0     ibuprofen (ADVIL/MOTRIN) 800 MG tablet Take 1 tablet (800 mg) by mouth every 6 hours as needed for other (mild and/or inflammatory pain) (Patient not taking: Reported on 1/6/2022) 30 tablet 0     lidocaine (LIDODERM)  5 % patch Place 2 patches onto the skin every 24 hours To prevent lidocaine toxicity, patient should be patch free for 12 hrs daily. (Patient not taking: Reported on 1/16/2023) 30 patch 0     meclizine (ANTIVERT) 25 MG tablet Take 1 tablet (25 mg) by mouth 3 times daily as needed for dizziness 30 tablet 0     oxyCODONE (ROXICODONE) 5 MG tablet Take 1 tablet (5 mg) by mouth every 4 hours as needed for moderate to severe pain (Patient not taking: Reported on 1/6/2022) 6 tablet 0     sodium chloride, PF, 0.9% PF flush 10 mLs by Intracatheter route every 8 hours (Patient not taking: Reported on 1/6/2022) 100 mL 0          Review Of Systems  Skin: negative  Eyes: negative  Ears/Nose/Throat: negative  Respiratory: No shortness of breath, dyspnea on exertion, cough, or hemoptysis    O: Physical Exam:  No thenar wasting.  Adequate wrist and finger motion.    Lab:  Had elevation inflammatory markers but improved now     Low Vit D.    A:  Bilateral CTS mild to medium per electrodiagnostics, improved clinically.  Underlying inflammatory pathology yet to be diagnosed  Possible.    P:  Pt to  Take  D3 5000 international unit(s) daily and recheck when we see patient back in June.  Consider repeat some of the inflammatory markers and arthritic markers again.  Notify if exacerbation symptoms.           In addition to the above assessment and plan each active problem on Zaina's problem list was evaluated today. This included the questioning of Zaina for any medication problems. We will continue the current treatment plan for these active problems except as noted.        Again, thank you for allowing me to participate in the care of your patient.        Sincerely,        GARETH DUFFY MD

## 2023-05-30 NOTE — PLAN OF CARE
Problem: Labor (Cervical Ripen, Induct, Augment) (Adult,Obstetrics,Pediatric)  Goal: Signs and Symptoms of Listed Potential Problems Will be Absent or Manageable (Labor)  Signs and symptoms of listed potential problems will be absent or manageable by discharge/transition of care (reference Labor (Cervical Ripen, Induct, Augment) (Adult,Obstetrics,Pediatric) CPG).   Outcome: Improving  Maternal VS stable, , ctxns 2.5-4, leaking meconium stained fluid. Patient received epidural for pain, reports pain relief and still able to move legs well. Itching treated with nubain, patient reports itching is improved. Plan for frequent repositioning and monitoring per protocol. NICU aware of meconium. Anticipate .       DM (diabetes mellitus)

## 2023-06-26 ENCOUNTER — OFFICE VISIT (OUTPATIENT)
Dept: ORTHOPEDICS | Facility: CLINIC | Age: 38
End: 2023-06-26
Payer: COMMERCIAL

## 2023-06-26 ENCOUNTER — ANCILLARY PROCEDURE (OUTPATIENT)
Dept: GENERAL RADIOLOGY | Facility: CLINIC | Age: 38
End: 2023-06-26
Attending: ORTHOPAEDIC SURGERY
Payer: COMMERCIAL

## 2023-06-26 ENCOUNTER — LAB (OUTPATIENT)
Dept: LAB | Facility: CLINIC | Age: 38
End: 2023-06-26
Payer: COMMERCIAL

## 2023-06-26 VITALS
WEIGHT: 172.6 LBS | BODY MASS INDEX: 31.76 KG/M2 | HEIGHT: 62 IN | SYSTOLIC BLOOD PRESSURE: 110 MMHG | DIASTOLIC BLOOD PRESSURE: 70 MMHG

## 2023-06-26 DIAGNOSIS — M17.10 ARTHRITIS OF KNEE: Primary | ICD-10-CM

## 2023-06-26 DIAGNOSIS — M25.562 LEFT KNEE PAIN: ICD-10-CM

## 2023-06-26 LAB — DEPRECATED CALCIDIOL+CALCIFEROL SERPL-MC: 30 UG/L (ref 20–75)

## 2023-06-26 PROCEDURE — 82306 VITAMIN D 25 HYDROXY: CPT

## 2023-06-26 PROCEDURE — 73560 X-RAY EXAM OF KNEE 1 OR 2: CPT | Mod: TC | Performed by: RADIOLOGY

## 2023-06-26 PROCEDURE — 36415 COLL VENOUS BLD VENIPUNCTURE: CPT

## 2023-06-26 PROCEDURE — 99213 OFFICE O/P EST LOW 20 MIN: CPT | Performed by: ORTHOPAEDIC SURGERY

## 2023-06-26 PROCEDURE — 73562 X-RAY EXAM OF KNEE 3: CPT | Mod: TC | Performed by: RADIOLOGY

## 2023-06-26 RX ORDER — CELECOXIB 200 MG/1
200 CAPSULE ORAL DAILY
Qty: 28 CAPSULE | Refills: 3 | Status: SHIPPED | OUTPATIENT
Start: 2023-06-26 | End: 2023-11-10

## 2023-06-26 ASSESSMENT — KOOS JR
GOING UP OR DOWN STAIRS: MILD
BENDING TO THE FLOOR TO PICK UP OBJECT: MODERATE
TWISING OR PIVOTING ON KNEE: MILD
HOW SEVERE IS YOUR KNEE STIFFNESS AFTER FIRST WAKING IN MORNING: MILD
RISING FROM SITTING: MILD
KOOS JR SCORING: 70.7

## 2023-06-26 NOTE — PROGRESS NOTES
Patient is a 37 year old, female seen today in consultation for Left knee.  They report onset of symptoms 6 months. No  injury.   Pain is located Superior knee  Medial knee as well, and described as painful, unable to describe it .  Increased pain with bend knee, walking.  Pain does not  Wake at nighttime. Pain is improved by nothing.  They have tried the following therapies: nothing .    Current pain level: 3/10, Worst pain level: 7/10.     Patient is currently working assistant .     Discussed bilateral CTS with intermittent symptoms and surgical intervention, she will consider and let us know how she'd like to proceed.         Patient Active Problem List   Diagnosis     History of gestational diabetes mellitus (GDM) in prior pregnancy, currently pregnant     H/O fetal anomaly in prior pregnancy, currently pregnant, third trimester     Uterine leiomyoma     High-risk pregnancy, elderly multigravida, unspecified trimester     History of  delivery- plans repeat     Elevated early 1hour- passed early 3 hour and 3 hour at EOB     History of fetal abnormality in previous pregnancy, currently pregnant     History of pre-eclampsia in prior pregnancy, currently pregnant in third trimester     S/P      Postoperative pain            Past Medical History:   Diagnosis Date     Vitamin D deficiency 2017: Vitamin D= 25, [employer] message sent - supplementation 7499-8536 IU/day 17- Vit D 48     Vitreous syneresis             Past Surgical History:   Procedure Laterality Date      SECTION N/A 2017    Procedure:  SECTION;;  Surgeon: Shellie Soni MD;  [facility]      SECTION N/A 2021    Procedure: REPEAT  SECTION;  Surgeon: Shellie Soni MD;  [facility]     D & C      fetal hypoplastic leftheart syndrome     DILATION AND CURETTAGE SUCTION N/A 2021    Procedure: DILATION AND CURETTAGE, UTERUS, USING SUCTION with  ultrasound guidance;  Surgeon: Neyda Ho MD;  [facility]     IR PELVIC TRANSABD/TRANSREC ABSCESS DRAIN  12/22/2021     IR SINOGRAM INJECTION DIAGNOSTIC  12/29/2021            Social History     Tobacco Use     Smoking status: Never     Smokeless tobacco: Never   Substance Use Topics     Alcohol use: No            Family History   Problem Relation Age of Onset     Hypertension Mother         had before pregnancy     Heart Disease Other         hypoplastic left heart. TAB 23 wks      Diabetes No family hx of      Cerebrovascular Disease No family hx of      Coronary Artery Disease No family hx of      Osteoporosis No family hx of      Anxiety Disorder No family hx of      Depression No family hx of              No Known Allergies         Current Outpatient Medications   Medication Sig Dispense Refill     acetaminophen (TYLENOL) 500 MG tablet Take 1-2 tablets (500-1,000 mg) by mouth every 6 hours as needed for mild pain (Patient not taking: Reported on 1/6/2022) 60 tablet 0     cholecalciferol (VITAMIN D3) 125 mcg (5000 units) capsule Take 1 capsule (125 mcg) by mouth daily Take one capsule daily. 30 capsule 0     estradiol (ESTRACE) 0.1 MG/GM vaginal cream Place 2 g vaginally twice a week (Patient not taking: Reported on 1/16/2023) 42.5 g 0     ferrous sulfate (FEROSUL) 325 (65 Fe) MG tablet Take 1 tablet (325 mg) by mouth daily (with breakfast) (Patient not taking: Reported on 1/16/2023) 100 tablet 0     ibuprofen (ADVIL/MOTRIN) 600 MG tablet Take 1 tablet (600 mg) by mouth every 6 hours as needed for moderate pain (Patient not taking: Reported on 1/6/2022) 40 tablet 0     ibuprofen (ADVIL/MOTRIN) 800 MG tablet Take 1 tablet (800 mg) by mouth every 6 hours as needed for other (mild and/or inflammatory pain) (Patient not taking: Reported on 1/6/2022) 30 tablet 0     lidocaine (LIDODERM) 5 % patch Place 2 patches onto the skin every 24 hours To prevent lidocaine toxicity, patient should be patch free for  12 hrs daily. (Patient not taking: Reported on 1/16/2023) 30 patch 0     meclizine (ANTIVERT) 25 MG tablet Take 1 tablet (25 mg) by mouth 3 times daily as needed for dizziness 30 tablet 0     oxyCODONE (ROXICODONE) 5 MG tablet Take 1 tablet (5 mg) by mouth every 4 hours as needed for moderate to severe pain (Patient not taking: Reported on 1/6/2022) 6 tablet 0     sodium chloride, PF, 0.9% PF flush 10 mLs by Intracatheter route every 8 hours (Patient not taking: Reported on 1/6/2022) 100 mL 0          Review Of Systems  Skin: negative  Eyes: negative  Ears/Nose/Throat: negative  Respiratory: No shortness of breath, dyspnea on exertion, cough, or hemoptysis    O: physical exam:  No significant effusion or crepitus with motion.  Does have some medial joint line pain to palpation.  No evidence instability.  CMS intact.  No tension signs.    Lab:  Vit D 17 4 mos ago    Image:  XR KNEE STANDING AP SUNRISE BILAT LAT LEFT 6/26/2023 10:00 AM      HISTORY: Left knee pain     COMPARISON: None.                                                                      IMPRESSION:      Left: Minimal hypertrophic change in the patellofemoral compartment.  No joint space narrowing. Normal patellar alignment. Trace knee joint  effusion. No fractures are evident.     Right: Minimal hypertrophic change in the patellofemoral compartment.  No joint space narrowing. Normal patellar alignment. No fractures are  evident.         A:  Hypovitaminosis D with arthropathy each knee      P:  Try NSAID:  Celebrex for 5 days to 2 weeks with food.  Notify if exacerbation symptoms.  5000 international unit(s) Vit D3 daily and recheck 25 OH Vit D in 3 months  See back 2-3 mos               In addition to the above assessment and plan each active problem on Zaina's problem list was evaluated today. This included the questioning of Zaina for any medication problems. We will continue the current treatment plan for these active problems except as  noted.

## 2023-06-26 NOTE — LETTER
2023         RE: Zaina Tan  [id number] [id number]        Dear Colleague,    Thank you for referring your patient, Zaina Tan, to the [facility] [place]. Please see a copy of my visit note below.    Patient is a 37 year old, female seen today in consultation for Left knee.  They report onset of symptoms 6 months. No  injury.   Pain is located Superior knee  Medial knee as well, and described as painful, unable to describe it .  Increased pain with bend knee, walking.  Pain does not  Wake at nighttime. Pain is improved by nothing.  They have tried the following therapies: nothing .    Current pain level: 3/10, Worst pain level: 7/10.     Patient is currently working assistant .     Discussed bilateral CTS with intermittent symptoms and surgical intervention, she will consider and let us know how she'd like to proceed.         Patient Active Problem List   Diagnosis     History of gestational diabetes mellitus (GDM) in prior pregnancy, currently pregnant     H/O fetal anomaly in prior pregnancy, currently pregnant, third trimester     Uterine leiomyoma     High-risk pregnancy, elderly multigravida, unspecified trimester     History of  delivery- plans repeat     Elevated early 1hour- passed early 3 hour and 3 hour at EOB     History of fetal abnormality in previous pregnancy, currently pregnant     History of pre-eclampsia in prior pregnancy, currently pregnant in third trimester     S/P      Postoperative pain            Past Medical History:   Diagnosis Date     Vitamin D deficiency 2017: Vitamin D= 25, [employer] message sent - supplementation 9949-6740 IU/day 17- Vit D 48     Vitreous syneresis             Past Surgical History:   Procedure Laterality Date      SECTION N/A 2017    Procedure:  SECTION;;  Surgeon: Shellie Soni MD;  [facility]      SECTION N/A 2021     Procedure: REPEAT  SECTION;  Surgeon: Shellie Soni MD;  [facility] L[facility]     D & C      fetal hypoplastic leftheart syndrome     DILATION AND CURETTAGE SUCTION N/A 2021    Procedure: DILATION AND CURETTAGE, UTERUS, USING SUCTION with ultrasound guidance;  Surgeon: Neyda Ho MD;  [facility]     IR PELVIC TRANSABD/TRANSREC ABSCESS DRAIN  2021     IR SINOGRAM INJECTION DIAGNOSTIC  2021            Social History     Tobacco Use     Smoking status: Never     Smokeless tobacco: Never   Substance Use Topics     Alcohol use: No            Family History   Problem Relation Age of Onset     Hypertension Mother         had before pregnancy     Heart Disease Other         hypoplastic left heart. TAB 23 wks      Diabetes No family hx of      Cerebrovascular Disease No family hx of      Coronary Artery Disease No family hx of      Osteoporosis No family hx of      Anxiety Disorder No family hx of      Depression No family hx of              No Known Allergies         Current Outpatient Medications   Medication Sig Dispense Refill     acetaminophen (TYLENOL) 500 MG tablet Take 1-2 tablets (500-1,000 mg) by mouth every 6 hours as needed for mild pain (Patient not taking: Reported on 2022) 60 tablet 0     cholecalciferol (VITAMIN D3) 125 mcg (5000 units) capsule Take 1 capsule (125 mcg) by mouth daily Take one capsule daily. 30 capsule 0     estradiol (ESTRACE) 0.1 MG/GM vaginal cream Place 2 g vaginally twice a week (Patient not taking: Reported on 2023) 42.5 g 0     ferrous sulfate (FEROSUL) 325 (65 Fe) MG tablet Take 1 tablet (325 mg) by mouth daily (with breakfast) (Patient not taking: Reported on 2023) 100 tablet 0     ibuprofen (ADVIL/MOTRIN) 600 MG tablet Take 1 tablet (600 mg) by mouth every 6 hours as needed for moderate pain (Patient not taking: Reported on 2022) 40 tablet 0     ibuprofen (ADVIL/MOTRIN) 800 MG tablet Take 1 tablet (800 mg) by mouth every  6 hours as needed for other (mild and/or inflammatory pain) (Patient not taking: Reported on 1/6/2022) 30 tablet 0     lidocaine (LIDODERM) 5 % patch Place 2 patches onto the skin every 24 hours To prevent lidocaine toxicity, patient should be patch free for 12 hrs daily. (Patient not taking: Reported on 1/16/2023) 30 patch 0     meclizine (ANTIVERT) 25 MG tablet Take 1 tablet (25 mg) by mouth 3 times daily as needed for dizziness 30 tablet 0     oxyCODONE (ROXICODONE) 5 MG tablet Take 1 tablet (5 mg) by mouth every 4 hours as needed for moderate to severe pain (Patient not taking: Reported on 1/6/2022) 6 tablet 0     sodium chloride, PF, 0.9% PF flush 10 mLs by Intracatheter route every 8 hours (Patient not taking: Reported on 1/6/2022) 100 mL 0          Review Of Systems  Skin: negative  Eyes: negative  Ears/Nose/Throat: negative  Respiratory: No shortness of breath, dyspnea on exertion, cough, or hemoptysis    O: physical exam:  No significant effusion or crepitus with motion.  Does have some medial joint line pain to palpation.  No evidence instability.  CMS intact.  No tension signs.    Lab:  Vit D 17 4 mos ago    Image:  XR KNEE STANDING AP SUNRISE BILAT LAT LEFT 6/26/2023 10:00 AM      HISTORY: Left knee pain     COMPARISON: None.                                                                      IMPRESSION:      Left: Minimal hypertrophic change in the patellofemoral compartment.  No joint space narrowing. Normal patellar alignment. Trace knee joint  effusion. No fractures are evident.     Right: Minimal hypertrophic change in the patellofemoral compartment.  No joint space narrowing. Normal patellar alignment. No fractures are  evident.         A:  Hypovitaminosis D with arthropathy each knee      P:  Try NSAID:  Celebrex for 5 days to 2 weeks with food.  Notify if exacerbation symptoms.  5000 international unit(s) Vit D3 daily and recheck 25 OH Vit D in 3 months  See back 2-3 mos               In  addition to the above assessment and plan each active problem on Zaina's problem list was evaluated today. This included the questioning of Zaina for any medication problems. We will continue the current treatment plan for these active problems except as noted.        Again, thank you for allowing me to participate in the care of your patient.        Sincerely,        GARETH DUFFY MD

## 2023-06-26 NOTE — PATIENT INSTRUCTIONS
Thank you for choosing [facility]    Dr. Zhu Locations:    [facility] [address], [address]  [place] [id number] [place] [id number]   Appointments: [phone] Appointments: [phone]   Fax: [phone] Fax: [phone]       Follow up:  Please call [phone] to schedule your follow up appointment.     Lab orders have been placed You can go to your [facility] location that has a lab. Please call your [facility] to schedule a lab appointment. [facility] has a walk-in hospital lab for your convenience.

## 2023-07-31 ENCOUNTER — OFFICE VISIT (OUTPATIENT)
Dept: FAMILY MEDICINE | Facility: CLINIC | Age: 38
End: 2023-07-31
Attending: FAMILY MEDICINE
Payer: COMMERCIAL

## 2023-07-31 VITALS
HEIGHT: 62 IN | SYSTOLIC BLOOD PRESSURE: 134 MMHG | BODY MASS INDEX: 31.65 KG/M2 | WEIGHT: 172 LBS | HEART RATE: 67 BPM | DIASTOLIC BLOOD PRESSURE: 82 MMHG

## 2023-07-31 DIAGNOSIS — G56.03 BILATERAL CARPAL TUNNEL SYNDROME: ICD-10-CM

## 2023-07-31 DIAGNOSIS — H81.10 BENIGN PAROXYSMAL POSITIONAL VERTIGO, UNSPECIFIED LATERALITY: Primary | ICD-10-CM

## 2023-07-31 PROCEDURE — 99213 OFFICE O/P EST LOW 20 MIN: CPT | Performed by: FAMILY MEDICINE

## 2023-07-31 PROCEDURE — G0463 HOSPITAL OUTPT CLINIC VISIT: HCPCS | Performed by: FAMILY MEDICINE

## 2023-07-31 NOTE — PATIENT INSTRUCTIONS
Physical therapy for the vertigo  and use the meclizine as needed  Use your wrist splints for the carpel tunnel - if gets worse need to go back to get injected

## 2023-07-31 NOTE — PROGRESS NOTES
"  Assessment & Plan     Benign paroxysmal positional vertigo, unspecified laterality  Hx of vertigo which initially responded to meclizine - now returned  - talked about Eply maneuver   - Physical Therapy Referral    Bilateral carpal tunnel syndrome  Had injection in March and helped a lot - now coming back - told to use splints and may need re injection       Return if symptoms worsen or fail to improve.    Megan Henry MD PhD  [facility]    Time note (e3, 20'): The total of my time (on the date of service) for this service was 26 minutes, including discussion/face-to-face, chart review, interpretation not otherwise reported, documentation, and updating of the computerized record.      Ana Lilia Mahajan is a 37 year old, presenting for the following health issues:  Tingling of hands;  dizziness     HPI 38 yo female with hx of bilateral carpal tunnel.  They were injected in March 2023.  Helped a lot -  last few days getting some tingling when sleeping.      She had left knee pain and seen 6/2023 at sports medicine.  Had xray and has arthropathy and told to take NSAIDS. This is much better.     She is having dizziness - like spinning - went to urgent care 4/2023 and told had vertigo.  Given meclizine 25mg tid and helped a lot.   Then about 1 wk ago returned. Spinning  and feels like falling - no nausea - mclizine not working -        Review of Systems   Constitutional, HEENT, cardiovascular, pulmonary, gi and gu systems are negative, except as otherwise noted.      Objective    /82   Pulse 67   Ht 1.575 m (5' 2\")   Wt 78 kg (172 lb)   Breastfeeding No   BMI 31.46 kg/m    Body mass index is 31.46 kg/m .  Physical Exam   GENERAL: healthy, alert and no distress  EYES: no nystagmus   NECK: no adenopathy, no asymmetry, masses, or scars and thyroid normal to palpation  RESP: lungs clear to auscultation - no rales, rhonchi or wheezes  CV: regular rate and rhythm, normal " S1 S2, no S3 or S4, no murmur, click or rub, no peripheral edema   MS: positive Tinel's sign on the right wrist, negative Phalens sign - no gross musculoskeletal defects noted, no edema  NEURO: Normal strength and tone, mentation intact and speech normal  PSYCH: mentation appears normal, affect normal/bright  LYMPH: no cervical, supraclavicular  adenopathy

## 2023-08-03 ENCOUNTER — DOCUMENTATION ONLY (OUTPATIENT)
Dept: FAMILY MEDICINE | Facility: CLINIC | Age: 38
End: 2023-08-03

## 2023-08-03 ENCOUNTER — THERAPY VISIT (OUTPATIENT)
Dept: PHYSICAL THERAPY | Facility: CLINIC | Age: 38
End: 2023-08-03
Attending: FAMILY MEDICINE
Payer: COMMERCIAL

## 2023-08-03 DIAGNOSIS — H81.10 BENIGN PAROXYSMAL POSITIONAL VERTIGO, UNSPECIFIED LATERALITY: ICD-10-CM

## 2023-08-03 PROCEDURE — 95992 CANALITH REPOSITIONING PROC: CPT | Mod: GP | Performed by: PHYSICAL THERAPIST

## 2023-08-03 PROCEDURE — 97161 PT EVAL LOW COMPLEX 20 MIN: CPT | Mod: GP | Performed by: PHYSICAL THERAPIST

## 2023-08-03 NOTE — PROGRESS NOTES
Type of Form Received:     Form Received (Date) 8/3/23   Form Filled out Yes 8/9/23   Placed in provider folder Yes

## 2023-08-03 NOTE — PROGRESS NOTES
PHYSICAL THERAPY EVALUATION  Type of Visit: Evaluation    See electronic medical record for Abuse and Falls Screening details.    Subjective       Presenting condition or subjective complaint:   Pt reports onset of vertigo in April. She was seen in  and prescribed meclizine. She took it for 1-2 days and vertigo completely resolved. She was feeling good without vertigo from that time until last week. She tried the meclizine but it hasn't worked. She has been feeling brief episodes of vertigo with rolling left in bed, looking up, bending over. She denies recent illness or medication changes. She denies hearing changes, no HA. Balance feels off when she is dizzy but has not fallen.    Date of onset: 07/31/23        Prior diagnostic imaging/testing results:       Prior therapy history for the same diagnosis, illness or injury:        Prior Level of Function  Transfers: Independent  Ambulation: Independent  ADL: Independent  IADL:     Living Environment  Social support:   spouse  Type of home:     Stairs to enter the home:         Ramp:     Stairs inside the home:       no  Help at home:  no  Equipment owned:       Employment:    unemployed  Hobbies/Interests:      Patient goals for therapy:      Pain assessment: Pain present wrist pain, tingling with B carpel tunnel     Objective   Cognitive Status Examination  Orientation: Oriented to person, place and time   Level of Consciousness: Alert  Follows Commands and Answers Questions: 100% of the time  Personal Safety and Judgement: Intact  Memory:       BED MOBILITY: Independent    TRANSFERS: Independent      GAIT:   Level of Hitchcock: Independent  Assistive Device(s): None  Gait Deviations: WNL      BALANCE:     SPECIAL TESTS    Dynamic Gait Index (DGI)   4 item DGI 12/12   Modified CTSIB Conditions (sec) Cond 1: 30  Cond 2: 30  Cond 4: 30  Cond 5 : 30                VESTIBULAR EVALUATION  ADDITIONAL HISTORY       Pertinent visual history: pt wears glasses. She  reports seeing clusters of black spots in her L eye all the time, they seem to be getting worse. She plans to follow up with eye doctor.  Pertinent history of current vestibular problem:   DHI: Total Score: 2424/100    Cervicogenic Screen    Neck ROM Normal        Oculomotor Screen    Ocular ROM Normal   Smooth Pursuit Normal   Saccades Hypometric intermittently, no greater than 2 moves to target- WNL. Some dizziness   VOR Normal   VOR Cancellation    Head Impulse Test    Convergence Testing         Infrared Goggle Exam Vestibular Suppressant in Last 24 Hours? No  Exam Completed With: Infrared goggles   Spontaneous Nystagmus Negative   Gaze Evoked Nystagmus Negative   Head Shake Horizontal Nystagmus Negative   Positional Testing    Supine Head-Hanging Test     Left Right   [place]-Hallpike Downbeating, few beats without dizziness, +dizziness/spin with RTS Negative   Sidelying Test     HSCC Supine Roll Test Negative Negative   HSCC Forward Roll Test     Eunice and Lean Test -  Sitting Erect    Eunice and Lean Test - Seated, Head Bent 60 Degrees Forward    Eunice and Lean Test - Seated, Head Bent Backwards       BPPV Canal(s): L side, question of ant or post canal with atypical nystagmus  BPPV Type: Canalithasis      Assessment & Plan   CLINICAL IMPRESSIONS  Medical Diagnosis: Benign paroxysmal positional vertigo, unspecified laterality (H81.10)    Treatment Diagnosis: symptoms L BPPV   Impression/Assessment: Patient is a 37 year old female with complaints of intermittent vertigo and imbalance.  The following significant findings have been identified: Impaired balance and Dizziness. These impairments interfere with their ability to perform self care tasks, recreational activities, household chores, and bed mobility  as compared to previous level of function.     Clinical Decision Making (Complexity):  Clinical Presentation: Stable/Uncomplicated  Clinical Presentation Rationale: based on medical and personal factors listed in PT  evaluation  Clinical Decision Making (Complexity): Low complexity    PLAN OF CARE  Treatment Interventions:  Interventions: Neuromuscular Re-education, Canalith Repositioning    Long Term Goals     PT Goal 1  Goal Identifier: Dizziness  Goal Description: Zaina will be able to perform bed mobility and transitional movements ie bending over while caring for her home and her children without dizziness or instability for greater safety.  Target Date: 08/24/23  PT Goal 2  Goal Identifier: DHI  Goal Description: Zaina will score 0/100 on DHI indicating resolution of dizziness and no perception of handicap for improved QOL.  Target Date: 08/24/23      Frequency of Treatment: 1x/week  Duration of Treatment: 3 weeks    Recommended Referrals to Other Professionals:   Education Assessment:        Risks and benefits of evaluation/treatment have been explained.   Patient/Family/caregiver agrees with Plan of Care.     Evaluation Time:     PT Eval, Low Complexity Minutes (82982): 35       Signing Clinician: Era aKtz, YAYA STEPHENS [facility]                                                                                   OUTPATIENT PHYSICAL THERAPY      PLAN OF TREATMENT FOR OUTPATIENT REHABILITATION   Patient's Last Name, First Name, Zaina Taveras    YOB: 1985   Provider's Name   [facility]   Medical Record No.  [id number]     Onset Date: 07/31/23  Start of Care Date: 08/03/23     Medical Diagnosis:  Benign paroxysmal positional vertigo, unspecified laterality (H81.10)      PT Treatment Diagnosis:  symptoms L BPPV Plan of Treatment  Frequency/Duration: 1x/week/ 3 weeks    Certification date from 08/03/23 to 08/24/23         See note for plan of treatment details and functional goals     Era Katz, PT                         I CERTIFY THE NEED FOR THESE SERVICES FURNISHED UNDER        THIS PLAN OF TREATMENT AND WHILE UNDER MY CARE  .             Physician Signature               Date    X_____________________________________________________                    Referring Provider:  Megan Henry      Initial Assessment  See Epic Evaluation- Start of Care Date: 08/03/23

## 2023-08-10 ENCOUNTER — THERAPY VISIT (OUTPATIENT)
Dept: PHYSICAL THERAPY | Facility: CLINIC | Age: 38
End: 2023-08-10
Attending: FAMILY MEDICINE
Payer: COMMERCIAL

## 2023-08-10 DIAGNOSIS — H81.10 BENIGN PAROXYSMAL POSITIONAL VERTIGO, UNSPECIFIED LATERALITY: Primary | ICD-10-CM

## 2023-08-10 PROCEDURE — 97164 PT RE-EVAL EST PLAN CARE: CPT | Mod: GP | Performed by: PHYSICAL THERAPIST

## 2023-08-15 NOTE — PROGRESS NOTES
08/10/23 0500   Appointment Info   Signing clinician's name / credentials Jyothi Andrdaeestelita PT, DPT   Total/Authorized Visits Discharge   Visits Used 2   Medical Diagnosis Benign paroxysmal positional vertigo, unspecified laterality (H81.10)   PT Tx Diagnosis symptoms L BPPV   Quick Adds Certification   Progress Note/Certification   Start of Care Date 08/03/23   Onset of illness/injury or Date of Surgery 07/31/23   Therapy Frequency 1x/week   Predicted Duration 3 weeks   Certification date from 08/03/23   Certification date to 08/24/23   GOALS   PT Goals 2   PT Goal 1   Goal Identifier Dizziness   Goal Description Zaina will be able to perform bed mobility and transitional movements ie bending over while caring for her home and her children without dizziness or instability for greater safety.   Target Date 08/24/23   Date Met 08/10/23   PT Goal 2   Goal Identifier DHI   Goal Description Zaina will score 0/100 on DHI indicating resolution of dizziness and no perception of handicap for improved QOL.   Target Date 08/24/23   Date Met 08/10/23   Subjective Report   Subjective Report Has been feeling good. Loved that manuevers seemed to have worked - no dizizness. Here to double check. Has concerns about carpel tunnel   Treatment Interventions (PT)   Interventions Infrared Goggle Exam or Frenzel Lense Exam;Standard Canalith Repositioning   Infrared Goggle Exam or Frenzel Lense Exam   Vestibular Suppressant in Last 24 Hours? No   Exam completed with Infrared Goggles   Spontaneous Nystagmus Negative   Positional Testing comments all positional testing completed 2x, loaded alban hallpikes, no nystagmus and no dizziness in any position throughout assessment   [place]-Hallpike (Right) Negative   [place]-Hallpike (Left) Negative   [facility] Supine Roll Test (Right) Negative   [facility] Supine Roll Test (Left) Negative   Eval/Assessments   Assessments PT Re-Eval   PT Eval, Re-eval Minutes (75015) 20   Education   Education Comments pt provided  with written handout to address carpel tunnel: 'Message Dr Henry requesting Occupational Therapy orders to see a hand therapy specialist for carpal tunnel. There are hand specialists at the [facility] in [facility] .  Pt provided written handout with ed on BPPV - PT recommending pt f/u with MD tellez dizziness return with request for new orders dx BPPV and return for vestibular therapy prn. Ed on recurrance rate of BPPV.   Plan   Updates to plan of care Discharge   Total Session Time   Total Treatment Time (sum of timed and untimed services) 20

## 2023-09-08 ENCOUNTER — LAB (OUTPATIENT)
Dept: LAB | Facility: CLINIC | Age: 38
End: 2023-09-08
Attending: FAMILY MEDICINE
Payer: COMMERCIAL

## 2023-09-08 ENCOUNTER — OFFICE VISIT (OUTPATIENT)
Dept: FAMILY MEDICINE | Facility: CLINIC | Age: 38
End: 2023-09-08
Attending: FAMILY MEDICINE
Payer: COMMERCIAL

## 2023-09-08 VITALS — HEART RATE: 60 BPM | SYSTOLIC BLOOD PRESSURE: 112 MMHG | DIASTOLIC BLOOD PRESSURE: 69 MMHG

## 2023-09-08 DIAGNOSIS — H81.10 BENIGN PAROXYSMAL POSITIONAL VERTIGO, UNSPECIFIED LATERALITY: ICD-10-CM

## 2023-09-08 DIAGNOSIS — G56.03 BILATERAL CARPAL TUNNEL SYNDROME: ICD-10-CM

## 2023-09-08 DIAGNOSIS — E55.9 VITAMIN D DEFICIENCY: ICD-10-CM

## 2023-09-08 DIAGNOSIS — G56.03 BILATERAL CARPAL TUNNEL SYNDROME: Primary | ICD-10-CM

## 2023-09-08 PROBLEM — G89.18 POSTOPERATIVE PAIN: Status: RESOLVED | Noted: 2021-12-22 | Resolved: 2023-09-08

## 2023-09-08 PROBLEM — O09.529 HIGH-RISK PREGNANCY, ELDERLY MULTIGRAVIDA, UNSPECIFIED TRIMESTER: Status: RESOLVED | Noted: 2021-04-20 | Resolved: 2023-09-08

## 2023-09-08 PROBLEM — Z98.891 HISTORY OF CESAREAN DELIVERY: Status: RESOLVED | Noted: 2021-04-20 | Resolved: 2023-09-08

## 2023-09-08 PROBLEM — O09.293 H/O FETAL ANOMALY IN PRIOR PREGNANCY, CURRENTLY PREGNANT, THIRD TRIMESTER: Status: RESOLVED | Noted: 2017-01-30 | Resolved: 2023-09-08

## 2023-09-08 PROBLEM — O09.293 HISTORY OF PRE-ECLAMPSIA IN PRIOR PREGNANCY, CURRENTLY PREGNANT IN THIRD TRIMESTER: Status: RESOLVED | Noted: 2021-09-27 | Resolved: 2023-09-08

## 2023-09-08 PROBLEM — R73.09 ELEVATED GLUCOSE: Status: RESOLVED | Noted: 2021-05-18 | Resolved: 2023-09-08

## 2023-09-08 PROBLEM — Z98.891 S/P C-SECTION: Status: RESOLVED | Noted: 2021-11-23 | Resolved: 2023-09-08

## 2023-09-08 LAB — ERYTHROCYTE [SEDIMENTATION RATE] IN BLOOD BY WESTERGREN METHOD: 38 MM/HR (ref 0–20)

## 2023-09-08 PROCEDURE — 99214 OFFICE O/P EST MOD 30 MIN: CPT | Performed by: FAMILY MEDICINE

## 2023-09-08 PROCEDURE — 85652 RBC SED RATE AUTOMATED: CPT

## 2023-09-08 PROCEDURE — G0463 HOSPITAL OUTPT CLINIC VISIT: HCPCS | Performed by: FAMILY MEDICINE

## 2023-09-08 PROCEDURE — 36415 COLL VENOUS BLD VENIPUNCTURE: CPT

## 2023-09-08 ASSESSMENT — PAIN SCALES - GENERAL: PAINLEVEL: WORST PAIN (10)

## 2023-09-08 NOTE — NURSING NOTE
Chief Complaint   Patient presents with    Pain     Carpal tunnel in hands, radiating pain, brace doesn't help, steroid injection helped for a while but things got worse.

## 2023-09-08 NOTE — PROGRESS NOTES
"CC: Pain      Subjective: Zaina Tan is a 37 year old female who presents for     Carpal tunnel - Bilateral hand and wrist paresthesias.  Hard to sleep at night due to carpal tunnel.   Steroid injections in 3/6/2023 helped for a bit but symptoms have returned and worse than before. Braces are not working.   Noted inflammatory markers were elevated -- most recent ESR 4/15/2023 was elevated at 30.     Right handed. Works as CNA.     There is not a personal history of thyroid disease, diabetes, or nerve problems. She did have GDM with her first born but normal screening since. Recent A1c on 1/30/2023 was 5.5%.    BPPV - Went to PT on 8/3 and 8/10 and it helped. Symptoms returned 2 days ago. Wondering about seeing PT again. It was helpful for a bit. Usually happens right before bedtime or first thing in the morning. Meclizine hasn't been helpful. PT has been helpful \"as soon as they do the maneuver it goes away.\"     Vitamin D deficiency - Ran out of vitamin D supplement. Last vitamin D level while taking supplement was normal at 30, previous vitamin D level was low at 17.       Objective:  /69   Pulse 60   General: Patient appears healthy, alert, and in no distress.  MSK: Bilateral hand and wrist exam shows no atrophy, swelling, tenderness, or instability; there is full ROM in all wrist, hand, and finger joints  Neuro: Tinel's test: positive bilateral; Phalen's test: negative  HEENT: TMs normal bilaterally. PERRLA, EOMI. Full ROM of neck, without dizziness or reproduction of symptoms.       Assessment/Plan:  Zaina was seen today for pain.  Bilateral carpal tunnel syndrome  Recommend calling orthopedics for re-evaluation, consider carpal tunnel surgery. Recheck ESR d/t elevated levels in the past.   -     Erythrocyte sedimentation rate auto; Future    Benign paroxysmal positional vertigo, unspecified laterality  Referral back to PT for Epley. Discussed asking PT for home maneuvers as well. Symptoms not " reproducible on exam today.   -     Physical Therapy Referral; Future    Vitamin D deficiency  Resume vitamin D supplement d/t low levels in the past and normal levels when on supplementation.   -     cholecalciferol (VITAMIN D3) 125 mcg (5000 units) capsule; Take 1 capsule (125 mcg) by mouth daily Take one capsule daily.      Marianne Lacey MD

## 2023-09-12 ENCOUNTER — THERAPY VISIT (OUTPATIENT)
Dept: PHYSICAL THERAPY | Facility: CLINIC | Age: 38
End: 2023-09-12
Payer: COMMERCIAL

## 2023-09-12 DIAGNOSIS — H81.10 BENIGN PAROXYSMAL POSITIONAL VERTIGO, UNSPECIFIED LATERALITY: ICD-10-CM

## 2023-09-12 PROCEDURE — 95992 CANALITH REPOSITIONING PROC: CPT | Mod: GP | Performed by: PHYSICAL THERAPIST

## 2023-09-12 PROCEDURE — 97161 PT EVAL LOW COMPLEX 20 MIN: CPT | Mod: GP | Performed by: PHYSICAL THERAPIST

## 2023-09-12 NOTE — PROGRESS NOTES
PHYSICAL THERAPY EVALUATION  Type of Visit: Evaluation    See electronic medical record for Abuse and Falls Screening details.    Subjective       Presenting condition or subjective complaint:  Zaina returns to PT. Was seen for 2 sessions last month for dizziness. States that sympomts improved but then returned 1 week ago. Describes dizziness with rolling and reaching in bed.   Date of onset: 23    Relevant medical history:     Past Medical History:   Diagnosis Date    History of gestational diabetes mellitus (GDM) in prior pregnancy, currently pregnant 2015    - GDM with  pregnancy  - No GDM with   pregnancy  21- Failed early GCT  21- early 3 hour GTT wnl, repeat at 24 weeks  9/3/21: 3hr GTT wnl x3/4 values, NO GDM          History of pre-eclampsia in prior pregnancy, currently pregnant in third trimester 2021    S/P  2021    Vitamin D deficiency 2017: Vitamin D= 25, [employer] message sent - supplementation 5221-3204 IU/day 17- Vit D 48    Vitreous syneresis          Prior Level of Function  Transfers: Independent  Ambulation: Independent  ADL: Independent    Patient goals for therapy:  Decrease dizziness     Objective   Cognitive Status Examination  Orientation: Oriented to person, place and time   Level of Consciousness: Alert    POSTURE: WNL      BED MOBILITY: Independent    TRANSFERS: Independent    GAIT:   Gait Description: Ambulates without signs of instability    BALANCE: No balance concerns at this time    VESTIBULAR  Cervicogenic Screen    Neck ROM Normal     Oculomotor Screen    Ocular ROM Normal   Smooth Pursuit Normal   Saccades Normal   VOR Mild increase in symptoms but able to complete                    Infrared Goggle Exam Vestibular Suppressant in Last 24 Hours? No  Exam Completed With: Infrared goggles   Spontaneous Nystagmus Negative   Gaze Evoked Nystagmus Horizontal L with L gaze            Left Right   Alton-Hallpike  Upbeating L torsional, very mild, lasting 5-8 seconds Negative                BPPV Canal(s): L Posterior  BPPV Type: Canalithasis    DHI: 30/100      Assessment & Plan   CLINICAL IMPRESSIONS  Medical Diagnosis: Benign paroxysmal positional vertigo, unspecified laterality    Treatment Diagnosis: Dizziness   Impression/Assessment: Patient is a 37 year old female with dizziness complaints.  The following significant findings have been identified: Dizziness and Disequilibrium . These impairments interfere with their ability to perform self care tasks, recreational activities, and household chores as compared to previous level of function.     Clinical Decision Making (Complexity):  Clinical Presentation: Stable/Uncomplicated  Clinical Presentation Rationale: based on medical and personal factors listed in PT evaluation  Clinical Decision Making (Complexity): Low complexity    PLAN OF CARE  Treatment Interventions:  Interventions: Gait Training, Neuromuscular Re-education, Canalith Repositioning    Long Term Goals     PT Goal 1  Goal Identifier: BPPV  Goal Description: The pt will test negative for BPPV with positional testing indicating improvement in ability to complete bed mobility  Target Date: 11/11/23  PT Goal 2  Goal Identifier: DHI  Goal Description: The pt will score <10/100 on the DHI indicating improvement in subjective dizziness  Target Date: 11/11/23      Frequency of Treatment: 1x/week  Duration of Treatment: 60 days    Education Assessment:        Risks and benefits of evaluation/treatment have been explained.   Patient/Family/caregiver agrees with Plan of Care.     Evaluation Time:        Signing Clinician: Kalie Pascal, PT      [facility]                                                                                   OUTPATIENT PHYSICAL THERAPY      PLAN OF TREATMENT FOR OUTPATIENT REHABILITATION   Patient's Last Name, First Name, M.I.  Britney,Zaina    Date of  Birth:  1985   Provider's Name   [facility]   Medical Record No.  [id number]     Onset Date: 09/05/23  Start of Care Date: 09/12/23     Medical Diagnosis:  Benign paroxysmal positional vertigo, unspecified laterality      PT Treatment Diagnosis:  Dizziness Plan of Treatment  Frequency/Duration: 1x/week/ 60 days    Certification date from 09/12/23 to 11/11/23         See note for plan of treatment details and functional goals     Kalie Pascal, PT                         I CERTIFY THE NEED FOR THESE SERVICES FURNISHED UNDER        THIS PLAN OF TREATMENT AND WHILE UNDER MY CARE     (Physician attestation of this document indicates review and certification of the therapy plan).                Referring Provider:  Marianne Lacey      Initial Assessment  See Epic Evaluation- Start of Care Date: 09/12/23

## 2023-09-25 NOTE — PROGRESS NOTES
[facility] H&P Note:  Date: Sep 26, 2023    Patient Name: Zaina Tan  MRN: [id number]    Consult requested by: No ref. provider found    CHIEF COMPLAINT: carpal tunnel syndrome, bilateral    Dominant Hand: right  Occupation: nursing assistant      HPI:  Ms. Zaina Tan is a 37 year old female right hand dominant who presents with bilateral carpal tunnel syndrome. Patient was previously treated by Dr. Zhu for this condition. She notes constant pain, numbness and tingling that is worst at nighttime. Starts in middle and ring fingers and radiates up her arm. She has difficulty sleeping due to her symptoms. She received bilateral carpal tunnel injections on 3/6/23 that provided relief for 3 months.  Other treatment to date includes: bracing. She has stopped bracing as she feels this no longer provides her relief. She can only work casually now due to the pain. Patient had EMG completed at [facility] on 23.       PMH  Diabetes: no  Thyroid Problems: no  Smoking: no      PAST MEDICAL HISTORY:  Past Medical History:   Diagnosis Date    History of gestational diabetes mellitus (GDM) in prior pregnancy, currently pregnant 2015    - GDM with  pregnancy  - No GDM with   pregnancy  21- Failed early GCT  21- early 3 hour GTT wnl, repeat at 24 weeks  9/3/21: 3hr GTT wnl x3/4 values, NO GDM          History of pre-eclampsia in prior pregnancy, currently pregnant in third trimester 2021    S/P  2021    Vitamin D deficiency 2017: Vitamin D= 25, [employer] message sent - supplementation 1855-7313 IU/day 17- Vit D 48    Vitreous syneresis        PAST SURGICAL HISTORY:  Past Surgical History:   Procedure Laterality Date     SECTION N/A 2017    Procedure:  SECTION;;  Surgeon: Shellie Soni MD;  Location: [facility] L+D     SECTION N/A 2021    Procedure: REPEAT   SECTION;  Surgeon: Shellie Soni MD;  Location: [facility] L+D    D & C  6-2014    fetal hypoplastic leftheart syndrome    DILATION AND CURETTAGE SUCTION N/A 12/14/2021    Procedure: DILATION AND CURETTAGE, UTERUS, USING SUCTION with ultrasound guidance;  Surgeon: Neyda Ho MD;  Location: [facility] OR    IR PELVIC TRANSABD/TRANSREC ABSCESS DRAIN  12/22/2021    IR SINOGRAM INJECTION DIAGNOSTIC  12/29/2021       MEDICATIONS:  Current Outpatient Medications   Medication    celecoxib (CELEBREX) 200 MG capsule    cholecalciferol (VITAMIN D3) 125 mcg (5000 units) capsule    estradiol (ESTRACE) 0.1 MG/GM vaginal cream    ferrous sulfate (FEROSUL) 325 (65 Fe) MG tablet    lidocaine (LIDODERM) 5 % patch    meclizine (ANTIVERT) 25 MG tablet     Current Facility-Administered Medications   Medication    1.0 mL bupivacaine (MARCAINE) 0.5% injection (50 mL vial)    1.0 mL bupivacaine (MARCAINE) 0.5% injection (50 mL vial)    lidocaine 1 % injection 1 mL    lidocaine 1 % injection 1 mL    triamcinolone (KENALOG-40) injection 40 mg    triamcinolone (KENALOG-40) injection 40 mg       ALLERGIES:   No Known Allergies    FAMILY HISTORY:  No pertinent family history    SOCIAL HISTORY:  Social History     Tobacco Use    Smoking status: Never    Smokeless tobacco: Never   Substance Use Topics    Alcohol use: No    Drug use: No       The patient's past medical, family, and social history was reviewed and confirmed.    REVIEW OF SYMPTOMS:      General: Negative   Eyes: Negative   Ear, Nose and Throat: Negative   Respiratory: Negative   Cardiovascular: Negative   Gastrointestinal: Negative   Genito-urinary: Negative   Musculoskeletal: Negative  Neurological: Negative   Psychological: Negative  HEME: Negative   ENDO: Negative   SKIN: Negative    VITALS:  There were no vitals filed for this visit.    EXAM:  General: NAD, A&Ox3  HEENT: NC/AT  CV: RRR by peripheral pulse  Pulmonary: Non-labored breathing on RA  BUE:  Skin intact, no deformity,  mild right thenar atrophy  5/5 EPL, FPL, APB, hand intrinsics  Sensation is intact to light touch in the median, radial and ulnar nerve distributions  Two-point discrimination 5 mm median and ulnar bilaterally  Positive Tinel's and Durkan's compression test at the carpal tunnels bilaterally  No pain or weakness with compression of the lacertus  Negative Tinel's at the cubital tunnel  Well-perfused, cap refill less than 2 seconds      EMG/NCS  Prolonged distal onset latency left median motor nerve.  Prolonged distal onset latency and reduced amplitude right median motor nerve.  Prolonged distal peak latency of bilateral sensory nerves.  Findings are consistent with bilateral carpal tunnel syndrome.    I have personally reviewed the above images and labs.         IMPRESSION AND RECOMMENDATIONS:  Ms. Zaina Tan is a 37 year old female right hand dominant with bilateral carpal tunnel syndrome.    Symptoms are refractory to conservative management.  Given the severity of her symptoms, I recommended surgery.  He wishes to proceed.    The indications for surgery were discussed with the patient. The benefits, risks, and alternatives of operative management were discussed in detail with the patient. The patient understands that the risks of surgery include, but are not limited to: infection, bleeding, injury to nearby structures (such as nerves, blood vessels, and tendons), weakness in , pillar pain, wound healing problems, need for additional surgery, pain, stiffness, scarring, need for rehabilitation, and anesthetic complications.  Patient expressed understanding and elected to proceed with surgery. All questions were answered to the patient's satisfaction.    Case request placed, local + [facility]      Vicente Butt MD    [facility], Upper Extremity & Microvascular Surgery  Department of Orthopaedic Surgery  [facility]

## 2023-09-26 ENCOUNTER — OFFICE VISIT (OUTPATIENT)
Dept: ORTHOPEDICS | Facility: CLINIC | Age: 38
End: 2023-09-26
Payer: COMMERCIAL

## 2023-09-26 ENCOUNTER — TELEPHONE (OUTPATIENT)
Dept: ORTHOPEDICS | Facility: CLINIC | Age: 38
End: 2023-09-26

## 2023-09-26 VITALS
WEIGHT: 170 LBS | DIASTOLIC BLOOD PRESSURE: 64 MMHG | BODY MASS INDEX: 31.28 KG/M2 | SYSTOLIC BLOOD PRESSURE: 108 MMHG | HEIGHT: 62 IN

## 2023-09-26 DIAGNOSIS — G56.01 RIGHT CARPAL TUNNEL SYNDROME: Primary | ICD-10-CM

## 2023-09-26 PROCEDURE — 99214 OFFICE O/P EST MOD 30 MIN: CPT | Performed by: STUDENT IN AN ORGANIZED HEALTH CARE EDUCATION/TRAINING PROGRAM

## 2023-09-26 NOTE — TELEPHONE ENCOUNTER
Patient has been scheduled for surgery. Details are below.    Date of Surgery: 11/30/23    Approximate Arrival Time: surgery center will confirm    Surgeon:  Dr. Butt     Procedure: Release Carpal Tunnel-right  Location: [facility] [address]  Surgery Consult: na  PreOp Physical: patient will call her PCP  PostOp: 12/12/23  Packet Mailed/MyChart Sent: yes  Added to [place]: yes

## 2023-09-26 NOTE — LETTER
2023         RE: Zaina Tan  [address] [facility] [address]        Dear Colleague,    Thank you for referring your patient, Zaina Tan, to the [facility] [place]. Please see a copy of my visit note below.    Orthopaedic Surgery Hand and Upper Extremity Clinic H&P Note:  Date: Sep 26, 2023    Patient Name: Zaina Tan  MRN: [id number]    Consult requested by: No ref. provider found    CHIEF COMPLAINT: carpal tunnel syndrome, bilateral    Dominant Hand: right  Occupation: nursing assistant      HPI:  Ms. Zaina Tan is a 37 year old female right hand dominant who presents with bilateral carpal tunnel syndrome. Patient was previously treated by Dr. Zhu for this condition. She notes constant pain, numbness and tingling that is worst at nighttime. Starts in middle and ring fingers and radiates up her arm. She has difficulty sleeping due to her symptoms. She received bilateral carpal tunnel injections on 3/6/23 that provided relief for 3 months.  Other treatment to date includes: bracing. She has stopped bracing as she feels this no longer provides her relief. She can only work casually now due to the pain. Patient had EMG completed at [facility] on 23.       PMH  Diabetes: no  Thyroid Problems: no  Smoking: no      PAST MEDICAL HISTORY:  Past Medical History:   Diagnosis Date     History of gestational diabetes mellitus (GDM) in prior pregnancy, currently pregnant 2015    - GDM with  pregnancy  - No GDM with  2017 pregnancy  21- Failed early GCT  21- early 3 hour GTT wnl, repeat at 24 weeks  9/3/21: 3hr GTT wnl x3/4 values, NO GDM           History of pre-eclampsia in prior pregnancy, currently pregnant in third trimester 2021     S/P  2021     Vitamin D deficiency 2017: Vitamin D= 25, [employer] Schou[employer] message sent - supplementation 7236-7483 IU/day 17- Vit D 48     Vitreous syneresis         PAST SURGICAL HISTORY:  Past Surgical History:   Procedure Laterality Date      SECTION N/A 2017    Procedure:  SECTION;;  Surgeon: Shellie Soni MD;  [facility]      SECTION N/A 2021    Procedure: REPEAT  SECTION;  Surgeon: Shellie Soni MD;  [facility]     D & C      fetal hypoplastic leftheart syndrome     DILATION AND CURETTAGE SUCTION N/A 2021    Procedure: DILATION AND CURETTAGE, UTERUS, USING SUCTION with ultrasound guidance;  Surgeon: Neyda Ho MD;  [facility]     IR PELVIC TRANSABD/TRANSREC ABSCESS DRAIN  2021     IR SINOGRAM INJECTION DIAGNOSTIC  2021       MEDICATIONS:  Current Outpatient Medications   Medication     celecoxib (CELEBREX) 200 MG capsule     cholecalciferol (VITAMIN D3) 125 mcg (5000 units) capsule     estradiol (ESTRACE) 0.1 MG/GM vaginal cream     ferrous sulfate (FEROSUL) 325 (65 Fe) MG tablet     lidocaine (LIDODERM) 5 % patch     meclizine (ANTIVERT) 25 MG tablet     Current Facility-Administered Medications   Medication     1.0 mL bupivacaine (MARCAINE) 0.5% injection (50 mL vial)     1.0 mL bupivacaine (MARCAINE) 0.5% injection (50 mL vial)     lidocaine 1 % injection 1 mL     lidocaine 1 % injection 1 mL     triamcinolone (KENALOG-40) injection 40 mg     triamcinolone (KENALOG-40) injection 40 mg       ALLERGIES:   No Known Allergies    FAMILY HISTORY:  No pertinent family history    SOCIAL HISTORY:  Social History     Tobacco Use     Smoking status: Never     Smokeless tobacco: Never   Substance Use Topics     Alcohol use: No     Drug use: No       The patient's past medical, family, and social history was reviewed and confirmed.    REVIEW OF SYMPTOMS:      General: Negative   Eyes: Negative   Ear, Nose and Throat: Negative   Respiratory: Negative   Cardiovascular: Negative   Gastrointestinal: Negative   Genito-urinary: Negative   Musculoskeletal: Negative  Neurological:  Negative   Psychological: Negative  HEME: Negative   ENDO: Negative   SKIN: Negative    VITALS:  There were no vitals filed for this visit.    EXAM:  General: NAD, A&Ox3  HEENT: NC/AT  CV: RRR by peripheral pulse  Pulmonary: Non-labored breathing on RA  BUE:  Skin intact, no deformity, mild right thenar atrophy  5/5 EPL, FPL, APB, hand intrinsics  Sensation is intact to light touch in the median, radial and ulnar nerve distributions  Two-point discrimination 5 mm median and ulnar bilaterally  Positive Tinel's and Durkan's compression test at the carpal tunnels bilaterally  No pain or weakness with compression of the lacertus  Negative Tinel's at the cubital tunnel  Well-perfused, cap refill less than 2 seconds      EMG/NCS  Prolonged distal onset latency left median motor nerve.  Prolonged distal onset latency and reduced amplitude right median motor nerve.  Prolonged distal peak latency of bilateral sensory nerves.  Findings are consistent with bilateral carpal tunnel syndrome.    I have personally reviewed the above images and labs.         IMPRESSION AND RECOMMENDATIONS:  Ms. Zaina Tan is a 37 year old female right hand dominant with bilateral carpal tunnel syndrome.    Symptoms are refractory to conservative management.  Given the severity of her symptoms, I recommended surgery.  He wishes to proceed.    The indications for surgery were discussed with the patient. The benefits, risks, and alternatives of operative management were discussed in detail with the patient. The patient understands that the risks of surgery include, but are not limited to: infection, bleeding, injury to nearby structures (such as nerves, blood vessels, and tendons), weakness in , pillar pain, wound healing problems, need for additional surgery, pain, stiffness, scarring, need for rehabilitation, and anesthetic complications.  Patient expressed understanding and elected to proceed with surgery. All questions were answered to the  patient's satisfaction.    Case request placed, local + [facility]      Vicente Butt MD    Hand, Upper Extremity & Microvascular Surgery  Department of Orthopaedic Surgery  [facility]           Again, thank you for allowing me to participate in the care of your patient.        Sincerely,        Vicente Butt MD

## 2023-10-17 ENCOUNTER — OFFICE VISIT (OUTPATIENT)
Dept: URGENT CARE | Facility: URGENT CARE | Age: 38
End: 2023-10-17
Payer: COMMERCIAL

## 2023-10-17 VITALS
DIASTOLIC BLOOD PRESSURE: 63 MMHG | TEMPERATURE: 97.9 F | WEIGHT: 172 LBS | OXYGEN SATURATION: 100 % | BODY MASS INDEX: 31.46 KG/M2 | SYSTOLIC BLOOD PRESSURE: 107 MMHG | HEART RATE: 69 BPM

## 2023-10-17 DIAGNOSIS — H53.141 EYES SENSITIVE TO LIGHT, RIGHT: ICD-10-CM

## 2023-10-17 DIAGNOSIS — H57.11 ACUTE PAIN IN RIGHT EYE: Primary | ICD-10-CM

## 2023-10-17 DIAGNOSIS — H57.89 REDNESS OF EYE, RIGHT: ICD-10-CM

## 2023-10-17 PROCEDURE — 99213 OFFICE O/P EST LOW 20 MIN: CPT | Performed by: PHYSICIAN ASSISTANT

## 2023-11-09 NOTE — PROGRESS NOTES
[facility] WOMEN'S CLINIC [facility] PROFESSIONAL BLDG  3RD FLR,[address]  [address]  [facility] [address]  [facility] [address]  Phone: [phone]  Fax: [phone]  Primary Provider: Megan Henry  Pre-op Performing Provider: TANISHA RECIO    PREOPERATIVE EVALUATION:  Today's date: 11/10/2023    Zaina is a 38 year old female who presents for a preoperative evaluation.    Surgical Information:  Surgery/Procedure: RIGHT CARPAL TUNNEL RELEASE  Surgery Location: [facility]  Surgeon: Dr. Butt  Surgery Date: 11/30/2023  Time of Surgery: 8:25am  Where patient plans to recover: At home with family  Fax number for surgical facility: Note does not need to be faxed, will be available electronically in Epic.    Assessment & Plan     The proposed surgical procedure is considered LOW risk.    Preop general physical exam  Right carpal tunnel syndrome     - No identified additional risk factors other than previously addressed    Antiplatelet or Anticoagulation Medication Instructions:   - Patient is on no antiplatelet or anticoagulation medications.    Additional Medication Instructions:  Patient is on no additional chronic medications    RECOMMENDATION:  APPROVAL GIVEN to proceed with proposed procedure, without further diagnostic evaluation.      Subjective   HPI related to upcoming procedure:   Zaina has pain, numbness, and tingling. Has tried carpal tunnel injections and bracing. She has had an EMG. Due to severity of symptoms, plans on surgery.     Do you have chest pain when you re physically active? No  Do you currently have a cold, bronchitis or symptoms of other respiratory (head and chest) infections? No  Do you have a cough, shortness of breath, or wheezing? No    Have you ever had anemia or been told to take iron pills? No  Have you had any abnormal blood loss such as black, tarry or bloody stools, or abnormal vaginal bleeding? No  Have you ever had a blood transfusion? No  Are you willing to have a blood  transfusion if it is medically needed before, during or after your surgery? Yes  Have you ever had a heart attack or stroke? No  Have you ever had surgery on your heart or blood vessels, such as a stent, coronary (heart) bypass, or surgery on an artery in the head, neck, heart or legs? No  Do you have a history of heart failure? No  Do you have sleep apnea, excessive snoring or daytime drowsiness? No    Do you or anyone in your family have a history of blood clots? No  Do you or anyone in your family have a serious bleeding problem, such as longlasting bleeding after surgeries or cuts? No  Have you or anyone in your family ever had problems with anesthesia (sedation for surgery)? No    Do you have any artificial heart valves or other implanted medical devices, such as a pacemaker, defibrillator or continuous glucose monitor? No  Do you have any artificial joints? No  Are you allergic to latex? No  Is there any chance that you may be pregnant? No      Preoperative Review of :   reviewed - no record of controlled substances prescribed.    Status of Chronic Conditions:  See problem list for active medical problems.  Problems all longstanding and stable, except as noted/documented.  See ROS for pertinent symptoms related to these conditions.    Review of Systems  CONSTITUTIONAL: NEGATIVE for fever, chills, change in weight  ENT/MOUTH: NEGATIVE for ear, mouth and throat problems  RESP: NEGATIVE for significant cough or SOB  CV: NEGATIVE for chest pain, palpitations or peripheral edema    Patient Active Problem List    Diagnosis Date Noted    Right carpal tunnel syndrome 09/26/2023     Priority: Medium    Uterine leiomyoma 04/21/2015     Priority: Medium     Overview:   1st trimester US noted several fibroids at uterine fundus, largest 2.1 cm. Monitor.         Past Medical History:   Diagnosis Date    History of gestational diabetes mellitus (GDM) in prior pregnancy, currently pregnant 09/03/2015    - GDM with 2015  "pregnancy  - No GDM with  2017 pregnancy  21- Failed early GCT  21- early 3 hour GTT wnl, repeat at 24 weeks  9/3/21: 3hr GTT wnl x3/4 values, NO GDM          History of pre-eclampsia in prior pregnancy, currently pregnant in third trimester 2021    S/P  2021    Vitamin D deficiency 2017: Vitamin D= 25, [employer] message sent - supplementation 9416-0260 IU/day 17- Vit D 48    Vitreous syneresis      Past Surgical History:   Procedure Laterality Date     SECTION N/A 2017    Procedure:  SECTION;;  Surgeon: Shellie Soni MD;  [facility]     SECTION N/A 2021    Procedure: REPEAT  SECTION;  Surgeon: Shellie Soni MD;  [facility]    D & C      fetal hypoplastic leftheart syndrome    DILATION AND CURETTAGE SUCTION N/A 2021    Procedure: DILATION AND CURETTAGE, UTERUS, USING SUCTION with ultrasound guidance;  Surgeon: Neyda Ho MD;  [facility]    IR PELVIC TRANSABD/TRANSREC ABSCESS DRAIN  2021    IR SINOGRAM INJECTION DIAGNOSTIC  2021     No current outpatient medications on file.       No Known Allergies     Social History     Tobacco Use    Smoking status: Never    Smokeless tobacco: Never   Substance Use Topics    Alcohol use: No           Objective     /68   Pulse 62   Ht 1.575 m (5' 2\")   Wt 78.9 kg (174 lb)   LMP 10/22/2023   BMI 31.83 kg/m      Physical Exam    GENERAL APPEARANCE: healthy, alert and no distress     EYES: EOMI, PERRL     HENT: ear canals and TM's normal and nose and mouth without ulcers or lesions     NECK: no adenopathy, no asymmetry, masses, or scars and thyroid normal to palpation     RESP: lungs clear to auscultation - no rales, rhonchi or wheezes     CV: regular rates and rhythm, normal S1 S2, no S3 or S4 and no murmur, click or rub     ABDOMEN:  soft, nontender, no HSM or masses     MS: extremities normal- no gross deformities noted, " no evidence of inflammation in joints, FROM in all extremities.     SKIN: no suspicious lesions or rashes     NEURO: Normal strength and tone, sensory exam grossly normal, mentation intact and speech normal     PSYCH: mentation appears normal. and affect normal/bright     LYMPHATICS: No cervical adenopathy    Recent Labs   Lab Test 04/15/23  1522 01/30/23  1634 01/16/23  1452 05/23/22  0008 12/22/21  1044 11/23/21  2358 11/23/21  1719   HGB 13.6  --  12.5 11.9  --    < > 11.2*   *  --  208 221  --    < > 163   INR  --   --   --   --  1.03  --  0.99     --   --  138  --    < >  --    POTASSIUM 3.8  --   --  4.1  --    < >  --    CR 0.60  --   --  0.65  --    < >  --    A1C  --  5.5  --   --   --   --   --     < > = values in this interval not displayed.        Diagnostics:  No labs were ordered during this visit.   No EKG required for low risk surgery (cataract, skin procedure, breast biopsy, etc).    Revised Cardiac Risk Index (RCRI):  The patient has the following serious cardiovascular risks for perioperative complications:   - No serious cardiac risks = 0 points     RCRI Interpretation: 0 points: Class I (very low risk - 0.4% complication rate)       Signed Electronically by: Marianne Lacey MD  Copy of this evaluation report is provided to requesting physician.

## 2023-11-09 NOTE — PATIENT INSTRUCTIONS
Preparing for Your Surgery  Getting started  A nurse will call you to review your health history and instructions. They will give you an arrival time based on your scheduled surgery time. Please be ready to share:  Your doctor's clinic name and phone number  Your medical, surgical, and anesthesia history  A list of allergies and sensitivities  A list of medicines, including herbal treatments and over-the-counter drugs  Whether the patient has a legal guardian (ask how to send us the papers in advance)  Please tell us if you're pregnant--or if there's any chance you might be pregnant. Some surgeries may injure a fetus (unborn baby), so they require a pregnancy test. Surgeries that are safe for a fetus don't always need a test, and you can choose whether to have one.   If you have a child who's having surgery, please ask for a copy of Preparing for Your Child's Surgery.    Preparing for surgery  Within 10 to 30 days of surgery: Have a pre-op exam (sometimes called an H&P, or History and Physical). This can be done at a clinic or pre-operative center.  If you're having a , you may not need this exam. Talk to your care team.  At your pre-op exam, talk to your care team about all medicines you take. If you need to stop any medicines before surgery, ask when to start taking them again.  We do this for your safety. Many medicines can make you bleed too much during surgery. Some change how well surgery (anesthesia) drugs work.  Call your insurance company to let them know you're having surgery. (If you don't have insurance, call [phone].)  Call your clinic if there's any change in your health. This includes signs of a cold or flu (sore throat, runny nose, cough, rash, fever). It also includes a scrape or scratch near the surgery site.  If you have questions on the day of surgery, call your hospital or surgery center.  Eating and drinking guidelines  For your safety: Unless your surgeon tells you otherwise,  follow the guidelines below.  Eat and drink as usual until 8 hours before you arrive for surgery. After that, no food or milk.  Drink clear liquids until 2 hours before you arrive. These are liquids you can see through, like water, Gatorade, and Propel Water. They also include plain black coffee and tea (no cream or milk), candy, and breath mints. You can spit out gum when you arrive.  If you drink alcohol: Stop drinking it the night before surgery.  If your care team tells you to take medicine on the morning of surgery, it's okay to take it with a sip of water.  Preventing infection  Shower or bathe the night before and morning of your surgery. Follow the instructions your clinic gave you. (If no instructions, use regular soap.)  Don't shave or clip hair near your surgery site. We'll remove the hair if needed.  Don't smoke or vape the morning of surgery. You may chew nicotine gum up to 2 hours before surgery. A nicotine patch is okay.  Note: Some surgeries require you to completely quit smoking and nicotine. Check with your surgeon.  Your care team will make every effort to keep you safe from infection. We will:  Clean our hands often with soap and water (or an alcohol-based hand rub).  Clean the skin at your surgery site with a special soap that kills germs.  Give you a special gown to keep you warm. (Cold raises the risk of infection.)  Wear special hair covers, masks, gowns and gloves during surgery.  Give antibiotic medicine, if prescribed. Not all surgeries need antibiotics.  What to bring on the day of surgery  Photo ID and insurance card  Copy of your health care directive, if you have one  Glasses and hearing aids (bring cases)  You can't wear contacts during surgery  Inhaler and eye drops, if you use them (tell us about these when you arrive)  CPAP machine or breathing device, if you use them  A few personal items, if spending the night  If you have . . .  A pacemaker, ICD (cardiac defibrillator) or other  implant: Bring the ID card.  An implanted stimulator: Bring the remote control.  A legal guardian: Bring a copy of the certified (court-stamped) guardianship papers.  Please remove any jewelry, including body piercings. Leave jewelry and other valuables at home.  If you're going home the day of surgery  You must have a responsible adult drive you home. They should stay with you overnight as well.  If you don't have someone to stay with you, and you aren't safe to go home alone, we may keep you overnight. Insurance often won't pay for this.  After surgery  If it's hard to control your pain or you need more pain medicine, please call your surgeon's office.  Questions?   If you have any questions for your care team, list them here: _________________________________________________________________________________________________________________________________________________________________________ ____________________________________ ____________________________________ ____________________________________  For informational purposes only. Not to replace the advice of your health care provider. Copyright   2003, 2019 [employer]. All rights reserved. Clinically reviewed by Laney Victor MD. [employer] 466876 - REV 12/22.    Thank you for trusting us with your care!     If you need to contact us for questions about:  Symptoms, Scheduling & Medical Questions; Non-urgent (2-3 day response) C[employer] message, Urgent (needing response today) [phone] (if after 3:30pm next day response)   Prescriptions: Please call your Pharmacy   Billing: Janna [phone] or [facility]:[phone]

## 2023-11-09 NOTE — H&P (VIEW-ONLY)
[facility] PROFESSIONAL BLDG  3RD FLR,[address]  [address]  [facility] [address]  [facility] [address]  Phone: [phone]  Fax: [phone]  Primary Provider: Megan Henry  Pre-op Performing Provider: TANISHA RECIO    PREOPERATIVE EVALUATION:  Today's date: 11/10/2023    Zaina is a 38 year old female who presents for a preoperative evaluation.    Surgical Information:  Surgery/Procedure: RIGHT CARPAL TUNNEL RELEASE  [place] [facility] [place]: Dr. Butt  Surgery Date: 11/30/2023  Time of Surgery: 8:25am  Where patient plans to recover: At home with family  Fax number for surgical facility: Note does not need to be faxed, will be available electronically in Epic.    Assessment & Plan     The proposed surgical procedure is considered LOW risk.    Preop general physical exam  Right carpal tunnel syndrome     - No identified additional risk factors other than previously addressed    Antiplatelet or Anticoagulation Medication Instructions:   - Patient is on no antiplatelet or anticoagulation medications.    Additional Medication Instructions:  Patient is on no additional chronic medications    RECOMMENDATION:  APPROVAL GIVEN to proceed with proposed procedure, without further diagnostic evaluation.      Subjective   HPI related to upcoming procedure:   Zaina has pain, numbness, and tingling. Has tried carpal tunnel injections and bracing. She has had an EMG. Due to severity of symptoms, plans on surgery.     Do you have chest pain when you re physically active? No  Do you currently have a cold, bronchitis or symptoms of other respiratory (head and chest) infections? No  Do you have a cough, shortness of breath, or wheezing? No    Have you ever had anemia or been told to take iron pills? No  Have you had any abnormal blood loss such as black, tarry or bloody stools, or abnormal vaginal bleeding? No  Have you ever had a blood transfusion? No  Are you willing to have a blood  transfusion if it is medically needed before, during or after your surgery? Yes  Have you ever had a heart attack or stroke? No  Have you ever had surgery on your heart or blood vessels, such as a stent, coronary (heart) bypass, or surgery on an artery in the head, neck, heart or legs? No  Do you have a history of heart failure? No  Do you have sleep apnea, excessive snoring or daytime drowsiness? No    Do you or anyone in your family have a history of blood clots? No  Do you or anyone in your family have a serious bleeding problem, such as longlasting bleeding after surgeries or cuts? No  Have you or anyone in your family ever had problems with anesthesia (sedation for surgery)? No    Do you have any artificial heart valves or other implanted medical devices, such as a pacemaker, defibrillator or continuous glucose monitor? No  Do you have any artificial joints? No  Are you allergic to latex? No  Is there any chance that you may be pregnant? No      Preoperative Review of :   reviewed - no record of controlled substances prescribed.    Status of Chronic Conditions:  See problem list for active medical problems.  Problems all longstanding and stable, except as noted/documented.  See ROS for pertinent symptoms related to these conditions.    Review of Systems  CONSTITUTIONAL: NEGATIVE for fever, chills, change in weight  ENT/MOUTH: NEGATIVE for ear, mouth and throat problems  RESP: NEGATIVE for significant cough or SOB  CV: NEGATIVE for chest pain, palpitations or peripheral edema    Patient Active Problem List    Diagnosis Date Noted    Right carpal tunnel syndrome 09/26/2023     Priority: Medium    Uterine leiomyoma 04/21/2015     Priority: Medium     Overview:   1st trimester US noted several fibroids at uterine fundus, largest 2.1 cm. Monitor.         Past Medical History:   Diagnosis Date    History of gestational diabetes mellitus (GDM) in prior pregnancy, currently pregnant 09/03/2015    - GDM with 2015  "pregnancy  - No GDM with  2017 pregnancy  21- Failed early GCT  21- early 3 hour GTT wnl, repeat at 24 weeks  9/3/21: 3hr GTT wnl x3/4 values, NO GDM          History of pre-eclampsia in prior pregnancy, currently pregnant in third trimester 2021    S/P  2021    Vitamin D deficiency 2017: Vitamin D= 25, [employer] message sent - supplementation 0522-8099 IU/day 17- Vit D 48    Vitreous syneresis      Past Surgical History:   Procedure Laterality Date     SECTION N/A 2017    Procedure:  SECTION;;  Surgeon: Shellie Soni MD;  Location: [facility] L+D     SECTION N/A 2021    Procedure: REPEAT  SECTION;  Surgeon: Shellie Soni MD;  Location: [facility] L+D    D & C      fetal hypoplastic leftheart syndrome    DILATION AND CURETTAGE SUCTION N/A 2021    Procedure: DILATION AND CURETTAGE, UTERUS, USING SUCTION with ultrasound guidance;  Surgeon: Neyda Ho MD;  Location: [facility] OR    IR PELVIC TRANSABD/TRANSREC ABSCESS DRAIN  2021    IR SINOGRAM INJECTION DIAGNOSTIC  2021     No current outpatient medications on file.       No Known Allergies     Social History     Tobacco Use    Smoking status: Never    Smokeless tobacco: Never   Substance Use Topics    Alcohol use: No           Objective     /68   Pulse 62   Ht 1.575 m (5' 2\")   Wt 78.9 kg (174 lb)   LMP 10/22/2023   BMI 31.83 kg/m      Physical Exam    GENERAL APPEARANCE: healthy, alert and no distress     EYES: EOMI, PERRL     HENT: ear canals and TM's normal and nose and mouth without ulcers or lesions     NECK: no adenopathy, no asymmetry, masses, or scars and thyroid normal to palpation     RESP: lungs clear to auscultation - no rales, rhonchi or wheezes     CV: regular rates and rhythm, normal S1 S2, no S3 or S4 and no murmur, click or rub     ABDOMEN:  soft, nontender, no HSM or masses     MS: extremities normal- no gross deformities noted, " no evidence of inflammation in joints, FROM in all extremities.     SKIN: no suspicious lesions or rashes     NEURO: Normal strength and tone, sensory exam grossly normal, mentation intact and speech normal     PSYCH: mentation appears normal. and affect normal/bright     LYMPHATICS: No cervical adenopathy    Recent Labs   Lab Test 04/15/23  1522 01/30/23  1634 01/16/23  1452 05/23/22  0008 12/22/21  1044 11/23/21  2358 11/23/21  1719   HGB 13.6  --  12.5 11.9  --    < > 11.2*   *  --  208 221  --    < > 163   INR  --   --   --   --  1.03  --  0.99     --   --  138  --    < >  --    POTASSIUM 3.8  --   --  4.1  --    < >  --    CR 0.60  --   --  0.65  --    < >  --    A1C  --  5.5  --   --   --   --   --     < > = values in this interval not displayed.        Diagnostics:  No labs were ordered during this visit.   No EKG required for low risk surgery (cataract, skin procedure, breast biopsy, etc).    Revised Cardiac Risk Index (RCRI):  The patient has the following serious cardiovascular risks for perioperative complications:   - No serious cardiac risks = 0 points     RCRI Interpretation: 0 points: Class I (very low risk - 0.4% complication rate)       Signed Electronically by: Marianne Lacey MD  Copy of this evaluation report is provided to requesting physician.

## 2023-11-10 ENCOUNTER — OFFICE VISIT (OUTPATIENT)
Dept: FAMILY MEDICINE | Facility: CLINIC | Age: 38
End: 2023-11-10
Attending: FAMILY MEDICINE
Payer: COMMERCIAL

## 2023-11-10 VITALS
BODY MASS INDEX: 32.02 KG/M2 | HEART RATE: 62 BPM | SYSTOLIC BLOOD PRESSURE: 105 MMHG | WEIGHT: 174 LBS | DIASTOLIC BLOOD PRESSURE: 68 MMHG | HEIGHT: 62 IN

## 2023-11-10 DIAGNOSIS — G56.01 RIGHT CARPAL TUNNEL SYNDROME: ICD-10-CM

## 2023-11-10 DIAGNOSIS — Z01.818 PREOP GENERAL PHYSICAL EXAM: Primary | ICD-10-CM

## 2023-11-10 PROCEDURE — 99213 OFFICE O/P EST LOW 20 MIN: CPT | Performed by: FAMILY MEDICINE

## 2023-11-10 PROCEDURE — G0463 HOSPITAL OUTPT CLINIC VISIT: HCPCS | Performed by: FAMILY MEDICINE

## 2023-11-25 ENCOUNTER — ANESTHESIA EVENT (OUTPATIENT)
Dept: SURGERY | Facility: AMBULATORY SURGERY CENTER | Age: 38
End: 2023-11-25
Payer: COMMERCIAL

## 2023-11-29 NOTE — ANESTHESIA PREPROCEDURE EVALUATION
Anesthesia Pre-Procedure Evaluation    Patient: Zaina Tan   MRN: [id number] : 1985        Procedure : Procedure(s):  RELEASE, CARPAL TUNNEL          Past Medical History:   Diagnosis Date    History of gestational diabetes mellitus (GDM) in prior pregnancy, currently pregnant 2015    - GDM with  pregnancy  - No GDM with   pregnancy  21- Failed early GCT  21- early 3 hour GTT wnl, repeat at 24 weeks  9/3/21: 3hr GTT wnl x3/4 values, NO GDM          History of pre-eclampsia in prior pregnancy, currently pregnant in third trimester 2021    S/P  2021    Vitamin D deficiency 2017: Vitamin D= 25, [employer] message sent - supplementation 6568-2983 IU/day 17- Vit D 48    Vitreous syneresis       Past Surgical History:   Procedure Laterality Date     SECTION N/A 2017    Procedure:  SECTION;;  Surgeon: Shellie oSni MD;  [facility]: [facility] L[facility]     SECTION N/A 2021    Procedure: REPEAT  SECTION;  Surgeon: Shellie Soni MD;  [facility] L[facility]    D & C      fetal hypoplastic leftheart syndrome    DILATION AND CURETTAGE SUCTION N/A 2021    Procedure: DILATION AND CURETTAGE, UTERUS, USING SUCTION with ultrasound guidance;  Surgeon: Neyda Ho MD;  Location: [facility] OR    IR PELVIC TRANSABD/TRANSREC ABSCESS DRAIN  2021    IR SINOGRAM INJECTION DIAGNOSTIC  2021      No Known Allergies   Social History     Tobacco Use    Smoking status: Never    Smokeless tobacco: Never   Substance Use Topics    Alcohol use: No      Wt Readings from Last 1 Encounters:   11/10/23 78.9 kg (174 lb)           Physical Exam    Airway        Mallampati: II   TM distance: > 3 FB   Neck ROM: full   Mouth opening: > 3 cm    Respiratory Devices and Support         Dental       (+) Minor Abnormalities - some fillings, tiny chips      Cardiovascular   cardiovascular exam normal          Pulmonary                    OUTSIDE LABS:  CBC:   Lab Results   Component Value Date    WBC 6.0 04/15/2023    WBC 5.2 01/16/2023    HGB 13.6 04/15/2023    HGB 12.5 01/16/2023    HCT 41.3 04/15/2023    HCT 39.0 01/16/2023     (L) 04/15/2023     01/16/2023     BMP:   Lab Results   Component Value Date     04/15/2023     05/23/2022    POTASSIUM 3.8 04/15/2023    POTASSIUM 4.1 05/23/2022    CHLORIDE 104 04/15/2023    CHLORIDE 106 05/23/2022    CO2 31 04/15/2023    CO2 31 05/23/2022    BUN 11 04/15/2023    BUN 16 05/23/2022    CR 0.60 04/15/2023    CR 0.65 05/23/2022    GLC 90 04/15/2023    GLC 92 05/23/2022     COAGS:   Lab Results   Component Value Date    PTT 29 12/22/2021    INR 1.03 12/22/2021    FIBR 314 12/22/2021     POC:   Lab Results   Component Value Date    BGM 86 12/02/2015    HCG Negative 04/15/2023    HCGS Negative 05/23/2022     HEPATIC:   Lab Results   Component Value Date    ALBUMIN 3.6 04/15/2023    PROTTOTAL 8.1 04/15/2023    ALT 18 04/15/2023    AST 20 04/15/2023    ALKPHOS 104 04/15/2023    BILITOTAL 0.6 04/15/2023     OTHER:   Lab Results   Component Value Date    LACT 0.7 12/06/2021    A1C 5.5 01/30/2023    EROS 10.0 04/15/2023    LIPASE 234 12/06/2021    TSH 1.24 04/15/2023    SED 38 (H) 09/08/2023       Anesthesia Plan    ASA Status:  1    NPO Status:  NPO Appropriate    Anesthesia Type: MAC.     - Reason for MAC: straight local not clinically adequate   Induction: Intravenous, Propofol.   Maintenance: TIVA.        Consents    Anesthesia Plan(s) and associated risks, benefits, and realistic alternatives discussed. Questions answered and patient/representative(s) expressed understanding.     - Discussed:     - Discussed with:  Patient, Spouse      - Extended Intubation/Ventilatory Support Discussed: No.      - Patient is DNR/DNI Status: No     Use of blood products discussed: No .     Postoperative Care    Pain management: IV analgesics, Oral pain medications, Multi-modal analgesia.   PONV  "prophylaxis: Dexamethasone or Solumedrol, Ondansetron (or other 5HT-3), Background Propofol Infusion     Comments:               Margarito Mariee MD    I have reviewed the pertinent notes and labs in the chart from the past 30 days and (re)examined the patient.  Any updates or changes from those notes are reflected in this note.              # Obesity: Estimated body mass index is 31.83 kg/m  as calculated from the following:    Height as of 11/10/23: 1.575 m (5' 2\").    Weight as of 11/10/23: 78.9 kg (174 lb).      "

## 2023-11-30 ENCOUNTER — HOSPITAL ENCOUNTER (OUTPATIENT)
Facility: AMBULATORY SURGERY CENTER | Age: 38
Discharge: HOME | End: 2023-11-30
Attending: STUDENT IN AN ORGANIZED HEALTH CARE EDUCATION/TRAINING PROGRAM | Admitting: STUDENT IN AN ORGANIZED HEALTH CARE EDUCATION/TRAINING PROGRAM
Payer: COMMERCIAL

## 2023-11-30 ENCOUNTER — ANESTHESIA (OUTPATIENT)
Dept: SURGERY | Facility: AMBULATORY SURGERY CENTER | Age: 38
End: 2023-11-30
Payer: COMMERCIAL

## 2023-11-30 VITALS
TEMPERATURE: 97.1 F | OXYGEN SATURATION: 99 % | HEART RATE: 100 BPM | DIASTOLIC BLOOD PRESSURE: 60 MMHG | RESPIRATION RATE: 14 BRPM | SYSTOLIC BLOOD PRESSURE: 107 MMHG | HEIGHT: 62 IN | WEIGHT: 170 LBS | BODY MASS INDEX: 31.28 KG/M2

## 2023-11-30 DIAGNOSIS — G56.01 RIGHT CARPAL TUNNEL SYNDROME: Primary | ICD-10-CM

## 2023-11-30 LAB
HCG UR QL: NEGATIVE
INTERNAL QC OK POCT: NORMAL
POCT KIT EXPIRATION DATE: NORMAL
POCT KIT LOT NUMBER: NORMAL

## 2023-11-30 PROCEDURE — 88305 TISSUE EXAM BY PATHOLOGIST: CPT | Mod: TC | Performed by: STUDENT IN AN ORGANIZED HEALTH CARE EDUCATION/TRAINING PROGRAM

## 2023-11-30 PROCEDURE — 88305 TISSUE EXAM BY PATHOLOGIST: CPT | Mod: 26 | Performed by: PATHOLOGY

## 2023-11-30 PROCEDURE — 81025 URINE PREGNANCY TEST: CPT | Performed by: PATHOLOGY

## 2023-11-30 PROCEDURE — 88313 SPECIAL STAINS GROUP 2: CPT | Mod: 26 | Performed by: PATHOLOGY

## 2023-11-30 PROCEDURE — 64721 CARPAL TUNNEL SURGERY: CPT | Mod: RT

## 2023-11-30 RX ORDER — ACETAMINOPHEN 325 MG/1
975 TABLET ORAL ONCE
Status: DISCONTINUED | OUTPATIENT
Start: 2023-11-30 | End: 2023-12-01 | Stop reason: HOSPADM

## 2023-11-30 RX ORDER — KETOROLAC TROMETHAMINE 30 MG/ML
INJECTION, SOLUTION INTRAMUSCULAR; INTRAVENOUS PRN
Status: DISCONTINUED | OUTPATIENT
Start: 2023-11-30 | End: 2023-11-30

## 2023-11-30 RX ORDER — ONDANSETRON 4 MG/1
4 TABLET, ORALLY DISINTEGRATING ORAL EVERY 30 MIN PRN
Status: DISCONTINUED | OUTPATIENT
Start: 2023-11-30 | End: 2023-12-01 | Stop reason: HOSPADM

## 2023-11-30 RX ORDER — LIDOCAINE HYDROCHLORIDE AND EPINEPHRINE 10; 10 MG/ML; UG/ML
10 INJECTION, SOLUTION INFILTRATION; PERINEURAL ONCE
Status: DISCONTINUED | OUTPATIENT
Start: 2023-11-30 | End: 2023-12-01 | Stop reason: HOSPADM

## 2023-11-30 RX ORDER — PROPOFOL 10 MG/ML
INJECTION, EMULSION INTRAVENOUS PRN
Status: DISCONTINUED | OUTPATIENT
Start: 2023-11-30 | End: 2023-11-30

## 2023-11-30 RX ORDER — KETAMINE HYDROCHLORIDE 10 MG/ML
INJECTION INTRAMUSCULAR; INTRAVENOUS PRN
Status: DISCONTINUED | OUTPATIENT
Start: 2023-11-30 | End: 2023-11-30

## 2023-11-30 RX ORDER — LIDOCAINE 40 MG/G
CREAM TOPICAL
Status: DISCONTINUED | OUTPATIENT
Start: 2023-11-30 | End: 2023-12-01 | Stop reason: HOSPADM

## 2023-11-30 RX ORDER — GLYCOPYRROLATE 0.2 MG/ML
INJECTION, SOLUTION INTRAMUSCULAR; INTRAVENOUS PRN
Status: DISCONTINUED | OUTPATIENT
Start: 2023-11-30 | End: 2023-11-30

## 2023-11-30 RX ORDER — OXYCODONE HYDROCHLORIDE 5 MG/1
10 TABLET ORAL
Status: DISCONTINUED | OUTPATIENT
Start: 2023-11-30 | End: 2023-12-01 | Stop reason: HOSPADM

## 2023-11-30 RX ORDER — ONDANSETRON 2 MG/ML
INJECTION INTRAMUSCULAR; INTRAVENOUS PRN
Status: DISCONTINUED | OUTPATIENT
Start: 2023-11-30 | End: 2023-11-30

## 2023-11-30 RX ORDER — FENTANYL CITRATE 50 UG/ML
50 INJECTION, SOLUTION INTRAMUSCULAR; INTRAVENOUS
Status: DISCONTINUED | OUTPATIENT
Start: 2023-11-30 | End: 2023-12-01 | Stop reason: HOSPADM

## 2023-11-30 RX ORDER — OXYCODONE HYDROCHLORIDE 5 MG/1
5 TABLET ORAL EVERY 6 HOURS PRN
Qty: 5 TABLET | Refills: 0 | Status: SHIPPED | OUTPATIENT
Start: 2023-11-30 | End: 2023-12-03

## 2023-11-30 RX ORDER — OXYCODONE HYDROCHLORIDE 5 MG/1
5 TABLET ORAL
Status: DISCONTINUED | OUTPATIENT
Start: 2023-11-30 | End: 2023-12-01 | Stop reason: HOSPADM

## 2023-11-30 RX ORDER — ACETAMINOPHEN 325 MG/1
975 TABLET ORAL ONCE
Status: COMPLETED | OUTPATIENT
Start: 2023-11-30 | End: 2023-11-30

## 2023-11-30 RX ORDER — LIDOCAINE HYDROCHLORIDE 20 MG/ML
INJECTION, SOLUTION INFILTRATION; PERINEURAL PRN
Status: DISCONTINUED | OUTPATIENT
Start: 2023-11-30 | End: 2023-11-30

## 2023-11-30 RX ORDER — PROPOFOL 10 MG/ML
INJECTION, EMULSION INTRAVENOUS CONTINUOUS PRN
Status: DISCONTINUED | OUTPATIENT
Start: 2023-11-30 | End: 2023-11-30

## 2023-11-30 RX ORDER — DEXAMETHASONE SODIUM PHOSPHATE 4 MG/ML
INJECTION, SOLUTION INTRA-ARTICULAR; INTRALESIONAL; INTRAMUSCULAR; INTRAVENOUS; SOFT TISSUE PRN
Status: DISCONTINUED | OUTPATIENT
Start: 2023-11-30 | End: 2023-11-30

## 2023-11-30 RX ORDER — ONDANSETRON 2 MG/ML
4 INJECTION INTRAMUSCULAR; INTRAVENOUS EVERY 30 MIN PRN
Status: DISCONTINUED | OUTPATIENT
Start: 2023-11-30 | End: 2023-12-01 | Stop reason: HOSPADM

## 2023-11-30 RX ORDER — SODIUM CHLORIDE, SODIUM LACTATE, POTASSIUM CHLORIDE, CALCIUM CHLORIDE 600; 310; 30; 20 MG/100ML; MG/100ML; MG/100ML; MG/100ML
INJECTION, SOLUTION INTRAVENOUS CONTINUOUS
Status: DISCONTINUED | OUTPATIENT
Start: 2023-11-30 | End: 2023-12-01 | Stop reason: HOSPADM

## 2023-11-30 RX ADMIN — KETOROLAC TROMETHAMINE 15 MG: 30 INJECTION, SOLUTION INTRAMUSCULAR; INTRAVENOUS at 08:57

## 2023-11-30 RX ADMIN — PROPOFOL 150 MCG/KG/MIN: 10 INJECTION, EMULSION INTRAVENOUS at 08:24

## 2023-11-30 RX ADMIN — PROPOFOL 50 MG: 10 INJECTION, EMULSION INTRAVENOUS at 08:24

## 2023-11-30 RX ADMIN — DEXAMETHASONE SODIUM PHOSPHATE 4 MG: 4 INJECTION, SOLUTION INTRA-ARTICULAR; INTRALESIONAL; INTRAMUSCULAR; INTRAVENOUS; SOFT TISSUE at 08:25

## 2023-11-30 RX ADMIN — KETAMINE HYDROCHLORIDE 10 MG: 10 INJECTION INTRAMUSCULAR; INTRAVENOUS at 08:39

## 2023-11-30 RX ADMIN — LIDOCAINE HYDROCHLORIDE 80 MG: 20 INJECTION, SOLUTION INFILTRATION; PERINEURAL at 08:24

## 2023-11-30 RX ADMIN — ACETAMINOPHEN 975 MG: 325 TABLET ORAL at 07:38

## 2023-11-30 RX ADMIN — ONDANSETRON 4 MG: 2 INJECTION INTRAMUSCULAR; INTRAVENOUS at 08:25

## 2023-11-30 RX ADMIN — SODIUM CHLORIDE, SODIUM LACTATE, POTASSIUM CHLORIDE, CALCIUM CHLORIDE: 600; 310; 30; 20 INJECTION, SOLUTION INTRAVENOUS at 08:19

## 2023-11-30 RX ADMIN — GLYCOPYRROLATE 0.2 MG: 0.2 INJECTION, SOLUTION INTRAMUSCULAR; INTRAVENOUS at 08:38

## 2023-11-30 RX ADMIN — KETAMINE HYDROCHLORIDE 20 MG: 10 INJECTION INTRAMUSCULAR; INTRAVENOUS at 08:29

## 2023-11-30 NOTE — INTERVAL H&P NOTE
"I have reviewed the surgical (or preoperative) H&P that is linked to this encounter, and examined the patient. There are no significant changes    Clinical Conditions Present on Arrival:  Clinically Significant Risk Factors Present on Admission                  # Obesity: Estimated body mass index is 31.09 kg/m  as calculated from the following:    Height as of this encounter: 1.575 m (5' 2\").    Weight as of this encounter: 77.1 kg (170 lb).       "

## 2023-11-30 NOTE — DISCHARGE INSTRUCTIONS
Procedure Performed: Right carpal tunnel release  Attending Surgeon: Vicente Butt MD  Date: 11/30/2023    DIAGNOSIS  1. Right carpal tunnel syndrome        MEDICATIONS   Resume all home medications as directed unless otherwise instructed during this hospitalization. If there is any question, double check with your primary care provider.  Start new discharge medications as directed.    Take 1 tablet of 650 mg Tylenol (acetaminophen) Arthritis Strength (extended release) and 1 tablet of Aleve (naproxen) 220 mg in the morning with breakfast and in the evening with dinner.     For breakthrough pain use narcotic pain medication as prescribed.    Do not drive or operate machinery while taking narcotic pain medications.   If you are taking other Tylenol containing medicines at home, be sure NOT to exceed 4 gram's (4000 milligrams) of Tylenol per day.   If you are taking pain medications, be sure to take Colace (docusate sodium) as well to prevent constipation. If constipated, try adding another cathartic or enema.  If nausea and vomiting, call the hospital or seek medical attention.    ACTIVITY   Weight bearing: Non-weight bearing to arm.    DIET  Resume same diet prior to your hospital admission.    WOUND   Leave dressing on until you are seen in clinic for your follow up visit.   Watch for signs and symptoms of infection of your wounds including; pain, redness, swelling, drainage or fever.  If you notice any of these symptoms please call or seek medical attention.    Keep wound clean, dry, and intact.  Do not submerge wounds in water until they are healed. No baths, soaking, swimming, or prolonged water exposure for 4 weeks after surgery.    RETURN   Follow-up with Orthopedic Clinic as directed.     Future Appointments   Date Time Provider Department Center   12/12/2023 10:30 AM Vicente Butt MD [facility] [place] [facility] [place]       Call the [facility] at [phone] during business hours for any symptoms such as:     * Fevers with Temperature greater than 101.5 degrees.   * Pus drainage from wound site.   * Severe pain, not controlled by medication.   * Persistent nausea, vomiting and inablility to tolerate fluids.    If you are receiving care in [place], you may call the Orthopedic clinic at [phone].    FOR URGENT PROBLEMS ONLY, after hours or on weekends call the hospital  at [phone] and ask to speak with the orthopedic resident on call.    [facility]  Home Care Following Anesthesia  For 24 hours after surgery:  Get plenty of rest.  A responsible adult must stay with you for at least 24 hours after you leave the surgery center.  Do not drive or use heavy equipment.  If you have weakness or tingling, don't drive or use heavy equipment until this feeling goes away.   Do not drink alcohol.   Avoid strenuous or risky activities.  Ask for help when climbing stairs.  You may feel lightheaded.  IF so, sit for a few minutes before standing.  Have someone help you get up.   If you have nausea (feel sick to your stomach): Drink only clear liquids such as apple juice, ginger ale, broth or 7-Up.  Rest may also help.  Be sure to drink enough fluids.  Move to a regular diet as you feel able.   You may have a slight fever.  Call the doctor if your fever is over 100 F (37.7 C) (taken under the tongue) or lasts longer than 24 hours.  You may have a dry mouth, a sore throat, muscle aches or trouble sleeping. These should go away after 24 hours.  Do not make important or legal decisions.   It is recommended to avoid smoking.               Tips for taking pain medications  To get the best pain relief possible, remember these points:  Take pain medications as directed, before pain becomes severe.  Pain medication can upset your stomach: taking it with food may help.  Constipation is a common side effect of pain medication. Drink plenty of  fluids.  Eat foods high in fiber. Take a stool  softener if recommended by your doctor or pharmacist.  Do not drink alcohol, drive or operate machinery while taking pain medications.  Ask about other ways to control pain, such as with heat, ice or relaxation.    Tylenol/Acetaminophen Consumption    If you feel your pain relief is insufficient, you may take Tylenol/Acetaminophen in addition to your narcotic pain medication.   Be careful not to exceed 4,000 mg of Tylenol/Acetaminophen in a 24 hour period from all sources.  If you are taking extra strength Tylenol/acetaminophen (500 mg), the maximum dose is 8 tablets in 24 hours.  If you are taking regular strength acetaminophen (325 mg), the maximum dose is 12 tablets in 24 hours.    Call a doctor for any of the following:  Signs of infection (fever, growing tenderness at the surgery site, a large amount of drainage or bleeding, severe pain, foul-smelling drainage, redness, swelling).  It has been over 8 to 10 hours since surgery and you are still not able to urinate (pass water).  Headache for over 24 hours.  Numbness, tingling or weakness the day after surgery (if you had spinal anesthesia).  Signs of Covid-19 infection (temperature over 100 degrees, shortness of breath, cough, loss of taste/smell, generalized body aches, persistent headache, chills, sore throat, nausea/vomiting/diarrhea)  Your doctor is:       Dr. Vicente Butt, Orthopaedics: [phone]               Or dial [phone] and ask for the resident on call for:  Orthopaedics  For emergency care, call the:  [facility] [place] Emergency Department: [phone] (TTY for hearing impaired: [phone])

## 2023-11-30 NOTE — OP NOTE
Patient: Zaina Tan  : 1985  MRN: [id number]    DATE OF OPERATION: 2023      OPERATIVE REPORT       PREOPERATIVE DIAGNOSIS:  Right carpal tunnel syndrome     POSTOPERATIVE DIAGNOSIS:  Right carpal tunnel syndrome     PROCEDURE:  Right open carpal tunnel release    SURGEON:  Vicente Butt MD     ASSISTANT(S):  None    ANESTHESIA:  Local + MAC  Local: 5cc 1% lidocaine 1:100,000 epinephrine and 5cc 0.5% bupivacaine     IMPLANTS:   None    ESTIMATED BLOOD LOSS:  1cc    DVT PROPHYLAXIS:  SCDs    TOURNIQUET TIME:  16 minutes    SPECIMENS REMOVED:  Right carpal tunnel synovium    INTRAOPERATIVE FINDINGS:  Compressed median nerve at the carpal tunnel. Recurrent motor branch extraligamentous and radial. Significant amount of thick inflamed tenosynovium encasing the nerve and flexor  tendons.    COMPLICATIONS:  None    DISPOSITION:  Stable to PACU.     INDICATIONS:  Zaina Tna is a 38 year old female with a history of right hand numbness and tingling refractory to conservative measures, including bracing, and injections. Electrodiagnostic studies demonstrated evidence of carpal tunnel syndrome.     Indications for surgery were discussed with the patient in detail. After discussing risks, benefits and alternatives to procedure, the patient elected to proceed with surgical intervention.     The patient understood that risks include, but are not limited to: Bleeding, infection, damage to surrounding structures (such as nerve, vessel or tendon), persistent symptoms or numbness, need for additional procedures, pillar pain, stiffness, need for therapy, and anesthetic complications. The patient expressed understanding and agreement and wished to proceed.     Consent was obtained for the procedure.     DESCRIPTION OF PROCEDURE IN DETAIL:  The patient was seen in the preoperative care unit. Patient identity, consent, procedure to be performed, and operative site were verified with the patient. The right upper  extremity was marked.     The patient was brought to the operating room and placed supine on the operating room table. The right upper extremity was placed on an armboard. SCDs were placed on the bilateral lower extremities.      Sedation was administered by anesthesia. The skin over the carpal tunnel was cleansed with chlorhexidine and the above local anesthetic mixture was infiltrated in the skin and subcutaneous tissues over the carpal tunnel.      The right upper extremity was prepped and draped in the usual sterile fashion. A timeout was performed confirming the correct patient, procedure, and operative site. All were in agreement.    A standard longitudinal 2.5cm incision was made in the skin directly over the carpal tunnel.  Blunt dissection was carried down through the skin and subcutaneous tissues to the superficial palmar fascia.  Hemostasis was achieved with bipolar cautery.  The superficial palmar fascia was divided sharply exposing the transverse carpal ligament. The transverse carpal ligament was then divided sharply and released in its entirety, exposing the median nerve which appeared pale and compressed. Distally, the superficial palmar fascia was divided with scissors under direct visualization, protecting the superficial palmar arch below. Attention was then turned proximally, and the remaining transverse carpal ligament and antebrachial fascia were released under direct visualization. A full median nerve release was performed.  I noted a significant amount of thick and inflamed tenosynovium enveloping the median nerve as well as the underlying flexor tendons.  This was gently debrided with tenotomy scissors and sent for pathological examination and Congo red staining.  At the conclusion, the median nerve had excellent excursion. The recurrent motor branch was noted to be extraligamentous and radial.    The wound was copiously irrigated. The incision was then closed with interrupted, buried 4-0  Monocryl sutures. A steri-strip was applied. A sterile, bulky soft dressing was placed.     All needle and sponge counts were correct at the end of the procedure. There were no complications.     The patient was awoken from anesthesia and taken to the postoperative recovery unit in stable condition.     I was present and scrubbed for the entire procedure.     POSTOPERATIVE PLAN:  The patient will be discharged home. The patient was instructed to be non-weight bearing. The patient was instructed on finger range of motion exercises. The dressing will remain in place until follow-up. The patient was instructed to keep it clean and dry. The patient will return to clinic in 10 days for a wound check.    Vicente Butt MD     Hand, Upper Extremity & Microvascular Surgery  Department of Orthopedic Surgery  [facility]

## 2023-11-30 NOTE — ANESTHESIA POSTPROCEDURE EVALUATION
Patient: Zaina Tan    Procedure: Procedure(s):  RELEASE, CARPAL TUNNEL       Anesthesia Type:  MAC    Note:  Disposition: Outpatient   Postop Pain Control: Uneventful            Sign Out: Well controlled pain   PONV: No   Neuro/Psych: Uneventful            Sign Out: Acceptable/Baseline neuro status   Airway/Respiratory: Uneventful            Sign Out: Acceptable/Baseline resp. status   CV/Hemodynamics: Uneventful            Sign Out: Acceptable CV status; No obvious hypovolemia; No obvious fluid overload   Other NRE:    DID A NON-ROUTINE EVENT OCCUR?            Last vitals:  Vitals Value Taken Time   /60 11/30/23 0952   Temp 36.2  C (97.1  F) 11/30/23 0952   Pulse 100 11/30/23 0907   Resp 14 11/30/23 0952   SpO2 99 % 11/30/23 0952       Electronically Signed By: Margarito Mariee MD  November 30, 2023  3:18 PM

## 2023-11-30 NOTE — ANESTHESIA CARE TRANSFER NOTE
Patient: Zaina Tan    Procedure: Procedure(s):  RELEASE, CARPAL TUNNEL       Diagnosis: Right carpal tunnel syndrome [G56.01]  Diagnosis Additional Information: No value filed.    Anesthesia Type:   MAC     Note:    Oropharynx: oropharynx clear of all foreign objects and spontaneously breathing  Level of Consciousness: drowsy  Oxygen Supplementation: room air    Independent Airway: airway patency satisfactory and stable  Dentition: dentition unchanged  Vital Signs Stable: post-procedure vital signs reviewed and stable  Report to RN Given: handoff report given  Patient transferred to: Phase II    Handoff Report: Identifed the Patient, Identified the Reponsible Provider, Reviewed the pertinent medical history, Discussed the surgical course, Reviewed Intra-OP anesthesia mangement and issues during anesthesia, Set expectations for post-procedure period and Allowed opportunity for questions and acknowledgement of understanding      Vitals:  Vitals Value Taken Time   /65 11/30/23 0907   Temp 36.4  C (97.6  F) 11/30/23 0907   Pulse 100 11/30/23 0907   Resp 16 11/30/23 0907   SpO2 97 % 11/30/23 0907       Electronically Signed By: RERE Dubose CRNA  November 30, 2023  9:08 AM

## 2023-12-06 LAB
PATH REPORT.COMMENTS IMP SPEC: NORMAL
PATH REPORT.FINAL DX SPEC: NORMAL
PATH REPORT.GROSS SPEC: NORMAL
PATH REPORT.MICROSCOPIC SPEC OTHER STN: NORMAL
PATH REPORT.RELEVANT HX SPEC: NORMAL
PHOTO IMAGE: NORMAL

## 2023-12-07 NOTE — PROGRESS NOTES
Orthopaedic Surgery Hand Clinic Progress Note    Patient Name: Zaina Tan  MRN: [id number]  : 1985  Date: Dec 12, 2023    Date of Surgery: 23    Surgery Performed: right open carpal tunnel release    Subjective:  Ms. Zaina Tan is a 38 year old female who returns 2 weeks s/p the above procedure. Patient is overall doing well. She reports numbness in between the 3rd and 4th digits, and intermittent shooting electrical type pain. In regards to her left hand/wrist- she no longer has pain that woke her up at night but has continued to have numbness in the third webspace. She is bracing the left wrist at nighttime. She takes Tylenol as needed for pain.    Objective:    General: alert, appropriate, NAD  HEENT: NC/AT  CV: RRR by pulse  Pulm: Unlabored on RA  MSK:  Incision clean, dry, intact  No erythema, drainage, evidence of infection  Diminished sensation in the third webspace between the third and fourth fingers  Intact index fingertip and small fingertip  Intact EPL, FPL, APB, hand intrinsics  Warm well-perfused, cap refill less than 2 seconds    Congo red staining negative for amyloid.    Assessment/Plan:   38-year-old female status post right open carpal tunnel release 2023.    I reassured the patient that the persistent numbness in the fingers is not unexpected given the duration of her symptoms before hand.  I advised that the aggravating symptoms, such as those that wake her up at night, are the first resolved.    New Steri-Strips applied.  Patient may begin gentle wrist and finger range of motion.  Okay to use hand for activities of daily lifting.  Avoid heavy lifting for another 2 to 3 weeks until wound is fully healed.    Okay to shower, no soaking, submerging, prolonged water exposure x 4 weeks, let Steri-Strips fall off on their own    Patient wants to be fully recovered on the right before proceeding on the left.  She was given my card.  She will let me know when she is ready to  schedule.    All questions answered.  The patient voiced understanding and agreement.    Vicente Butt MD    Hand & Upper Extremity Surgery  Department of Orthopedic Surgery  [facility]

## 2023-12-11 ENCOUNTER — TELEPHONE (OUTPATIENT)
Dept: FAMILY MEDICINE | Facility: CLINIC | Age: 38
End: 2023-12-11

## 2023-12-11 DIAGNOSIS — N39.0 URINARY TRACT INFECTION: ICD-10-CM

## 2023-12-11 RX ORDER — SULFAMETHOXAZOLE/TRIMETHOPRIM 800-160 MG
1 TABLET ORAL 2 TIMES DAILY
Qty: 6 TABLET | Refills: 0 | Status: SHIPPED | OUTPATIENT
Start: 2023-12-11 | End: 2024-09-09

## 2023-12-11 NOTE — TELEPHONE ENCOUNTER
Called and spoke with the patient to inquire about her UTI symptoms.    Patient stated that she has had symptoms for 3 days which include burning and painful urination. Denies any fever, back pain, pelvic pressure, or chills at this time.        I did let her know that I will send in Bactrim for her and after the 3 day course if she does not feel any better to please call us.     All questions answered and prescription was sent.

## 2023-12-12 ENCOUNTER — OFFICE VISIT (OUTPATIENT)
Dept: ORTHOPEDICS | Facility: CLINIC | Age: 38
End: 2023-12-12
Payer: COMMERCIAL

## 2023-12-12 DIAGNOSIS — G56.03 BILATERAL CARPAL TUNNEL SYNDROME: Primary | ICD-10-CM

## 2023-12-12 PROCEDURE — 99024 POSTOP FOLLOW-UP VISIT: CPT | Performed by: STUDENT IN AN ORGANIZED HEALTH CARE EDUCATION/TRAINING PROGRAM

## 2023-12-12 NOTE — LETTER
2023         RE: Zaina Tan  [address] [facility] [address]        Dear Colleague,    Thank you for referring your patient, Zaina Tan, to the [facility] [place]. Please see a copy of my visit note below.    Orthopaedic Surgery Hand Clinic Progress Note    Patient Name: Zaina Tan  MRN: [id number]  : 1985  Date: Dec 12, 2023    Date of Surgery: 23    Surgery Performed: right open carpal tunnel release    Subjective:  Ms. Zaina Tan is a 38 year old female who returns 2 weeks s/p the above procedure. Patient is overall doing well. She reports numbness in between the 3rd and 4th digits, and intermittent shooting electrical type pain. In regards to her left hand/wrist- she no longer has pain that woke her up at night but has continued to have numbness in the third webspace. She is bracing the left wrist at nighttime. She takes Tylenol as needed for pain.    Objective:    General: alert, appropriate, NAD  HEENT: NC/AT  CV: RRR by pulse  Pulm: Unlabored on RA  MSK:  Incision clean, dry, intact  No erythema, drainage, evidence of infection  Diminished sensation in the third webspace between the third and fourth fingers  Intact index fingertip and small fingertip  Intact EPL, FPL, APB, hand intrinsics  Warm well-perfused, cap refill less than 2 seconds    Congo red staining negative for amyloid.    Assessment/Plan:   38-year-old female status post right open carpal tunnel release 2023.    I reassured the patient that the persistent numbness in the fingers is not unexpected given the duration of her symptoms before hand.  I advised that the aggravating symptoms, such as those that wake her up at night, are the first resolved.    New Steri-Strips applied.  Patient may begin gentle wrist and finger range of motion.  Okay to use hand for activities of daily lifting.  Avoid heavy lifting for another 2 to 3 weeks until wound is fully healed.    Okay  to shower, no soaking, submerging, prolonged water exposure x 4 weeks, let Steri-Strips fall off on their own    Patient wants to be fully recovered on the right before proceeding on the left.  She was given my card.  She will let me know when she is ready to schedule.    All questions answered.  The patient voiced understanding and agreement.    Vicente Butt MD    Hand & Upper Extremity Surgery  Department of Orthopedic Surgery  [facility]      Again, thank you for allowing me to participate in the care of your patient.        Sincerely,        Vicente Butt MD

## 2023-12-14 ENCOUNTER — NURSE TRIAGE (OUTPATIENT)
Dept: OBGYN | Facility: CLINIC | Age: 38
End: 2023-12-14
Payer: COMMERCIAL

## 2023-12-14 DIAGNOSIS — B37.31 YEAST INFECTION OF THE VAGINA: Primary | ICD-10-CM

## 2023-12-14 DIAGNOSIS — N89.8 VAGINAL ITCHING: ICD-10-CM

## 2023-12-14 RX ORDER — FLUCONAZOLE 150 MG/1
150 TABLET ORAL ONCE
Qty: 1 TABLET | Refills: 1 | Status: SHIPPED | OUTPATIENT
Start: 2023-12-14 | End: 2023-12-14

## 2023-12-14 NOTE — TELEPHONE ENCOUNTER
"Received call from patient. Patient reports that she was treated for a UTI on 12/11 and has since developed symptoms of a yeast infection. Reports lumpy white discharge, pruritus, and vulvar burning. S/s of UTI have resolved per pt. No concern for exposure to STIs per pt. First day of LMP today, 12/14. Has had yeast infection before, doesn't remember what medication she received but reports the medication cleared it right away. Fluconazole ordered per protocol, discussed with patient that if no relief after refill used, pt will need to present to clinic for assessment. Pt verbalized understanding.    Reason for Disposition   Symptoms of a yeast infection (i.e., itchy, white discharge, not bad smelling) and feels like prior vaginal yeast infections    Additional Information   Negative: Sounds like a life-threatening emergency to the triager   Negative: Followed a genital area injury (e.g., vagina, vulva)   Negative: Vaginal bleeding is main symptom   Negative: Vaginal discharge is main symptom   Negative: Pain or burning with passing urine (urination) is main symptom   Negative: Menstrual cramps is main symptom   Negative: Abdomen pain is main symptom   Negative: Pubic lice suspected   Negative: Itching or rash of female genital area (e.g., labia, vagina, vulva)   Negative: Pregnant and labor suspected   Negative: Patient sounds very sick or weak to the triager   Negative: SEVERE pain and not improved 2 hours after pain medicine   Negative: Genital area looks infected (e.g., draining sore, spreading redness) and fever   Negative: Something is hanging out of the vagina and can't easily be pushed back inside   Negative: MILD-MODERATE pain and present > 24 hours  (Exception: Chronic pain.)   Negative: Genital area looks infected (e.g., draining sore, spreading redness)   Negative: Rash with painful tiny water blisters    Answer Assessment - Initial Assessment Questions  1. SYMPTOM: \"What's the main symptom you're " "concerned about?\" (e.g., pain, itching, dryness)      Vaginal itching, white lumpy discharge, vulvar burning    2. LOCATION: \"Where is the itching located?\" (e.g., inside/outside, left/right)      Internal and external    3. ONSET: \"When did the  symptoms  start?\"      Started in the last few days    4. PAIN: \"Is there any pain?\" If Yes, ask: \"How bad is it?\" (Scale: 1-10; mild, moderate, severe)    -  MILD (1-3): Doesn't interfere with normal activities.     -  MODERATE (4-7): Interferes with normal activities (e.g., work or school) or awakens from sleep.      -  SEVERE (8-10): Excruciating pain, unable to do any normal activities.      Just showered and washed so discomfort has subsided, pt rates symptoms as mild currently, but worsens throughout day    5. ITCHING: \"Is there any itching?\" If Yes, ask: \"How bad is it?\" (Scale: 1-10; mild, moderate, severe)      Yes, severe    6. CAUSE: \"What do you think is causing the discharge?\" \"Have you had the same problem before? What happened then?\"      Thinks she has yeast infection, has had one before, doesn't remember what happened but was prescribed a medication and it \"went away right away\"    7. OTHER SYMPTOMS: \"Do you have any other symptoms?\" (e.g., fever, itching, vaginal bleeding, pain with urination, injury to genital area, vaginal foreign body)      Itching    8. PREGNANCY: \"Is there any chance you are pregnant?\" \"When was your last menstrual period?\"      No, LMP 12/14/23    Protocols used: Vaginal Symptoms-A-OH    "

## 2024-04-02 DIAGNOSIS — G56.02 LEFT CARPAL TUNNEL SYNDROME: Primary | ICD-10-CM

## 2024-04-03 ENCOUNTER — TELEPHONE (OUTPATIENT)
Dept: ORTHOPEDICS | Facility: CLINIC | Age: 39
End: 2024-04-03

## 2024-04-03 PROBLEM — G56.02 LEFT CARPAL TUNNEL SYNDROME: Status: ACTIVE | Noted: 2024-04-02

## 2024-04-03 NOTE — TELEPHONE ENCOUNTER
Patient has been scheduled for surgery. Details are below.    Date of Surgery: 10/03/24    Approximate Arrival Time: SURGERY CENTER WILL CALL 3/4 DAYS PRIOR TO CONFIRM A TIME   Surgeon:  DR. JO HACKETT     Procedure: RELEASE, LEFT CARPAL TUNNEL - Left     Location: [facility] [address]  Surgery Consult: NA  PreOp Physical: CALLING [place]    PostOp: 10/14/24  Packet Mailed/MyChart Sent: YES  Added to [place]: YES    Spoke to: SANTHOSH

## 2024-05-21 DIAGNOSIS — B37.31 YEAST INFECTION OF THE VAGINA: Primary | ICD-10-CM

## 2024-05-21 RX ORDER — FLUCONAZOLE 150 MG/1
150 TABLET ORAL
Qty: 1 TABLET | Refills: 1 | Status: SHIPPED | OUTPATIENT
Start: 2024-05-21 | End: 2024-09-09

## 2024-05-22 ENCOUNTER — OFFICE VISIT (OUTPATIENT)
Dept: URGENT CARE | Facility: URGENT CARE | Age: 39
End: 2024-05-22
Payer: COMMERCIAL

## 2024-05-22 VITALS
OXYGEN SATURATION: 99 % | BODY MASS INDEX: 33.11 KG/M2 | SYSTOLIC BLOOD PRESSURE: 110 MMHG | DIASTOLIC BLOOD PRESSURE: 70 MMHG | RESPIRATION RATE: 20 BRPM | HEART RATE: 64 BPM | WEIGHT: 181 LBS | TEMPERATURE: 98.7 F

## 2024-05-22 DIAGNOSIS — R30.9 PAINFUL URINATION: Primary | ICD-10-CM

## 2024-05-22 LAB
ALBUMIN UR-MCNC: NEGATIVE MG/DL
APPEARANCE UR: ABNORMAL
BACTERIA #/AREA URNS HPF: ABNORMAL /HPF
BILIRUB UR QL STRIP: NEGATIVE
CLUE CELLS: ABNORMAL
COLOR UR AUTO: YELLOW
GLUCOSE UR STRIP-MCNC: NEGATIVE MG/DL
HGB UR QL STRIP: NEGATIVE
KETONES UR STRIP-MCNC: NEGATIVE MG/DL
LEUKOCYTE ESTERASE UR QL STRIP: ABNORMAL
NITRATE UR QL: NEGATIVE
PH UR STRIP: 7 [PH] (ref 5–7)
RBC #/AREA URNS AUTO: ABNORMAL /HPF
SP GR UR STRIP: 1.02 (ref 1–1.03)
SQUAMOUS #/AREA URNS AUTO: ABNORMAL /LPF
TRICHOMONAS, WET PREP: ABNORMAL
UROBILINOGEN UR STRIP-ACNC: 0.2 E.U./DL
WBC #/AREA URNS AUTO: ABNORMAL /HPF
WBC'S/HIGH POWER FIELD, WET PREP: ABNORMAL
YEAST, WET PREP: ABNORMAL

## 2024-05-22 PROCEDURE — 99213 OFFICE O/P EST LOW 20 MIN: CPT | Performed by: PHYSICIAN ASSISTANT

## 2024-05-22 PROCEDURE — 87210 SMEAR WET MOUNT SALINE/INK: CPT | Performed by: PHYSICIAN ASSISTANT

## 2024-05-22 PROCEDURE — 81001 URINALYSIS AUTO W/SCOPE: CPT | Performed by: PHYSICIAN ASSISTANT

## 2024-05-22 RX ORDER — CEPHALEXIN 500 MG/1
500 CAPSULE ORAL 3 TIMES DAILY
Qty: 15 CAPSULE | Refills: 0 | Status: SHIPPED | OUTPATIENT
Start: 2024-05-22 | End: 2024-05-27

## 2024-05-22 NOTE — PROGRESS NOTES
SUBJECTIVE:   Zaina Tan is a 38 year old female who  presents today for a possible UTI. Symptoms of dysuria, urgency, and frequency have been going on for 2day(s).  Hematuria no.  sudden onsetand mild.  There is no history of fever, chills, nausea or vomiting.  No history of vaginal or penile discharge. This patient does have a history of urinary tract infections. Patient denies long duration, rigors, flank pain, temperature > 101 degrees F., Vomiting, significant nausea or diarrhea, taking Coumadin, and GFR less than 30 within the last year or vaginal discharge, vaginal odor, vaginal itching, and dyspareunia (pain in labia/pelvis)     Past Medical History:   Diagnosis Date    History of gestational diabetes mellitus (GDM) in prior pregnancy, currently pregnant 2015    - GDM with  pregnancy  - No GDM with   pregnancy  21- Failed early GCT  21- early 3 hour GTT wnl, repeat at 24 weeks  9/3/21: 3hr GTT wnl x3/4 values, NO GDM          History of pre-eclampsia in prior pregnancy, currently pregnant in third trimester 2021    S/P  2021    Vitamin D deficiency 2017: Vitamin D= 25, [employer] message sent - supplementation [id number] IU/day 17- Vit D 48    Vitreous syneresis      Current Outpatient Medications   Medication Sig Dispense Refill    cephALEXin (KEFLEX) 500 MG capsule Take 1 capsule (500 mg) by mouth 3 times daily for 5 days 15 capsule 0    fluconazole (DIFLUCAN) 150 MG tablet Take 1 tablet (150 mg) by mouth every 3 days (Patient not taking: Reported on 2024) 1 tablet 1    sulfamethoxazole-trimethoprim (BACTRIM DS) 800-160 MG tablet Take 1 tablet by mouth 2 times daily (Patient not taking: Reported on 2024) 6 tablet 0     Social History     Tobacco Use    Smoking status: Never    Smokeless tobacco: Never   Substance Use Topics    Alcohol use: No       ROS:   Review of systems negative except as stated above.    OBJECTIVE:  /70    Pulse 64   Temp 98.7  F (37.1  C) (Tympanic)   Resp 20   Wt 82.1 kg (181 lb)   SpO2 99%   BMI 33.11 kg/m    GENERAL APPEARANCE: healthy, alert and no distress  RESP: lungs clear to auscultation - no rales, rhonchi or wheezes  CV: regular rates and rhythm, normal S1 S2, no murmur noted  BACK: No CVA tenderness  SKIN: no suspicious lesions or rashes      Results for orders placed or performed in visit on 05/22/24   UA Macroscopic with reflex to Microscopic and Culture - Lab Collect     Status: Abnormal    Specimen: Urine, Midstream   Result Value Ref Range    Color Urine Yellow Colorless, Straw, Light Yellow, Yellow    Appearance Urine Slightly Cloudy (A) Clear    Glucose Urine Negative Negative mg/dL    Bilirubin Urine Negative Negative    Ketones Urine Negative Negative mg/dL    Specific Gravity Urine 1.020 1.003 - 1.035    Blood Urine Negative Negative    pH Urine 7.0 5.0 - 7.0    Protein Albumin Urine Negative Negative mg/dL    Urobilinogen Urine 0.2 0.2, 1.0 E.U./dL    Nitrite Urine Negative Negative    Leukocyte Esterase Urine Trace (A) Negative   UA Microscopic with Reflex to Culture     Status: Abnormal   Result Value Ref Range    Bacteria Urine Many (A) None Seen /HPF    RBC Urine 0-2 0-2 /HPF /HPF    WBC Urine 5-10 (A) 0-5 /HPF /HPF    Squamous Epithelials Urine Few (A) None Seen /LPF    Narrative    Urine Culture not indicated   Wet prep - Clinic Collect     Status: Abnormal    Specimen: Vagina; Swab   Result Value Ref Range    Trichomonas Absent Absent    Yeast Absent Absent    Clue Cells Absent Absent    WBCs/high power field 2+ (A) None       ASSESSMENT:   (R30.9) Painful urination  (primary encounter diagnosis)  Comment: likely UTI  Plan: UA Macroscopic with reflex to Microscopic and         Culture - Lab Collect, UA Microscopic with         Reflex to Culture, Wet prep - Clinic Collect,         cephALEXin (KEFLEX) 500 MG capsule      Red flags and emergent follow up discussed, and understood by  patient  Follow up with PCP if symptoms worsen or fail to improve

## 2024-05-24 ENCOUNTER — HOSPITAL ENCOUNTER (EMERGENCY)
Facility: CLINIC | Age: 39
Discharge: HOME | End: 2024-05-24
Attending: EMERGENCY MEDICINE | Admitting: EMERGENCY MEDICINE
Payer: COMMERCIAL

## 2024-05-24 ENCOUNTER — APPOINTMENT (OUTPATIENT)
Dept: GENERAL RADIOLOGY | Facility: CLINIC | Age: 39
End: 2024-05-24
Attending: EMERGENCY MEDICINE
Payer: COMMERCIAL

## 2024-05-24 VITALS
HEIGHT: 62 IN | TEMPERATURE: 97.6 F | RESPIRATION RATE: 18 BRPM | WEIGHT: 180.34 LBS | SYSTOLIC BLOOD PRESSURE: 110 MMHG | OXYGEN SATURATION: 100 % | BODY MASS INDEX: 33.19 KG/M2 | HEART RATE: 56 BPM | DIASTOLIC BLOOD PRESSURE: 62 MMHG

## 2024-05-24 DIAGNOSIS — R07.9 CHEST PAIN, UNSPECIFIED TYPE: ICD-10-CM

## 2024-05-24 DIAGNOSIS — J02.9 PHARYNGITIS, UNSPECIFIED ETIOLOGY: ICD-10-CM

## 2024-05-24 LAB
ANION GAP SERPL CALCULATED.3IONS-SCNC: 12 MMOL/L (ref 7–15)
ATRIAL RATE - MUSE: 62 BPM
BASOPHILS # BLD AUTO: 0 10E3/UL (ref 0–0.2)
BASOPHILS NFR BLD AUTO: 0 %
BUN SERPL-MCNC: 11.6 MG/DL (ref 6–20)
CALCIUM SERPL-MCNC: 9.2 MG/DL (ref 8.6–10)
CHLORIDE SERPL-SCNC: 100 MMOL/L (ref 98–107)
CREAT SERPL-MCNC: 0.66 MG/DL (ref 0.51–0.95)
D DIMER PPP FEU-MCNC: 0.33 UG/ML FEU (ref 0–0.5)
DEPRECATED HCO3 PLAS-SCNC: 23 MMOL/L (ref 22–29)
DIASTOLIC BLOOD PRESSURE - MUSE: NORMAL MMHG
EGFRCR SERPLBLD CKD-EPI 2021: >90 ML/MIN/1.73M2
EOSINOPHIL # BLD AUTO: 0.1 10E3/UL (ref 0–0.7)
EOSINOPHIL NFR BLD AUTO: 1 %
ERYTHROCYTE [DISTWIDTH] IN BLOOD BY AUTOMATED COUNT: 12.6 % (ref 10–15)
FLUAV RNA SPEC QL NAA+PROBE: NEGATIVE
FLUBV RNA RESP QL NAA+PROBE: NEGATIVE
GLUCOSE SERPL-MCNC: 106 MG/DL (ref 70–99)
GROUP A STREP BY PCR: NOT DETECTED
HCT VFR BLD AUTO: 36.8 % (ref 35–47)
HGB BLD-MCNC: 12.1 G/DL (ref 11.7–15.7)
HOLD SPECIMEN: NORMAL
HOLD SPECIMEN: NORMAL
IMM GRANULOCYTES # BLD: 0 10E3/UL
IMM GRANULOCYTES NFR BLD: 0 %
INTERPRETATION ECG - MUSE: NORMAL
LYMPHOCYTES # BLD AUTO: 1.7 10E3/UL (ref 0.8–5.3)
LYMPHOCYTES NFR BLD AUTO: 22 %
MCH RBC QN AUTO: 28.1 PG (ref 26.5–33)
MCHC RBC AUTO-ENTMCNC: 32.9 G/DL (ref 31.5–36.5)
MCV RBC AUTO: 86 FL (ref 78–100)
MONOCYTES # BLD AUTO: 0.5 10E3/UL (ref 0–1.3)
MONOCYTES NFR BLD AUTO: 6 %
NEUTROPHILS # BLD AUTO: 5.3 10E3/UL (ref 1.6–8.3)
NEUTROPHILS NFR BLD AUTO: 71 %
NRBC # BLD AUTO: 0 10E3/UL
NRBC BLD AUTO-RTO: 0 /100
P AXIS - MUSE: 65 DEGREES
PLATELET # BLD AUTO: 195 10E3/UL (ref 150–450)
POTASSIUM SERPL-SCNC: 3.8 MMOL/L (ref 3.4–5.3)
PR INTERVAL - MUSE: 166 MS
QRS DURATION - MUSE: 88 MS
QT - MUSE: 414 MS
QTC - MUSE: 420 MS
R AXIS - MUSE: 53 DEGREES
RBC # BLD AUTO: 4.3 10E6/UL (ref 3.8–5.2)
RSV RNA SPEC NAA+PROBE: NEGATIVE
SARS-COV-2 RNA RESP QL NAA+PROBE: NEGATIVE
SODIUM SERPL-SCNC: 135 MMOL/L (ref 135–145)
SYSTOLIC BLOOD PRESSURE - MUSE: NORMAL MMHG
T AXIS - MUSE: 17 DEGREES
TROPONIN T SERPL HS-MCNC: <6 NG/L
VENTRICULAR RATE- MUSE: 62 BPM
WBC # BLD AUTO: 7.5 10E3/UL (ref 4–11)

## 2024-05-24 PROCEDURE — 71046 X-RAY EXAM CHEST 2 VIEWS: CPT

## 2024-05-24 PROCEDURE — 82374 ASSAY BLOOD CARBON DIOXIDE: CPT | Performed by: EMERGENCY MEDICINE

## 2024-05-24 PROCEDURE — 85041 AUTOMATED RBC COUNT: CPT | Performed by: EMERGENCY MEDICINE

## 2024-05-24 PROCEDURE — 87637 SARSCOV2&INF A&B&RSV AMP PRB: CPT | Performed by: EMERGENCY MEDICINE

## 2024-05-24 PROCEDURE — 36415 COLL VENOUS BLD VENIPUNCTURE: CPT | Performed by: EMERGENCY MEDICINE

## 2024-05-24 PROCEDURE — 85379 FIBRIN DEGRADATION QUANT: CPT | Performed by: EMERGENCY MEDICINE

## 2024-05-24 PROCEDURE — 250N000013 HC RX MED GY IP 250 OP 250 PS 637: Performed by: EMERGENCY MEDICINE

## 2024-05-24 PROCEDURE — 84484 ASSAY OF TROPONIN QUANT: CPT | Performed by: EMERGENCY MEDICINE

## 2024-05-24 PROCEDURE — 93005 ELECTROCARDIOGRAM TRACING: CPT

## 2024-05-24 PROCEDURE — 99285 EMERGENCY DEPT VISIT HI MDM: CPT | Mod: 25

## 2024-05-24 PROCEDURE — 87651 STREP A DNA AMP PROBE: CPT | Performed by: EMERGENCY MEDICINE

## 2024-05-24 RX ORDER — IBUPROFEN 600 MG/1
600 TABLET, FILM COATED ORAL ONCE
Status: COMPLETED | OUTPATIENT
Start: 2024-05-24 | End: 2024-05-24

## 2024-05-24 RX ADMIN — IBUPROFEN 600 MG: 600 TABLET, FILM COATED ORAL at 08:48

## 2024-05-24 ASSESSMENT — COLUMBIA-SUICIDE SEVERITY RATING SCALE - C-SSRS
1. IN THE PAST MONTH, HAVE YOU WISHED YOU WERE DEAD OR WISHED YOU COULD GO TO SLEEP AND NOT WAKE UP?: NO
2. HAVE YOU ACTUALLY HAD ANY THOUGHTS OF KILLING YOURSELF IN THE PAST MONTH?: NO
6. HAVE YOU EVER DONE ANYTHING, STARTED TO DO ANYTHING, OR PREPARED TO DO ANYTHING TO END YOUR LIFE?: NO

## 2024-05-24 ASSESSMENT — ACTIVITIES OF DAILY LIVING (ADL)
ADLS_ACUITY_SCORE: 35
ADLS_ACUITY_SCORE: 35

## 2024-05-24 NOTE — ED PROVIDER NOTES
"Emergency Department Note      History of Present Illness     Chief Complaint:  Chest Pain and Pharyngitis       MELCHOR Tan is a 38 year old female who presents with non radiating sharp intermittent primarily left sided chest pain for 4 days. Patient has had similar intermittent chest pain before but for the last 4 days the pain has been more constant and last a few minutes each time. The pain is worse when she leans forwards but not worse with breathing, exertion, or position. She denies shortness of breath, fever, leg swelling, and weakness. Patient also reports cold symptoms. She had chills last night and this morning she had sore throat, one episode of bloody sputum, cough, and headache. No history of diabetes or hyperlipidemia as well as cardiac surgery. She is on antibiotics for UTI.    Independent Historian:    None    Review of External Notes  None    Past Medical History   Medical History, Surgical History, Problem List, and Medications  Reviewed in Epic    Physical Exam   Patient Vitals for the past 24 hrs:   BP Temp Temp src Pulse Resp SpO2 Height Weight   05/24/24 0944 110/62 -- -- 56 18 100 % -- --   05/24/24 0822 128/73 97.6  F (36.4  C) Temporal 69 16 100 % 1.575 m (5' 2\") 81.8 kg (180 lb 5.4 oz)       Physical Exam  Constitutional: Vital signs reviewed as above.   Eyes: PEERL, EOMI B/L  Neck: No JVD noted. FROM   Cardiovascular: normal rate, Regular rhythm and normal heart sounds.  No murmur heard. Equal B/L peripheral pulses.  Pulmonary/Chest: Effort normal and breath sounds normal. No respiratory distress. Patient has no wheezes. Patient has no rales.   Gastrointestinal: Soft. There is no tenderness.   Musculoskeletal/Extremities: No pitting edema noted. Normal tone.  Neurological: Alert  Skin: Skin is warm and dry. There is no diaphoresis noted.   Psychiatric: The patient appears calm.   Head: No external signs of trauma. No lesions noted.  ENT:   Ears: Normal B/L TM and external " canals   Mouth/Throat:     Mucous membranes are moist and normal.     No Oropharyngeal exudate. No oropharyngeal erythema noted.    Slight tonsilar swelling noted.     No uvular deviation noted.    No swelling noted on the floor of the mouth    Diagnostics     Laboratory: Imaging:   Labs Ordered and Resulted from Time of ED Arrival to Time of ED Departure   BASIC METABOLIC PANEL - Abnormal       Result Value    Sodium 135      Potassium 3.8      Chloride 100      Carbon Dioxide (CO2) 23      Anion Gap 12      Urea Nitrogen 11.6      Creatinine 0.66      GFR Estimate >90      Calcium 9.2      Glucose 106 (*)    INFLUENZA A/B, RSV, & SARS-COV2 PCR - Normal    Influenza A PCR Negative      Influenza B PCR Negative      RSV PCR Negative      SARS CoV2 PCR Negative     TROPONIN T, HIGH SENSITIVITY - Normal    Troponin T, High Sensitivity <6     D DIMER QUANTITATIVE - Normal    D-Dimer Quantitative 0.33     GROUP A STREPTOCOCCUS PCR THROAT SWAB - Normal    Group A strep by PCR Not Detected     CBC WITH PLATELETS AND DIFFERENTIAL    WBC Count 7.5      RBC Count 4.30      Hemoglobin 12.1      Hematocrit 36.8      MCV 86      MCH 28.1      MCHC 32.9      RDW 12.6      Platelet Count 195      % Neutrophils 71      % Lymphocytes 22      % Monocytes 6      % Eosinophils 1      % Basophils 0      % Immature Granulocytes 0      NRBCs per 100 WBC 0      Absolute Neutrophils 5.3      Absolute Lymphocytes 1.7      Absolute Monocytes 0.5      Absolute Eosinophils 0.1      Absolute Basophils 0.0      Absolute Immature Granulocytes 0.0      Absolute NRBCs 0.0       Chest XR,  PA & LAT   Final Result   IMPRESSION: Negative chest. Lungs clear.      GARETH DONIS MD            SYSTEM ID:  [id number]            EKG   ECG taken at 0826, ECG read at 0830  NSR  Normal ECG     No significant change as compared to prior, dated 4/15/2023.  Rate 62 bpm. KY interval 166 ms. QRS duration 88 ms. QT/QTc 414/420 ms. P-R-T axes 65 53  17.      Independent Interpretation  See ED course    ED Course    Medications Administered  Medications   ibuprofen (ADVIL/MOTRIN) tablet 600 mg (600 mg Oral $Given 5/24/24 0880)       Procedures  Procedures     Discussion of Management  See ED Course    Social Determinants of Health adding to complexity of care  None    ED Course  ED Course as of 05/24/24 1011   Fri May 24, 2024   0829 I obtained history and examined the patient as noted above.    1010 Rechecked and updated.       Medical Decision Making / Diagnosis   CMS Diagnoses: None    Code Status: Prior    MIPS     None    Medical Decision Making:  Zaina Tan is a 38 year old female presented to the Emergency Department with a complaint of chest pain. Fortunately the workup in the ED has been unremarkable and at this time I am not concerned for ACS. The EKG shows NSR. The troponin is negative. Based on the [facility] high sensitivity troponin pathway, this should rule the patient out for myocardial injury.    I considered other possible causes of chest pain including PE (negative d-dimer), infection, pneumothorax, aortic dissection, and even more benign causes such as reflux and esophageal motility issues. The physical exam, laboratory, and radiological findings listed above make life threatening conditions less likely. At this time I believe the patient is safe for discharge. I have encouraged close outpatient follow up.     Anticipatory guidance given prior to discharge.         Critical Care:  None.    Disposition:  See ED Course and [facility]    ICD-10 Codes:    ICD-10-CM    1. Chest pain, unspecified type  R07.9       2. Pharyngitis, unspecified etiology  J02.9            5/24/2024   Dusty Antonio DO     [employer]                    Dusty Antonio DO  05/24/24 1041

## 2024-05-24 NOTE — ED TRIAGE NOTES
Pt with c/o non-radiating L sided CP x 4 days. No SOB. No n/v/d. ABC intact. Pt also c/o sore throat x 2 days. Denies cough or fever.

## 2024-06-20 ENCOUNTER — TELEPHONE (OUTPATIENT)
Dept: ORTHOPEDICS | Facility: CLINIC | Age: 39
End: 2024-06-20
Payer: COMMERCIAL

## 2024-06-20 NOTE — TELEPHONE ENCOUNTER
Other: Requesting c/b- would like to know if her surgery can moved to August     Could we send this information to you in [facility] or would you prefer to receive a phone call?:   Patient would prefer a phone call   Okay to leave a detailed message?: No at Cell number on file:    Telephone Information:   Mobile [phone]

## 2024-07-07 ENCOUNTER — HEALTH MAINTENANCE LETTER (OUTPATIENT)
Age: 39
End: 2024-07-07

## 2024-07-26 ENCOUNTER — TELEPHONE (OUTPATIENT)
Dept: OPHTHALMOLOGY | Facility: CLINIC | Age: 39
End: 2024-07-26
Payer: COMMERCIAL

## 2024-07-31 ENCOUNTER — OFFICE VISIT (OUTPATIENT)
Dept: OPHTHALMOLOGY | Facility: CLINIC | Age: 39
End: 2024-07-31
Payer: COMMERCIAL

## 2024-07-31 DIAGNOSIS — H52.13 MYOPIC ASTIGMATISM OF BOTH EYES: Primary | ICD-10-CM

## 2024-07-31 DIAGNOSIS — H52.203 MYOPIC ASTIGMATISM OF BOTH EYES: Primary | ICD-10-CM

## 2024-07-31 DIAGNOSIS — H43.22 ASTEROID HYALOSIS, LEFT EYE: ICD-10-CM

## 2024-07-31 PROCEDURE — 92015 DETERMINE REFRACTIVE STATE: CPT | Performed by: OPTOMETRIST

## 2024-07-31 PROCEDURE — 92004 COMPRE OPH EXAM NEW PT 1/>: CPT | Performed by: OPTOMETRIST

## 2024-07-31 ASSESSMENT — CONF VISUAL FIELD
OS_INFERIOR_NASAL_RESTRICTION: 0
METHOD: COUNTING FINGERS
OD_SUPERIOR_NASAL_RESTRICTION: 0
OD_INFERIOR_TEMPORAL_RESTRICTION: 0
OD_NORMAL: 1
OS_SUPERIOR_TEMPORAL_RESTRICTION: 0
OD_INFERIOR_NASAL_RESTRICTION: 0
OS_INFERIOR_TEMPORAL_RESTRICTION: 0
OS_NORMAL: 1
OS_SUPERIOR_NASAL_RESTRICTION: 0
OD_SUPERIOR_TEMPORAL_RESTRICTION: 0

## 2024-07-31 ASSESSMENT — REFRACTION_WEARINGRX
OD_CYLINDER: +0.25
OS_SPHERE: -0.75
OS_AXIS: 017
OD_SPHERE: -0.75
SPECS_TYPE: SVL
OD_AXIS: 039
OS_CYLINDER: +0.25

## 2024-07-31 ASSESSMENT — REFRACTION_MANIFEST
OD_CYLINDER: +0.25
OS_CYLINDER: +0.25
OD_AXIS: 052
OD_SPHERE: -0.75
OS_AXIS: 009
OS_SPHERE: -0.75

## 2024-07-31 ASSESSMENT — VISUAL ACUITY
OS_CC+: -2
OD_CC+: -1
METHOD: SNELLEN - LINEAR
OD_CC: 20/15
CORRECTION_TYPE: GLASSES
OS_CC: 20/15

## 2024-07-31 ASSESSMENT — TONOMETRY
OD_IOP_MMHG: 18
IOP_METHOD: ICARE
OS_IOP_MMHG: 23
OS_IOP_MMHG: 23
IOP_METHOD: ICARE

## 2024-07-31 ASSESSMENT — CUP TO DISC RATIO
OS_RATIO: 0.5
OD_RATIO: 0.5

## 2024-07-31 NOTE — NURSING NOTE
Chief Complaints and History of Present Illnesses   Patient presents with    COMPREHENSIVE EYE EXAM     Here for comprehensive eye exam     Chief Complaint(s) and History of Present Illness(es)       COMPREHENSIVE EYE EXAM              Associated symptoms: floaters.  Negative for dryness, eye pain and flashes    Treatments tried: no treatments    Pain scale: 0/10    Comments: Here for comprehensive eye exam              Comments    Pt states she's had multiple refractions but always goes back to her 5 year old glasses.   No vision complaints with current glasses.  She has black floaters left eye gradually increasing in number.  Denies eye pain, flashes or curtains.     Jose Rafael Leyva 1:10 PM July 31, 2024

## 2024-07-31 NOTE — PROGRESS NOTES
History  HPI       COMPREHENSIVE EYE EXAM    Associated symptoms include floaters.  Negative for dryness, eye pain and flashes.  Treatments tried include no treatments.  Pain was noted as 0/10. Additional comments: Here for comprehensive eye exam             Comments    Pt states she's had multiple refractions but always goes back to her 5 year old glasses.   No vision complaints with current glasses.  She has black floaters left eye gradually increasing in number.  Denies eye pain, flashes or curtains.     Jose Rafael Leyva 1:10 PM July 31, 2024            Last edited by Jose Rafael Leyva on 7/31/2024  1:10 PM.          Assessment/Plan  (H52.203,  H52.13) Myopic astigmatism of both eyes  (primary encounter diagnosis)  Comment: Myopic astigmatism both eyes   Plan: HC REFRACTION         Educated patient on condition and clinical findings. Dispensed spectacle prescription for as-needed wear. Monitor annually.    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 If symptoms present, return to clinic immediately.    Return to clinic in 1 year for comprehensive eye exam.    Complete documentation of historical and exam elements from today's encounter can  be found in the full encounter summary report (not reduplicated in this progress  note). I personally obtained the chief complaint(s) and history of present illness. I  confirmed and edited as necessary the review of systems, past medical/surgical  history, family history, social history, and examination findings as documented by  others; and I examined the patient myself. I personally reviewed the relevant tests,  images, and reports as documented above. I formulated and edited as necessary the  assessment and plan and discussed the findings and management plan with the  patient and family.    Franky LANDIS  BERNARDO Momin, [facility]

## 2024-09-09 ENCOUNTER — OFFICE VISIT (OUTPATIENT)
Dept: INTERNAL MEDICINE | Facility: CLINIC | Age: 39
End: 2024-09-09
Attending: INTERNAL MEDICINE
Payer: COMMERCIAL

## 2024-09-09 VITALS
SYSTOLIC BLOOD PRESSURE: 109 MMHG | HEART RATE: 54 BPM | HEIGHT: 62 IN | WEIGHT: 181.8 LBS | BODY MASS INDEX: 33.45 KG/M2 | DIASTOLIC BLOOD PRESSURE: 71 MMHG

## 2024-09-09 DIAGNOSIS — G56.02 LEFT CARPAL TUNNEL SYNDROME: Primary | ICD-10-CM

## 2024-09-09 PROCEDURE — 99214 OFFICE O/P EST MOD 30 MIN: CPT | Performed by: INTERNAL MEDICINE

## 2024-09-09 PROCEDURE — G0463 HOSPITAL OUTPT CLINIC VISIT: HCPCS | Performed by: INTERNAL MEDICINE

## 2024-09-09 NOTE — PROGRESS NOTES
Preoperative Evaluation  [facility] WOMEN[facility]S [facility] PROFESSIONAL [facility]  3RD FLR,[address]  [address]  [address] [address]  Phone: [phone]  Primary Provider: Marianne Lacey MD  Pre-op Performing Provider: Dilcia Singh MD  Sep 9, 2024             9/9/2024   Surgical Information   What procedure is being done? carpel tunnel surgery   Facility or Hospital where procedure/surgery will be performed: [facility]   Who is doing the procedure / surgery? maryanne liz   Date of surgery / procedure: september 26   Time of surgery / procedure: unknown   Where do you plan to recover after surgery? at home with family        Fax number for surgical facility: Note does not need to be faxed, will be available electronically in Epic.    Assessment & Plan     The proposed surgical procedure is considered LOW risk.    Left carpal tunnel syndrome  Discussed risks.  Patient does not have significant risk factors or medical conditions that need to be medicated prior to surgery.  No additional evaluation is recommended.      I spent a total of 30 minutes on the day of the visit.   Time spent by me doing chart review, history and exam, documentation and further activities per the note        - No identified additional risk factors other than previously addressed         Recommendation  Approval given to proceed with proposed procedure, without further diagnostic evaluation.    Ana Lilia Mahajan is a 38 year old, presenting for the following:  Pre-Op Exam (DOS: 09/26/24)          9/9/2024    11:49 AM   Additional Questions   Roomed by NICK Montana   Accompanied by daughter     MELCHOR related to upcoming procedure: Patient was diagnosed with bilateral carpal tunnel syndrome. She had surgery on the right wrist in November. She is scheduled for surgery on the left wrist later this month. She states that she has noticed difficulties with extension and flexion of her right middle  finger for the last month.         9/9/2024   Pre-Op Questionnaire   Have you ever had a heart attack or stroke? No   Have you ever had surgery on your heart or blood vessels, such as a stent placement, a coronary artery bypass, or surgery on an artery in your head, neck, heart, or legs? No   Do you have chest pain with activity? No   Do you have a history of heart failure? No   Do you currently have a cold, bronchitis or symptoms of other infection? (!) YES    Do you have a cough, shortness of breath, or wheezing? No   Do you or anyone in your family have previous history of blood clots? No   Do you or does anyone in your family have a serious bleeding problem such as prolonged bleeding following surgeries or cuts? No   Have you ever had problems with anemia or been told to take iron pills? No   Have you had any abnormal blood loss such as black, tarry or bloody stools, or abnormal vaginal bleeding? No   Have you ever had a blood transfusion? No   Are you willing to have a blood transfusion if it is medically needed before, during, or after your surgery? Yes   Have you or any of your relatives ever had problems with anesthesia? No   Do you have sleep apnea, excessive snoring or daytime drowsiness? No   Do you have any artifical heart valves or other implanted medical devices like a pacemaker, defibrillator, or continuous glucose monitor? No   Do you have artificial joints? No   Are you allergic to latex? No        Health Care Directive  Patient does not have a Health Care Directive or Living Will:     Preoperative Review of    reviewed - no record of controlled substances prescribed.      Status of Chronic Conditions:  See problem list for active medical problems.  Problems all longstanding and stable, except as noted/documented.  See ROS for pertinent symptoms related to these conditions.    Patient Active Problem List    Diagnosis Date Noted    Left carpal tunnel syndrome 04/02/2024     Priority: Medium     Right carpal tunnel syndrome 2023     Priority: Medium    Uterine leiomyoma 2015     Priority: Medium     Overview:   1st trimester US noted several fibroids at uterine fundus, largest 2.1 cm. Monitor.         Past Medical History:   Diagnosis Date    History of gestational diabetes mellitus (GDM) in prior pregnancy, currently pregnant 2015    - GDM with  pregnancy  - No GDM with   pregnancy  21- Failed early GCT  21- early 3 hour GTT wnl, repeat at 24 weeks  9/3/21: 3hr GTT wnl x3/4 values, NO GDM          History of pre-eclampsia in prior pregnancy, currently pregnant in third trimester 2021    S/P  2021    Vitamin D deficiency 2017: Vitamin D= 25, [employer] message sent - supplementation 5485-3813 IU/day 17- Vit D 48    Vitreous syneresis      Past Surgical History:   Procedure Laterality Date     SECTION N/A 2017    Procedure:  SECTION;;  Surgeon: Shellie Soni MD;  [facility]     SECTION N/A 2021    Procedure: REPEAT  SECTION;  Surgeon: Shellie Soni MD;  [facility]    D & C      fetal hypoplastic leftheart syndrome    DILATION AND CURETTAGE SUCTION N/A 2021    Procedure: DILATION AND CURETTAGE, UTERUS, USING SUCTION with ultrasound guidance;  Surgeon: Neyda Ho MD;  [facility]    IR PELVIC TRANSABD/TRANSREC ABSCESS DRAIN  2021    IR SINOGRAM INJECTION DIAGNOSTIC  2021    RELEASE CARPAL TUNNEL Right 2023    Procedure: RELEASE, CARPAL TUNNEL;  Surgeon: Vicente Butt MD;  Location: [facility] OR     No current outpatient medications on file.       No Known Allergies     Social History     Tobacco Use    Smoking status: Never    Smokeless tobacco: Never   Substance Use Topics    Alcohol use: No     Family History   Problem Relation Age of Onset    Hypertension Mother         had before pregnancy    Heart Disease Other         hypoplastic  "left heart. TAB 23 wks     Diabetes No family hx of     Cerebrovascular Disease No family hx of     Coronary Artery Disease No family hx of     Osteoporosis No family hx of     Anxiety Disorder No family hx of     Depression No family hx of      History   Drug Use No             Objective    /71   Pulse 54   Ht 1.575 m (5' 2\")   Wt 82.5 kg (181 lb 12.8 oz)   BMI 33.25 kg/m     Estimated body mass index is 33.25 kg/m  as calculated from the following:    Height as of this encounter: 1.575 m (5' 2\").    Weight as of this encounter: 82.5 kg (181 lb 12.8 oz).  Physical Exam  GENERAL: alert and no distress  EYES: Eyes grossly normal to inspection, PERRL and conjunctivae and sclerae normal  NECK: no adenopathy, no asymmetry, masses, or scars  RESP: lungs clear to auscultation - no rales, rhonchi or wheezes  CV: regular rate and rhythm, normal S1 S2, no S3 or S4, no murmur, click or rub, no peripheral edema  ABDOMEN: soft, nontender and bowel sounds normal  MS: no gross musculoskeletal defects noted, no edema  NEURO: Normal strength and tone, mentation intact and speech normal  PSYCH: mentation appears normal, affect normal/bright    Recent Labs   Lab Test 05/24/24  0856   HGB 12.1         POTASSIUM 3.8   CR 0.66        Diagnostics  No labs were ordered during this visit.   No EKG required, no history of coronary heart disease, significant arrhythmia, peripheral arterial disease or other structural heart disease.    Revised Cardiac Risk Index (RCRI)  The patient has the following serious cardiovascular risks for perioperative complications:   - No serious cardiac risks = 0 points     RCRI Interpretation: 0 points: Class I (very low risk - 0.4% complication rate)         Signed Electronically by: Dilcia Singh MD  A copy of this evaluation report is provided to the requesting physician.        "

## 2024-09-09 NOTE — PATIENT INSTRUCTIONS
Thank you for trusting us with your care!   Please be aware, if you are on Mychart, you may see your results prior to your providers review. If labs are abnormal, we will call or message you on Molple[employer] with a follow up plan.    If you need to contact us for questions about:  Symptoms, Scheduling & Medical Questions; Non-urgent (2-3 day response) Aware Labs message, Urgent (needing response today) [phone] (if after 3:30pm next day response)   Prescriptions: Please call your Pharmacy   Billing: Janna [phone] or [facility]:[phone]

## 2024-09-09 NOTE — LETTER
9/9/2024       RE: Zaina Tan  [address]  [facility] [address]     Dear Colleague,    Thank you for referring your patient, Zaina Tan, to the [facility]. Please see a copy of my visit note below.    Preoperative Evaluation  [facility] PROFESSIONAL [facility]  3RD FLR,[address]  [address]  [id number]  [facility] [address]  Phone: [phone]  Primary Provider: Marianne Lacey MD  Pre-op Performing Provider: Dilcia Singh MD  Sep 9, 2024             9/9/2024   Surgical Information   What procedure is being done? carpel tunnel surgery   Facility or Hospital where procedure/surgery will be performed: [facility]   Who is doing the procedure / surgery? zaina tan   Date of surgery / procedure: september 26   Time of surgery / procedure: unknown   Where do you plan to recover after surgery? at home with family        Fax number for surgical facility: Note does not need to be faxed, will be available electronically in Epic.    Assessment & Plan    The proposed surgical procedure is considered LOW risk.    Left carpal tunnel syndrome  Discussed risks.  Patient does not have significant risk factors or medical conditions that need to be medicated prior to surgery.  No additional evaluation is recommended.      I spent a total of 30 minutes on the day of the visit.   Time spent by me doing chart review, history and exam, documentation and further activities per the note        - No identified additional risk factors other than previously addressed         Recommendation  Approval given to proceed with proposed procedure, without further diagnostic evaluation.    Ana Lilia Mahajan is a 38 year old, presenting for the following:  Pre-Op Exam (DOS: 09/26/24)          9/9/2024    11:49 AM   Additional Questions   Roomed by NICK Montana   Accompanied by daughter      HPI related to upcoming procedure: Patient was diagnosed with bilateral carpal tunnel syndrome. She had surgery on the right wrist in November. She is scheduled for surgery on the left wrist later this month. She states that she has noticed difficulties with extension and flexion of her right middle finger for the last month.         9/9/2024   Pre-Op Questionnaire   Have you ever had a heart attack or stroke? No   Have you ever had surgery on your heart or blood vessels, such as a stent placement, a coronary artery bypass, or surgery on an artery in your head, neck, heart, or legs? No   Do you have chest pain with activity? No   Do you have a history of heart failure? No   Do you currently have a cold, bronchitis or symptoms of other infection? (!) YES    Do you have a cough, shortness of breath, or wheezing? No   Do you or anyone in your family have previous history of blood clots? No   Do you or does anyone in your family have a serious bleeding problem such as prolonged bleeding following surgeries or cuts? No   Have you ever had problems with anemia or been told to take iron pills? No   Have you had any abnormal blood loss such as black, tarry or bloody stools, or abnormal vaginal bleeding? No   Have you ever had a blood transfusion? No   Are you willing to have a blood transfusion if it is medically needed before, during, or after your surgery? Yes   Have you or any of your relatives ever had problems with anesthesia? No   Do you have sleep apnea, excessive snoring or daytime drowsiness? No   Do you have any artifical heart valves or other implanted medical devices like a pacemaker, defibrillator, or continuous glucose monitor? No   Do you have artificial joints? No   Are you allergic to latex? No        Health Care Directive  Patient does not have a Health Care Directive or Living Will:     Preoperative Review of    reviewed - no record of controlled substances prescribed.      Status of Chronic  Conditions:  See problem list for active medical problems.  Problems all longstanding and stable, except as noted/documented.  See ROS for pertinent symptoms related to these conditions.    Patient Active Problem List    Diagnosis Date Noted     Left carpal tunnel syndrome 2024     Priority: Medium     Right carpal tunnel syndrome 2023     Priority: Medium     Uterine leiomyoma 2015     Priority: Medium     Overview:   1st trimester US noted several fibroids at uterine fundus, largest 2.1 cm. Monitor.         Past Medical History:   Diagnosis Date     History of gestational diabetes mellitus (GDM) in prior pregnancy, currently pregnant 2015    - GDM with  pregnancy  - No GDM with   pregnancy  21- Failed early GCT  21- early 3 hour GTT wnl, repeat at 24 weeks  9/3/21: 3hr GTT wnl x3/4 values, NO GDM           History of pre-eclampsia in prior pregnancy, currently pregnant in third trimester 2021     S/P  2021     Vitamin D deficiency 2017: Vitamin D= 25, [employer] message sent - supplementation 4817-1146 IU/day 17- Vit D 48     Vitreous syneresis      Past Surgical History:   Procedure Laterality Date      SECTION N/A 2017    Procedure:  SECTION;;  Surgeon: Shellie Soni MD;  [facility]      SECTION N/A 2021    Procedure: REPEAT  SECTION;  Surgeon: Shellie Soni MD;  [facility]     D & C      fetal hypoplastic leftheart syndrome     DILATION AND CURETTAGE SUCTION N/A 2021    Procedure: DILATION AND CURETTAGE, UTERUS, USING SUCTION with ultrasound guidance;  Surgeon: Neyda Ho MD;  [facility]     IR PELVIC TRANSABD/TRANSREC ABSCESS DRAIN  2021     IR SINOGRAM INJECTION DIAGNOSTIC  2021     RELEASE CARPAL TUNNEL Right 2023    Procedure: RELEASE, CARPAL TUNNEL;  Surgeon: Vicente Butt MD;  Location: [facility] OR     No current outpatient  "medications on file.       No Known Allergies     Social History     Tobacco Use     Smoking status: Never     Smokeless tobacco: Never   Substance Use Topics     Alcohol use: No     Family History   Problem Relation Age of Onset     Hypertension Mother         had before pregnancy     Heart Disease Other         hypoplastic left heart. TAB 23 wks      Diabetes No family hx of      Cerebrovascular Disease No family hx of      Coronary Artery Disease No family hx of      Osteoporosis No family hx of      Anxiety Disorder No family hx of      Depression No family hx of      History   Drug Use No             Objective   /71   Pulse 54   Ht 1.575 m (5' 2\")   Wt 82.5 kg (181 lb 12.8 oz)   BMI 33.25 kg/m     Estimated body mass index is 33.25 kg/m  as calculated from the following:    Height as of this encounter: 1.575 m (5' 2\").    Weight as of this encounter: 82.5 kg (181 lb 12.8 oz).  Physical Exam  GENERAL: alert and no distress  EYES: Eyes grossly normal to inspection, PERRL and conjunctivae and sclerae normal  NECK: no adenopathy, no asymmetry, masses, or scars  RESP: lungs clear to auscultation - no rales, rhonchi or wheezes  CV: regular rate and rhythm, normal S1 S2, no S3 or S4, no murmur, click or rub, no peripheral edema  ABDOMEN: soft, nontender and bowel sounds normal  MS: no gross musculoskeletal defects noted, no edema  NEURO: Normal strength and tone, mentation intact and speech normal  PSYCH: mentation appears normal, affect normal/bright    Recent Labs   Lab Test 05/24/24  0856   HGB 12.1         POTASSIUM 3.8   CR 0.66        Diagnostics  No labs were ordered during this visit.   No EKG required, no history of coronary heart disease, significant arrhythmia, peripheral arterial disease or other structural heart disease.    Revised Cardiac Risk Index (RCRI)  The patient has the following serious cardiovascular risks for perioperative complications:   - No serious cardiac risks = 0 " points     RCRI Interpretation: 0 points: Class I (very low risk - 0.4% complication rate)         Signed Electronically by: Dilcia Singh MD  A copy of this evaluation report is provided to the requesting physician.             Again, thank you for allowing me to participate in the care of your patient.      Sincerely,    Dilcia Singh MD

## 2024-09-17 ENCOUNTER — OFFICE VISIT (OUTPATIENT)
Dept: ORTHOPEDICS | Facility: CLINIC | Age: 39
End: 2024-09-17
Payer: COMMERCIAL

## 2024-09-17 DIAGNOSIS — M65.331 TRIGGER FINGER, RIGHT MIDDLE FINGER: Primary | ICD-10-CM

## 2024-09-17 PROCEDURE — 20550 NJX 1 TENDON SHEATH/LIGAMENT: CPT | Mod: F7 | Performed by: STUDENT IN AN ORGANIZED HEALTH CARE EDUCATION/TRAINING PROGRAM

## 2024-09-17 PROCEDURE — 99212 OFFICE O/P EST SF 10 MIN: CPT | Mod: 25 | Performed by: STUDENT IN AN ORGANIZED HEALTH CARE EDUCATION/TRAINING PROGRAM

## 2024-09-17 RX ORDER — LIDOCAINE HYDROCHLORIDE 10 MG/ML
1 INJECTION, SOLUTION INFILTRATION; PERINEURAL
Status: SHIPPED | OUTPATIENT
Start: 2024-09-17

## 2024-09-17 RX ORDER — TESTOSTERONE CYPIONATE 200 MG/ML
1 INJECTION INTRAMUSCULAR
Status: SHIPPED | OUTPATIENT
Start: 2024-09-17

## 2024-09-17 RX ADMIN — LIDOCAINE HYDROCHLORIDE 1 ML: 10 INJECTION, SOLUTION INFILTRATION; PERINEURAL at 11:09

## 2024-09-17 RX ADMIN — TESTOSTERONE CYPIONATE 1 ML: 200 INJECTION INTRAMUSCULAR at 11:09

## 2024-09-17 NOTE — PROGRESS NOTES
Orthopaedic Surgery Hand Clinic Progress Note    Patient Name: Zaina Tan  MRN: [id number]  : 1985  Date: Sep 17, 2024    Date of Surgery: 23    Surgery Performed: right open carpal tunnel release    Subjective:    24: Patient was last seen 23. She has upcoming left carpal tunnel release surgery planned for 24 and is here to discuss surgery in more detail. She states she has concerns regarding her right long finger,  for about 2 months has been locking and catching at times.     She had excellent results from right carpal tunnel release.    Objective:    General: alert, appropriate, NAD  HEENT: NC/AT  CV: RRR by pulse  Pulm: Unlabored on RA  MSK:  Incision clean, dry, intact  No erythema, drainage, evidence of infection  Tenderness to palpation at the A1 pulley with visible triggering of the right middle finger  Intact EPL, FPL, APB, hand intrinsics  Warm well-perfused, cap refill less than 2 seconds    Congo red staining negative for amyloid.    Assessment/Plan:   38-year-old female status post right open carpal tunnel release 2023, now with right middle trigger finger.    I discussed the diagnosis, prognosis, and treatment options.  I recommended starting with steroid injections.  The patient wishes to proceed.  This was performed in clinic.  See procedure note below.  Patient tolerated this well without complication.  Oval 8 splints provided.    Patient is scheduled for left carpal tunnel release next Thursday.  Will proceed as scheduled.  Questions answered.  The patient voiced understanding and agreement.    Vicente Butt MD    Hand & Upper Extremity Surgery  Department of Orthopedic Surgery  [facility]    Hand / Upper Extremity Injection/Arthrocentesis: R long A1    Date/Time: 2024 11:09 AM    Performed by: Vicente Butt MD  Authorized by: Vicente Butt MD    Condition: trigger finger    Location:  Long finger    Site:  R long  A1  Medications:  1 mL dexAMETHasone 120 MG/30ML; 1 mL lidocaine 1 %  Outcome:  Tolerated well, no immediate complications  Procedure discussed: discussed risks, benefits, and alternatives    Consent Given by:  Patient  Timeout: timeout called immediately prior to procedure    Prep: patient was prepped and draped in usual sterile fashion

## 2024-09-17 NOTE — LETTER
2024      Zaina Tan  [address]  [place] [address]      Dear Colleague,    Thank you for referring your patient, Zaina Tan, to the [facility] [place]. Please see a copy of my visit note below.    Orthopaedic Surgery Hand Clinic Progress Note    Patient Name: Zaina Tan  MRN: [id number]  : 1985  Date: Sep 17, 2024    Date of Surgery: 23    Surgery Performed: right open carpal tunnel release    Subjective:    24: Patient was last seen 23. She has upcoming left carpal tunnel release surgery planned for 24 and is here to discuss surgery in more detail. She states she has concerns regarding her right long finger,  for about 2 months has been locking and catching at times.     She had excellent results from right carpal tunnel release.    Objective:    General: alert, appropriate, NAD  HEENT: NC/AT  CV: RRR by pulse  Pulm: Unlabored on RA  MSK:  Incision clean, dry, intact  No erythema, drainage, evidence of infection  Tenderness to palpation at the A1 pulley with visible triggering of the right middle finger  Intact EPL, FPL, APB, hand intrinsics  Warm well-perfused, cap refill less than 2 seconds    Congo red staining negative for amyloid.    Assessment/Plan:   38-year-old female status post right open carpal tunnel release 2023, now with right middle trigger finger.    I discussed the diagnosis, prognosis, and treatment options.  I recommended starting with steroid injections.  The patient wishes to proceed.  This was performed in clinic.  See procedure note below.  Patient tolerated this well without complication.  Oval 8 splints provided.    Patient is scheduled for left carpal tunnel release next Thursday.  Will proceed as scheduled.  Questions answered.  The patient voiced understanding and agreement.    Vicente Butt MD    Hand & Upper Extremity Surgery  Department of Orthopedic Surgery  [facility]  [place]    Hand / Upper Extremity Injection/Arthrocentesis: R long A1    Date/Time: 9/17/2024 11:09 AM    Performed by: Vicente Butt MD  Authorized by: Vicente Butt MD    Condition: trigger finger    Location:  Long finger    Site:  R long A1  Medications:  1 mL dexAMETHasone 120 MG/30ML; 1 mL lidocaine 1 %  Outcome:  Tolerated well, no immediate complications  Procedure discussed: discussed risks, benefits, and alternatives    Consent Given by:  Patient  Timeout: timeout called immediately prior to procedure    Prep: patient was prepped and draped in usual sterile fashion          Again, thank you for allowing me to participate in the care of your patient.        Sincerely,        Vicente Butt MD

## 2024-09-25 RX ORDER — FENTANYL CITRATE 50 UG/ML
25-50 INJECTION, SOLUTION INTRAMUSCULAR; INTRAVENOUS
Status: CANCELLED | OUTPATIENT
Start: 2024-09-25

## 2024-09-25 RX ORDER — NALOXONE HYDROCHLORIDE 0.4 MG/ML
0.4 INJECTION, SOLUTION INTRAMUSCULAR; INTRAVENOUS; SUBCUTANEOUS
Status: CANCELLED | OUTPATIENT
Start: 2024-09-25

## 2024-09-25 RX ORDER — NALOXONE HYDROCHLORIDE 0.4 MG/ML
0.2 INJECTION, SOLUTION INTRAMUSCULAR; INTRAVENOUS; SUBCUTANEOUS
Status: CANCELLED | OUTPATIENT
Start: 2024-09-25

## 2024-09-25 RX ORDER — FLUMAZENIL 0.1 MG/ML
0.2 INJECTION, SOLUTION INTRAVENOUS
Status: CANCELLED | OUTPATIENT
Start: 2024-09-25

## 2024-09-26 ENCOUNTER — HOSPITAL ENCOUNTER (OUTPATIENT)
Facility: AMBULATORY SURGERY CENTER | Age: 39
Discharge: HOME | End: 2024-09-26
Attending: STUDENT IN AN ORGANIZED HEALTH CARE EDUCATION/TRAINING PROGRAM | Admitting: STUDENT IN AN ORGANIZED HEALTH CARE EDUCATION/TRAINING PROGRAM
Payer: COMMERCIAL

## 2024-09-26 VITALS
TEMPERATURE: 98.6 F | OXYGEN SATURATION: 97 % | RESPIRATION RATE: 16 BRPM | HEART RATE: 79 BPM | SYSTOLIC BLOOD PRESSURE: 128 MMHG | HEIGHT: 62 IN | DIASTOLIC BLOOD PRESSURE: 57 MMHG | BODY MASS INDEX: 32.2 KG/M2 | WEIGHT: 175 LBS

## 2024-09-26 DIAGNOSIS — G56.02 LEFT CARPAL TUNNEL SYNDROME: Primary | ICD-10-CM

## 2024-09-26 DIAGNOSIS — G56.01 RIGHT CARPAL TUNNEL SYNDROME: ICD-10-CM

## 2024-09-26 PROCEDURE — 64721 CARPAL TUNNEL SURGERY: CPT | Mod: LT

## 2024-09-26 RX ORDER — OXYCODONE HYDROCHLORIDE 5 MG/1
5 TABLET ORAL EVERY 6 HOURS PRN
Qty: 3 TABLET | Refills: 0 | Status: SHIPPED | OUTPATIENT
Start: 2024-09-26 | End: 2024-09-29

## 2024-09-26 RX ORDER — FENTANYL CITRATE 50 UG/ML
25 INJECTION, SOLUTION INTRAMUSCULAR; INTRAVENOUS EVERY 5 MIN PRN
Status: DISCONTINUED | OUTPATIENT
Start: 2024-09-26 | End: 2024-09-27 | Stop reason: HOSPADM

## 2024-09-26 RX ORDER — HYDROMORPHONE HYDROCHLORIDE 1 MG/ML
0.2 INJECTION, SOLUTION INTRAMUSCULAR; INTRAVENOUS; SUBCUTANEOUS EVERY 5 MIN PRN
Status: DISCONTINUED | OUTPATIENT
Start: 2024-09-26 | End: 2024-09-27 | Stop reason: HOSPADM

## 2024-09-26 RX ORDER — OXYCODONE HYDROCHLORIDE 5 MG/1
5 TABLET ORAL
Status: DISCONTINUED | OUTPATIENT
Start: 2024-09-26 | End: 2024-09-27 | Stop reason: HOSPADM

## 2024-09-26 RX ORDER — NALOXONE HYDROCHLORIDE 0.4 MG/ML
0.1 INJECTION, SOLUTION INTRAMUSCULAR; INTRAVENOUS; SUBCUTANEOUS
Status: DISCONTINUED | OUTPATIENT
Start: 2024-09-26 | End: 2024-09-27 | Stop reason: HOSPADM

## 2024-09-26 RX ORDER — ACETAMINOPHEN 325 MG/1
975 TABLET ORAL ONCE
Status: COMPLETED | OUTPATIENT
Start: 2024-09-26 | End: 2024-09-26

## 2024-09-26 RX ORDER — ONDANSETRON 2 MG/ML
4 INJECTION INTRAMUSCULAR; INTRAVENOUS EVERY 30 MIN PRN
Status: DISCONTINUED | OUTPATIENT
Start: 2024-09-26 | End: 2024-09-27 | Stop reason: HOSPADM

## 2024-09-26 RX ORDER — DEXAMETHASONE SODIUM PHOSPHATE 10 MG/ML
4 INJECTION, SOLUTION INTRAMUSCULAR; INTRAVENOUS
Status: DISCONTINUED | OUTPATIENT
Start: 2024-09-26 | End: 2024-09-27 | Stop reason: HOSPADM

## 2024-09-26 RX ORDER — MAGNESIUM HYDROXIDE 1200 MG/15ML
LIQUID ORAL PRN
Status: DISCONTINUED | OUTPATIENT
Start: 2024-09-26 | End: 2024-09-26 | Stop reason: HOSPADM

## 2024-09-26 RX ORDER — LIDOCAINE 40 MG/G
CREAM TOPICAL
Status: DISCONTINUED | OUTPATIENT
Start: 2024-09-26 | End: 2024-09-27 | Stop reason: HOSPADM

## 2024-09-26 RX ORDER — HYDROMORPHONE HYDROCHLORIDE 1 MG/ML
0.4 INJECTION, SOLUTION INTRAMUSCULAR; INTRAVENOUS; SUBCUTANEOUS EVERY 5 MIN PRN
Status: DISCONTINUED | OUTPATIENT
Start: 2024-09-26 | End: 2024-09-27 | Stop reason: HOSPADM

## 2024-09-26 RX ORDER — SODIUM CHLORIDE, SODIUM LACTATE, POTASSIUM CHLORIDE, CALCIUM CHLORIDE 600; 310; 30; 20 MG/100ML; MG/100ML; MG/100ML; MG/100ML
INJECTION, SOLUTION INTRAVENOUS CONTINUOUS
Status: DISCONTINUED | OUTPATIENT
Start: 2024-09-26 | End: 2024-09-27 | Stop reason: HOSPADM

## 2024-09-26 RX ORDER — LABETALOL HYDROCHLORIDE 5 MG/ML
10 INJECTION, SOLUTION INTRAVENOUS
Status: DISCONTINUED | OUTPATIENT
Start: 2024-09-26 | End: 2024-09-27 | Stop reason: HOSPADM

## 2024-09-26 RX ORDER — ONDANSETRON 4 MG/1
4 TABLET, ORALLY DISINTEGRATING ORAL EVERY 30 MIN PRN
Status: DISCONTINUED | OUTPATIENT
Start: 2024-09-26 | End: 2024-09-27 | Stop reason: HOSPADM

## 2024-09-26 RX ORDER — FENTANYL CITRATE 50 UG/ML
50 INJECTION, SOLUTION INTRAMUSCULAR; INTRAVENOUS EVERY 5 MIN PRN
Status: DISCONTINUED | OUTPATIENT
Start: 2024-09-26 | End: 2024-09-27 | Stop reason: HOSPADM

## 2024-09-26 RX ORDER — LIDOCAINE HYDROCHLORIDE AND EPINEPHRINE 10; 10 MG/ML; UG/ML
10 INJECTION, SOLUTION INFILTRATION; PERINEURAL ONCE
Status: COMPLETED | OUTPATIENT
Start: 2024-09-26 | End: 2024-09-26

## 2024-09-26 RX ORDER — OXYCODONE HYDROCHLORIDE 5 MG/1
10 TABLET ORAL
Status: DISCONTINUED | OUTPATIENT
Start: 2024-09-26 | End: 2024-09-27 | Stop reason: HOSPADM

## 2024-09-26 RX ADMIN — ACETAMINOPHEN 975 MG: 325 TABLET ORAL at 10:57

## 2024-09-26 RX ADMIN — LIDOCAINE HYDROCHLORIDE AND EPINEPHRINE 10 ML: 10; 10 INJECTION, SOLUTION INFILTRATION; PERINEURAL at 11:29

## 2024-09-26 NOTE — OP NOTE
Patient: Zaina Tan  : 1985  MRN: [id number]    DATE OF OPERATION: 2024      OPERATIVE REPORT       PREOPERATIVE DIAGNOSIS:  Left carpal tunnel syndrome     POSTOPERATIVE DIAGNOSIS:  Left carpal tunnel syndrome     PROCEDURE:  Left open carpal tunnel release    SURGEON:  Vicente Butt MD     ASSISTANT(S):  Laura Jordan MD    ANESTHESIA:  Local: 10cc 1% lidocaine 1:100,000 epinephrine     IMPLANTS:   None    ESTIMATED BLOOD LOSS:  1cc    DVT PROPHYLAXIS:  SCDs    TOURNIQUET TIME:  None     SPECIMENS REMOVED:  None    INTRAOPERATIVE FINDINGS:  Compressed median nerve at the carpal tunnel. Recurrent motor branch extraligamentous and radial    COMPLICATIONS:  None    DISPOSITION:  Stable to PACU.     INDICATIONS:  Zaina Tan is a 38 year old female with a history of left hand numbness and tingling refractory to conservative measures. Electrodiagnostic studies demonstrated evidence of carpal tunnel syndrome. She underwent a right carpal tunnel release previously and did well from that. She wished to proceed on the left.    Indications for surgery were discussed with the patient in detail. After discussing risks, benefits and alternatives to procedure, the patient elected to proceed with surgical intervention.     The patient understood that risks include, but are not limited to: Bleeding, infection, damage to surrounding structures (such as nerve, vessel or tendon), persistent symptoms or numbness, need for additional procedures, pillar pain, stiffness, need for therapy, and anesthetic complications. The patient expressed understanding and agreement and wished to proceed.     Consent was obtained for the procedure.     DESCRIPTION OF PROCEDURE IN DETAIL:  The patient was seen in the preoperative care unit. Patient identity, consent, procedure to be performed, and operative site were verified with the patient. The left upper extremity was marked. The skin over the carpal tunnel was cleansed with  chlorhexidine and local anesthetic (10cc of 1% lidocaine 1:100,000 epinephrine) was infiltrated in the skin and subcutaneous tissues over the carpal tunnel.      The patient was brought to the operating room and placed supine on the operating room table. The left upper extremity was placed on an armboard.      The left upper extremity was prepped and draped in the usual sterile fashion. A timeout was performed confirming the correct patient, procedure, and operative site. All were in agreement.    A standard longitudinal 2.5cm incision was made in the skin directly over the carpal tunnel.  Blunt dissection was carried down through the skin and subcutaneous tissues to the superficial palmar fascia.  Hemostasis was achieved with bipolar cautery.  The superficial palmar fascia was divided sharply exposing the transverse carpal ligament. The transverse carpal ligament was then divided sharply and released in its entirety, exposing the median nerve which appeared pale and compressed. Distally, the superficial palmar fascia was divided with scissors under direct visualization, protecting the superficial palmar arch below. Attention was then turned proximally, and the remaining transverse carpal ligament and antebrachial fascia were released under direct visualization. A full median nerve release was performed. The median nerve had excellent excursion at the end of the case. The recurrent motor branch was noted to be extraligamentous and radial.    The wound was copiously irrigated. The incision was then closed with interrupted, buried 4-0 Monocryl sutures. A steri-strip was applied. A sterile, bulky soft dressing was placed.     All needle and sponge counts were correct at the end of the procedure. There were no complications.     The patient was taken to the postoperative recovery unit in stable condition.     Dr. Butt was present and scrubbed for the entire procedure.     POSTOPERATIVE PLAN:  The patient will be  discharged home. The patient was instructed to be non-weight bearing. The patient was instructed on finger range of motion exercises. The dressing will remain in place until follow-up. The patient was instructed to keep it clean and dry. The patient will return to clinic in 10 days for a wound check.    Laura Jordan MD  Orthopedic Surgery PGY-4    I was present and scrubbed for the entire surgery. I agree with the operative note.    Vicente Butt MD     Hand, Upper Extremity & Microvascular Surgery  Department of Orthopedic Surgery  [facility]

## 2024-09-26 NOTE — DISCHARGE INSTRUCTIONS
Procedure Performed: Left carpal tunnel release  Attending Surgeon: Vicente Butt MD  Date: 2024    DIAGNOSIS  1. Left carpal tunnel syndrome        MEDICATIONS   Resume all home medications as directed unless otherwise instructed during this hospitalization. If there is any question, double check with your primary care provider.  Start new discharge medications as directed.    Take 1 tablet of 650 mg Tylenol (acetaminophen) Arthritis Strength (extended release) and 1 tablet of Aleve (naproxen) 220 mg in the morning with breakfast and in the evening with dinner.     For breakthrough pain use narcotic pain medication as prescribed.    Do not drive or operate machinery while taking narcotic pain medications.   If you are taking other Tylenol containing medicines at home, be sure NOT to exceed 4 gram's (4000 milligrams) of Tylenol per day.   If you are taking pain medications, be sure to take Colace (docusate sodium) as well to prevent constipation. If constipated, try adding another cathartic or enema.  If nausea and vomiting, call the hospital or seek medical attention.    ACTIVITY   Weight bearin lb coffee cup weight bearing to operative extremity    DIET  Resume same diet prior to your hospital admission.    WOUND   Leave dressing on until you are seen in clinic for your follow up visit.   Watch for signs and symptoms of infection of your wounds including; pain, redness, swelling, drainage or fever.  If you notice any of these symptoms please call or seek medical attention.    Keep wound clean, dry, and intact.  Do not submerge wounds in water until they are healed. No baths, soaking, swimming, or prolonged water exposure for 4 weeks after surgery.    RETURN   Follow-up with Orthopedic Clinic as directed.     Future Appointments   Date Time Provider Department Center   10/7/2024 10:30 AM Olivier Rolle PA-C [facility]       Call the [facility] at [phone] during  business hours for any symptoms such as:    * Fevers with Temperature greater than 101.5 degrees.   * Pus drainage from wound site.   * Severe pain, not controlled by medication.   * Persistent nausea, vomiting and inablility to tolerate fluids.    If you are receiving care in [place], you may call the Orthopedic clinic at [phone].    FOR URGENT PROBLEMS ONLY, after hours or on weekends call the hospital  at [phone] and ask to speak with the orthopedic resident on call.      [facility]  Home Care Following Your Procedure  Call a doctor if you have signs of infection (fever, growing tenderness at the surgery site, a large amount of drainage or bleeding, severe pain, foul-smelling drainage, redness, swelling).             Tylenol/Acetaminophen Consumption    If you feel your pain relief is insufficient, you may take Tylenol/Acetaminophen in addition to your narcotic pain medication.   Be careful not to exceed 4,000 mg of Tylenol/Acetaminophen in a 24 hour period from all sources.  If you are taking extra strength Tylenol/acetaminophen (500 mg), the maximum dose is 8 tablets in 24 hours.  If you are taking regular strength acetaminophen (325 mg), the maximum dose is 12 tablets in 24 hours.      Your doctor is:       Dr. Vicente Butt, Orthopaedics: [phone]             Or dial [phone] and ask for the resident on call for:  Orthopaedics  For emergency care, call the:  [facility]: [phone] (TTY for hearing impaired: [phone])

## 2024-09-26 NOTE — BRIEF OP NOTE
[facility]    Brief Operative Note    Pre-operative diagnosis: Left carpal tunnel syndrome [G56.02]  Post-operative diagnosis Same as pre-operative diagnosis    Procedure: RELEASE, LEFT CARPAL TUNNEL, Left - Wrist    Surgeon: Surgeons and Role:     * Vicente Btut MD - Primary     * Laura Jordan MD - Resident - Assisting  Anesthesia: Local   Estimated Blood Loss: Minimal    Drains: None  Specimens: * No specimens in log *  Findings:   See full op report .  Complications: None.  Implants: * No implants in log *

## 2024-10-07 ENCOUNTER — OFFICE VISIT (OUTPATIENT)
Dept: ORTHOPEDICS | Facility: CLINIC | Age: 39
End: 2024-10-07
Payer: COMMERCIAL

## 2024-10-07 VITALS
DIASTOLIC BLOOD PRESSURE: 76 MMHG | BODY MASS INDEX: 32.2 KG/M2 | SYSTOLIC BLOOD PRESSURE: 112 MMHG | WEIGHT: 175 LBS | HEIGHT: 62 IN

## 2024-10-07 DIAGNOSIS — Z47.89 ORTHOPEDIC AFTERCARE: Primary | ICD-10-CM

## 2024-10-07 DIAGNOSIS — G89.18 ACUTE POST-OPERATIVE PAIN: ICD-10-CM

## 2024-10-07 PROCEDURE — 99024 POSTOP FOLLOW-UP VISIT: CPT | Performed by: PHYSICIAN ASSISTANT

## 2024-10-07 RX ORDER — TRAMADOL HYDROCHLORIDE 50 MG/1
50 TABLET ORAL EVERY 6 HOURS PRN
Qty: 16 TABLET | Refills: 0 | Status: SHIPPED | OUTPATIENT
Start: 2024-10-07 | End: 2024-10-11

## 2024-10-07 NOTE — PROGRESS NOTES
HISTORY OF PRESENT ILLNESS:    Zaina Tan is a 38 year old female who is seen in follow up for left Carpal tunnel release.  Present symptoms: Pt reports great improvement to CT symptoms, she is not experiencing numbness or tingling, night pain has resolved.  Is  keeping covered. Pt is using hand for light activities. Patient is in pain at the incision, she has run out of oxycodone.  She takes tylenol which does not help.  Denies Chest pain, Calve pain, Fever, Chills.    Current Treatment: Postop.    PHYSICAL EXAM:  There were no vitals taken for this visit.  There is no weight on file to calculate BMI.   GENERAL APPEARANCE: healthy, alert and no distress   PSYCH: mentation appears normal and affect normal/bright    MSK:  Right:  Volar wrist.  Incision clean and dry, Sutures present, healing.  No incisional erythema.   No Ecchymosis.  Edema min at wrist, hand and digits.  CMS: jadiel incisional numbness, otherwise grossly intact to digits.  AROM: mild restriction in palmar wrist and hand flexion, otherwise WNL.      ASSESSMENT:  Zaina Tan is a 38 year old female  S/P Right open CTR.  symptom improvement. Healing incision.  We discussed that CT symptoms may improve for several months after surgery.    PLAN:  - Surgery discussed, images reviewed if applicable, and all questions were answered at this time.  - Sutures removed with sterile technique, steri-strips applied in usual fashion, care instructions given and verbally acknowledged.  - Medications: Taper RX pain meds, OTC PRN.  - Physical Therapy: As instructed / RICE and PROM.  - AAT    Return to clinic PRN.    Olivier Rolle PA-C    Dept. Orthopedic Surgery  [facility]     10/7/2024

## 2024-10-07 NOTE — Clinical Note
10/7/2024      Zaina Tan  [address]  [place] [address]      Dear Colleague,    Thank you for referring your patient, Zaina Tan, to the [facility] [place]. Please see a copy of my visit note below.    HISTORY OF PRESENT ILLNESS:    Zaina Tan is a 38 year old female who is seen in follow up for left Carpal tunnel release.  Present symptoms: Pt reports great improvement to CT symptoms, she is not experiencing numbness or tingling.  Is  keeping covered. Pt is using hand for activities. Patient is in pain, she has run out of oxycodone.  She takes tylenol which does not help.  Denies Chest pain, Calve pain, Fever, Chills.    Current Treatment: Postop.    PHYSICAL EXAM:  There were no vitals taken for this visit.  There is no weight on file to calculate BMI.   GENERAL APPEARANCE: healthy, alert and no distress   PSYCH: mentation appears normal and affect normal/bright    MSK:  {RIGHT:414590} Volar wrist.  Incision clean and dry, Sutures present, healing.  *** incisional erythema.   No Ecchymosis.  Edema *** at *** hand and digits.  CMS: jadiel incisional numbness, otherwise grossly intact to digits.  AROM: mild restriction in palmar wrist *** flexion, otherwise WNL.      ASSESSMENT:  Zaina Tan is a 38 year old female  S/P *** CTR.  *** improvement. Healing.  We discussed that CT symptoms may improve for several months after surgery.    PLAN:  - Surgery discussed, images reviewed if applicable, and all questions were answered at this time.  - Sutures removed with sterile technique, steri-strips applied in usual fashion, care instructions given and verbally acknowledged.  - Medications: Taper RX pain meds, OTC PRN.  - Physical Therapy: As instructed / RICE and PROM.  - AAT    Return to clinic PRN.    Olivier Rolle PA-C    Dept. Orthopedic Surgery  [facility]     10/7/2024         Again, thank you for allowing me to participate in the care of your patient.         Sincerely,        Olivier Rolle PA-C

## 2024-10-07 NOTE — PATIENT INSTRUCTIONS
Post carpal tunnel release left hand:     Incision Care:  Showering is okay at this time, however no soaking, submerging or scrubbing of incision until all scabs have resolved.  Any applied Steri-strips will most likely fall off on their own, however they may be removed after 1 weeks with rubbing alcohol if they have not.    If there is no draining or bleeding, the tape-strips or clean garments are enough coverage unless you were instructed otherwise or you would like to cover for comfort.  If drainage or bleeding occurs, please cover the incision with clean dressings.  If drainage or bleeding is significant or does not stop within 48 hours please notify the office.    Gradually increase your activities as you can tolerated them, starting at a level well below what you would normally do.  Lifting may be uncomfortable for a while.    The skin around the incision may peel or have a hard skin edge over the next couple weeks.  Also, the incision may be sensitive to the touch and be prominent due to scar tissue.  You may massage the area when tolerated several times per day, using lotion if needed, to desensitize the area and reduce scar tissue.    Also, the nerve involved may heal for over a year from the time of surgery.  During this time your symptoms may continue to improve or even recur temporarily and then resolve as part of the healing.    Follow up as needed in clinic, please contact us in 1-2 weeks if you are not progressing with use of the left hand.      Right hand trigger finger:  You may continue to use the brace in the short-term.  There is potential for the injection and the bracing to help relieve your symptoms over the next several weeks.  If it continues to trigger your options are to repeat the injection in 2 months, Or consider surgery to release it.    Please let us know.    Inflammation: As we discussed, changing your diet can help with inflammation.    You can certainly look up specific  inflammation and then at for an anti-inflammatory diet, this would include avoiding saturated fat, sugar, alcohol.  Increasing foods such as berries, whole grains, the better fats such as EPA DHA, fish oil as well as supplementing fish oil, and vegetables in general will be anti-inflammatory.

## 2024-11-24 ENCOUNTER — HEALTH MAINTENANCE LETTER (OUTPATIENT)
Age: 39
End: 2024-11-24

## 2024-12-03 ENCOUNTER — TELEPHONE (OUTPATIENT)
Dept: OBGYN | Facility: CLINIC | Age: 39
End: 2024-12-03
Payer: COMMERCIAL

## 2024-12-03 DIAGNOSIS — R30.0 DYSURIA: Primary | ICD-10-CM

## 2024-12-03 RX ORDER — SULFAMETHOXAZOLE AND TRIMETHOPRIM 800; 160 MG/1; MG/1
1 TABLET ORAL 2 TIMES DAILY
Qty: 6 TABLET | Refills: 0 | Status: SHIPPED | OUTPATIENT
Start: 2024-12-03

## 2024-12-03 NOTE — TELEPHONE ENCOUNTER
Started last night. Is having burning during and at the end of urination.  Reports both frequency and urgency.    Denies fever, flank pain, blood in urine.    Denies allergies.      Rx sent per protocol.  Advised that if symptoms worsen or persist, she will need to be seen

## 2024-12-03 NOTE — TELEPHONE ENCOUNTER
Health Call Center    Phone Message    May a detailed message be left on voicemail: yes     Reason for Call: Symptoms or Concerns     If patient has red-flag symptoms, warm transfer to triage line    Current symptom or concern: Painful burning urination     Symptoms have been present for:  Unknown    Has patient previously been seen for this? Yes    By Marianne Jeffrey      Are there any new or worsening symptoms? Yes: Pt is requesting a call back to discuss the symptoms listed above. Please review and call back to advise at # [phone].    Action Taken: Message routed to:  Other: [facility]    Travel Screening: Not Applicable     Date of Service:

## 2024-12-05 ENCOUNTER — NURSE TRIAGE (OUTPATIENT)
Dept: OBGYN | Facility: CLINIC | Age: 39
End: 2024-12-05

## 2024-12-05 ENCOUNTER — TELEPHONE (OUTPATIENT)
Dept: OBGYN | Facility: CLINIC | Age: 39
End: 2024-12-05

## 2024-12-05 ENCOUNTER — OFFICE VISIT (OUTPATIENT)
Dept: URGENT CARE | Facility: URGENT CARE | Age: 39
End: 2024-12-05
Payer: COMMERCIAL

## 2024-12-05 VITALS
DIASTOLIC BLOOD PRESSURE: 73 MMHG | SYSTOLIC BLOOD PRESSURE: 116 MMHG | OXYGEN SATURATION: 100 % | HEART RATE: 65 BPM | RESPIRATION RATE: 20 BRPM | TEMPERATURE: 97.5 F

## 2024-12-05 DIAGNOSIS — R30.0 DYSURIA: Primary | ICD-10-CM

## 2024-12-05 LAB
ALBUMIN UR-MCNC: ABNORMAL MG/DL
APPEARANCE UR: ABNORMAL
BACTERIA #/AREA URNS HPF: ABNORMAL /HPF
BILIRUB UR QL STRIP: NEGATIVE
CLUE CELLS: ABNORMAL
COLOR UR AUTO: YELLOW
GLUCOSE UR STRIP-MCNC: NEGATIVE MG/DL
HGB UR QL STRIP: ABNORMAL
KETONES UR STRIP-MCNC: ABNORMAL MG/DL
LEUKOCYTE ESTERASE UR QL STRIP: NEGATIVE
MUCOUS THREADS #/AREA URNS LPF: PRESENT /LPF
NITRATE UR QL: NEGATIVE
PH UR STRIP: 6 [PH] (ref 5–7)
RBC #/AREA URNS AUTO: ABNORMAL /HPF
SP GR UR STRIP: >=1.03 (ref 1–1.03)
SQUAMOUS #/AREA URNS AUTO: ABNORMAL /LPF
TRANS CELLS #/AREA URNS HPF: ABNORMAL /HPF
TRICHOMONAS, WET PREP: ABNORMAL
UROBILINOGEN UR STRIP-ACNC: 0.2 E.U./DL
WBC #/AREA URNS AUTO: ABNORMAL /HPF
WBC'S/HIGH POWER FIELD, WET PREP: ABNORMAL
YEAST, WET PREP: ABNORMAL

## 2024-12-05 PROCEDURE — 81001 URINALYSIS AUTO W/SCOPE: CPT | Performed by: NURSE PRACTITIONER

## 2024-12-05 PROCEDURE — 99213 OFFICE O/P EST LOW 20 MIN: CPT | Performed by: NURSE PRACTITIONER

## 2024-12-05 PROCEDURE — 87086 URINE CULTURE/COLONY COUNT: CPT | Performed by: NURSE PRACTITIONER

## 2024-12-05 PROCEDURE — 87210 SMEAR WET MOUNT SALINE/INK: CPT | Performed by: NURSE PRACTITIONER

## 2024-12-05 RX ORDER — PHENTERMINE HYDROCHLORIDE 15 MG/1
15 CAPSULE ORAL EVERY MORNING
COMMUNITY
Start: 2024-10-24

## 2024-12-05 NOTE — PROGRESS NOTES
Chief Complaint   Patient presents with    Urinary Problem     X3 days having worsening Sx on bactrim         ICD-10-CM    1. Dysuria  R30.0 UA Macroscopic with reflex to Microscopic and Culture - Clinic Collect     Wet prep - Clinic Collect     UA Microscopic with Reflex to Culture            Red flag warning signs and when to go to the emergency room discussed.  Reviewed potential adverse reactions to medications.    {2021 E&M time:488036}    Medical Decision Making    ***    {Diagnostic Test Results (Optional):181120}    Results for orders placed or performed in visit on 12/05/24 (from the past 24 hours)   UA Macroscopic with reflex to Microscopic and Culture - Clinic Collect    Specimen: Urine, Midstream   Result Value Ref Range    Color Urine Yellow Colorless, Straw, Light Yellow, Yellow    Appearance Urine Slightly Cloudy (A) Clear    Glucose Urine Negative Negative mg/dL    Bilirubin Urine Negative Negative    Ketones Urine Trace (A) Negative mg/dL    Specific Gravity Urine >=1.030 1.003 - 1.035    Blood Urine Moderate (A) Negative    pH Urine 6.0 5.0 - 7.0    Protein Albumin Urine Trace (A) Negative mg/dL    Urobilinogen Urine 0.2 0.2, 1.0 E.U./dL    Nitrite Urine Negative Negative    Leukocyte Esterase Urine Negative Negative   Wet prep - Clinic Collect    Specimen: Vagina; Swab   Result Value Ref Range    Trichomonas Absent Absent    Yeast Absent Absent    Clue Cells Absent Absent    WBCs/high power field 1+ (A) None       Subjective     Zaina Tan is an 39 year old female who presents to clinic today for ***       ROS: 10 point ROS neg other than the symptoms noted above in the HPI.       Objective    /73 (BP Location: Left arm, Patient Position: Sitting, Cuff Size: Adult Regular)   Pulse 65   Temp 97.5  F (36.4  C) (Oral)   Resp 20   SpO2 100%   Nurses notes and VS have been reviewed.    Physical Exam   {EXAM PEDS ONE:178939}    {TSN EXAM QUICK NORMAL:827864}      RERE Garcia,  [facility] Provider    The use of [employer] dictation services may have been used to construct the content in this note; any grammatical or spelling errors are non-intentional. Please contact the author of this note directly if you are in need of any clarification.

## 2024-12-05 NOTE — TELEPHONE ENCOUNTER
S-(situation): Zaina called to report persistent and worsening UTI symptoms after starting abx.    B-(background): UTI symptoms (dysuria, frequency, urgency) reported 12/3, bactrim rx sent per protocol.     A-(assessment): As of now, Zaina has been on the Bactrim >24h - taken 3/6 pills. Last night and into day, she has started experiencing worsening burning with urination (10/10) and lingering pelvic pain (10/10). New mild back/flank pain this morning, 2/10.     Has not taken temperature, but states she felt feverish yesterday. Noted a little bit of blood in her urine yesterday as well.    R-(recommendations): Due to new flank pain, blood in urine, and worsening symptoms, recommended prompt eval in an urgent care to rule out any pyelonephritis/ treatment-resistant bacteria.    Reason for Disposition   Side (flank) or lower back pain and new-onset since starting antibiotics   Taking antibiotic > 24 hours for UTI and flank or lower back pain getting worse    Protocols used: Urinary Tract Infection on Antibiotic Follow-up Call - Female-A-[place]

## 2024-12-05 NOTE — TELEPHONE ENCOUNTER
Called and spoke to Zaina about her symptoms. Zaina reports that she is at urgent care [place] because she feels like her symptoms are getting worse and the medication that was prescribed was not helping. This RN verbalized understanding, expressed she can reach out to us for any other future needs relating to care.

## 2024-12-05 NOTE — TELEPHONE ENCOUNTER
S: Patient has UTI and on Bactim DS since 12/3/24. Today, new onset of worsening symptoms including: flank pain, blood in urine, nausea, chills, severe vaginal pain, and increased urgency.    B: Last OV with Dr. Lacey on 11/10/23. Patient was sent Anbx course on 12/3/24 for UTI symptoms by P Triage RN.    A: Anbx are not treating infection and patient is experiencing new symptoms. Was sent to urgent care this morning by a [facility] Triage RN- Patient states that Urgent Care did not assess her kidney function and told her to take AZO- which she states does not work for her ever.    R: Spoke with Tiffany, clinical manager, whom suggested sending a UC for sensitivities. UC sent. RN advised patient to take ibuprofen for pain and if severe pain unrelieved by ibuprofen- then advised patient to go to ED due to symptoms- may need IV antibiotics due to fever and other symptoms worsening despite anbx.

## 2024-12-05 NOTE — TELEPHONE ENCOUNTER
[facility]    Phone Message    May a detailed message be left on voicemail: yes     Reason for Call: Other: . Pt stated her abx are not working and her symptoms still persist, writer checked openings per recommendation below that pt should be seen, we don't have any openings with any provider until 12/16/24 and per protocol we are to only schedule if same day appt is available, please reach out to pt to discuss, thank you!    Action Taken: Message routed to:  Other: obgyn    Travel Screening: Not Applicable     Date of Service:

## 2024-12-06 LAB — BACTERIA UR CULT: NORMAL

## 2024-12-13 ENCOUNTER — TELEPHONE (OUTPATIENT)
Dept: FAMILY MEDICINE | Facility: CLINIC | Age: 39
End: 2024-12-13

## 2024-12-13 DIAGNOSIS — B37.31 YEAST INFECTION OF THE VAGINA: Primary | ICD-10-CM

## 2024-12-13 NOTE — TELEPHONE ENCOUNTER
[facility] Center    Phone Message    May a detailed message be left on voicemail: yes     Reason for Call: Other: Pt is still itching after burning sensation has decreased from UTI and would like nurse advice. Please contact pt.      Action Taken: Message routed to:  Other: [facility]    Travel Screening: Not Applicable     Date of Service:

## 2024-12-16 RX ORDER — FLUCONAZOLE 150 MG/1
150 TABLET ORAL ONCE
Qty: 1 TABLET | Refills: 1 | Status: SHIPPED | OUTPATIENT
Start: 2024-12-16 | End: 2024-12-16

## 2024-12-16 RX ORDER — FLUCONAZOLE 150 MG/1
150 TABLET ORAL ONCE
Qty: 1 TABLET | Refills: 0 | Status: SHIPPED | OUTPATIENT
Start: 2024-12-16 | End: 2024-12-16

## 2024-12-16 NOTE — TELEPHONE ENCOUNTER
Spoke with Zaina. Was given Bactrim DS on 12/3/24 for UTI symptoms and now has internal itching and white flaking discharge. Will send in fluconazole x 2.

## 2024-12-30 ENCOUNTER — ANCILLARY PROCEDURE (OUTPATIENT)
Dept: MAMMOGRAPHY | Facility: CLINIC | Age: 39
End: 2024-12-30
Attending: FAMILY MEDICINE
Payer: COMMERCIAL

## 2024-12-30 DIAGNOSIS — Z12.31 VISIT FOR SCREENING MAMMOGRAM: ICD-10-CM

## 2024-12-30 PROCEDURE — 77063 BREAST TOMOSYNTHESIS BI: CPT | Mod: 26 | Performed by: RADIOLOGY

## 2024-12-30 PROCEDURE — 77063 BREAST TOMOSYNTHESIS BI: CPT

## 2024-12-30 PROCEDURE — 77067 SCR MAMMO BI INCL CAD: CPT | Mod: 26 | Performed by: RADIOLOGY

## 2024-12-30 PROCEDURE — 77067 SCR MAMMO BI INCL CAD: CPT

## 2025-01-03 NOTE — PROGRESS NOTES
Orthopaedic Surgery Hand Clinic Progress Note    Patient Name: Zaina Tan  MRN: [id number]  : 1985  Date: 2025    Date of Surgery: 2024  Surgery Performed: left open carpal tunnel release    23 - right open carpal tunnel release    Subjective:    1/3/25: patient returns to today to discuss right middle finger finger and left thumb trigger finger.  Left thumb has started being symptomatic for about 3 weeks.  It intermittently locks and is sluggish and is associated with pain.  She has been using a splint on her middle finger on her right hand. She notices that the right middle finger is stuck in the morning when she wakes up.  I previously provided her a right middle trigger finger injection 2024 without relief.    Objective:    General: alert, appropriate, NAD  HEENT: NC/AT  CV: RRR by pulse  Pulm: Unlabored on RA  MSK:  Incisions clean, dry, intact  No erythema, drainage, evidence of infection  Tenderness to palpation at the A1 pulley with visible triggering of the right middle finger  Tenderness palpation with palpable nodule but no obvious triggering of the left thumb  Intact EPL, FPL, APB, hand intrinsics  Warm well-perfused, cap refill less than 2 seconds    [place] red staining negative for amyloid.    Assessment/Plan:   39-year-old female status post carpal tunnel releases, now with right middle trigger finger and left thumb trigger finger.    Right middle trigger finger is quite severe and refractory to conservative management.  She wishes to proceed with surgery.  She is not interested in trying another injection as it had no effect.    The indications for surgery were discussed with the patient. The benefits, risks, and alternatives of operative management were discussed in detail with the patient. The patient understands that the risks of surgery include, but are not limited to: infection, bleeding, injury to nearby structures (such as nerves, blood vessels, and  tendons), wound healing problems, need for additional surgery, pain, stiffness, scarring, need for rehabilitation, and anesthetic complications.  Patient expressed understanding and elected to proceed with surgery. All questions were answered to the patient's satisfaction.    Case request placed.  Local plus MAC.    Left trigger thumb mild at this point, recommended trialing an injection.  This was provided to the patient in clinic today.  She tolerated this well without complication. See procedure note below.   Oval 8 splints provided.    Vicente Butt MD    Hand & Upper Extremity Surgery  Department of Orthopedic Surgery  [facility]    Hand / Upper Extremity Injection/Arthrocentesis: L thumb A1    Date/Time: 1/7/2025 10:47 AM    Performed by: Vicente Butt MD  Authorized by: Vicente Butt MD    Indications:  Pain  Needle Size:  25 G  Guidance: landmark    Approach:  Volar  Condition: trigger finger    Location:  Thumb    Site:  L thumb A1  Medications:  1 mL dexAMETHasone 120 MG/30ML; 1 mL lidocaine 1 %  Outcome:  Tolerated well, no immediate complications  Procedure discussed: discussed risks, benefits, and alternatives    Consent Given by:  Patient  Timeout: timeout called immediately prior to procedure    Prep: patient was prepped and draped in usual sterile fashion

## 2025-01-07 ENCOUNTER — OFFICE VISIT (OUTPATIENT)
Dept: ORTHOPEDICS | Facility: CLINIC | Age: 40
End: 2025-01-07
Payer: COMMERCIAL

## 2025-01-07 VITALS — HEIGHT: 62 IN | BODY MASS INDEX: 31.65 KG/M2 | WEIGHT: 172 LBS

## 2025-01-07 DIAGNOSIS — M65.331 TRIGGER FINGER, RIGHT MIDDLE FINGER: Primary | ICD-10-CM

## 2025-01-07 DIAGNOSIS — M65.312 TRIGGER THUMB OF LEFT HAND: ICD-10-CM

## 2025-01-07 PROCEDURE — 99214 OFFICE O/P EST MOD 30 MIN: CPT | Mod: 25 | Performed by: STUDENT IN AN ORGANIZED HEALTH CARE EDUCATION/TRAINING PROGRAM

## 2025-01-07 PROCEDURE — 20550 NJX 1 TENDON SHEATH/LIGAMENT: CPT | Mod: FA | Performed by: STUDENT IN AN ORGANIZED HEALTH CARE EDUCATION/TRAINING PROGRAM

## 2025-01-07 RX ORDER — TESTOSTERONE CYPIONATE 200 MG/ML
1 INJECTION INTRAMUSCULAR
Status: COMPLETED | OUTPATIENT
Start: 2025-01-07 | End: 2025-01-07

## 2025-01-07 RX ORDER — LIDOCAINE HYDROCHLORIDE 10 MG/ML
1 INJECTION, SOLUTION INFILTRATION; PERINEURAL
Status: COMPLETED | OUTPATIENT
Start: 2025-01-07 | End: 2025-01-07

## 2025-01-07 RX ADMIN — LIDOCAINE HYDROCHLORIDE 1 ML: 10 INJECTION, SOLUTION INFILTRATION; PERINEURAL at 10:47

## 2025-01-07 RX ADMIN — TESTOSTERONE CYPIONATE 1 ML: 200 INJECTION INTRAMUSCULAR at 10:47

## 2025-01-07 NOTE — LETTER
2025      Zaina Tan  [address]  [place] [address]      Dear Colleague,    Thank you for referring your patient, Zaina Tan, to the [facility] [place]. Please see a copy of my visit note below.    Orthopaedic Surgery Hand Clinic Progress Note    Patient Name: Zaina Tan  MRN: [id number]  : 1985  Date: 2025    Date of Surgery: 2024  Surgery Performed: left open carpal tunnel release    23 - right open carpal tunnel release    Subjective:    1/3/25: patient returns to today to discuss right middle finger finger and left thumb trigger finger.  Left thumb has started being symptomatic for about 3 weeks.  It intermittently locks and is sluggish and is associated with pain.  She has been using a splint on her middle finger on her right hand. She notices that the right middle finger is stuck in the morning when she wakes up.  I previously provided her a right middle trigger finger injection 2024 without relief.    Objective:    General: alert, appropriate, NAD  HEENT: NC/AT  CV: RRR by pulse  Pulm: Unlabored on RA  MSK:  Incisions clean, dry, intact  No erythema, drainage, evidence of infection  Tenderness to palpation at the A1 pulley with visible triggering of the right middle finger  Tenderness palpation with palpable nodule but no obvious triggering of the left thumb  Intact EPL, FPL, APB, hand intrinsics  Warm well-perfused, cap refill less than 2 seconds    [place] red staining negative for amyloid.    Assessment/Plan:   39-year-old female status post carpal tunnel releases, now with right middle trigger finger and left thumb trigger finger.    Right middle trigger finger is quite severe and refractory to conservative management.  She wishes to proceed with surgery.  She is not interested in trying another injection as it had no effect.    The indications for surgery were discussed with the patient. The benefits, risks, and alternatives  of operative management were discussed in detail with the patient. The patient understands that the risks of surgery include, but are not limited to: infection, bleeding, injury to nearby structures (such as nerves, blood vessels, and tendons), wound healing problems, need for additional surgery, pain, stiffness, scarring, need for rehabilitation, and anesthetic complications.  Patient expressed understanding and elected to proceed with surgery. All questions were answered to the patient's satisfaction.    Case request placed.  Local plus MAC.    Left trigger thumb mild at this point, recommended trialing an injection.  This was provided to the patient in clinic today.  She tolerated this well without complication. See procedure note below.   Oval 8 splints provided.    Vicente Butt MD    Hand & Upper Extremity Surgery  Department of Orthopedic Surgery  [facility]    Hand / Upper Extremity Injection/Arthrocentesis: L thumb A1    Date/Time: 1/7/2025 10:47 AM    Performed by: Vicente Butt MD  Authorized by: Vicente Butt MD    Indications:  Pain  Needle Size:  25 G  Guidance: landmark    Approach:  Volar  Condition: trigger finger    Location:  Thumb    Site:  L thumb A1  Medications:  1 mL dexAMETHasone 120 MG/30ML; 1 mL lidocaine 1 %  Outcome:  Tolerated well, no immediate complications  Procedure discussed: discussed risks, benefits, and alternatives    Consent Given by:  Patient  Timeout: timeout called immediately prior to procedure    Prep: patient was prepped and draped in usual sterile fashion          Again, thank you for allowing me to participate in the care of your patient.        Sincerely,        Vicente Butt MD    Electronically signed

## 2025-01-10 ENCOUNTER — MYC MEDICAL ADVICE (OUTPATIENT)
Dept: ORTHOPEDICS | Facility: CLINIC | Age: 40
End: 2025-01-10

## 2025-01-10 PROBLEM — M65.331 TRIGGER FINGER, RIGHT MIDDLE FINGER: Status: ACTIVE | Noted: 2025-01-07

## 2025-02-14 ENCOUNTER — LAB (OUTPATIENT)
Dept: LAB | Facility: CLINIC | Age: 40
End: 2025-02-14
Attending: FAMILY MEDICINE
Payer: COMMERCIAL

## 2025-02-14 DIAGNOSIS — E55.9 VITAMIN D DEFICIENCY: ICD-10-CM

## 2025-02-14 DIAGNOSIS — Z13.1 SCREENING FOR DIABETES MELLITUS: ICD-10-CM

## 2025-02-14 DIAGNOSIS — Z13.220 SCREENING FOR CHOLESTEROL LEVEL: ICD-10-CM

## 2025-02-14 PROBLEM — G56.02 LEFT CARPAL TUNNEL SYNDROME: Status: RESOLVED | Noted: 2024-04-02 | Resolved: 2025-02-14

## 2025-02-14 PROBLEM — G56.01 RIGHT CARPAL TUNNEL SYNDROME: Status: RESOLVED | Noted: 2023-09-26 | Resolved: 2025-02-14

## 2025-02-14 PROBLEM — R73.03 PREDIABETES: Status: ACTIVE | Noted: 2025-02-14

## 2025-02-14 LAB
CHOLEST SERPL-MCNC: 176 MG/DL
EST. AVERAGE GLUCOSE BLD GHB EST-MCNC: 123 MG/DL
FASTING STATUS PATIENT QL REPORTED: NO
HBA1C MFR BLD: 5.9 %
HDLC SERPL-MCNC: 51 MG/DL
LDLC SERPL CALC-MCNC: 105 MG/DL
NONHDLC SERPL-MCNC: 125 MG/DL
TRIGL SERPL-MCNC: 99 MG/DL
VIT D+METAB SERPL-MCNC: 21 NG/ML (ref 20–50)

## 2025-02-14 PROCEDURE — 82306 VITAMIN D 25 HYDROXY: CPT

## 2025-02-14 PROCEDURE — 83036 HEMOGLOBIN GLYCOSYLATED A1C: CPT

## 2025-02-14 PROCEDURE — 36415 COLL VENOUS BLD VENIPUNCTURE: CPT

## 2025-02-14 PROCEDURE — 83718 ASSAY OF LIPOPROTEIN: CPT

## 2025-08-05 ENCOUNTER — MYC MEDICAL ADVICE (OUTPATIENT)
Dept: PHYSICAL THERAPY | Facility: CLINIC | Age: 40
End: 2025-08-05
Payer: COMMERCIAL

## 2025-08-25 ENCOUNTER — OFFICE VISIT (OUTPATIENT)
Dept: FAMILY MEDICINE | Facility: CLINIC | Age: 40
End: 2025-08-25
Attending: FAMILY MEDICINE
Payer: COMMERCIAL

## 2025-08-25 VITALS
WEIGHT: 162.3 LBS | SYSTOLIC BLOOD PRESSURE: 109 MMHG | HEIGHT: 62 IN | HEART RATE: 61 BPM | BODY MASS INDEX: 29.87 KG/M2 | RESPIRATION RATE: 16 BRPM | DIASTOLIC BLOOD PRESSURE: 66 MMHG

## 2025-08-25 DIAGNOSIS — M65.30 TRIGGER FINGER, ACQUIRED: ICD-10-CM

## 2025-08-25 DIAGNOSIS — Z01.818 PREOP GENERAL PHYSICAL EXAM: Primary | ICD-10-CM

## 2025-08-25 DIAGNOSIS — R73.03 PREDIABETES: ICD-10-CM

## 2025-08-25 PROCEDURE — 1126F AMNT PAIN NOTED NONE PRSNT: CPT | Performed by: FAMILY MEDICINE

## 2025-08-25 PROCEDURE — 99213 OFFICE O/P EST LOW 20 MIN: CPT | Performed by: FAMILY MEDICINE

## 2025-08-25 PROCEDURE — G0463 HOSPITAL OUTPT CLINIC VISIT: HCPCS | Performed by: FAMILY MEDICINE

## 2025-08-25 PROCEDURE — 3074F SYST BP LT 130 MM HG: CPT | Performed by: FAMILY MEDICINE

## 2025-08-25 PROCEDURE — 3078F DIAST BP <80 MM HG: CPT | Performed by: FAMILY MEDICINE

## 2025-08-25 ASSESSMENT — PAIN SCALES - GENERAL: PAINLEVEL_OUTOF10: NO PAIN (0)

## 2025-08-27 ENCOUNTER — ANESTHESIA EVENT (OUTPATIENT)
Dept: SURGERY | Facility: AMBULATORY SURGERY CENTER | Age: 40
End: 2025-08-27
Payer: COMMERCIAL

## 2025-08-28 ENCOUNTER — ANESTHESIA (OUTPATIENT)
Dept: SURGERY | Facility: AMBULATORY SURGERY CENTER | Age: 40
End: 2025-08-28
Payer: COMMERCIAL

## 2025-08-28 ENCOUNTER — HOSPITAL ENCOUNTER (OUTPATIENT)
Facility: AMBULATORY SURGERY CENTER | Age: 40
End: 2025-08-28
Attending: STUDENT IN AN ORGANIZED HEALTH CARE EDUCATION/TRAINING PROGRAM | Admitting: STUDENT IN AN ORGANIZED HEALTH CARE EDUCATION/TRAINING PROGRAM
Payer: COMMERCIAL

## 2025-08-28 VITALS
BODY MASS INDEX: 29.81 KG/M2 | WEIGHT: 162 LBS | SYSTOLIC BLOOD PRESSURE: 130 MMHG | HEIGHT: 62 IN | DIASTOLIC BLOOD PRESSURE: 78 MMHG | TEMPERATURE: 97.6 F | OXYGEN SATURATION: 100 % | HEART RATE: 106 BPM | RESPIRATION RATE: 14 BRPM

## 2025-08-28 DIAGNOSIS — M65.331 TRIGGER FINGER, RIGHT MIDDLE FINGER: Primary | ICD-10-CM

## 2025-08-28 RX ORDER — ONDANSETRON 2 MG/ML
4 INJECTION INTRAMUSCULAR; INTRAVENOUS EVERY 30 MIN PRN
Status: ACTIVE | OUTPATIENT
Start: 2025-08-28

## 2025-08-28 RX ORDER — LIDOCAINE 40 MG/G
CREAM TOPICAL
Status: ACTIVE | OUTPATIENT
Start: 2025-08-28

## 2025-08-28 RX ORDER — DEXAMETHASONE SODIUM PHOSPHATE 10 MG/ML
4 INJECTION, SOLUTION INTRAMUSCULAR; INTRAVENOUS
Status: ACTIVE | OUTPATIENT
Start: 2025-08-28

## 2025-08-28 RX ORDER — ONDANSETRON 4 MG/1
4 TABLET, ORALLY DISINTEGRATING ORAL EVERY 30 MIN PRN
Status: ACTIVE | OUTPATIENT
Start: 2025-08-28

## 2025-08-28 RX ORDER — OXYCODONE HYDROCHLORIDE 5 MG/1
5 TABLET ORAL
Status: ACTIVE | OUTPATIENT
Start: 2025-08-28

## 2025-08-28 RX ORDER — FENTANYL CITRATE 50 UG/ML
INJECTION, SOLUTION INTRAMUSCULAR; INTRAVENOUS PRN
Status: DISCONTINUED | OUTPATIENT
Start: 2025-08-28 | End: 2025-08-28

## 2025-08-28 RX ORDER — LIDOCAINE HYDROCHLORIDE 20 MG/ML
INJECTION, SOLUTION INFILTRATION; PERINEURAL PRN
Status: DISCONTINUED | OUTPATIENT
Start: 2025-08-28 | End: 2025-08-28

## 2025-08-28 RX ORDER — PROPOFOL 10 MG/ML
INJECTION, EMULSION INTRAVENOUS CONTINUOUS PRN
Status: DISCONTINUED | OUTPATIENT
Start: 2025-08-28 | End: 2025-08-28

## 2025-08-28 RX ORDER — OXYCODONE HYDROCHLORIDE 5 MG/1
5 TABLET ORAL EVERY 6 HOURS PRN
Qty: 5 TABLET | Refills: 0 | Status: SHIPPED | OUTPATIENT
Start: 2025-08-28 | End: 2025-08-31

## 2025-08-28 RX ORDER — HYDROMORPHONE HYDROCHLORIDE 1 MG/ML
0.2 INJECTION, SOLUTION INTRAMUSCULAR; INTRAVENOUS; SUBCUTANEOUS EVERY 5 MIN PRN
Status: ACTIVE | OUTPATIENT
Start: 2025-08-28

## 2025-08-28 RX ORDER — ACETAMINOPHEN 325 MG/1
975 TABLET ORAL ONCE
Status: COMPLETED | OUTPATIENT
Start: 2025-08-28 | End: 2025-08-28

## 2025-08-28 RX ORDER — LIDOCAINE HYDROCHLORIDE AND EPINEPHRINE 10; 10 MG/ML; UG/ML
INJECTION, SOLUTION INFILTRATION; PERINEURAL PRN
Status: DISCONTINUED | OUTPATIENT
Start: 2025-08-28 | End: 2025-08-28 | Stop reason: HOSPADM

## 2025-08-28 RX ORDER — HYDROMORPHONE HYDROCHLORIDE 1 MG/ML
0.4 INJECTION, SOLUTION INTRAMUSCULAR; INTRAVENOUS; SUBCUTANEOUS EVERY 5 MIN PRN
Status: ACTIVE | OUTPATIENT
Start: 2025-08-28

## 2025-08-28 RX ORDER — NALOXONE HYDROCHLORIDE 0.4 MG/ML
0.1 INJECTION, SOLUTION INTRAMUSCULAR; INTRAVENOUS; SUBCUTANEOUS
Status: ACTIVE | OUTPATIENT
Start: 2025-08-28

## 2025-08-28 RX ORDER — KETOROLAC TROMETHAMINE 30 MG/ML
INJECTION, SOLUTION INTRAMUSCULAR; INTRAVENOUS PRN
Status: DISCONTINUED | OUTPATIENT
Start: 2025-08-28 | End: 2025-08-28

## 2025-08-28 RX ORDER — SODIUM CHLORIDE, SODIUM LACTATE, POTASSIUM CHLORIDE, CALCIUM CHLORIDE 600; 310; 30; 20 MG/100ML; MG/100ML; MG/100ML; MG/100ML
INJECTION, SOLUTION INTRAVENOUS CONTINUOUS
Status: ACTIVE | OUTPATIENT
Start: 2025-08-28

## 2025-08-28 RX ORDER — DEXAMETHASONE SODIUM PHOSPHATE 4 MG/ML
INJECTION, SOLUTION INTRA-ARTICULAR; INTRALESIONAL; INTRAMUSCULAR; INTRAVENOUS; SOFT TISSUE PRN
Status: DISCONTINUED | OUTPATIENT
Start: 2025-08-28 | End: 2025-08-28

## 2025-08-28 RX ORDER — GLYCOPYRROLATE 0.2 MG/ML
INJECTION, SOLUTION INTRAMUSCULAR; INTRAVENOUS PRN
Status: DISCONTINUED | OUTPATIENT
Start: 2025-08-28 | End: 2025-08-28

## 2025-08-28 RX ORDER — ONDANSETRON 2 MG/ML
INJECTION INTRAMUSCULAR; INTRAVENOUS PRN
Status: DISCONTINUED | OUTPATIENT
Start: 2025-08-28 | End: 2025-08-28

## 2025-08-28 RX ORDER — FENTANYL CITRATE 50 UG/ML
25 INJECTION, SOLUTION INTRAMUSCULAR; INTRAVENOUS EVERY 5 MIN PRN
Status: ACTIVE | OUTPATIENT
Start: 2025-08-28

## 2025-08-28 RX ORDER — FENTANYL CITRATE 50 UG/ML
50 INJECTION, SOLUTION INTRAMUSCULAR; INTRAVENOUS EVERY 5 MIN PRN
Status: ACTIVE | OUTPATIENT
Start: 2025-08-28

## 2025-08-28 RX ORDER — MAGNESIUM HYDROXIDE 1200 MG/15ML
LIQUID ORAL PRN
Status: DISCONTINUED | OUTPATIENT
Start: 2025-08-28 | End: 2025-08-28 | Stop reason: HOSPADM

## 2025-08-28 RX ORDER — OXYCODONE HYDROCHLORIDE 5 MG/1
10 TABLET ORAL
Status: ACTIVE | OUTPATIENT
Start: 2025-08-28

## 2025-08-28 RX ADMIN — LIDOCAINE HYDROCHLORIDE 100 MG: 20 INJECTION, SOLUTION INFILTRATION; PERINEURAL at 08:01

## 2025-08-28 RX ADMIN — DEXAMETHASONE SODIUM PHOSPHATE 4 MG: 4 INJECTION, SOLUTION INTRA-ARTICULAR; INTRALESIONAL; INTRAMUSCULAR; INTRAVENOUS; SOFT TISSUE at 08:01

## 2025-08-28 RX ADMIN — ONDANSETRON 4 MG: 2 INJECTION INTRAMUSCULAR; INTRAVENOUS at 08:01

## 2025-08-28 RX ADMIN — SODIUM CHLORIDE, SODIUM LACTATE, POTASSIUM CHLORIDE, CALCIUM CHLORIDE: 600; 310; 30; 20 INJECTION, SOLUTION INTRAVENOUS at 06:49

## 2025-08-28 RX ADMIN — GLYCOPYRROLATE 0.2 MG: 0.2 INJECTION, SOLUTION INTRAMUSCULAR; INTRAVENOUS at 08:01

## 2025-08-28 RX ADMIN — FENTANYL CITRATE 100 MCG: 50 INJECTION, SOLUTION INTRAMUSCULAR; INTRAVENOUS at 07:58

## 2025-08-28 RX ADMIN — PROPOFOL 75 MCG/KG/MIN: 10 INJECTION, EMULSION INTRAVENOUS at 08:00

## 2025-08-28 RX ADMIN — KETOROLAC TROMETHAMINE 30 MG: 30 INJECTION, SOLUTION INTRAMUSCULAR; INTRAVENOUS at 08:31

## 2025-08-28 RX ADMIN — ACETAMINOPHEN 975 MG: 325 TABLET ORAL at 06:46

## 2025-08-28 ASSESSMENT — LIFESTYLE VARIABLES: TOBACCO_USE: 0

## 2025-08-28 ASSESSMENT — ENCOUNTER SYMPTOMS: SEIZURES: 0

## (undated) DEVICE — DRAPE SETUP C-SECTION INVISISHIELD 54X90" DYNJE5600

## (undated) DEVICE — TUBING SUCTION VACUUM COLLECTION 6FT 610

## (undated) DEVICE — GLOVE PROTEXIS BLUE W/NEU-THERA 6.5  2D73EB65

## (undated) DEVICE — PREP CHLORAPREP 26ML TINTED ORANGE  260815

## (undated) DEVICE — DRSG ABDOMINAL 07 1/2X8" 7197D

## (undated) DEVICE — SPECIMEN TRAP VACUUM SUCTION SAFETOUCH 003853-902

## (undated) DEVICE — LINEN ORTHO PACK 5446

## (undated) DEVICE — GLOVE ESTEEM POWDER FREE SMT 6.5  2D72PT65

## (undated) DEVICE — DECANTER TRANSFER DEVICE 2008S

## (undated) DEVICE — SOL WATER IRRIG 1000ML BOTTLE 07139-09

## (undated) DEVICE — GLOVE PROTEXIS W/NEU-THERA 7.0  2D73TE70

## (undated) DEVICE — LINEN TOWEL PACK X5 5464

## (undated) DEVICE — SUCTION CATH AIRLIFE TRI-FLO W/CONTROL PORT 14FR  T60C

## (undated) DEVICE — SUCTION CANNULA UTERINE 07MM CVD 022107-10

## (undated) DEVICE — CATH TRAY FOLEY 16FR SILICONE 907416

## (undated) DEVICE — BLADE KNIFE BEAVER MINI BEAVER6400

## (undated) DEVICE — SOL NACL 0.9% IRRIG 1000ML BOTTLE 07138-09

## (undated) DEVICE — SU MONOCRYL 4-0 PS-2 18" UND Y496G

## (undated) DEVICE — SU VICRYL 3-0 SH 27" J316H

## (undated) DEVICE — PACK MINOR HAND CUSTOM ASC 37-0097A

## (undated) DEVICE — PACK HAND CUSTOM ASC

## (undated) DEVICE — GOWN IMPERVIOUS 2XL BLUE

## (undated) DEVICE — SPONGE LAP 18X18" X8435

## (undated) DEVICE — SOL WATER IRRIG 500ML BOTTLE 2F7113

## (undated) DEVICE — SU VICRYL 0 CT-1 36" J346H

## (undated) DEVICE — GLOVE BIOGEL PI MICRO SZ 7.0 48570

## (undated) DEVICE — SPECIMEN CONTAINER 5OZ STERILE 2600SA

## (undated) DEVICE — GLOVE EXAM NITRILE LG PF LATEX FREE 5064

## (undated) DEVICE — DRSG STERI STRIP 1X5" R1548

## (undated) DEVICE — SYR 30ML SLIP TIP W/O NDL 302833

## (undated) DEVICE — SU VICRYL 3-0 CTX 36" UND J980H

## (undated) DEVICE — BNDG ELASTIC 3"X5YDS UNSTERILE 6611-30

## (undated) DEVICE — ESU GROUND PAD UNIVERSAL W/O CORD

## (undated) DEVICE — PAD CHUX UNDERPAD 30X36" P3036C

## (undated) DEVICE — ENDO BITE BLOCK ADULT OMNI-BLOC

## (undated) DEVICE — PREP CHLORAPREP 26ML TINTED HI-LITE ORANGE 930815

## (undated) DEVICE — SOL WATER IRRIG 1000ML BOTTLE 2F7114

## (undated) DEVICE — Device

## (undated) DEVICE — CAST PADDING 3" STERILE 9043S

## (undated) DEVICE — DRSG STERI STRIP 1/2X4" R1547

## (undated) DEVICE — COVER CAMERA IN-LIGHT DISP LT-C02

## (undated) DEVICE — DRSG TELFA ISLAND 4X10"

## (undated) DEVICE — SOL NACL 0.9% IRRIG 500ML BOTTLE 2F7123

## (undated) DEVICE — SU MONOCRYL 4-0 PS-2 27" UND Y426H

## (undated) DEVICE — STOCKING SLEEVE COMPRESSION CALF LG

## (undated) DEVICE — SUCTION VACUUM CANISTER STANDARD W/LID&CAPS 003987-901

## (undated) DEVICE — GLOVE PROTEXIS BLUE W/NEU-THERA 7.5  2D73EB75

## (undated) DEVICE — SUCTION MANIFOLD NEPTUNE 2 SYS 1 PORT 702-025-000

## (undated) DEVICE — CATH TRAY FOLEY 16FR BARDEX W/DRAIN BAG STATLOCK 300316A

## (undated) DEVICE — STRAP KNEE/BODY 31143004

## (undated) DEVICE — PACK C-SECTION LF PL15OTA83B

## (undated) DEVICE — KIT ENDO TURNOVER/PROCEDURE CARRY-ON 101822

## (undated) DEVICE — LINEN GOWN X4 5410

## (undated) DEVICE — SOL NACL 0.9% IRRIG 1000ML BOTTLE 2F7124

## (undated) DEVICE — BASIN SET MAJOR

## (undated) DEVICE — TUBING SUCTION 12"X1/4" N612

## (undated) DEVICE — SU MONOCRYL 0 CT-1 36" Y346H

## (undated) DEVICE — SUCTION CANISTER MEDIVAC LINER 1500ML W/LID 65651-515

## (undated) DEVICE — PREP SCRUB SOL EXIDINE 4% CHG 4OZ 29002-404

## (undated) RX ORDER — LIDOCAINE HYDROCHLORIDE 10 MG/ML
INJECTION, SOLUTION EPIDURAL; INFILTRATION; INTRACAUDAL; PERINEURAL
Status: DISPENSED
Start: 2021-12-22

## (undated) RX ORDER — LIDOCAINE HYDROCHLORIDE 10 MG/ML
INJECTION, SOLUTION EPIDURAL; INFILTRATION; INTRACAUDAL; PERINEURAL
Status: DISPENSED
Start: 2025-08-28

## (undated) RX ORDER — ONDANSETRON 2 MG/ML
INJECTION INTRAMUSCULAR; INTRAVENOUS
Status: DISPENSED
Start: 2025-08-28

## (undated) RX ORDER — GLYCOPYRROLATE 0.2 MG/ML
INJECTION, SOLUTION INTRAMUSCULAR; INTRAVENOUS
Status: DISPENSED
Start: 2025-08-28

## (undated) RX ORDER — PROPOFOL 10 MG/ML
INJECTION, EMULSION INTRAVENOUS
Status: DISPENSED
Start: 2021-12-14

## (undated) RX ORDER — LIDOCAINE HYDROCHLORIDE 10 MG/ML
INJECTION, SOLUTION EPIDURAL; INFILTRATION; INTRACAUDAL; PERINEURAL
Status: DISPENSED
Start: 2021-12-14

## (undated) RX ORDER — FENTANYL CITRATE 50 UG/ML
INJECTION, SOLUTION INTRAMUSCULAR; INTRAVENOUS
Status: DISPENSED
Start: 2021-12-22

## (undated) RX ORDER — ONDANSETRON 2 MG/ML
INJECTION INTRAMUSCULAR; INTRAVENOUS
Status: DISPENSED
Start: 2017-09-04

## (undated) RX ORDER — BUPIVACAINE HYDROCHLORIDE 5 MG/ML
INJECTION, SOLUTION EPIDURAL; INTRACAUDAL
Status: DISPENSED
Start: 2023-11-30

## (undated) RX ORDER — ACETAMINOPHEN 325 MG/1
TABLET ORAL
Status: DISPENSED
Start: 2021-12-14

## (undated) RX ORDER — FENTANYL CITRATE 50 UG/ML
INJECTION, SOLUTION INTRAMUSCULAR; INTRAVENOUS
Status: DISPENSED
Start: 2021-12-14

## (undated) RX ORDER — LIDOCAINE HYDROCHLORIDE 20 MG/ML
INJECTION, SOLUTION EPIDURAL; INFILTRATION; INTRACAUDAL; PERINEURAL
Status: DISPENSED
Start: 2021-12-14

## (undated) RX ORDER — KETOROLAC TROMETHAMINE 30 MG/ML
INJECTION, SOLUTION INTRAMUSCULAR; INTRAVENOUS
Status: DISPENSED
Start: 2021-12-14

## (undated) RX ORDER — DOXYCYCLINE 100 MG/1
CAPSULE ORAL
Status: DISPENSED
Start: 2021-12-14

## (undated) RX ORDER — LIDOCAINE HYDROCHLORIDE AND EPINEPHRINE 10; 10 MG/ML; UG/ML
INJECTION, SOLUTION INFILTRATION; PERINEURAL
Status: DISPENSED
Start: 2024-09-26

## (undated) RX ORDER — DEXAMETHASONE SODIUM PHOSPHATE 4 MG/ML
INJECTION, SOLUTION INTRA-ARTICULAR; INTRALESIONAL; INTRAMUSCULAR; INTRAVENOUS; SOFT TISSUE
Status: DISPENSED
Start: 2025-08-28

## (undated) RX ORDER — FENTANYL CITRATE 50 UG/ML
INJECTION, SOLUTION INTRAMUSCULAR; INTRAVENOUS
Status: DISPENSED
Start: 2025-08-28

## (undated) RX ORDER — LIDOCAINE HYDROCHLORIDE AND EPINEPHRINE 10; 10 MG/ML; UG/ML
INJECTION, SOLUTION INFILTRATION; PERINEURAL
Status: DISPENSED
Start: 2023-11-30

## (undated) RX ORDER — KETOROLAC TROMETHAMINE 30 MG/ML
INJECTION, SOLUTION INTRAMUSCULAR; INTRAVENOUS
Status: DISPENSED
Start: 2025-08-28

## (undated) RX ORDER — PHENYLEPHRINE HCL IN 0.9% NACL 1 MG/10 ML
SYRINGE (ML) INTRAVENOUS
Status: DISPENSED
Start: 2017-09-04

## (undated) RX ORDER — OXYTOCIN/0.9 % SODIUM CHLORIDE 30/500 ML
PLASTIC BAG, INJECTION (ML) INTRAVENOUS
Status: DISPENSED
Start: 2021-11-23

## (undated) RX ORDER — OXYTOCIN/0.9 % SODIUM CHLORIDE 30/500 ML
PLASTIC BAG, INJECTION (ML) INTRAVENOUS
Status: DISPENSED
Start: 2017-09-04

## (undated) RX ORDER — LIDOCAINE HYDROCHLORIDE AND EPINEPHRINE 10; 10 MG/ML; UG/ML
INJECTION, SOLUTION INFILTRATION; PERINEURAL
Status: DISPENSED
Start: 2025-08-28

## (undated) RX ORDER — PROPOFOL 10 MG/ML
INJECTION, EMULSION INTRAVENOUS
Status: DISPENSED
Start: 2025-08-28

## (undated) RX ORDER — EPINEPHRINE 1 MG/ML
INJECTION, SOLUTION, CONCENTRATE INTRAVENOUS
Status: DISPENSED
Start: 2021-11-23

## (undated) RX ORDER — ACETAMINOPHEN 325 MG/1
TABLET ORAL
Status: DISPENSED
Start: 2025-08-28

## (undated) RX ORDER — ACETAMINOPHEN 325 MG/1
TABLET ORAL
Status: DISPENSED
Start: 2024-09-26

## (undated) RX ORDER — FENTANYL CITRATE 50 UG/ML
INJECTION, SOLUTION INTRAMUSCULAR; INTRAVENOUS
Status: DISPENSED
Start: 2021-11-23

## (undated) RX ORDER — MORPHINE SULFATE 1 MG/ML
INJECTION, SOLUTION EPIDURAL; INTRATHECAL; INTRAVENOUS
Status: DISPENSED
Start: 2021-11-23